# Patient Record
Sex: MALE | Race: WHITE | NOT HISPANIC OR LATINO | Employment: OTHER | URBAN - NONMETROPOLITAN AREA
[De-identification: names, ages, dates, MRNs, and addresses within clinical notes are randomized per-mention and may not be internally consistent; named-entity substitution may affect disease eponyms.]

---

## 2017-04-18 ENCOUNTER — OFFICE VISIT (OUTPATIENT)
Dept: RETAIL CLINIC | Facility: CLINIC | Age: 62
End: 2017-04-18

## 2017-04-18 VITALS
WEIGHT: 192.6 LBS | OXYGEN SATURATION: 97 % | RESPIRATION RATE: 20 BRPM | TEMPERATURE: 98 F | HEIGHT: 72 IN | HEART RATE: 97 BPM | BODY MASS INDEX: 26.09 KG/M2

## 2017-04-18 DIAGNOSIS — J06.9 UPPER RESPIRATORY TRACT INFECTION, UNSPECIFIED TYPE: Primary | ICD-10-CM

## 2017-04-18 PROCEDURE — 99203 OFFICE O/P NEW LOW 30 MIN: CPT | Performed by: NURSE PRACTITIONER

## 2017-04-18 RX ORDER — ALBUTEROL SULFATE 2.5 MG/3ML
2.5 SOLUTION RESPIRATORY (INHALATION) EVERY 4 HOURS PRN
Status: ON HOLD | COMMUNITY
End: 2018-05-06

## 2017-04-18 RX ORDER — AZITHROMYCIN 250 MG/1
TABLET, FILM COATED ORAL
Qty: 6 TABLET | Refills: 0 | Status: SHIPPED | OUTPATIENT
Start: 2017-04-18 | End: 2018-05-08 | Stop reason: HOSPADM

## 2017-04-18 RX ORDER — ALBUTEROL SULFATE 90 UG/1
2 AEROSOL, METERED RESPIRATORY (INHALATION) EVERY 4 HOURS PRN
COMMUNITY
End: 2018-12-25 | Stop reason: HOSPADM

## 2017-04-18 RX ORDER — PREDNISONE 10 MG/1
10 TABLET ORAL DAILY
Qty: 15 TABLET | Refills: 0 | Status: SHIPPED | OUTPATIENT
Start: 2017-04-18 | End: 2017-04-24

## 2017-04-18 RX ORDER — HYDROCHLOROTHIAZIDE 12.5 MG/1
25 CAPSULE, GELATIN COATED ORAL DAILY
Status: ON HOLD | COMMUNITY
End: 2018-05-06

## 2017-04-18 NOTE — PROGRESS NOTES
"Subjective   Maximo Kwon is a 62 y.o. male.   Chief Complaint   Patient presents with   • URI      URI    This is a new problem. The current episode started in the past 7 days. There has been no fever. Associated symptoms include congestion, coughing, rhinorrhea and wheezing. Pertinent negatives include no headaches.      Maximo presents to the clinic today c/o cough which started 3 days ago. Associated symptoms include wheezing and rhinorrhea.  Wheezing is primarily at night.  He has an albuterol nebulizer machine that he uses for his COPD, he has been using this about twice a day. The albuterol has offered some relief in symptoms. Has tried robitussin cough syrup without adequate relief.   Refer to ROS for additional information.    The following portions of the patient's history were reviewed and updated as appropriate: allergies, current medications, past family history, past medical history, past social history, past surgical history and problem list.    Review of Systems   HENT: Positive for congestion and rhinorrhea.    Respiratory: Positive for cough and wheezing.    Neurological: Negative for headaches.   Psychiatric/Behavioral: Positive for sleep disturbance.       No Known Allergies    Pulse 97  Temp 98 °F (36.7 °C) (Oral)   Resp 20  Ht 72\" (182.9 cm)  Wt 192 lb 9.6 oz (87.4 kg)  SpO2 97%  BMI 26.12 kg/m2      Objective     Physical Exam   Constitutional: He is oriented to person, place, and time. He appears well-developed and well-nourished.   HENT:   Head: Normocephalic.   Right Ear: External ear normal.   Left Ear: External ear normal.   Nose: Rhinorrhea present. Right sinus exhibits no maxillary sinus tenderness and no frontal sinus tenderness. Left sinus exhibits no maxillary sinus tenderness and no frontal sinus tenderness.   Mouth/Throat: Oropharynx is clear and moist.   Cardiovascular: Normal rate, regular rhythm and normal heart sounds.    Pulmonary/Chest: Effort normal. He has wheezes " (scant) in the right lower field and the left lower field.   Neurological: He is alert and oriented to person, place, and time.   Nursing note and vitals reviewed.      Assessment/Plan   Maximo was seen today for uri.    Diagnoses and all orders for this visit:    Upper respiratory tract infection, unspecified type  -     azithromycin (ZITHROMAX Z-AURY) 250 MG tablet; Take 2 tablets the first day, then 1 tablet daily for 4 days.  -     predniSONE (DELTASONE) 10 MG tablet; Take 1 tablet by mouth Daily for 6 days. Take 4 tablets for 2 days, 3 tablets for one day and 2 tablets for 2 days.               Follow up with PCP or at the Urgent Care if symptoms worsen or fail to improve within next 48-72 hours.  Patient teaching information discussed and provided to the patient. Patient verbalized understanding.

## 2017-04-18 NOTE — PATIENT INSTRUCTIONS
"Upper Respiratory Infection, Adult  Most upper respiratory infections (URIs) are a viral infection of the air passages leading to the lungs. A URI affects the nose, throat, and upper air passages. The most common type of URI is nasopharyngitis and is typically referred to as \"the common cold.\"  URIs run their course and usually go away on their own. Most of the time, a URI does not require medical attention, but sometimes a bacterial infection in the upper airways can follow a viral infection. This is called a secondary infection. Sinus and middle ear infections are common types of secondary upper respiratory infections.  Bacterial pneumonia can also complicate a URI. A URI can worsen asthma and chronic obstructive pulmonary disease (COPD). Sometimes, these complications can require emergency medical care and may be life threatening.   CAUSES  Almost all URIs are caused by viruses. A virus is a type of germ and can spread from one person to another.   RISKS FACTORS  You may be at risk for a URI if:   · You smoke.    · You have chronic heart or lung disease.  · You have a weakened defense (immune) system.    · You are very young or very old.    · You have nasal allergies or asthma.  · You work in crowded or poorly ventilated areas.  · You work in health care facilities or schools.  SIGNS AND SYMPTOMS   Symptoms typically develop 2-3 days after you come in contact with a cold virus. Most viral URIs last 7-10 days. However, viral URIs from the influenza virus (flu virus) can last 14-18 days and are typically more severe. Symptoms may include:   · Runny or stuffy (congested) nose.    · Sneezing.    · Cough.    · Sore throat.    · Headache.    · Fatigue.    · Fever.    · Loss of appetite.    · Pain in your forehead, behind your eyes, and over your cheekbones (sinus pain).  · Muscle aches.    DIAGNOSIS   Your health care provider may diagnose a URI by:  · Physical exam.  · Tests to check that your symptoms are not due to " another condition such as:  ¨ Strep throat.  ¨ Sinusitis.  ¨ Pneumonia.  ¨ Asthma.  TREATMENT   A URI goes away on its own with time. It cannot be cured with medicines, but medicines may be prescribed or recommended to relieve symptoms. Medicines may help:  · Reduce your fever.  · Reduce your cough.  · Relieve nasal congestion.  HOME CARE INSTRUCTIONS   · Take medicines only as directed by your health care provider.    · Gargle warm saltwater or take cough drops to comfort your throat as directed by your health care provider.  · Use a warm mist humidifier or inhale steam from a shower to increase air moisture. This may make it easier to breathe.  · Drink enough fluid to keep your urine clear or pale yellow.    · Eat soups and other clear broths and maintain good nutrition.    · Rest as needed.    · Return to work when your temperature has returned to normal or as your health care provider advises. You may need to stay home longer to avoid infecting others. You can also use a face mask and careful hand washing to prevent spread of the virus.  · Increase the usage of your inhaler if you have asthma.    · Do not use any tobacco products, including cigarettes, chewing tobacco, or electronic cigarettes. If you need help quitting, ask your health care provider.  PREVENTION   The best way to protect yourself from getting a cold is to practice good hygiene.   · Avoid oral or hand contact with people with cold symptoms.    · Wash your hands often if contact occurs.    There is no clear evidence that vitamin C, vitamin E, echinacea, or exercise reduces the chance of developing a cold. However, it is always recommended to get plenty of rest, exercise, and practice good nutrition.   SEEK MEDICAL CARE IF:   · You are getting worse rather than better.    · Your symptoms are not controlled by medicine.    · You have chills.  · You have worsening shortness of breath.  · You have brown or red mucus.  · You have yellow or brown nasal  discharge.  · You have pain in your face, especially when you bend forward.  · You have a fever.  · You have swollen neck glands.  · You have pain while swallowing.  · You have white areas in the back of your throat.  SEEK IMMEDIATE MEDICAL CARE IF:   · You have severe or persistent:    Headache.    Ear pain.    Sinus pain.    Chest pain.  · You have chronic lung disease and any of the following:    Wheezing.    Prolonged cough.    Coughing up blood.    A change in your usual mucus.  · You have a stiff neck.  · You have changes in your:    Vision.    Hearing.    Thinking.    Mood.  MAKE SURE YOU:   · Understand these instructions.  · Will watch your condition.  · Will get help right away if you are not doing well or get worse.     This information is not intended to replace advice given to you by your health care provider. Make sure you discuss any questions you have with your health care provider.     Document Released: 06/13/2002 Document Revised: 05/03/2016 Document Reviewed: 03/25/2015  [employer] Interactive Patient Education ©2016 [employer]    Cough, Adult  Coughing is a reflex that clears your throat and your airways. Coughing helps to heal and protect your lungs. It is normal to cough occasionally, but a cough that happens with other symptoms or lasts a long time may be a sign of a condition that needs treatment. A cough may last only 2-3 weeks (acute), or it may last longer than 8 weeks (chronic).  CAUSES  Coughing is commonly caused by:  · Breathing in substances that irritate your lungs.  · A viral or bacterial respiratory infection.  · Allergies.  · Asthma.  · Postnasal drip.  · Smoking.  · Acid backing up from the stomach into the esophagus (gastroesophageal reflux).  · Certain medicines.  · Chronic lung problems, including COPD (or rarely, lung cancer).  · Other medical conditions such as heart failure.  HOME CARE INSTRUCTIONS   Pay attention to any changes in your symptoms. Take these actions to  help with your discomfort:  · Take medicines only as told by your health care provider.    If you were prescribed an antibiotic medicine, take it as told by your health care provider. Do not stop taking the antibiotic even if you start to feel better.    Talk with your health care provider before you take a cough suppressant medicine.  · Drink enough fluid to keep your urine clear or pale yellow.  · If the air is dry, use a cold steam vaporizer or humidifier in your bedroom or your home to help loosen secretions.  · Avoid anything that causes you to cough at work or at home.  · If your cough is worse at night, try sleeping in a semi-upright position.  · Avoid cigarette smoke. If you smoke, quit smoking. If you need help quitting, ask your health care provider.  · Avoid caffeine.  · Avoid alcohol.  · Rest as needed.  SEEK MEDICAL CARE IF:   · You have new symptoms.  · You cough up pus.  · Your cough does not get better after 2-3 weeks, or your cough gets worse.  · You cannot control your cough with suppressant medicines and you are losing sleep.  · You develop pain that is getting worse or pain that is not controlled with pain medicines.  · You have a fever.  · You have unexplained weight loss.  · You have night sweats.  SEEK IMMEDIATE MEDICAL CARE IF:  · You cough up blood.  · You have difficulty breathing.  · Your heartbeat is very fast.     This information is not intended to replace advice given to you by your health care provider. Make sure you discuss any questions you have with your health care provider.     Document Released: 06/15/2012 Document Revised: 09/07/2016 Document Reviewed: 02/24/2016  [employer] Interactive Patient Education ©2016 [employer]

## 2018-03-11 ENCOUNTER — HOSPITAL ENCOUNTER (EMERGENCY)
Facility: HOSPITAL | Age: 63
Discharge: HOME | End: 2018-03-11
Attending: EMERGENCY MEDICINE | Admitting: EMERGENCY MEDICINE

## 2018-03-11 ENCOUNTER — APPOINTMENT (OUTPATIENT)
Dept: GENERAL RADIOLOGY | Facility: HOSPITAL | Age: 63
End: 2018-03-11

## 2018-03-11 VITALS
OXYGEN SATURATION: 96 % | HEIGHT: 72 IN | SYSTOLIC BLOOD PRESSURE: 131 MMHG | TEMPERATURE: 99.1 F | WEIGHT: 190 LBS | RESPIRATION RATE: 18 BRPM | DIASTOLIC BLOOD PRESSURE: 70 MMHG | HEART RATE: 96 BPM | BODY MASS INDEX: 25.73 KG/M2

## 2018-03-11 DIAGNOSIS — J44.1 CHRONIC OBSTRUCTIVE PULMONARY DISEASE WITH ACUTE EXACERBATION (HCC): Primary | ICD-10-CM

## 2018-03-11 LAB
A-A DO2: 41.1 MMHG (ref 0–300)
ALBUMIN SERPL-MCNC: 4.4 G/DL (ref 3.4–4.8)
ALBUMIN/GLOB SERPL: 1.5 G/DL (ref 1.5–2.5)
ALP SERPL-CCNC: 71 U/L (ref 40–129)
ALT SERPL W P-5'-P-CCNC: 41 U/L (ref 10–44)
ANION GAP SERPL CALCULATED.3IONS-SCNC: 7 MMOL/L (ref 3.6–11.2)
ARTERIAL PATENCY WRIST A: ABNORMAL
AST SERPL-CCNC: 24 U/L (ref 10–34)
ATMOSPHERIC PRESS: 727 MMHG
BASE EXCESS BLDA CALC-SCNC: 2.2 MMOL/L
BASOPHILS # BLD AUTO: 0.02 10*3/MM3 (ref 0–0.3)
BASOPHILS NFR BLD AUTO: 0.2 % (ref 0–2)
BDY SITE: ABNORMAL
BILIRUB SERPL-MCNC: 1.4 MG/DL (ref 0.2–1.8)
BILIRUB UR QL STRIP: NEGATIVE
BNP SERPL-MCNC: 18 PG/ML (ref 0–100)
BODY TEMPERATURE: 98.6 C
BUN BLD-MCNC: 14 MG/DL (ref 7–21)
BUN/CREAT SERPL: 15.4 (ref 7–25)
CALCIUM SPEC-SCNC: 9.2 MG/DL (ref 7.7–10)
CHLORIDE SERPL-SCNC: 97 MMOL/L (ref 99–112)
CLARITY UR: CLEAR
CO2 SERPL-SCNC: 29 MMOL/L (ref 24.3–31.9)
COHGB MFR BLD: 2.4 % (ref 0–5)
COLOR UR: YELLOW
CREAT BLD-MCNC: 0.91 MG/DL (ref 0.43–1.29)
D-LACTATE SERPL-SCNC: 2.3 MMOL/L (ref 0.5–2)
DEPRECATED RDW RBC AUTO: 49.9 FL (ref 37–54)
EOSINOPHIL # BLD AUTO: 0.02 10*3/MM3 (ref 0–0.7)
EOSINOPHIL NFR BLD AUTO: 0.2 % (ref 0–5)
ERYTHROCYTE [DISTWIDTH] IN BLOOD BY AUTOMATED COUNT: 15.4 % (ref 11.5–14.5)
FLUAV AG NPH QL: NEGATIVE
FLUBV AG NPH QL IA: NEGATIVE
GFR SERPL CREATININE-BSD FRML MDRD: 84 ML/MIN/1.73
GLOBULIN UR ELPH-MCNC: 3 GM/DL
GLUCOSE BLD-MCNC: 216 MG/DL (ref 70–110)
GLUCOSE UR STRIP-MCNC: ABNORMAL MG/DL
HCO3 BLDA-SCNC: 24.7 MMOL/L (ref 22–26)
HCT VFR BLD AUTO: 39 % (ref 42–52)
HCT VFR BLD CALC: 39 % (ref 42–52)
HGB BLD-MCNC: 12.9 G/DL (ref 14–18)
HGB BLDA-MCNC: 13.2 G/DL (ref 12–16)
HGB UR QL STRIP.AUTO: NEGATIVE
HOLD SPECIMEN: NORMAL
HOROWITZ INDEX BLD+IHG-RTO: 21 %
IMM GRANULOCYTES # BLD: 0.02 10*3/MM3 (ref 0–0.03)
IMM GRANULOCYTES NFR BLD: 0.2 % (ref 0–0.5)
KETONES UR QL STRIP: ABNORMAL
LEUKOCYTE ESTERASE UR QL STRIP.AUTO: NEGATIVE
LIPASE SERPL-CCNC: 31 U/L (ref 13–60)
LYMPHOCYTES # BLD AUTO: 0.93 10*3/MM3 (ref 1–3)
LYMPHOCYTES NFR BLD AUTO: 7.2 % (ref 21–51)
MCH RBC QN AUTO: 30.4 PG (ref 27–33)
MCHC RBC AUTO-ENTMCNC: 33.1 G/DL (ref 33–37)
MCV RBC AUTO: 91.8 FL (ref 80–94)
METHGB BLD QL: 0.2 % (ref 0–3)
MODALITY: ABNORMAL
MONOCYTES # BLD AUTO: 1.04 10*3/MM3 (ref 0.1–0.9)
MONOCYTES NFR BLD AUTO: 8.1 % (ref 0–10)
NEUTROPHILS # BLD AUTO: 10.83 10*3/MM3 (ref 1.4–6.5)
NEUTROPHILS NFR BLD AUTO: 84.1 % (ref 30–70)
NITRITE UR QL STRIP: NEGATIVE
OSMOLALITY SERPL CALC.SUM OF ELEC: 273.4 MOSM/KG (ref 273–305)
OXYHGB MFR BLDV: 90.6 % (ref 85–100)
PCO2 BLDA: 32 MM HG (ref 35–45)
PH BLDA: 7.51 PH UNITS (ref 7.35–7.45)
PH UR STRIP.AUTO: 6.5 [PH] (ref 5–8)
PLATELET # BLD AUTO: 197 10*3/MM3 (ref 130–400)
PMV BLD AUTO: 11.9 FL (ref 6–10)
PO2 BLDA: 63.4 MM HG (ref 80–100)
POTASSIUM BLD-SCNC: 4.2 MMOL/L (ref 3.5–5.3)
PROT SERPL-MCNC: 7.4 G/DL (ref 6–8)
PROT UR QL STRIP: NEGATIVE
RBC # BLD AUTO: 4.25 10*6/MM3 (ref 4.7–6.1)
SAO2 % BLDCOA: 93 % (ref 90–100)
SODIUM BLD-SCNC: 133 MMOL/L (ref 135–153)
SP GR UR STRIP: 1.02 (ref 1–1.03)
TROPONIN I SERPL-MCNC: <0.006 NG/ML
UROBILINOGEN UR QL STRIP: ABNORMAL
WBC NRBC COR # BLD: 12.86 10*3/MM3 (ref 4.5–12.5)

## 2018-03-11 PROCEDURE — 87804 INFLUENZA ASSAY W/OPTIC: CPT | Performed by: EMERGENCY MEDICINE

## 2018-03-11 PROCEDURE — 71045 X-RAY EXAM CHEST 1 VIEW: CPT

## 2018-03-11 PROCEDURE — 36600 WITHDRAWAL OF ARTERIAL BLOOD: CPT | Performed by: EMERGENCY MEDICINE

## 2018-03-11 PROCEDURE — 83880 ASSAY OF NATRIURETIC PEPTIDE: CPT | Performed by: EMERGENCY MEDICINE

## 2018-03-11 PROCEDURE — 85025 COMPLETE CBC W/AUTO DIFF WBC: CPT | Performed by: EMERGENCY MEDICINE

## 2018-03-11 PROCEDURE — 25010000002 CEFTRIAXONE: Performed by: EMERGENCY MEDICINE

## 2018-03-11 PROCEDURE — 94640 AIRWAY INHALATION TREATMENT: CPT

## 2018-03-11 PROCEDURE — 99284 EMERGENCY DEPT VISIT MOD MDM: CPT

## 2018-03-11 PROCEDURE — 93010 ELECTROCARDIOGRAM REPORT: CPT | Performed by: INTERNAL MEDICINE

## 2018-03-11 PROCEDURE — 83605 ASSAY OF LACTIC ACID: CPT | Performed by: EMERGENCY MEDICINE

## 2018-03-11 PROCEDURE — 96368 THER/DIAG CONCURRENT INF: CPT

## 2018-03-11 PROCEDURE — 82375 ASSAY CARBOXYHB QUANT: CPT | Performed by: EMERGENCY MEDICINE

## 2018-03-11 PROCEDURE — 83690 ASSAY OF LIPASE: CPT | Performed by: EMERGENCY MEDICINE

## 2018-03-11 PROCEDURE — 96365 THER/PROPH/DIAG IV INF INIT: CPT

## 2018-03-11 PROCEDURE — 80053 COMPREHEN METABOLIC PANEL: CPT | Performed by: EMERGENCY MEDICINE

## 2018-03-11 PROCEDURE — 94799 UNLISTED PULMONARY SVC/PX: CPT

## 2018-03-11 PROCEDURE — 83050 HGB METHEMOGLOBIN QUAN: CPT | Performed by: EMERGENCY MEDICINE

## 2018-03-11 PROCEDURE — 25010000002 METHYLPREDNISOLONE PER 125 MG: Performed by: EMERGENCY MEDICINE

## 2018-03-11 PROCEDURE — 87040 BLOOD CULTURE FOR BACTERIA: CPT | Performed by: EMERGENCY MEDICINE

## 2018-03-11 PROCEDURE — 82805 BLOOD GASES W/O2 SATURATION: CPT | Performed by: EMERGENCY MEDICINE

## 2018-03-11 PROCEDURE — 84484 ASSAY OF TROPONIN QUANT: CPT | Performed by: EMERGENCY MEDICINE

## 2018-03-11 PROCEDURE — 71045 X-RAY EXAM CHEST 1 VIEW: CPT | Performed by: RADIOLOGY

## 2018-03-11 PROCEDURE — 81003 URINALYSIS AUTO W/O SCOPE: CPT | Performed by: EMERGENCY MEDICINE

## 2018-03-11 PROCEDURE — 96375 TX/PRO/DX INJ NEW DRUG ADDON: CPT

## 2018-03-11 PROCEDURE — 93005 ELECTROCARDIOGRAM TRACING: CPT | Performed by: EMERGENCY MEDICINE

## 2018-03-11 PROCEDURE — 96361 HYDRATE IV INFUSION ADD-ON: CPT

## 2018-03-11 RX ORDER — METHYLPREDNISOLONE 4 MG/1
TABLET ORAL
Qty: 1 EACH | Refills: 0 | Status: SHIPPED | OUTPATIENT
Start: 2018-03-11 | End: 2018-05-08 | Stop reason: HOSPADM

## 2018-03-11 RX ORDER — DOXYCYCLINE 100 MG/1
100 CAPSULE ORAL EVERY 12 HOURS
Qty: 20 CAPSULE | Refills: 0 | Status: ON HOLD | OUTPATIENT
Start: 2018-03-11 | End: 2018-05-06

## 2018-03-11 RX ORDER — OMEPRAZOLE 20 MG/1
20 CAPSULE, DELAYED RELEASE ORAL DAILY
COMMUNITY
End: 2022-02-28 | Stop reason: SDUPTHER

## 2018-03-11 RX ORDER — PRAVASTATIN SODIUM 20 MG
20 TABLET ORAL NIGHTLY
COMMUNITY
End: 2018-12-25 | Stop reason: HOSPADM

## 2018-03-11 RX ORDER — METHYLPREDNISOLONE SODIUM SUCCINATE 125 MG/2ML
125 INJECTION, POWDER, LYOPHILIZED, FOR SOLUTION INTRAMUSCULAR; INTRAVENOUS ONCE
Status: COMPLETED | OUTPATIENT
Start: 2018-03-11 | End: 2018-03-11

## 2018-03-11 RX ORDER — GABAPENTIN 300 MG/1
300 CAPSULE ORAL 2 TIMES DAILY
Status: ON HOLD | COMMUNITY
End: 2018-05-06

## 2018-03-11 RX ORDER — SODIUM CHLORIDE 0.9 % (FLUSH) 0.9 %
10 SYRINGE (ML) INJECTION AS NEEDED
Status: DISCONTINUED | OUTPATIENT
Start: 2018-03-11 | End: 2018-03-11 | Stop reason: HOSPADM

## 2018-03-11 RX ORDER — GUAIFENESIN 600 MG/1
1200 TABLET, EXTENDED RELEASE ORAL EVERY 12 HOURS SCHEDULED
COMMUNITY
End: 2019-04-05

## 2018-03-11 RX ORDER — LISINOPRIL 2.5 MG/1
2.5 TABLET ORAL DAILY
COMMUNITY
End: 2018-12-25 | Stop reason: HOSPADM

## 2018-03-11 RX ORDER — SODIUM CHLORIDE 9 MG/ML
125 INJECTION, SOLUTION INTRAVENOUS CONTINUOUS
Status: DISCONTINUED | OUTPATIENT
Start: 2018-03-11 | End: 2018-03-11 | Stop reason: HOSPADM

## 2018-03-11 RX ORDER — CEFDINIR 300 MG/1
300 CAPSULE ORAL EVERY 12 HOURS
Qty: 20 CAPSULE | Refills: 0 | Status: ON HOLD | OUTPATIENT
Start: 2018-03-11 | End: 2018-05-06

## 2018-03-11 RX ORDER — IPRATROPIUM BROMIDE AND ALBUTEROL SULFATE 2.5; .5 MG/3ML; MG/3ML
3 SOLUTION RESPIRATORY (INHALATION)
Status: COMPLETED | OUTPATIENT
Start: 2018-03-11 | End: 2018-03-11

## 2018-03-11 RX ADMIN — DOXYCYCLINE 100 MG: 100 INJECTION, POWDER, LYOPHILIZED, FOR SOLUTION INTRAVENOUS at 13:40

## 2018-03-11 RX ADMIN — IPRATROPIUM BROMIDE AND ALBUTEROL SULFATE 3 ML: .5; 3 SOLUTION RESPIRATORY (INHALATION) at 12:33

## 2018-03-11 RX ADMIN — CEFTRIAXONE 1 G: 1 INJECTION, POWDER, FOR SOLUTION INTRAMUSCULAR; INTRAVENOUS at 13:38

## 2018-03-11 RX ADMIN — METHYLPREDNISOLONE SODIUM SUCCINATE 125 MG: 125 INJECTION, POWDER, FOR SOLUTION INTRAMUSCULAR; INTRAVENOUS at 12:55

## 2018-03-11 RX ADMIN — SODIUM CHLORIDE 125 ML/HR: 9 INJECTION, SOLUTION INTRAVENOUS at 14:57

## 2018-03-11 RX ADMIN — SODIUM CHLORIDE 1000 ML: 9 INJECTION, SOLUTION INTRAVENOUS at 12:55

## 2018-03-11 RX ADMIN — IPRATROPIUM BROMIDE AND ALBUTEROL SULFATE 3 ML: .5; 3 SOLUTION RESPIRATORY (INHALATION) at 13:27

## 2018-03-11 NOTE — ED PROVIDER NOTES
Subjective   Patient is a 62-year-old white male who reports a history of COPD.  He reports that about 6 weeks ago he developed a bad case of bronchitis which took him about 4 weeks to recover from.  He states that time he did, but over the last 2-3 days she has had increased cough productive of thick yellow sputum as well as progressively worsening shortness of breath and wheezing.  He has felt feverish, had some chills, had some generalized myalgias.  He denies chest pain, abdominal pain, nausea, vomiting, diarrhea, bloody stools, any other associated symptoms or other complaints.            Review of Systems   Constitutional: Positive for chills and fever. Negative for diaphoresis.   HENT: Negative for ear pain, sore throat and trouble swallowing.    Eyes: Negative for photophobia and pain.   Respiratory: Positive for cough, shortness of breath and wheezing. Negative for stridor.    Cardiovascular: Negative for chest pain and palpitations.   Gastrointestinal: Negative for abdominal distention, abdominal pain, blood in stool, diarrhea, nausea and vomiting.   Endocrine: Negative for polydipsia and polyphagia.   Genitourinary: Negative for difficulty urinating and flank pain.   Musculoskeletal: Negative for back pain, neck pain and neck stiffness.   Skin: Negative for color change and pallor.   Neurological: Negative for seizures, syncope and speech difficulty.   Psychiatric/Behavioral: Negative for confusion.   All other systems reviewed and are negative.      Past Medical History:   Diagnosis Date   • COPD (chronic obstructive pulmonary disease)    • Diabetes mellitus    • Diverticulitis    • Hypertension        No Known Allergies    History reviewed. No pertinent surgical history.    History reviewed. No pertinent family history.    Social History     Social History   • Marital status:      Social History Main Topics   • Smoking status: Former Smoker     Packs/day: 1.00     Years: 30.00     Types:  Cigarettes     Quit date: 2008   • Smokeless tobacco: Never Used   • Alcohol use No   • Drug use: No   • Sexual activity: Defer     Other Topics Concern   • Not on file           Objective   Physical Exam   Constitutional: He is oriented to person, place, and time. He appears well-developed and well-nourished. No distress.   HENT:   Head: Normocephalic and atraumatic.   Eyes: EOM are normal. Pupils are equal, round, and reactive to light. No scleral icterus.   Neck: Normal range of motion. Neck supple. No no neck rigidity. No tracheal deviation present.   Cardiovascular: Normal rate, regular rhythm and intact distal pulses.    Pulmonary/Chest: No respiratory distress. He exhibits no tenderness.   Patient does not appear to be in respiratory distress.  Lung exam reveals moderate expiratory wheezes throughout.  Overall air movement seems pretty good.  He is able to speak full sentences without difficulty.   Abdominal: Soft. Bowel sounds are normal. There is no tenderness. There is no rebound and no guarding.   Musculoskeletal: Normal range of motion. He exhibits no tenderness.   Neurological: He is alert and oriented to person, place, and time. He has normal strength. No sensory deficit. He exhibits normal muscle tone. Coordination normal. GCS eye subscore is 4. GCS verbal subscore is 5. GCS motor subscore is 6.   Skin: Skin is warm and dry. Capillary refill takes less than 2 seconds. He is not diaphoretic. No cyanosis. No pallor.   Psychiatric: He has a normal mood and affect. His behavior is normal.   Vitals reviewed.      Procedures  EKG shows sinus rhythm with a rate of 100.  No apparent acute ischemia.  XR Chest 1 View    (Results Pending)      Chest x-ray, pending radiologist review, shows no definite infiltrate.  There is some vague haziness in the right base.  No other apparent acute disease.  There are no previous films on file with which to compare.  Results for orders placed or performed during the hospital  encounter of 03/11/18   Influenza Antigen, Rapid - Swab, Nasopharynx   Result Value Ref Range    Influenza A Ag, EIA Negative Negative    Influenza B Ag, EIA Negative Negative   Comprehensive Metabolic Panel   Result Value Ref Range    Glucose 216 (H) 70 - 110 mg/dL    BUN 14 7 - 21 mg/dL    Creatinine 0.91 0.43 - 1.29 mg/dL    Sodium 133 (L) 135 - 153 mmol/L    Potassium 4.2 3.5 - 5.3 mmol/L    Chloride 97 (L) 99 - 112 mmol/L    CO2 29.0 24.3 - 31.9 mmol/L    Calcium 9.2 7.7 - 10.0 mg/dL    Total Protein 7.4 6.0 - 8.0 g/dL    Albumin 4.40 3.40 - 4.80 g/dL    ALT (SGPT) 41 10 - 44 U/L    AST (SGOT) 24 10 - 34 U/L    Alkaline Phosphatase 71 40 - 129 U/L    Total Bilirubin 1.4 0.2 - 1.8 mg/dL    eGFR Non African Amer 84 >60 mL/min/1.73    Globulin 3.0 gm/dL    A/G Ratio 1.5 1.5 - 2.5 g/dL    BUN/Creatinine Ratio 15.4 7.0 - 25.0    Anion Gap 7.0 3.6 - 11.2 mmol/L   Lipase   Result Value Ref Range    Lipase 31 13 - 60 U/L   Urinalysis With / Culture If Indicated - Urine, Clean Catch   Result Value Ref Range    Color, UA Yellow Yellow, Straw    Appearance, UA Clear Clear    pH, UA 6.5 5.0 - 8.0    Specific Gravity, UA 1.020 1.005 - 1.030    Glucose,  mg/dL (Trace) (A) Negative    Ketones, UA Trace (A) Negative    Bilirubin, UA Negative Negative    Blood, UA Negative Negative    Protein, UA Negative Negative    Leuk Esterase, UA Negative Negative    Nitrite, UA Negative Negative    Urobilinogen, UA 1.0 E.U./dL 0.2 - 1.0 E.U./dL   Blood Gas, Arterial   Result Value Ref Range    Site Arterial: right brachial     Max's Test N/A     pH, Arterial 7.506 (C) 7.350 - 7.450 pH units    pCO2, Arterial 32.0 (L) 35.0 - 45.0 mm Hg    pO2, Arterial 63.4 (L) 80.0 - 100.0 mm Hg    HCO3, Arterial 24.7 22.0 - 26.0 mmol/L    Base Excess, Arterial 2.2 mmol/L    O2 Saturation, Arterial 93.0 90.0 - 100.0 %    Hemoglobin, Blood Gas 13.2 12 - 16 g/dL    Hematocrit, Blood Gas 39.0 (L) 42.0 - 52.0 %    Oxyhemoglobin 90.6 85 - 100 %     Methemoglobin 0.20 0.00 - 3.00 %    Carboxyhemoglobin 2.4 0 - 5 %    A-a Gradiant 41.1 0.0 - 300.0 mmHg    Temperature 98.6 C    Barometric Pressure for Blood Gas 727 mmHg    Modality Room Air     FIO2 21 %   BNP   Result Value Ref Range    BNP 18.0 0.0 - 100.0 pg/mL   Troponin   Result Value Ref Range    Troponin I <0.006 <=0.040 ng/mL   Lactic Acid, Plasma   Result Value Ref Range    Lactate 2.3 (C) 0.5 - 2.0 mmol/L   CBC Auto Differential   Result Value Ref Range    WBC 12.86 (H) 4.50 - 12.50 10*3/mm3    RBC 4.25 (L) 4.70 - 6.10 10*6/mm3    Hemoglobin 12.9 (L) 14.0 - 18.0 g/dL    Hematocrit 39.0 (L) 42.0 - 52.0 %    MCV 91.8 80.0 - 94.0 fL    MCH 30.4 27.0 - 33.0 pg    MCHC 33.1 33.0 - 37.0 g/dL    RDW 15.4 (H) 11.5 - 14.5 %    RDW-SD 49.9 37.0 - 54.0 fl    MPV 11.9 (H) 6.0 - 10.0 fL    Platelets 197 130 - 400 10*3/mm3    Neutrophil % 84.1 (H) 30.0 - 70.0 %    Lymphocyte % 7.2 (L) 21.0 - 51.0 %    Monocyte % 8.1 0.0 - 10.0 %    Eosinophil % 0.2 0.0 - 5.0 %    Basophil % 0.2 0.0 - 2.0 %    Immature Grans % 0.2 0.0 - 0.5 %    Neutrophils, Absolute 10.83 (H) 1.40 - 6.50 10*3/mm3    Lymphocytes, Absolute 0.93 (L) 1.00 - 3.00 10*3/mm3    Monocytes, Absolute 1.04 (H) 0.10 - 0.90 10*3/mm3    Eosinophils, Absolute 0.02 0.00 - 0.70 10*3/mm3    Basophils, Absolute 0.02 0.00 - 0.30 10*3/mm3    Immature Grans, Absolute 0.02 0.00 - 0.03 10*3/mm3   Osmolality, Calculated   Result Value Ref Range    Osmolality Calc 273.4 273.0 - 305.0 mOsm/kg            ED Course  ED Course   Comment By Time   Patient's emergency department stay has been uneventful.  He voices that he is feeling much better.  He appears comfortable.  Lungs are clear to auscultation throughout with the exception of occasional scattered rhonchus; cough is still somewhat bronchospastic.  We discussed his test results and his plan of care.  He voices understanding and agreement. Bhavesh Stewart MD 03/11 1523                  [facility]    Final diagnoses:   Chronic  obstructive pulmonary disease with acute exacerbation             Please note that portions of this note were completed with a voice recognition program. Efforts were made to edit the dictations, but occasionally words are mistranscribed.         Bhavesh Stewart MD  03/11/18 [id number]

## 2018-03-11 NOTE — DISCHARGE INSTRUCTIONS
Home in care of family.  Rest.  Drink plenty of water.  Take your medications as prescribed.  Use your albuterol/ipratropium breathing treatments every 4 hours as needed.  No strenuous activity.  Do not smoke.  Follow-up with Zachariah Shay in the office in 2 days.  Return the emergency Department right away if any problems.

## 2018-03-16 LAB
BACTERIA SPEC AEROBE CULT: NORMAL
BACTERIA SPEC AEROBE CULT: NORMAL

## 2018-03-30 ENCOUNTER — APPOINTMENT (OUTPATIENT)
Dept: CT IMAGING | Facility: HOSPITAL | Age: 63
End: 2018-03-30

## 2018-03-30 ENCOUNTER — HOSPITAL ENCOUNTER (EMERGENCY)
Facility: HOSPITAL | Age: 63
Discharge: HOME | End: 2018-03-30
Attending: EMERGENCY MEDICINE | Admitting: EMERGENCY MEDICINE

## 2018-03-30 VITALS
DIASTOLIC BLOOD PRESSURE: 64 MMHG | HEART RATE: 99 BPM | HEIGHT: 72 IN | OXYGEN SATURATION: 98 % | SYSTOLIC BLOOD PRESSURE: 108 MMHG | WEIGHT: 190 LBS | BODY MASS INDEX: 25.73 KG/M2 | TEMPERATURE: 98 F | RESPIRATION RATE: 18 BRPM

## 2018-03-30 DIAGNOSIS — A08.4 VIRAL GASTROENTERITIS: Primary | ICD-10-CM

## 2018-03-30 LAB
ALBUMIN SERPL-MCNC: 4.7 G/DL (ref 3.4–4.8)
ALBUMIN/GLOB SERPL: 1.6 G/DL (ref 1.5–2.5)
ALP SERPL-CCNC: 66 U/L (ref 40–129)
ALT SERPL W P-5'-P-CCNC: 48 U/L (ref 10–44)
ANION GAP SERPL CALCULATED.3IONS-SCNC: 9.4 MMOL/L (ref 3.6–11.2)
AST SERPL-CCNC: 31 U/L (ref 10–34)
BASOPHILS # BLD AUTO: 0.01 10*3/MM3 (ref 0–0.3)
BASOPHILS NFR BLD AUTO: 0.1 % (ref 0–2)
BILIRUB SERPL-MCNC: 0.6 MG/DL (ref 0.2–1.8)
BILIRUB UR QL STRIP: NEGATIVE
BUN BLD-MCNC: 20 MG/DL (ref 7–21)
BUN/CREAT SERPL: 17.9 (ref 7–25)
CALCIUM SPEC-SCNC: 9.1 MG/DL (ref 7.7–10)
CHLORIDE SERPL-SCNC: 98 MMOL/L (ref 99–112)
CLARITY UR: CLEAR
CO2 SERPL-SCNC: 27.6 MMOL/L (ref 24.3–31.9)
COLOR UR: ABNORMAL
CREAT BLD-MCNC: 1.12 MG/DL (ref 0.43–1.29)
CRP SERPL-MCNC: 4.5 MG/DL (ref 0–0.99)
DEPRECATED RDW RBC AUTO: 48.4 FL (ref 37–54)
EOSINOPHIL # BLD AUTO: 0.17 10*3/MM3 (ref 0–0.7)
EOSINOPHIL NFR BLD AUTO: 2.4 % (ref 0–5)
ERYTHROCYTE [DISTWIDTH] IN BLOOD BY AUTOMATED COUNT: 14.9 % (ref 11.5–14.5)
GFR SERPL CREATININE-BSD FRML MDRD: 66 ML/MIN/1.73
GLOBULIN UR ELPH-MCNC: 3 GM/DL
GLUCOSE BLD-MCNC: 163 MG/DL (ref 70–110)
GLUCOSE UR STRIP-MCNC: ABNORMAL MG/DL
HCT VFR BLD AUTO: 42.5 % (ref 42–52)
HGB BLD-MCNC: 14.3 G/DL (ref 14–18)
HGB UR QL STRIP.AUTO: NEGATIVE
HOLD SPECIMEN: NORMAL
HOLD SPECIMEN: NORMAL
IMM GRANULOCYTES # BLD: 0.02 10*3/MM3 (ref 0–0.03)
IMM GRANULOCYTES NFR BLD: 0.3 % (ref 0–0.5)
KETONES UR QL STRIP: ABNORMAL
LEUKOCYTE ESTERASE UR QL STRIP.AUTO: NEGATIVE
LIPASE SERPL-CCNC: 34 U/L (ref 13–60)
LYMPHOCYTES # BLD AUTO: 0.93 10*3/MM3 (ref 1–3)
LYMPHOCYTES NFR BLD AUTO: 13 % (ref 21–51)
MCH RBC QN AUTO: 30.2 PG (ref 27–33)
MCHC RBC AUTO-ENTMCNC: 33.6 G/DL (ref 33–37)
MCV RBC AUTO: 89.9 FL (ref 80–94)
MONOCYTES # BLD AUTO: 1.28 10*3/MM3 (ref 0.1–0.9)
MONOCYTES NFR BLD AUTO: 17.9 % (ref 0–10)
NEUTROPHILS # BLD AUTO: 4.76 10*3/MM3 (ref 1.4–6.5)
NEUTROPHILS NFR BLD AUTO: 66.3 % (ref 30–70)
NITRITE UR QL STRIP: NEGATIVE
OSMOLALITY SERPL CALC.SUM OF ELEC: 276.3 MOSM/KG (ref 273–305)
PH UR STRIP.AUTO: <=5 [PH] (ref 5–8)
PLATELET # BLD AUTO: 288 10*3/MM3 (ref 130–400)
PMV BLD AUTO: 11.7 FL (ref 6–10)
POTASSIUM BLD-SCNC: 3.7 MMOL/L (ref 3.5–5.3)
PROT SERPL-MCNC: 7.7 G/DL (ref 6–8)
PROT UR QL STRIP: NEGATIVE
RBC # BLD AUTO: 4.73 10*6/MM3 (ref 4.7–6.1)
SODIUM BLD-SCNC: 135 MMOL/L (ref 135–153)
SP GR UR STRIP: >=1.03 (ref 1–1.03)
UROBILINOGEN UR QL STRIP: ABNORMAL
WBC NRBC COR # BLD: 7.17 10*3/MM3 (ref 4.5–12.5)
WHOLE BLOOD HOLD SPECIMEN: NORMAL
WHOLE BLOOD HOLD SPECIMEN: NORMAL

## 2018-03-30 PROCEDURE — 74177 CT ABD & PELVIS W/CONTRAST: CPT | Performed by: RADIOLOGY

## 2018-03-30 PROCEDURE — 25010000002 IOPAMIDOL 61 % SOLUTION: Performed by: EMERGENCY MEDICINE

## 2018-03-30 PROCEDURE — 81003 URINALYSIS AUTO W/O SCOPE: CPT | Performed by: PHYSICIAN ASSISTANT

## 2018-03-30 PROCEDURE — 85025 COMPLETE CBC W/AUTO DIFF WBC: CPT | Performed by: PHYSICIAN ASSISTANT

## 2018-03-30 PROCEDURE — 83690 ASSAY OF LIPASE: CPT | Performed by: PHYSICIAN ASSISTANT

## 2018-03-30 PROCEDURE — 25010000002 ONDANSETRON PER 1 MG: Performed by: PHYSICIAN ASSISTANT

## 2018-03-30 PROCEDURE — 86140 C-REACTIVE PROTEIN: CPT | Performed by: PHYSICIAN ASSISTANT

## 2018-03-30 PROCEDURE — 99283 EMERGENCY DEPT VISIT LOW MDM: CPT

## 2018-03-30 PROCEDURE — 80053 COMPREHEN METABOLIC PANEL: CPT | Performed by: PHYSICIAN ASSISTANT

## 2018-03-30 PROCEDURE — 96374 THER/PROPH/DIAG INJ IV PUSH: CPT

## 2018-03-30 PROCEDURE — 74177 CT ABD & PELVIS W/CONTRAST: CPT

## 2018-03-30 PROCEDURE — 96375 TX/PRO/DX INJ NEW DRUG ADDON: CPT

## 2018-03-30 PROCEDURE — 25010000002 ORPHENADRINE CITRATE PER 60 MG: Performed by: PHYSICIAN ASSISTANT

## 2018-03-30 PROCEDURE — 96361 HYDRATE IV INFUSION ADD-ON: CPT

## 2018-03-30 RX ORDER — CYCLOBENZAPRINE HCL 10 MG
10 TABLET ORAL ONCE
Status: COMPLETED | OUTPATIENT
Start: 2018-03-30 | End: 2018-03-30

## 2018-03-30 RX ORDER — ORPHENADRINE CITRATE 30 MG/ML
60 INJECTION INTRAMUSCULAR; INTRAVENOUS ONCE
Status: COMPLETED | OUTPATIENT
Start: 2018-03-30 | End: 2018-03-30

## 2018-03-30 RX ORDER — DICYCLOMINE HCL 20 MG
20 TABLET ORAL EVERY 6 HOURS PRN
Qty: 15 TABLET | Refills: 0 | Status: ON HOLD | OUTPATIENT
Start: 2018-03-30 | End: 2018-05-06

## 2018-03-30 RX ORDER — ONDANSETRON 2 MG/ML
4 INJECTION INTRAMUSCULAR; INTRAVENOUS ONCE
Status: COMPLETED | OUTPATIENT
Start: 2018-03-30 | End: 2018-03-30

## 2018-03-30 RX ORDER — METAXALONE 800 MG/1
800 TABLET ORAL 3 TIMES DAILY PRN
Qty: 20 TABLET | Refills: 0 | Status: ON HOLD | OUTPATIENT
Start: 2018-03-30 | End: 2018-05-06

## 2018-03-30 RX ORDER — SODIUM CHLORIDE 0.9 % (FLUSH) 0.9 %
10 SYRINGE (ML) INJECTION AS NEEDED
Status: DISCONTINUED | OUTPATIENT
Start: 2018-03-30 | End: 2018-03-30 | Stop reason: HOSPADM

## 2018-03-30 RX ORDER — ONDANSETRON 4 MG/1
4 TABLET, ORALLY DISINTEGRATING ORAL EVERY 8 HOURS PRN
Qty: 20 TABLET | Refills: 0 | Status: ON HOLD | OUTPATIENT
Start: 2018-03-30 | End: 2018-05-06

## 2018-03-30 RX ADMIN — CYCLOBENZAPRINE HYDROCHLORIDE 10 MG: 10 TABLET, FILM COATED ORAL at 21:29

## 2018-03-30 RX ADMIN — ORPHENADRINE CITRATE 60 MG: 30 INJECTION INTRAMUSCULAR; INTRAVENOUS at 18:45

## 2018-03-30 RX ADMIN — SODIUM CHLORIDE 1000 ML: 9 INJECTION, SOLUTION INTRAVENOUS at 18:45

## 2018-03-30 RX ADMIN — IOPAMIDOL 90 ML: 612 INJECTION, SOLUTION INTRAVENOUS at 20:09

## 2018-03-30 RX ADMIN — ONDANSETRON 4 MG: 2 INJECTION, SOLUTION INTRAMUSCULAR; INTRAVENOUS at 18:45

## 2018-04-27 ENCOUNTER — OFFICE VISIT (OUTPATIENT)
Dept: PULMONOLOGY | Facility: CLINIC | Age: 63
End: 2018-04-27

## 2018-04-27 VITALS
DIASTOLIC BLOOD PRESSURE: 99 MMHG | OXYGEN SATURATION: 96 % | HEART RATE: 101 BPM | SYSTOLIC BLOOD PRESSURE: 151 MMHG | TEMPERATURE: 97.5 F | WEIGHT: 200.6 LBS | BODY MASS INDEX: 27.17 KG/M2 | HEIGHT: 72 IN

## 2018-04-27 DIAGNOSIS — J41.1 MUCOPURULENT CHRONIC BRONCHITIS (HCC): Primary | ICD-10-CM

## 2018-04-27 DIAGNOSIS — Z87.891 HISTORY OF NICOTINE USE: ICD-10-CM

## 2018-04-27 PROCEDURE — 99204 OFFICE O/P NEW MOD 45 MIN: CPT | Performed by: INTERNAL MEDICINE

## 2018-04-27 RX ORDER — IPRATROPIUM BROMIDE AND ALBUTEROL SULFATE 2.5; .5 MG/3ML; MG/3ML
3 SOLUTION RESPIRATORY (INHALATION) EVERY 4 HOURS PRN
Qty: 120 VIAL | Refills: 11 | Status: SHIPPED | OUTPATIENT
Start: 2018-04-27 | End: 2019-04-05

## 2018-05-04 ENCOUNTER — OFFICE VISIT (OUTPATIENT)
Dept: PULMONOLOGY | Facility: CLINIC | Age: 63
End: 2018-05-04

## 2018-05-04 VITALS
HEIGHT: 72 IN | OXYGEN SATURATION: 98 % | SYSTOLIC BLOOD PRESSURE: 141 MMHG | HEART RATE: 116 BPM | BODY MASS INDEX: 27.09 KG/M2 | WEIGHT: 200 LBS | TEMPERATURE: 98 F | DIASTOLIC BLOOD PRESSURE: 84 MMHG

## 2018-05-04 DIAGNOSIS — J44.0 CHRONIC OBSTRUCTIVE PULMONARY DISEASE WITH ACUTE LOWER RESPIRATORY INFECTION (HCC): Primary | ICD-10-CM

## 2018-05-04 PROCEDURE — 99214 OFFICE O/P EST MOD 30 MIN: CPT | Performed by: INTERNAL MEDICINE

## 2018-05-04 RX ORDER — METHYLPREDNISOLONE 4 MG/1
TABLET ORAL
Qty: 21 TABLET | Refills: 0 | Status: ON HOLD | OUTPATIENT
Start: 2018-05-04 | End: 2018-05-06

## 2018-05-04 RX ORDER — AZITHROMYCIN 250 MG/1
TABLET, FILM COATED ORAL
Qty: 6 TABLET | Refills: 0 | Status: ON HOLD | OUTPATIENT
Start: 2018-05-04 | End: 2018-05-06

## 2018-05-04 NOTE — PROGRESS NOTES
Subjective   Maximo Kwon is a 63 y.o. male who is being seen for Bronchitis    History of Present Illness   Mr. Kwon stops by today on an unscheduled visit.  He tells me that 2 days ago he developed scratchy throat and later on started having cough with yellowish sputum production.  Now he has elen wheezing and frequent bouts of cough with yellowish phlegm.    Past Medical History:   Diagnosis Date   • COPD (chronic obstructive pulmonary disease)    • Diabetes mellitus    • Diverticulitis    • Hypertension      No past surgical history on file.  History reviewed. No pertinent family history.   reports that he quit smoking about 10 years ago. His smoking use included Cigarettes. He has a 30.00 pack-year smoking history. He has never used smokeless tobacco. He reports that he does not drink alcohol or use drugs.  No Known Allergies        Patient has received a flu shot but not a pneumonia vaccination.    The following portions of the patient's history were reviewed and updated as appropriate: allergies, current medications, past family history, past medical history, past social history, past surgical history and problem list.    Review of Systems   Constitutional: Positive for fatigue. Negative for appetite change, chills, diaphoresis and unexpected weight change.   HENT: Negative for sore throat, trouble swallowing and voice change.    Eyes: Negative for visual disturbance.   Respiratory: Positive for cough, shortness of breath and wheezing. Negative for apnea and choking.    Cardiovascular: Negative for chest pain, palpitations and leg swelling.   Gastrointestinal: Negative for abdominal pain, constipation, diarrhea, nausea and vomiting.   Endocrine: Negative for cold intolerance, heat intolerance, polydipsia, polyphagia and polyuria.   Genitourinary: Negative for difficulty urinating and dysuria.   Musculoskeletal: Negative for gait problem.   Skin: Negative for rash and wound.   Neurological: Negative for  "syncope and light-headedness.   Hematological: Negative for adenopathy.   Psychiatric/Behavioral: Negative for agitation, behavioral problems and confusion.   All other systems reviewed and are negative.      Objective   /84   Pulse 116   Temp 98 °F (36.7 °C) (Oral)   Ht 182.9 cm (72\")   Wt 90.7 kg (200 lb)   SpO2 98%   BMI 27.12 kg/m²   Physical Exam   Constitutional: He is oriented to person, place, and time. He appears well-developed and well-nourished.   HENT:   Head: Normocephalic and atraumatic.   Nose: Mucosal edema present.   Eyes: EOM are normal. Pupils are equal, round, and reactive to light.   Neck: Neck supple.   Cardiovascular: Normal rate, regular rhythm and normal heart sounds.    Pulmonary/Chest: He has wheezes.   Expiratory wheezing bilaterally, prominent posteriorly   Abdominal: Soft. Bowel sounds are normal.   Musculoskeletal: Normal range of motion.   Neurological: He is alert and oriented to person, place, and time.   Skin: Skin is warm and dry.   Psychiatric: He has a normal mood and affect. His behavior is normal.   Nursing note and vitals reviewed.        Radiology:  Ct Abdomen Pelvis With Contrast    Result Date: 3/31/2018  : No definitive source for the patient's symptoms.        This report was finalized on 3/31/2018 9:50 AM by Dr. Sarwat Peñaloza MD.      Xr Chest 1 View    Result Date: 3/11/2018  No evidence of active or acute cardiopulmonary disease on today's chest radiograph.     This report was finalized on 3/11/2018 5:34 PM by Dr. Sarwat Peñaloza MD.        Lab Results:  Admission on 03/30/2018, Discharged on 03/30/2018   Component Date Value Ref Range Status   • Glucose 03/30/2018 163* 70 - 110 mg/dL Final   • BUN 03/30/2018 20  7 - 21 mg/dL Final   • Creatinine 03/30/2018 1.12  0.43 - 1.29 mg/dL Final   • Sodium 03/30/2018 135  135 - 153 mmol/L Final   • Potassium 03/30/2018 3.7  3.5 - 5.3 mmol/L Final   • Chloride 03/30/2018 98* 99 - 112 mmol/L Final   • CO2 03/30/2018 " 27.6  24.3 - 31.9 mmol/L Final   • Calcium 03/30/2018 9.1  7.7 - 10.0 mg/dL Final   • Total Protein 03/30/2018 7.7  6.0 - 8.0 g/dL Final   • Albumin 03/30/2018 4.70  3.40 - 4.80 g/dL Final   • ALT (SGPT) 03/30/2018 48* 10 - 44 U/L Final   • AST (SGOT) 03/30/2018 31  10 - 34 U/L Final   • Alkaline Phosphatase 03/30/2018 66  40 - 129 U/L Final   • Total Bilirubin 03/30/2018 0.6  0.2 - 1.8 mg/dL Final   • eGFR Non African Amer 03/30/2018 66  >60 mL/min/1.73 Final   • Globulin 03/30/2018 3.0  gm/dL Final   • A/G Ratio 03/30/2018 1.6  1.5 - 2.5 g/dL Final   • BUN/Creatinine Ratio 03/30/2018 17.9  7.0 - 25.0 Final   • Anion Gap 03/30/2018 9.4  3.6 - 11.2 mmol/L Final   • Lipase 03/30/2018 34  13 - 60 U/L Final   • Color, UA 03/30/2018 Dark Yellow* Yellow, Straw Final   • Appearance, UA 03/30/2018 Clear  Clear Final   • pH, UA 03/30/2018 <=5.0  5.0 - 8.0 Final   • Specific Gravity, UA 03/30/2018 >=1.030  1.005 - 1.030 Final   • Glucose, UA 03/30/2018 100 mg/dL (Trace)* Negative Final   • Ketones, UA 03/30/2018 Trace* Negative Final   • Bilirubin, UA 03/30/2018 Negative  Negative Final   • Blood, UA 03/30/2018 Negative  Negative Final   • Protein, UA 03/30/2018 Negative  Negative Final   • Leuk Esterase, UA 03/30/2018 Negative  Negative Final   • Nitrite, UA 03/30/2018 Negative  Negative Final   • Urobilinogen, UA 03/30/2018 0.2 E.U./dL  0.2 - 1.0 E.U./dL Final   • C-Reactive Protein 03/30/2018 4.50* 0.00 - 0.99 mg/dL Final   • WBC 03/30/2018 7.17  4.50 - 12.50 10*3/mm3 Final   • RBC 03/30/2018 4.73  4.70 - 6.10 10*6/mm3 Final   • Hemoglobin 03/30/2018 14.3  14.0 - 18.0 g/dL Final   • Hematocrit 03/30/2018 42.5  42.0 - 52.0 % Final   • MCV 03/30/2018 89.9  80.0 - 94.0 fL Final   • MCH 03/30/2018 30.2  27.0 - 33.0 pg Final   • MCHC 03/30/2018 33.6  33.0 - 37.0 g/dL Final   • RDW 03/30/2018 14.9* 11.5 - 14.5 % Final   • RDW-SD 03/30/2018 48.4  37.0 - 54.0 fl Final   • MPV 03/30/2018 11.7* 6.0 - 10.0 fL Final   • Platelets  03/30/2018 288  130 - 400 10*3/mm3 Final   • Neutrophil % 03/30/2018 66.3  30.0 - 70.0 % Final   • Lymphocyte % 03/30/2018 13.0* 21.0 - 51.0 % Final   • Monocyte % 03/30/2018 17.9* 0.0 - 10.0 % Final   • Eosinophil % 03/30/2018 2.4  0.0 - 5.0 % Final   • Basophil % 03/30/2018 0.1  0.0 - 2.0 % Final   • Immature Grans % 03/30/2018 0.3  0.0 - 0.5 % Final   • Neutrophils, Absolute 03/30/2018 4.76  1.40 - 6.50 10*3/mm3 Final   • Lymphocytes, Absolute 03/30/2018 0.93* 1.00 - 3.00 10*3/mm3 Final   • Monocytes, Absolute 03/30/2018 1.28* 0.10 - 0.90 10*3/mm3 Final   • Eosinophils, Absolute 03/30/2018 0.17  0.00 - 0.70 10*3/mm3 Final   • Basophils, Absolute 03/30/2018 0.01  0.00 - 0.30 10*3/mm3 Final   • Immature Grans, Absolute 03/30/2018 0.02  0.00 - 0.03 10*3/mm3 Final   • Extra Tube 03/30/2018 hold for add-on   Final   • Extra Tube 03/30/2018 Hold for add-ons.   Final   • Extra Tube 03/30/2018 hold for add-on   Final   • Extra Tube 03/30/2018 Hold for add-ons.   Final   • Osmolality Calc 03/30/2018 276.3  273.0 - 305.0 mOsm/kg Final       Assessment      ICD-10-CM ICD-9-CM   1. Chronic obstructive pulmonary disease with acute lower respiratory infection J44.0 496     519.8                DISCUSSION:  As patient has acute exacerbation of his COPD possibly triggered by acute bronchitis.  I asked him to stop by the emergency room for a chest x-ray and also to receive a short of steroid as well as nebulized bronchodilator.  But patient does not want to go to the emergency room at this point.  I'm giving him a course of Z-Aury and Medrol Dosepak, hopefully this would put him out of this crisis.  If this does not work then he has to go to the emergency room.  Patient tells me that she would think about it at that point.    Plan    No orders of the defined types were placed in this encounter.    New Medications Ordered This Visit   Medications   • azithromycin (ZITHROMAX Z-AURY) 250 MG tablet     Sig: Take 2 tablets the first  day, then 1 tablet daily for 4 days.     Dispense:  6 tablet     Refill:  0   • MethylPREDNISolone (MEDROL, AURY,) 4 MG tablet     Sig: Take as directed on package instructions.     Dispense:  21 tablet     Refill:  0                  Rk Tai MD, FCCP, FAASM  Pulmonary, Critical Care, and Sleep Medicine

## 2018-05-06 ENCOUNTER — HOSPITAL ENCOUNTER (INPATIENT)
Facility: HOSPITAL | Age: 63
LOS: 2 days | Discharge: HOME | End: 2018-05-08
Attending: INTERNAL MEDICINE | Admitting: INTERNAL MEDICINE

## 2018-05-06 ENCOUNTER — APPOINTMENT (OUTPATIENT)
Dept: GENERAL RADIOLOGY | Facility: HOSPITAL | Age: 63
End: 2018-05-06

## 2018-05-06 DIAGNOSIS — J44.1 COPD EXACERBATION (HCC): Primary | ICD-10-CM

## 2018-05-06 PROBLEM — E78.2 MIXED HYPERLIPIDEMIA: Status: ACTIVE | Noted: 2018-05-06

## 2018-05-06 LAB
A-A DO2: 22.2 MMHG (ref 0–300)
ALBUMIN SERPL-MCNC: 4.5 G/DL (ref 3.4–4.8)
ALBUMIN/GLOB SERPL: 1.5 G/DL (ref 1.5–2.5)
ALP SERPL-CCNC: 79 U/L (ref 40–129)
ALT SERPL W P-5'-P-CCNC: 34 U/L (ref 10–44)
ANION GAP SERPL CALCULATED.3IONS-SCNC: 8.6 MMOL/L (ref 3.6–11.2)
ARTERIAL PATENCY WRIST A: POSITIVE
AST SERPL-CCNC: 22 U/L (ref 10–34)
ATMOSPHERIC PRESS: 725 MMHG
BASE EXCESS BLDA CALC-SCNC: -0.3 MMOL/L
BASOPHILS # BLD AUTO: 0.04 10*3/MM3 (ref 0–0.3)
BASOPHILS NFR BLD AUTO: 0.3 % (ref 0–2)
BDY SITE: ABNORMAL
BILIRUB SERPL-MCNC: 0.8 MG/DL (ref 0.2–1.8)
BILIRUB UR QL STRIP: NEGATIVE
BNP SERPL-MCNC: 10 PG/ML (ref 0–100)
BODY TEMPERATURE: 98.6 C
BUN BLD-MCNC: 18 MG/DL (ref 7–21)
BUN/CREAT SERPL: 18 (ref 7–25)
CALCIUM SPEC-SCNC: 9.5 MG/DL (ref 7.7–10)
CHLORIDE SERPL-SCNC: 97 MMOL/L (ref 99–112)
CLARITY UR: CLEAR
CO2 SERPL-SCNC: 29.4 MMOL/L (ref 24.3–31.9)
COHGB MFR BLD: 1.2 % (ref 0–5)
COLOR UR: YELLOW
CREAT BLD-MCNC: 1 MG/DL (ref 0.43–1.29)
CRP SERPL-MCNC: 5.95 MG/DL (ref 0–0.99)
D-LACTATE SERPL-SCNC: 2.2 MMOL/L (ref 0.5–2)
D-LACTATE SERPL-SCNC: 2.9 MMOL/L (ref 0.5–2)
DEPRECATED RDW RBC AUTO: 47.3 FL (ref 37–54)
EOSINOPHIL # BLD AUTO: 0.18 10*3/MM3 (ref 0–0.7)
EOSINOPHIL NFR BLD AUTO: 1.4 % (ref 0–5)
ERYTHROCYTE [DISTWIDTH] IN BLOOD BY AUTOMATED COUNT: 14.5 % (ref 11.5–14.5)
GFR SERPL CREATININE-BSD FRML MDRD: 75 ML/MIN/1.73
GLOBULIN UR ELPH-MCNC: 3 GM/DL
GLUCOSE BLD-MCNC: 173 MG/DL (ref 70–110)
GLUCOSE BLDC GLUCOMTR-MCNC: 334 MG/DL (ref 70–130)
GLUCOSE BLDC GLUCOMTR-MCNC: 347 MG/DL (ref 70–130)
GLUCOSE UR STRIP-MCNC: ABNORMAL MG/DL
HBA1C MFR BLD: 6.7 % (ref 4.5–5.7)
HCO3 BLDA-SCNC: 23.4 MMOL/L (ref 22–26)
HCT VFR BLD AUTO: 39 % (ref 42–52)
HCT VFR BLD CALC: 38 % (ref 42–52)
HGB BLD-MCNC: 13.1 G/DL (ref 14–18)
HGB BLDA-MCNC: 12.8 G/DL (ref 12–16)
HGB UR QL STRIP.AUTO: NEGATIVE
HOLD SPECIMEN: NORMAL
HOROWITZ INDEX BLD+IHG-RTO: 21 %
IMM GRANULOCYTES # BLD: 0.04 10*3/MM3 (ref 0–0.03)
IMM GRANULOCYTES NFR BLD: 0.3 % (ref 0–0.5)
INR PPP: 1.08 (ref 0.9–1.1)
KETONES UR QL STRIP: NEGATIVE
LEUKOCYTE ESTERASE UR QL STRIP.AUTO: NEGATIVE
LYMPHOCYTES # BLD AUTO: 1.36 10*3/MM3 (ref 1–3)
LYMPHOCYTES NFR BLD AUTO: 10.4 % (ref 21–51)
MCH RBC QN AUTO: 30.2 PG (ref 27–33)
MCHC RBC AUTO-ENTMCNC: 33.6 G/DL (ref 33–37)
MCV RBC AUTO: 89.9 FL (ref 80–94)
METHGB BLD QL: 0.2 % (ref 0–3)
MODALITY: ABNORMAL
MONOCYTES # BLD AUTO: 1.3 10*3/MM3 (ref 0.1–0.9)
MONOCYTES NFR BLD AUTO: 9.9 % (ref 0–10)
NEUTROPHILS # BLD AUTO: 10.22 10*3/MM3 (ref 1.4–6.5)
NEUTROPHILS NFR BLD AUTO: 77.7 % (ref 30–70)
NITRITE UR QL STRIP: NEGATIVE
OSMOLALITY SERPL CALC.SUM OF ELEC: 276.1 MOSM/KG (ref 273–305)
OXYHGB MFR BLDV: 93.9 % (ref 85–100)
PCO2 BLDA: 35.3 MM HG (ref 35–45)
PH BLDA: 7.44 PH UNITS (ref 7.35–7.45)
PH UR STRIP.AUTO: 5.5 [PH] (ref 5–8)
PLATELET # BLD AUTO: 211 10*3/MM3 (ref 130–400)
PMV BLD AUTO: 11.7 FL (ref 6–10)
PO2 BLDA: 77.9 MM HG (ref 80–100)
POTASSIUM BLD-SCNC: 3.6 MMOL/L (ref 3.5–5.3)
PROT SERPL-MCNC: 7.5 G/DL (ref 6–8)
PROT UR QL STRIP: NEGATIVE
PROTHROMBIN TIME: 14.2 SECONDS (ref 11–15.4)
RBC # BLD AUTO: 4.34 10*6/MM3 (ref 4.7–6.1)
SAO2 % BLDCOA: 95.2 % (ref 90–100)
SODIUM BLD-SCNC: 135 MMOL/L (ref 135–153)
SP GR UR STRIP: 1.02 (ref 1–1.03)
TROPONIN I SERPL-MCNC: <0.006 NG/ML
TROPONIN I SERPL-MCNC: <0.006 NG/ML
UROBILINOGEN UR QL STRIP: ABNORMAL
WBC NRBC COR # BLD: 13.14 10*3/MM3 (ref 4.5–12.5)

## 2018-05-06 PROCEDURE — 94799 UNLISTED PULMONARY SVC/PX: CPT

## 2018-05-06 PROCEDURE — 94640 AIRWAY INHALATION TREATMENT: CPT

## 2018-05-06 PROCEDURE — 99285 EMERGENCY DEPT VISIT HI MDM: CPT

## 2018-05-06 PROCEDURE — 82962 GLUCOSE BLOOD TEST: CPT

## 2018-05-06 PROCEDURE — 85610 PROTHROMBIN TIME: CPT | Performed by: INTERNAL MEDICINE

## 2018-05-06 PROCEDURE — 83050 HGB METHEMOGLOBIN QUAN: CPT | Performed by: INTERNAL MEDICINE

## 2018-05-06 PROCEDURE — 83036 HEMOGLOBIN GLYCOSYLATED A1C: CPT | Performed by: NURSE PRACTITIONER

## 2018-05-06 PROCEDURE — 87205 SMEAR GRAM STAIN: CPT | Performed by: INTERNAL MEDICINE

## 2018-05-06 PROCEDURE — 83880 ASSAY OF NATRIURETIC PEPTIDE: CPT | Performed by: INTERNAL MEDICINE

## 2018-05-06 PROCEDURE — G0378 HOSPITAL OBSERVATION PER HR: HCPCS

## 2018-05-06 PROCEDURE — 82375 ASSAY CARBOXYHB QUANT: CPT | Performed by: INTERNAL MEDICINE

## 2018-05-06 PROCEDURE — 36600 WITHDRAWAL OF ARTERIAL BLOOD: CPT | Performed by: INTERNAL MEDICINE

## 2018-05-06 PROCEDURE — 81003 URINALYSIS AUTO W/O SCOPE: CPT | Performed by: INTERNAL MEDICINE

## 2018-05-06 PROCEDURE — 87040 BLOOD CULTURE FOR BACTERIA: CPT | Performed by: INTERNAL MEDICINE

## 2018-05-06 PROCEDURE — 71045 X-RAY EXAM CHEST 1 VIEW: CPT

## 2018-05-06 PROCEDURE — 86140 C-REACTIVE PROTEIN: CPT | Performed by: NURSE PRACTITIONER

## 2018-05-06 PROCEDURE — 85025 COMPLETE CBC W/AUTO DIFF WBC: CPT | Performed by: INTERNAL MEDICINE

## 2018-05-06 PROCEDURE — 99223 1ST HOSP IP/OBS HIGH 75: CPT | Performed by: INTERNAL MEDICINE

## 2018-05-06 PROCEDURE — 80053 COMPREHEN METABOLIC PANEL: CPT | Performed by: INTERNAL MEDICINE

## 2018-05-06 PROCEDURE — 87070 CULTURE OTHR SPECIMN AEROBIC: CPT | Performed by: INTERNAL MEDICINE

## 2018-05-06 PROCEDURE — 25010000002 METHYLPREDNISOLONE PER 125 MG: Performed by: INTERNAL MEDICINE

## 2018-05-06 PROCEDURE — 71045 X-RAY EXAM CHEST 1 VIEW: CPT | Performed by: RADIOLOGY

## 2018-05-06 PROCEDURE — 25010000002 METHYLPREDNISOLONE PER 40 MG: Performed by: INTERNAL MEDICINE

## 2018-05-06 PROCEDURE — 25010000002 HEPARIN (PORCINE) PER 1000 UNITS: Performed by: INTERNAL MEDICINE

## 2018-05-06 PROCEDURE — 87077 CULTURE AEROBIC IDENTIFY: CPT | Performed by: INTERNAL MEDICINE

## 2018-05-06 PROCEDURE — 82805 BLOOD GASES W/O2 SATURATION: CPT | Performed by: INTERNAL MEDICINE

## 2018-05-06 PROCEDURE — 63710000001 INSULIN ASPART PER 5 UNITS: Performed by: NURSE PRACTITIONER

## 2018-05-06 PROCEDURE — 87185 SC STD ENZYME DETCJ PER NZM: CPT | Performed by: INTERNAL MEDICINE

## 2018-05-06 PROCEDURE — 25010000002 CEFTRIAXONE PER 250 MG: Performed by: INTERNAL MEDICINE

## 2018-05-06 PROCEDURE — 83605 ASSAY OF LACTIC ACID: CPT | Performed by: INTERNAL MEDICINE

## 2018-05-06 PROCEDURE — 93010 ELECTROCARDIOGRAM REPORT: CPT | Performed by: INTERNAL MEDICINE

## 2018-05-06 PROCEDURE — 84484 ASSAY OF TROPONIN QUANT: CPT | Performed by: INTERNAL MEDICINE

## 2018-05-06 PROCEDURE — 93005 ELECTROCARDIOGRAM TRACING: CPT | Performed by: INTERNAL MEDICINE

## 2018-05-06 RX ORDER — HYDROCHLOROTHIAZIDE 25 MG/1
25 TABLET ORAL DAILY
COMMUNITY
End: 2022-03-11 | Stop reason: SDUPTHER

## 2018-05-06 RX ORDER — IPRATROPIUM BROMIDE AND ALBUTEROL SULFATE 2.5; .5 MG/3ML; MG/3ML
3 SOLUTION RESPIRATORY (INHALATION) ONCE
Status: COMPLETED | OUTPATIENT
Start: 2018-05-06 | End: 2018-05-06

## 2018-05-06 RX ORDER — SODIUM CHLORIDE 9 MG/ML
INJECTION, SOLUTION INTRAVENOUS
Status: DISPENSED
Start: 2018-05-06 | End: 2018-05-06

## 2018-05-06 RX ORDER — L.ACID,PARA/B.BIFIDUM/S.THERM 8B CELL
1 CAPSULE ORAL DAILY
Status: DISCONTINUED | OUTPATIENT
Start: 2018-05-06 | End: 2018-05-08 | Stop reason: HOSPADM

## 2018-05-06 RX ORDER — NICOTINE POLACRILEX 4 MG
15 LOZENGE BUCCAL
Status: DISCONTINUED | OUTPATIENT
Start: 2018-05-06 | End: 2018-05-08 | Stop reason: HOSPADM

## 2018-05-06 RX ORDER — HYDROCHLOROTHIAZIDE 25 MG/1
25 TABLET ORAL DAILY
Status: DISCONTINUED | OUTPATIENT
Start: 2018-05-06 | End: 2018-05-08 | Stop reason: HOSPADM

## 2018-05-06 RX ORDER — LIDOCAINE HYDROCHLORIDE 10 MG/ML
2 INJECTION, SOLUTION EPIDURAL; INFILTRATION; INTRACAUDAL; PERINEURAL ONCE
Status: COMPLETED | OUTPATIENT
Start: 2018-05-06 | End: 2018-05-06

## 2018-05-06 RX ORDER — DEXTROSE MONOHYDRATE 25 G/50ML
25 INJECTION, SOLUTION INTRAVENOUS
Status: DISCONTINUED | OUTPATIENT
Start: 2018-05-06 | End: 2018-05-08 | Stop reason: HOSPADM

## 2018-05-06 RX ORDER — ALBUTEROL SULFATE 2.5 MG/3ML
2.5 SOLUTION RESPIRATORY (INHALATION) EVERY 6 HOURS PRN
Status: DISCONTINUED | OUTPATIENT
Start: 2018-05-06 | End: 2018-05-08 | Stop reason: HOSPADM

## 2018-05-06 RX ORDER — BUDESONIDE AND FORMOTEROL FUMARATE DIHYDRATE 80; 4.5 UG/1; UG/1
2 AEROSOL RESPIRATORY (INHALATION)
Status: DISCONTINUED | OUTPATIENT
Start: 2018-05-06 | End: 2018-05-08 | Stop reason: HOSPADM

## 2018-05-06 RX ORDER — CLOTRIMAZOLE AND BETAMETHASONE DIPROPIONATE 10; .64 MG/G; MG/G
1 CREAM TOPICAL 2 TIMES DAILY PRN
COMMUNITY
End: 2018-12-25 | Stop reason: HOSPADM

## 2018-05-06 RX ORDER — SODIUM CHLORIDE 0.9 % (FLUSH) 0.9 %
1-10 SYRINGE (ML) INJECTION AS NEEDED
Status: DISCONTINUED | OUTPATIENT
Start: 2018-05-06 | End: 2018-05-08 | Stop reason: HOSPADM

## 2018-05-06 RX ORDER — IPRATROPIUM BROMIDE AND ALBUTEROL SULFATE 2.5; .5 MG/3ML; MG/3ML
3 SOLUTION RESPIRATORY (INHALATION)
Status: DISCONTINUED | OUTPATIENT
Start: 2018-05-06 | End: 2018-05-08 | Stop reason: HOSPADM

## 2018-05-06 RX ORDER — GABAPENTIN 400 MG/1
400 CAPSULE ORAL 2 TIMES DAILY
COMMUNITY
End: 2019-04-05

## 2018-05-06 RX ORDER — GABAPENTIN 400 MG/1
400 CAPSULE ORAL 2 TIMES DAILY
Status: CANCELLED | OUTPATIENT
Start: 2018-05-06

## 2018-05-06 RX ORDER — CLOTRIMAZOLE AND BETAMETHASONE DIPROPIONATE 10; .64 MG/G; MG/G
1 CREAM TOPICAL 2 TIMES DAILY PRN
Status: DISCONTINUED | OUTPATIENT
Start: 2018-05-06 | End: 2018-05-08 | Stop reason: HOSPADM

## 2018-05-06 RX ORDER — PANTOPRAZOLE SODIUM 40 MG/1
40 TABLET, DELAYED RELEASE ORAL EVERY MORNING
Status: DISCONTINUED | OUTPATIENT
Start: 2018-05-07 | End: 2018-05-08 | Stop reason: HOSPADM

## 2018-05-06 RX ORDER — GUAIFENESIN 600 MG/1
1200 TABLET, EXTENDED RELEASE ORAL EVERY 12 HOURS SCHEDULED
Status: DISCONTINUED | OUTPATIENT
Start: 2018-05-06 | End: 2018-05-08 | Stop reason: HOSPADM

## 2018-05-06 RX ORDER — METHYLPREDNISOLONE SODIUM SUCCINATE 40 MG/ML
40 INJECTION, POWDER, LYOPHILIZED, FOR SOLUTION INTRAMUSCULAR; INTRAVENOUS 2 TIMES DAILY
Status: DISCONTINUED | OUTPATIENT
Start: 2018-05-06 | End: 2018-05-08

## 2018-05-06 RX ORDER — LIDOCAINE HYDROCHLORIDE 10 MG/ML
INJECTION, SOLUTION INFILTRATION; PERINEURAL
Status: DISPENSED
Start: 2018-05-06 | End: 2018-05-06

## 2018-05-06 RX ORDER — METHYLPREDNISOLONE SODIUM SUCCINATE 125 MG/2ML
125 INJECTION, POWDER, LYOPHILIZED, FOR SOLUTION INTRAMUSCULAR; INTRAVENOUS ONCE
Status: COMPLETED | OUTPATIENT
Start: 2018-05-06 | End: 2018-05-06

## 2018-05-06 RX ORDER — ATORVASTATIN CALCIUM 10 MG/1
10 TABLET, FILM COATED ORAL NIGHTLY
Status: DISCONTINUED | OUTPATIENT
Start: 2018-05-06 | End: 2018-05-08 | Stop reason: HOSPADM

## 2018-05-06 RX ORDER — HEPARIN SODIUM 5000 [USP'U]/ML
5000 INJECTION, SOLUTION INTRAVENOUS; SUBCUTANEOUS EVERY 12 HOURS SCHEDULED
Status: DISCONTINUED | OUTPATIENT
Start: 2018-05-06 | End: 2018-05-08 | Stop reason: HOSPADM

## 2018-05-06 RX ORDER — LISINOPRIL 2.5 MG/1
2.5 TABLET ORAL DAILY
Status: DISCONTINUED | OUTPATIENT
Start: 2018-05-06 | End: 2018-05-08 | Stop reason: HOSPADM

## 2018-05-06 RX ADMIN — DOXYCYCLINE 100 MG: 100 INJECTION, POWDER, LYOPHILIZED, FOR SOLUTION INTRAVENOUS at 16:00

## 2018-05-06 RX ADMIN — INSULIN ASPART 7 UNITS: 100 INJECTION, SOLUTION INTRAVENOUS; SUBCUTANEOUS at 20:17

## 2018-05-06 RX ADMIN — LISINOPRIL 2.5 MG: 2.5 TABLET ORAL at 20:16

## 2018-05-06 RX ADMIN — LIDOCAINE HYDROCHLORIDE 2 ML: 10 INJECTION, SOLUTION EPIDURAL; INFILTRATION; INTRACAUDAL; PERINEURAL at 10:23

## 2018-05-06 RX ADMIN — IPRATROPIUM BROMIDE AND ALBUTEROL SULFATE 3 ML: .5; 3 SOLUTION RESPIRATORY (INHALATION) at 22:46

## 2018-05-06 RX ADMIN — IPRATROPIUM BROMIDE AND ALBUTEROL SULFATE 3 ML: .5; 3 SOLUTION RESPIRATORY (INHALATION) at 10:20

## 2018-05-06 RX ADMIN — GUAIFENESIN 1200 MG: 600 TABLET, EXTENDED RELEASE ORAL at 20:15

## 2018-05-06 RX ADMIN — INSULIN ASPART 7 UNITS: 100 INJECTION, SOLUTION INTRAVENOUS; SUBCUTANEOUS at 17:13

## 2018-05-06 RX ADMIN — SODIUM CHLORIDE 1000 ML: 9 INJECTION, SOLUTION INTRAVENOUS at 11:42

## 2018-05-06 RX ADMIN — ATORVASTATIN CALCIUM 10 MG: 10 TABLET, FILM COATED ORAL at 20:15

## 2018-05-06 RX ADMIN — HEPARIN SODIUM 5000 UNITS: 5000 INJECTION, SOLUTION INTRAVENOUS; SUBCUTANEOUS at 20:16

## 2018-05-06 RX ADMIN — IPRATROPIUM BROMIDE AND ALBUTEROL SULFATE 3 ML: .5; 3 SOLUTION RESPIRATORY (INHALATION) at 19:46

## 2018-05-06 RX ADMIN — HYDROCHLOROTHIAZIDE 25 MG: 25 TABLET ORAL at 20:15

## 2018-05-06 RX ADMIN — CEFTRIAXONE 1 G: 1 INJECTION, SOLUTION INTRAVENOUS at 13:33

## 2018-05-06 RX ADMIN — IPRATROPIUM BROMIDE AND ALBUTEROL SULFATE 3 ML: .5; 3 SOLUTION RESPIRATORY (INHALATION) at 15:43

## 2018-05-06 RX ADMIN — METHYLPREDNISOLONE SODIUM SUCCINATE 40 MG: 40 INJECTION, POWDER, FOR SOLUTION INTRAMUSCULAR; INTRAVENOUS at 20:16

## 2018-05-06 RX ADMIN — METHYLPREDNISOLONE SODIUM SUCCINATE 125 MG: 125 INJECTION, POWDER, FOR SOLUTION INTRAMUSCULAR; INTRAVENOUS at 10:23

## 2018-05-06 NOTE — H&P
Patient Identification:  Name:  Maximo Kwon  Age:  63 y.o.  Sex:  male  :  1955  MRN:  [id number]   Visit Number:  [id number]  Primary Care Physician:  OSITO Cedillo    I have seen the patient in conjunction with OSITO Bynum. and I agree with the following statements:     Chief complaint: shortness of breath, cough and wheezing     History of presenting illness:  63 y.o. male, Mr. Kwon presented to the ER today from home with complaints of cough, wheezing and shortness of breath which started last Wednesday. Patient states he started getting worsening shortness of breath last Wednesday and tried to use home nebs and his mucinex with no improvement. He states he finally decided to go see Dr. Tai this past Friday and was started on Zithromax PO and medrol dose pack. Over the weekend he states he continued to have worsening shortness of breath and productive cough, he denies fevers, chest pain, fatigue, no n/v/d. He was found to have a COPD exacerbation and admitted to [address] [place] [address] for further monitoring and treatment with IV antibiotics since he failed outpatient treatment.     He is a former smoker he quit smoking around 6 years ago at which time he was smoking 1 ppd for 30-35 years. He is a retired . His other medical history consist of DM, COPD, HTN and diverticulitis. In 2016 he underwent an emergent colon resection due to diverticula, colostomy was placed and reversed since. He states he also has had a lung biopsy due to a nodule, which was negative for malignancy. He states Dr. Tai has discussed annual low dose CT's with him so this is being managed outpatient.   ---------------------------------------------------------------------------------------------------------------------   Review of Systems   Constitutional: Negative for chills, fatigue and fever.   HENT: Negative for congestion and rhinorrhea.    Eyes: Negative for photophobia and visual disturbance.    Respiratory: Positive for cough, shortness of breath and wheezing. Negative for choking.    Cardiovascular: Negative for chest pain and leg swelling.   Gastrointestinal: Negative for abdominal distention and abdominal pain.   Endocrine: Negative for cold intolerance and heat intolerance.   Genitourinary: Negative for decreased urine volume and difficulty urinating.   Musculoskeletal: Negative for arthralgias and myalgias.   Skin: Negative for color change and pallor.   Allergic/Immunologic: Negative for environmental allergies.   Neurological: Negative for dizziness, weakness and light-headedness.   Hematological: Negative for adenopathy. Does not bruise/bleed easily.   Psychiatric/Behavioral: Negative for agitation, behavioral problems and confusion.    ---------------------------------------------------------------------------------------------------------------------   Past Medical History:   Diagnosis Date   • COPD (chronic obstructive pulmonary disease)    • Diabetes mellitus    • Diverticulitis    • GERD (gastroesophageal reflux disease)    • Hypertension      Past Surgical History:   Procedure Laterality Date   • COLON RESECTION  2016    had colostomy and reveresed back    • COLONOSCOPY  2016   • LUNG BIOPSY  2014    (non cancerous)     Family History   Problem Relation Age of Onset   • Cancer Father    • Diabetes Sister    • Diabetes Brother      Social History   • Marital status:      Social History Main Topics   • Smoking status: Former Smoker     Packs/day: 1.00     Years: 30.00     Types: Cigarettes     Quit date: 2008   • Smokeless tobacco: Never Used   • Alcohol use No   • Drug use: No   • Sexual activity: Defer   ---------------------------------------------------------------------------------------------------------------------   Allergies:  Review of patient's allergies indicates no known  allergies.  ---------------------------------------------------------------------------------------------------------------------   Prior to Admission Medications     Prescriptions Last Dose Informant Patient Reported? Taking?    albuterol (PROVENTIL HFA;VENTOLIN HFA) 108 (90 BASE) MCG/ACT inhaler   Yes Yes    Inhale 2 puffs Every 4 (Four) Hours As Needed for Wheezing.    azithromycin (ZITHROMAX Z-AURY) 250 MG tablet   No Yes    Take 2 tablets the first day, then 1 tablet daily for 4 days.    gabapentin (NEURONTIN) 300 MG capsule   Yes Yes    Take 300 mg by mouth 2 (Two) Times a Day.    guaiFENesin (MUCINEX) 600 MG 12 hr tablet   Yes Yes    Take 1,200 mg by mouth Every 12 (Twelve) Hours.    hydrochlorothiazide (MICROZIDE) 12.5 MG capsule   Yes Yes    Take 25 mg by mouth Daily.    lisinopril (PRINIVIL,ZESTRIL) 2.5 MG tablet   Yes Yes    Take 2.5 mg by mouth Daily.    metFORMIN (GLUCOPHAGE) 500 MG tablet   Yes Yes    Take 1,000 mg by mouth 2 (Two) Times a Day With Meals.    MethylPREDNISolone (MEDROL, AURY,) 4 MG tablet   No Yes    Take as directed on package instructions.    omeprazole (priLOSEC) 20 MG capsule   Yes Yes    Take 20 mg by mouth Daily.    pravastatin (PRAVACHOL) 20 MG tablet   Yes Yes    Take 20 mg by mouth Every Night.    tiotropium (SPIRIVA) 18 MCG per inhalation capsule   Yes Yes    Place 1 capsule into inhaler and inhale Daily.    albuterol (PROVENTIL) (2.5 MG/3ML) 0.083% nebulizer solution   Yes No    Take 2.5 mg by nebulization Every 4 (Four) Hours As Needed for Wheezing.    azithromycin (ZITHROMAX Z-AURY) 250 MG tablet   No No    Take 2 tablets the first day, then 1 tablet daily for 4 days.    cefdinir (OMNICEF) 300 MG capsule   No No    Take 1 capsule by mouth Every 12 (Twelve) Hours.    dicyclomine (BENTYL) 20 MG tablet   No No    Take 1 tablet by mouth Every 6 (Six) Hours As Needed (Diarrhea).    doxycycline (MONODOX) 100 MG capsule   No No    Take 1 capsule by mouth Every 12 (Twelve) Hours.     Fluticasone Furoate-Vilanterol (BREO ELLIPTA) 200-25 MCG/INH aerosol powder    Yes No    Inhale 1 puff Daily.    fluticasone-salmeterol (ADVAIR) 100-50 MCG/DOSE DISKUS   Yes No    Inhale 2 (Two) Times a Day.    HYDROcod Polst-CPM Polst ER (TUSSIONEX PENNKINETIC ER) 10-8 MG/5ML ER suspension   No No    Take 5 mL by mouth Every 12 (Twelve) Hours As Needed for Cough.    ipratropium-albuterol (DUO-NEB) 0.5-2.5 mg/mL nebulizer   No No    Take 3 mL by nebulization Every 4 (Four) Hours As Needed for Wheezing.    metaxalone (SKELAXIN) 800 MG tablet   No No    Take 1 tablet by mouth 3 (Three) Times a Day As Needed for Muscle Spasms.    MethylPREDNISolone (MEDROL, AURY,) 4 MG tablet   No No    Take as directed on package instructions.    ondansetron ODT (ZOFRAN-ODT) 4 MG disintegrating tablet   No No    Take 1 tablet by mouth Every 8 (Eight) Hours As Needed for Vomiting.    Tiotropium Bromide Monohydrate (SPIRIVA RESPIMAT) 2.5 MCG/ACT aerosol solution   Yes No    Inhale 2 (Two) Times a Day.   ---------------------------------------------------------------------------------------------------------------------   Vital Signs:  Temp:  [98 °F (36.7 °C)-98.5 °F (36.9 °C)] 98.1 °F (36.7 °C)  Heart Rate:  [] 97  Resp:  [20-24] 20  BP: (136-158)/(74-92) 145/82  1    05/06/18  0958 05/06/18  1358   Weight: 90.7 kg (200 lb) 92.6 kg (204 lb 3.2 oz)     Body mass index is 27.69 kg/m².  ---------------------------------------------------------------------------------------------------------------------   Physical Exam  Constitutional:  Well-developed and well-nourished.  Moderate respiratory distress.   HENT:  Head: Normocephalic and atraumatic.  Mouth:  Moist mucous membranes.    Eyes:  Conjunctivae and EOM are normal.  Pupils are equal, round, and reactive to light.  No scleral icterus.  Neck:  Neck supple.  No JVD present.    Cardiovascular:  Normal rate, regular rhythm.  with no murmur.  Pulmonary/Chest:  moderate respiratory  distress, cough, scattered wheezing with no crackles with prolonged expiratory phase.  Abdominal:  Soft.  Bowel sounds are present.  No distension and no tenderness.   Musculoskeletal:  No edema, no tenderness, and no deformity.  No red or swollen joints anywhere.    Neurological:  Alert and oriented to person, place, and time.  No cranial nerve deficit.  No tongue deviation.  No facial droop.  No slurred speech.   Skin:  Skin is warm and dry.  No rash noted.  No pallor.   Psychiatric:  Normal mood and affect.  Behavior is normal.  Judgment and thought content normal.   Peripheral vascular:  No edema and strong pulses on all 4 extremities.  Genitourinary:  no Mendez catheter in place.  ---------------------------------------------------------------------------------------------------------------------  EKG:  I have personally looked at the EKG and read the final cardiology read.      Telemetry:  I have personally looked at both the EKG and the telemetry strips.  ---------------------------------------------------------------------------------------------------------------------  Results from last 7 days  Lab Units 05/06/18  1016   LACTATE mmol/L 2.2*   WBC 10*3/mm3 13.14*   HEMOGLOBIN g/dL 13.1*   HEMATOCRIT % 39.0*   MCV fL 89.9   MCHC g/dL 33.6   PLATELETS 10*3/mm3 211   INR  1.08     Results from last 7 days  Lab Units 05/06/18  1322   PH, ARTERIAL pH units 7.439   PO2 ART mm Hg 77.9*   PCO2, ARTERIAL mm Hg 35.3   HCO3 ART mmol/L 23.4     Results from last 7 days  Lab Units 05/06/18  1016   SODIUM mmol/L 135   POTASSIUM mmol/L 3.6   CHLORIDE mmol/L 97*   CO2 mmol/L 29.4   BUN mg/dL 18   CREATININE mg/dL 1.00   EGFR IF NONAFRICN AM mL/min/1.73 75   CALCIUM mg/dL 9.5   GLUCOSE mg/dL 173*   ALBUMIN g/dL 4.50   BILIRUBIN mg/dL 0.8   ALK PHOS U/L 79   AST (SGOT) U/L 22   ALT (SGPT) U/L 34   Estimated Creatinine Clearance: 99 mL/min (by C-G formula based on SCr of 1 mg/dL).    Results from last 7 days  Lab Units  05/06/18  1156 05/06/18  1016   TROPONIN I ng/mL <0.006 <0.006     Cultures: blood cultures, drawn, result pending.    I have personally looked at the labs and they are stated above.  ---------------------------------------------------------------------------------------------------------------------  Imaging Results (last 7 days)     Procedure Component Value Units Date/Time    XR Chest 1 View [[id number]] Collected:  05/06/18 1048     Updated:  05/06/18 1051    Narrative:       EXAMINATION:  XR CHEST 1 VW-      CLINICAL INDICATION:     soa     TECHNIQUE:  XR CHEST 1 VW-       COMPARISON: 3/11/2018      FINDINGS:   The lungs remain aerated.   Heart size is stable.   No pneumothorax.   No pleural effusion.   Bony and soft tissue structures are unremarkable.            Impression:       Stable radiographic appearance of the chest.     This report was finalized on 5/6/2018 10:48 AM by Dr. Wilfredo Lawrence MD.           I have personally reviewed the radiology images and read the final radiology report.  ---------------------------------------------------------------------------------------------------------------------  Assessment and Plan:  -Acute hypoxic respiratory failure related to COPD exacerbation That failed outpatient treatment  -Lactic acidosis that could be related to albuterol use  -Slight hypochloremia   -Type 2 diabetes mellitus  -Essential hypertension  -History of heavy tobacco smoking addiction, quit in 2008   -Leukocytosis likely due to outpatient use of steroids    The patient has been admitted to the medical surgical floor with telemetry.  He is being started on Rocephin and doxycycline as well as Solu-Medrol and breathing treatments with acute COPD exacerbation.  We will give him supplemental oxygen to maintain oxygen saturations between 92-94%.  We will repeat his blood work in the morning.  For his diabetes, we will obtain a CT daily at bedtime Accu-Cheks.    DVT prophylaxis: Heparin BID  lea.    Elyse Castellanos, OSITO  05/06/18  2:45 PM     Joao Cano MD  05/06/18  9:24 PM  ---------------------------------------------------------------------------------------------------------------------

## 2018-05-06 NOTE — PLAN OF CARE
Problem: Patient Care Overview  Goal: Plan of Care Review  Outcome: Ongoing (interventions implemented as appropriate)   05/06/18 5401   Coping/Psychosocial   Plan of Care Reviewed With patient   Plan of Care Review   Progress improving     Goal: Individualization and Mutuality  Outcome: Ongoing (interventions implemented as appropriate)    Goal: Discharge Needs Assessment  Outcome: Ongoing (interventions implemented as appropriate)    Goal: Interprofessional Rounds/Family Conf  Outcome: Ongoing (interventions implemented as appropriate)      Problem: Chronic Obstructive Pulmonary Disease (Adult)  Goal: Signs and Symptoms of Listed Potential Problems Will be Absent, Minimized or Managed (Chronic Obstructive Pulmonary Disease)  Outcome: Ongoing (interventions implemented as appropriate)   05/06/18 3838   Goal/Outcome Evaluation   Problems Assessed (Chronic Obstructive Pulmonary Disease (COPD)) all   Problems Present (COPD, Bronch/Emphy) respiratory compromise;situational response

## 2018-05-06 NOTE — ED PROVIDER NOTES
Subjective   Patient reports shortness of breath and cough and wheezing.  He has known COPD.  This started gradually couple of weeks ago.  He was seen by his pulmonologist as an outpatient and given oral steroids and he has been using his nebulizers and inhalers.  This provides minimal relief.  Symptoms are moderate and worse with coughing and with any exertion.  He denies any fever or chills.  Denies any associated chest pain or nausea or vomiting.            Review of Systems   Constitutional: Negative for activity change, appetite change, chills and fever.   HENT: Negative for congestion, ear pain and sore throat.    Eyes: Negative for pain and discharge.   Respiratory: Positive for cough, shortness of breath and wheezing.    Cardiovascular: Negative for chest pain.   Gastrointestinal: Negative for abdominal pain, diarrhea, nausea and vomiting.   Genitourinary: Negative for difficulty urinating, flank pain and frequency.   Musculoskeletal: Negative for back pain.   Skin: Negative for rash.   Neurological: Negative for dizziness, weakness, numbness and headaches.   Psychiatric/Behavioral: Negative for behavioral problems and confusion.       Past Medical History:   Diagnosis Date   • COPD (chronic obstructive pulmonary disease)    • Diabetes mellitus    • Diverticulitis    • Hypertension        No Known Allergies    No past surgical history on file.    No family history on file.    Social History     Social History   • Marital status:      Social History Main Topics   • Smoking status: Former Smoker     Packs/day: 1.00     Years: 30.00     Types: Cigarettes     Quit date: 2008   • Smokeless tobacco: Never Used   • Alcohol use No   • Drug use: No   • Sexual activity: Defer     Other Topics Concern   • Not on file           Objective   Physical Exam   Constitutional: He is oriented to person, place, and time. He appears well-developed and well-nourished. No distress.   HENT:   Head: Normocephalic and  atraumatic.   Nose: Nose normal.   Mouth/Throat: Oropharynx is clear and moist.   Eyes: Conjunctivae are normal. Pupils are equal, round, and reactive to light.   Neck: Normal range of motion. Neck supple. No JVD present.   Cardiovascular: Regular rhythm, normal heart sounds and intact distal pulses.    Sinus tach , 105 HR    Pulmonary/Chest: He is in respiratory distress. He has wheezes.   Mild resp distress   Wheezing diffuse   Mild ronchi on the left   Increased effort      Abdominal: Soft. He exhibits no distension. There is no tenderness.   Musculoskeletal: Normal range of motion. He exhibits no edema or deformity.   Neurological: He is alert and oriented to person, place, and time. No cranial nerve deficit. Coordination normal.   Skin: Skin is warm and dry.   Psychiatric: He has a normal mood and affect.       Procedures           ED Course  ED Course                  MDM  Number of Diagnoses or Management Options  COPD exacerbation:      Amount and/or Complexity of Data Reviewed  Clinical lab tests: ordered and reviewed  Tests in the radiology section of CPT®: ordered and reviewed  Discuss the patient with other providers: yes    Risk of Complications, Morbidity, and/or Mortality  General comments: Leukocytosis is steroid induced   Pt is already on omnicef by o/p provider in addition to the medrol pack   No clinical signs of sepsis   Will not add abx         Patient Progress  Patient progress: improved        Final diagnoses:   COPD exacerbation            Thea Olivo MD  05/06/18 3734

## 2018-05-07 LAB
ALBUMIN SERPL-MCNC: 4.1 G/DL (ref 3.4–4.8)
ALBUMIN/GLOB SERPL: 1.5 G/DL (ref 1.5–2.5)
ALP SERPL-CCNC: 73 U/L (ref 40–129)
ALT SERPL W P-5'-P-CCNC: 28 U/L (ref 10–44)
ANION GAP SERPL CALCULATED.3IONS-SCNC: 8.1 MMOL/L (ref 3.6–11.2)
AST SERPL-CCNC: 13 U/L (ref 10–34)
BASOPHILS # BLD AUTO: 0 10*3/MM3 (ref 0–0.3)
BASOPHILS NFR BLD AUTO: 0 % (ref 0–2)
BILIRUB SERPL-MCNC: 0.4 MG/DL (ref 0.2–1.8)
BUN BLD-MCNC: 17 MG/DL (ref 7–21)
BUN/CREAT SERPL: 18.7 (ref 7–25)
CALCIUM SPEC-SCNC: 9.2 MG/DL (ref 7.7–10)
CHLORIDE SERPL-SCNC: 98 MMOL/L (ref 99–112)
CO2 SERPL-SCNC: 26.9 MMOL/L (ref 24.3–31.9)
CREAT BLD-MCNC: 0.91 MG/DL (ref 0.43–1.29)
CRP SERPL-MCNC: 8.06 MG/DL (ref 0–0.99)
D-LACTATE SERPL-SCNC: 1.3 MMOL/L (ref 0.5–2)
DEPRECATED RDW RBC AUTO: 45.3 FL (ref 37–54)
EOSINOPHIL # BLD AUTO: 0 10*3/MM3 (ref 0–0.7)
EOSINOPHIL NFR BLD AUTO: 0 % (ref 0–5)
ERYTHROCYTE [DISTWIDTH] IN BLOOD BY AUTOMATED COUNT: 14.1 % (ref 11.5–14.5)
GFR SERPL CREATININE-BSD FRML MDRD: 84 ML/MIN/1.73
GLOBULIN UR ELPH-MCNC: 2.8 GM/DL
GLUCOSE BLD-MCNC: 324 MG/DL (ref 70–110)
GLUCOSE BLDC GLUCOMTR-MCNC: 323 MG/DL (ref 70–130)
GLUCOSE BLDC GLUCOMTR-MCNC: 337 MG/DL (ref 70–130)
GLUCOSE BLDC GLUCOMTR-MCNC: 341 MG/DL (ref 70–130)
GLUCOSE BLDC GLUCOMTR-MCNC: 409 MG/DL (ref 70–130)
HCT VFR BLD AUTO: 37.5 % (ref 42–52)
HGB BLD-MCNC: 12.6 G/DL (ref 14–18)
IMM GRANULOCYTES # BLD: 0.02 10*3/MM3 (ref 0–0.03)
IMM GRANULOCYTES NFR BLD: 0.2 % (ref 0–0.5)
LYMPHOCYTES # BLD AUTO: 0.44 10*3/MM3 (ref 1–3)
LYMPHOCYTES NFR BLD AUTO: 5.1 % (ref 21–51)
MAGNESIUM SERPL-MCNC: 2 MG/DL (ref 1.7–2.6)
MCH RBC QN AUTO: 30.2 PG (ref 27–33)
MCHC RBC AUTO-ENTMCNC: 33.6 G/DL (ref 33–37)
MCV RBC AUTO: 89.9 FL (ref 80–94)
MONOCYTES # BLD AUTO: 0.52 10*3/MM3 (ref 0.1–0.9)
MONOCYTES NFR BLD AUTO: 6 % (ref 0–10)
NEUTROPHILS # BLD AUTO: 7.62 10*3/MM3 (ref 1.4–6.5)
NEUTROPHILS NFR BLD AUTO: 88.7 % (ref 30–70)
OSMOLALITY SERPL CALC.SUM OF ELEC: 280.5 MOSM/KG (ref 273–305)
PHOSPHATE SERPL-MCNC: 3.1 MG/DL (ref 2.7–4.5)
PLATELET # BLD AUTO: 205 10*3/MM3 (ref 130–400)
PMV BLD AUTO: 11.7 FL (ref 6–10)
POTASSIUM BLD-SCNC: 3.8 MMOL/L (ref 3.5–5.3)
PROT SERPL-MCNC: 6.9 G/DL (ref 6–8)
RBC # BLD AUTO: 4.17 10*6/MM3 (ref 4.7–6.1)
SODIUM BLD-SCNC: 133 MMOL/L (ref 135–153)
WBC NRBC COR # BLD: 8.6 10*3/MM3 (ref 4.5–12.5)

## 2018-05-07 PROCEDURE — 63710000001 INSULIN ASPART PER 5 UNITS: Performed by: NURSE PRACTITIONER

## 2018-05-07 PROCEDURE — 84100 ASSAY OF PHOSPHORUS: CPT | Performed by: INTERNAL MEDICINE

## 2018-05-07 PROCEDURE — 25010000002 METHYLPREDNISOLONE PER 40 MG: Performed by: INTERNAL MEDICINE

## 2018-05-07 PROCEDURE — 94799 UNLISTED PULMONARY SVC/PX: CPT

## 2018-05-07 PROCEDURE — 86140 C-REACTIVE PROTEIN: CPT | Performed by: INTERNAL MEDICINE

## 2018-05-07 PROCEDURE — 85025 COMPLETE CBC W/AUTO DIFF WBC: CPT | Performed by: INTERNAL MEDICINE

## 2018-05-07 PROCEDURE — 83735 ASSAY OF MAGNESIUM: CPT | Performed by: INTERNAL MEDICINE

## 2018-05-07 PROCEDURE — 82962 GLUCOSE BLOOD TEST: CPT

## 2018-05-07 PROCEDURE — 83605 ASSAY OF LACTIC ACID: CPT | Performed by: INTERNAL MEDICINE

## 2018-05-07 PROCEDURE — 80053 COMPREHEN METABOLIC PANEL: CPT | Performed by: INTERNAL MEDICINE

## 2018-05-07 PROCEDURE — 99233 SBSQ HOSP IP/OBS HIGH 50: CPT | Performed by: INTERNAL MEDICINE

## 2018-05-07 PROCEDURE — 25010000002 CEFTRIAXONE PER 250 MG: Performed by: INTERNAL MEDICINE

## 2018-05-07 PROCEDURE — 25010000002 HEPARIN (PORCINE) PER 1000 UNITS: Performed by: INTERNAL MEDICINE

## 2018-05-07 RX ORDER — GABAPENTIN 400 MG/1
400 CAPSULE ORAL 2 TIMES DAILY
Status: DISCONTINUED | OUTPATIENT
Start: 2018-05-07 | End: 2018-05-08 | Stop reason: HOSPADM

## 2018-05-07 RX ORDER — MONTELUKAST SODIUM 10 MG/1
10 TABLET ORAL NIGHTLY
Status: DISCONTINUED | OUTPATIENT
Start: 2018-05-07 | End: 2018-05-08 | Stop reason: HOSPADM

## 2018-05-07 RX ADMIN — MONTELUKAST SODIUM 10 MG: 10 TABLET, COATED ORAL at 22:55

## 2018-05-07 RX ADMIN — PANTOPRAZOLE SODIUM 40 MG: 40 TABLET, DELAYED RELEASE ORAL at 06:05

## 2018-05-07 RX ADMIN — HYDROCHLOROTHIAZIDE 25 MG: 25 TABLET ORAL at 08:28

## 2018-05-07 RX ADMIN — CEFTRIAXONE 1 G: 1 INJECTION, SOLUTION INTRAVENOUS at 14:03

## 2018-05-07 RX ADMIN — Medication 1 CAPSULE: at 08:28

## 2018-05-07 RX ADMIN — INSULIN ASPART 7 UNITS: 100 INJECTION, SOLUTION INTRAVENOUS; SUBCUTANEOUS at 21:50

## 2018-05-07 RX ADMIN — CLOTRIMAZOLE AND BETAMETHASONE DIPROPIONATE 1 APPLICATION: 10; .5 CREAM TOPICAL at 20:59

## 2018-05-07 RX ADMIN — DOXYCYCLINE 100 MG: 100 INJECTION, POWDER, LYOPHILIZED, FOR SOLUTION INTRAVENOUS at 14:37

## 2018-05-07 RX ADMIN — IPRATROPIUM BROMIDE AND ALBUTEROL SULFATE 3 ML: .5; 3 SOLUTION RESPIRATORY (INHALATION) at 14:07

## 2018-05-07 RX ADMIN — HEPARIN SODIUM 5000 UNITS: 5000 INJECTION, SOLUTION INTRAVENOUS; SUBCUTANEOUS at 08:27

## 2018-05-07 RX ADMIN — INSULIN ASPART 7 UNITS: 100 INJECTION, SOLUTION INTRAVENOUS; SUBCUTANEOUS at 08:28

## 2018-05-07 RX ADMIN — IPRATROPIUM BROMIDE AND ALBUTEROL SULFATE 3 ML: .5; 3 SOLUTION RESPIRATORY (INHALATION) at 20:18

## 2018-05-07 RX ADMIN — ATORVASTATIN CALCIUM 10 MG: 10 TABLET, FILM COATED ORAL at 20:58

## 2018-05-07 RX ADMIN — LISINOPRIL 2.5 MG: 2.5 TABLET ORAL at 08:28

## 2018-05-07 RX ADMIN — METHYLPREDNISOLONE SODIUM SUCCINATE 40 MG: 40 INJECTION, POWDER, FOR SOLUTION INTRAMUSCULAR; INTRAVENOUS at 20:58

## 2018-05-07 RX ADMIN — IPRATROPIUM BROMIDE AND ALBUTEROL SULFATE 3 ML: .5; 3 SOLUTION RESPIRATORY (INHALATION) at 22:16

## 2018-05-07 RX ADMIN — GUAIFENESIN 1200 MG: 600 TABLET, EXTENDED RELEASE ORAL at 08:28

## 2018-05-07 RX ADMIN — GABAPENTIN 400 MG: 400 CAPSULE ORAL at 21:50

## 2018-05-07 RX ADMIN — DOXYCYCLINE 100 MG: 100 INJECTION, POWDER, LYOPHILIZED, FOR SOLUTION INTRAVENOUS at 03:17

## 2018-05-07 RX ADMIN — BUDESONIDE AND FORMOTEROL FUMARATE DIHYDRATE 2 PUFF: 80; 4.5 AEROSOL RESPIRATORY (INHALATION) at 20:18

## 2018-05-07 RX ADMIN — HEPARIN SODIUM 5000 UNITS: 5000 INJECTION, SOLUTION INTRAVENOUS; SUBCUTANEOUS at 20:58

## 2018-05-07 RX ADMIN — GUAIFENESIN 1200 MG: 600 TABLET, EXTENDED RELEASE ORAL at 20:58

## 2018-05-07 RX ADMIN — IPRATROPIUM BROMIDE AND ALBUTEROL SULFATE 3 ML: .5; 3 SOLUTION RESPIRATORY (INHALATION) at 07:26

## 2018-05-07 RX ADMIN — IPRATROPIUM BROMIDE AND ALBUTEROL SULFATE 3 ML: .5; 3 SOLUTION RESPIRATORY (INHALATION) at 11:00

## 2018-05-07 RX ADMIN — METHYLPREDNISOLONE SODIUM SUCCINATE 40 MG: 40 INJECTION, POWDER, FOR SOLUTION INTRAMUSCULAR; INTRAVENOUS at 08:28

## 2018-05-07 RX ADMIN — INSULIN ASPART 9 UNITS: 100 INJECTION, SOLUTION INTRAVENOUS; SUBCUTANEOUS at 17:20

## 2018-05-07 RX ADMIN — BUDESONIDE AND FORMOTEROL FUMARATE DIHYDRATE 2 PUFF: 80; 4.5 AEROSOL RESPIRATORY (INHALATION) at 07:28

## 2018-05-07 RX ADMIN — INSULIN ASPART 7 UNITS: 100 INJECTION, SOLUTION INTRAVENOUS; SUBCUTANEOUS at 12:06

## 2018-05-07 NOTE — PLAN OF CARE
Problem: Patient Care Overview  Goal: Plan of Care Review  Outcome: Ongoing (interventions implemented as appropriate)   05/07/18 0937   Coping/Psychosocial   Plan of Care Reviewed With patient   Plan of Care Review   Progress improving     Goal: Individualization and Mutuality  Outcome: Ongoing (interventions implemented as appropriate)    Goal: Discharge Needs Assessment  Outcome: Ongoing (interventions implemented as appropriate)    Goal: Interprofessional Rounds/Family Conf  Outcome: Ongoing (interventions implemented as appropriate)   05/07/18 0937   Interdisciplinary Rounds/Family Conf   Participants ;family;nursing;patient       Problem: Chronic Obstructive Pulmonary Disease (Adult)  Goal: Signs and Symptoms of Listed Potential Problems Will be Absent, Minimized or Managed (Chronic Obstructive Pulmonary Disease)  Outcome: Ongoing (interventions implemented as appropriate)   05/07/18 0937   Goal/Outcome Evaluation   Problems Assessed (Chronic Obstructive Pulmonary Disease (COPD)) all   Problems Present (COPD, Bronch/Emphy) respiratory compromise;situational response

## 2018-05-07 NOTE — PLAN OF CARE
Problem: Patient Care Overview  Goal: Plan of Care Review  Outcome: Ongoing (interventions implemented as appropriate)   05/06/18 4484   Coping/Psychosocial   Plan of Care Reviewed With patient   Plan of Care Review   Progress improving     Goal: Individualization and Mutuality  Outcome: Ongoing (interventions implemented as appropriate)      Problem: Chronic Obstructive Pulmonary Disease (Adult)  Goal: Signs and Symptoms of Listed Potential Problems Will be Absent, Minimized or Managed (Chronic Obstructive Pulmonary Disease)  Outcome: Ongoing (interventions implemented as appropriate)   05/06/18 2528   Goal/Outcome Evaluation   Problems Assessed (Chronic Obstructive Pulmonary Disease (COPD)) all   Problems Present (COPD, Bronch/Emphy) respiratory compromise;situational response

## 2018-05-07 NOTE — PROGRESS NOTES
[facility]     Patient Identification:  Name:  Maximo Kwon  Age:  63 y.o.  Sex:  male  :  1955  MRN:  [id number]  Visit Number:  [id number]  Primary Care Provider:  OSITO Cedillo    Length of stay:  1    Chief complaint:  cough    Subjective:  Much better overall with improvement in his dyspnea.  He still has a cough that is little bit productive.  There are no new associated symptoms.  There are no new issues noted by the nursing staff.  ----------------------------------------------------------------------------------------------------------------------  Current Hospital Meds:    atorvastatin 10 mg Oral Nightly   budesonide-formoterol 2 puff Inhalation BID - RT   ceftriaxone 1 g Intravenous Q24H   doxycycline 100 mg Intravenous Q12H   guaiFENesin 1,200 mg Oral Q12H   heparin (porcine) 5,000 Units Subcutaneous Q12H   hydrochlorothiazide 25 mg Oral Daily   insulin aspart 0-9 Units Subcutaneous 4x Daily AC & at Bedtime   ipratropium-albuterol 3 mL Nebulization Q4H - RT   lactobacillus acidophilus 1 capsule Oral Daily   lisinopril 2.5 mg Oral Daily   methylPREDNISolone sodium succinate 40 mg Intravenous BID   montelukast 10 mg Oral Nightly   pantoprazole 40 mg Oral QAM        ----------------------------------------------------------------------------------------------------------------------  Vital Signs:  Temp:  [97.4 °F (36.3 °C)-98.2 °F (36.8 °C)] 98.2 °F (36.8 °C)  Heart Rate:  [85-97] 97  Resp:  [16-22] 16  BP: (122-157)/(74-88) 124/74  1    18  0958 18  1358   Weight: 90.7 kg (200 lb) 92.6 kg (204 lb 3.2 oz)     Body mass index is 27.69 kg/m².    Intake/Output Summary (Last 24 hours) at 18 1917  Last data filed at 18 1403   Gross per 24 hour   Intake             1370 ml   Output                0 ml   Net             1370 ml     Diet Regular; Cardiac, Consistent  Carbohydrate  ----------------------------------------------------------------------------------------------------------------------  Physical exam:  Constitutional:  Well-developed and well-nourished White male.  No apparent distress. Appropriately interactive.  Mood appears normal.     HENT:  Head:  Normocephalic and atraumatic.  Mouth:  Moist mucous membranes.    Eyes:  Conjunctivae and EOM are normal.  No scleral icterus.    Neck:  Neck supple.  No JVD present.    Cardiovascular:  Normal rate, regular rhythm and normal heart sounds with no murmur.  Pulmonary/Chest:  No respiratory distress, no wheezes, no crackles, with normal breath sounds and good air movement.  Abdominal:  Soft.  Bowel sounds are normal.  No distension and no tenderness.   Musculoskeletal:  No edema, no tenderness, and no deformity.  No red or swollen joints anywhere.    Neurological:  Alert and oriented to person, place, and time.  Moves all extremities equally.  No tongue deviation.  No facial droop.  No slurred speech.   Skin:  Skin is warm and dry. No rash noted. No pallor.   Peripheral vascular:  Palpable pulses in all 4 extremities with no clubbing, no cyanosis, no edema.  Genitourinary:  ----------------------------------------------------------------------------------------------------------------------  Tele:    ----------------------------------------------------------------------------------------------------------------------    Results from last 7 days  Lab Units 05/06/18  1156 05/06/18  1016   TROPONIN I ng/mL <0.006 <0.006       Results from last 7 days  Lab Units 05/07/18  0520 05/06/18  1423 05/06/18  1016   CRP mg/dL 8.06* 5.95*  --    LACTATE mmol/L 1.3 2.9* 2.2*   WBC 10*3/mm3 8.60  --  13.14*   HEMOGLOBIN g/dL 12.6*  --  13.1*   HEMATOCRIT % 37.5*  --  39.0*   MCV fL 89.9  --  89.9   MCHC g/dL 33.6  --  33.6   PLATELETS 10*3/mm3 205  --  211   INR   --   --  1.08       Results from last 7 days  Lab Units 05/06/18  1322    PH, ARTERIAL pH units 7.439   PO2 ART mm Hg 77.9*   PCO2, ARTERIAL mm Hg 35.3   HCO3 ART mmol/L 23.4       Results from last 7 days  Lab Units 05/07/18  0520 05/06/18  1016   SODIUM mmol/L 133* 135   POTASSIUM mmol/L 3.8 3.6   MAGNESIUM mg/dL 2.0  --    CHLORIDE mmol/L 98* 97*   CO2 mmol/L 26.9 29.4   BUN mg/dL 17 18   CREATININE mg/dL 0.91 1.00   EGFR IF NONAFRICN AM mL/min/1.73 84 75   CALCIUM mg/dL 9.2 9.5   GLUCOSE mg/dL 324* 173*   ALBUMIN g/dL 4.10 4.50   BILIRUBIN mg/dL 0.4 0.8   ALK PHOS U/L 73 79   AST (SGOT) U/L 13 22   ALT (SGPT) U/L 28 34   Estimated Creatinine Clearance: 108.8 mL/min (by C-G formula based on SCr of 0.91 mg/dL).    No results found for: AMMONIA      Blood Culture   Date Value Ref Range Status   05/06/2018 No growth at 24 hours  Preliminary   05/06/2018 No growth at 24 hours  Preliminary                I have personally looked at the labs and they are summarized above.  ----------------------------------------------------------------------------------------------------------------------  Imaging Results (last 24 hours)     ** No results found for the last 24 hours. **        ----------------------------------------------------------------------------------------------------------------------  Assessment and Plan:  Acute hypoxic respiratory failure  COPD exacerbation  Persistent leukocytosis most likely due to the steroids  Failed outpatient oral therapy  Continue the IV steroids and IV antibiotics.  If he continues to improve so rapidly we'll de-escalate tomorrow.  Continue oxygen support, bronchodilators.  I have added Singulair and inhaled steroids due to the seasonality of his recurrent COPD exacerbations    Diabetes mellitus, type II  Hyperglycemia due to steroids  Continue the insulin sliding scale coverage and de-escalate steroids as soon as possible  Holding metformin for now but will likely restart tomorrow if no dye studies  planned    Hypertension  Hyperlipidemia  GERD  Clinically Stable  Continue home regimen    DVT prophylaxis  Subcutaneous heparin      Marcellus Moeller MD  05/07/18  5:37 PM

## 2018-05-07 NOTE — PROGRESS NOTES
Discharge Planning Assessment  VENITA Trejo     Patient Name: Maximo Kwon  MRN: [id number]  Today's Date: 5/7/2018    Admit Date: 5/6/2018      Discharge Plan     Row Name 05/07/18 1624       Plan    Plan SS received consult discharge planning (DME). SS attempted visit with pt and pt was out of his room. SS to follow and assist as needed with discharge planning.         Antoinette Pizarro

## 2018-05-08 VITALS
BODY MASS INDEX: 27.66 KG/M2 | OXYGEN SATURATION: 97 % | DIASTOLIC BLOOD PRESSURE: 96 MMHG | RESPIRATION RATE: 18 BRPM | WEIGHT: 204.2 LBS | TEMPERATURE: 97.7 F | SYSTOLIC BLOOD PRESSURE: 147 MMHG | HEART RATE: 89 BPM | HEIGHT: 72 IN

## 2018-05-08 LAB
BASOPHILS # BLD AUTO: 0.01 10*3/MM3 (ref 0–0.3)
BASOPHILS NFR BLD AUTO: 0.1 % (ref 0–2)
CRP SERPL-MCNC: 4.01 MG/DL (ref 0–0.99)
DEPRECATED RDW RBC AUTO: 46.3 FL (ref 37–54)
EOSINOPHIL # BLD AUTO: 0 10*3/MM3 (ref 0–0.7)
EOSINOPHIL NFR BLD AUTO: 0 % (ref 0–5)
ERYTHROCYTE [DISTWIDTH] IN BLOOD BY AUTOMATED COUNT: 14.2 % (ref 11.5–14.5)
GLUCOSE BLDC GLUCOMTR-MCNC: 285 MG/DL (ref 70–130)
GLUCOSE BLDC GLUCOMTR-MCNC: 318 MG/DL (ref 70–130)
HCT VFR BLD AUTO: 39.3 % (ref 42–52)
HGB BLD-MCNC: 13 G/DL (ref 14–18)
IMM GRANULOCYTES # BLD: 0.03 10*3/MM3 (ref 0–0.03)
IMM GRANULOCYTES NFR BLD: 0.4 % (ref 0–0.5)
LYMPHOCYTES # BLD AUTO: 0.43 10*3/MM3 (ref 1–3)
LYMPHOCYTES NFR BLD AUTO: 5.2 % (ref 21–51)
MCH RBC QN AUTO: 30.2 PG (ref 27–33)
MCHC RBC AUTO-ENTMCNC: 33.1 G/DL (ref 33–37)
MCV RBC AUTO: 91.2 FL (ref 80–94)
MONOCYTES # BLD AUTO: 0.36 10*3/MM3 (ref 0.1–0.9)
MONOCYTES NFR BLD AUTO: 4.3 % (ref 0–10)
NEUTROPHILS # BLD AUTO: 7.48 10*3/MM3 (ref 1.4–6.5)
NEUTROPHILS NFR BLD AUTO: 90 % (ref 30–70)
PLATELET # BLD AUTO: 200 10*3/MM3 (ref 130–400)
PMV BLD AUTO: 11.8 FL (ref 6–10)
RBC # BLD AUTO: 4.31 10*6/MM3 (ref 4.7–6.1)
WBC NRBC COR # BLD: 8.31 10*3/MM3 (ref 4.5–12.5)

## 2018-05-08 PROCEDURE — 25010000002 METHYLPREDNISOLONE PER 40 MG: Performed by: INTERNAL MEDICINE

## 2018-05-08 PROCEDURE — 94799 UNLISTED PULMONARY SVC/PX: CPT

## 2018-05-08 PROCEDURE — 85025 COMPLETE CBC W/AUTO DIFF WBC: CPT | Performed by: INTERNAL MEDICINE

## 2018-05-08 PROCEDURE — 82962 GLUCOSE BLOOD TEST: CPT

## 2018-05-08 PROCEDURE — 86140 C-REACTIVE PROTEIN: CPT | Performed by: INTERNAL MEDICINE

## 2018-05-08 PROCEDURE — 63710000001 INSULIN ASPART PER 5 UNITS: Performed by: NURSE PRACTITIONER

## 2018-05-08 PROCEDURE — 99239 HOSP IP/OBS DSCHRG MGMT >30: CPT | Performed by: INTERNAL MEDICINE

## 2018-05-08 PROCEDURE — 25010000002 HEPARIN (PORCINE) PER 1000 UNITS: Performed by: INTERNAL MEDICINE

## 2018-05-08 RX ORDER — MONTELUKAST SODIUM 10 MG/1
10 TABLET ORAL NIGHTLY
Qty: 30 TABLET | Refills: 0 | Status: SHIPPED | OUTPATIENT
Start: 2018-05-08 | End: 2021-08-18 | Stop reason: SDDI

## 2018-05-08 RX ORDER — DOXYCYCLINE 100 MG/1
100 CAPSULE ORAL EVERY 12 HOURS SCHEDULED
Qty: 10 CAPSULE | Refills: 0 | Status: SHIPPED | OUTPATIENT
Start: 2018-05-08 | End: 2018-05-13

## 2018-05-08 RX ORDER — PREDNISONE 20 MG/1
20 TABLET ORAL 2 TIMES DAILY WITH MEALS
Status: DISCONTINUED | OUTPATIENT
Start: 2018-05-08 | End: 2018-05-08 | Stop reason: HOSPADM

## 2018-05-08 RX ORDER — PREDNISONE 20 MG/1
20 TABLET ORAL 2 TIMES DAILY WITH MEALS
Qty: 10 TABLET | Refills: 0 | Status: SHIPPED | OUTPATIENT
Start: 2018-05-08 | End: 2018-05-13

## 2018-05-08 RX ORDER — DOXYCYCLINE 100 MG/1
100 CAPSULE ORAL EVERY 12 HOURS SCHEDULED
Status: DISCONTINUED | OUTPATIENT
Start: 2018-05-08 | End: 2018-05-08 | Stop reason: HOSPADM

## 2018-05-08 RX ADMIN — BUDESONIDE AND FORMOTEROL FUMARATE DIHYDRATE 2 PUFF: 80; 4.5 AEROSOL RESPIRATORY (INHALATION) at 06:49

## 2018-05-08 RX ADMIN — IPRATROPIUM BROMIDE AND ALBUTEROL SULFATE 3 ML: .5; 3 SOLUTION RESPIRATORY (INHALATION) at 06:49

## 2018-05-08 RX ADMIN — IPRATROPIUM BROMIDE AND ALBUTEROL SULFATE 3 ML: .5; 3 SOLUTION RESPIRATORY (INHALATION) at 10:31

## 2018-05-08 RX ADMIN — GUAIFENESIN 1200 MG: 600 TABLET, EXTENDED RELEASE ORAL at 08:36

## 2018-05-08 RX ADMIN — METHYLPREDNISOLONE SODIUM SUCCINATE 40 MG: 40 INJECTION, POWDER, FOR SOLUTION INTRAMUSCULAR; INTRAVENOUS at 08:36

## 2018-05-08 RX ADMIN — INSULIN ASPART 7 UNITS: 100 INJECTION, SOLUTION INTRAVENOUS; SUBCUTANEOUS at 08:36

## 2018-05-08 RX ADMIN — HEPARIN SODIUM 5000 UNITS: 5000 INJECTION, SOLUTION INTRAVENOUS; SUBCUTANEOUS at 08:36

## 2018-05-08 RX ADMIN — DOXYCYCLINE 100 MG: 100 INJECTION, POWDER, LYOPHILIZED, FOR SOLUTION INTRAVENOUS at 03:13

## 2018-05-08 RX ADMIN — LISINOPRIL 2.5 MG: 2.5 TABLET ORAL at 08:36

## 2018-05-08 RX ADMIN — INSULIN ASPART 6 UNITS: 100 INJECTION, SOLUTION INTRAVENOUS; SUBCUTANEOUS at 11:43

## 2018-05-08 RX ADMIN — Medication 1 CAPSULE: at 08:36

## 2018-05-08 RX ADMIN — PANTOPRAZOLE SODIUM 40 MG: 40 TABLET, DELAYED RELEASE ORAL at 06:55

## 2018-05-08 RX ADMIN — IPRATROPIUM BROMIDE AND ALBUTEROL SULFATE 3 ML: .5; 3 SOLUTION RESPIRATORY (INHALATION) at 02:24

## 2018-05-08 RX ADMIN — GABAPENTIN 400 MG: 400 CAPSULE ORAL at 08:36

## 2018-05-08 RX ADMIN — HYDROCHLOROTHIAZIDE 25 MG: 25 TABLET ORAL at 08:36

## 2018-05-08 NOTE — DISCHARGE SUMMARY
[facility] HOSPITALIST MEDICINE DISCHARGE SUMMARY    Patient Identification:  Name:  Maximo Kwon  Age:  63 y.o.  Sex:  male  :  1955  MRN:  [id number]  Visit Number:  [id number]    Date of Admission: 2018  Date of Discharge:  2018     PCP: Zachariah Shay, APRN    DISCHARGE DIAGNOSIS  Acute hypoxic respiratory failure  COPD exacerbation  Leukocytosis due to steroids  Diabetes mellitus type II  Hypertension  Hyperlipidemia  GERD    CONSULTS       PROCEDURES PERFORMED      HOSPITAL COURSE  Patient is a 63 y.o. male presented to [facility] complaining of shortness of breath, cough, and wheezing.  Please see the admitting history and physical for further details.  He appeared to be in COPD exacerbation with no clear evidence of pneumonia.  He was started on IV steroids, inhaled bronchodilators, and antibiotics versus CAP.  He improved quite rapidly and no longer needed oxygen by hospital day #2.  Antibiotics and steroids were D escalated the by mouth.  His symptoms seem to be consistently related this allergy season so he was started on Singulair as well.  He was felt to be stable for discharge with close outpatient follow-up.  He will follow-up with his primary pulmonologist within the week.  He was discharged home in improved condition.    VITAL SIGNS:  1    18  0958 18  1358   Weight: 90.7 kg (200 lb) 92.6 kg (204 lb 3.2 oz)     Body mass index is 27.69 kg/m².    PHYSICAL EXAM:  Constitutional:  Well-developed and well-nourished.  No respiratory distress.      HENT:  Head: Normocephalic and atraumatic.  Mouth:  Moist mucous membranes.    Eyes:  Conjunctivae and EOM are normal.  Pupils are equal, round, and reactive to light.  No scleral icterus.  Neck:  Neck supple.  No JVD present.    Cardiovascular:  Normal rate, regular rhythm and normal heart sounds with no murmur.  Pulmonary/Chest:  No respiratory distress, no wheezes, no crackles, with normal breath  sounds and good air movement.  Abdominal:  Soft.  Bowel sounds are normal.  No distension and no tenderness.   Musculoskeletal:  No edema, no tenderness, and no deformity.  No red or swollen joints anywhere.    Neurological:  Alert and oriented to person, place, and time.  No cranial nerve deficit.  No tongue deviation.  No facial droop.  No slurred speech.   Skin:  Skin is warm and dry.  No rash noted.  No pallor.   Psychiatric:  Normal mood and affect.  Behavior is normal.  Judgment and thought content normal.   Peripheral vascular:  No edema and strong pulses on all 4 extremities.  Genitourinary:    DISCHARGE DISPOSITION   Stable    DISCHARGE MEDICATIONS:   Maximo Kwon   Home Medication Instructions RICARDO:[id number]    Printed on:05/08/18 1810   Medication Information                      albuterol (PROVENTIL HFA;VENTOLIN HFA) 108 (90 BASE) MCG/ACT inhaler  Inhale 2 puffs Every 4 (Four) Hours As Needed for Wheezing.             clotrimazole-betamethasone (LOTRISONE) 1-0.05 % cream  Apply 1 application topically 2 (Two) Times a Day As Needed. feet             doxycycline (MONODOX) 100 MG capsule  Take 1 capsule by mouth Every 12 (Twelve) Hours for 10 doses.             Fluticasone Furoate-Vilanterol (BREO ELLIPTA) 200-25 MCG/INH aerosol powder   Inhale 1 puff Daily.             gabapentin (NEURONTIN) 400 MG capsule  Take 400 mg by mouth 2 (Two) Times a Day.             guaiFENesin (MUCINEX) 600 MG 12 hr tablet  Take 1,200 mg by mouth Every 12 (Twelve) Hours.             hydrochlorothiazide (HYDRODIURIL) 25 MG tablet  Take 25 mg by mouth Daily.             ipratropium-albuterol (DUO-NEB) 0.5-2.5 mg/mL nebulizer  Take 3 mL by nebulization Every 4 (Four) Hours As Needed for Wheezing.             lisinopril (PRINIVIL,ZESTRIL) 2.5 MG tablet  Take 2.5 mg by mouth Daily.             metFORMIN (GLUCOPHAGE) 500 MG tablet  Take 1,000 mg by mouth 2 (Two) Times a Day With Meals.             montelukast (SINGULAIR) 10 MG  tablet  Take 1 tablet by mouth Every Night.             omeprazole (priLOSEC) 20 MG capsule  Take 20 mg by mouth Daily.             pravastatin (PRAVACHOL) 20 MG tablet  Take 20 mg by mouth Every Night.             predniSONE (DELTASONE) 20 MG tablet  Take 1 tablet by mouth 2 (Two) Times a Day With Meals for 10 doses.             tiotropium bromide monohydrate (SPIRIVA RESPIMAT) 2.5 MCG/ACT aerosol solution inhaler  Inhale 2 puffs Daily.                 Diet Instructions     Consistent Carbohydrate Diet as tolerated.               Your Scheduled Appointments    May 25, 2018 11:00 AM EDT  Hospital Follow Up with OSITO Diane  [facility] PULMONOLOGY CRITICAL CARE (--) [address]  [place] [address]  [phone]   Aug 01, 2018 12:00 PM EDT  Follow Up with PRERNA Tai MD  [facility] PULMONOLOGY CRITICAL CARE (--) [address]  [place] [address]  [phone]   Arrive 15 minutes prior to appointment.        Activity Instructions     Activity as tolerated.               Additional Instructions for the Follow-ups that You Need to Schedule     Discharge Follow-up with Specified Provider: Dr. Tai; 1 Week    As directed      To:  Dr. Tai    Follow Up:  1 Week           Follow-up Information     OSITO Cedillo .    Specialty:  Family Medicine  Contact information:  [address] [place] [address]  [phone]                   TEST  RESULTS PENDING AT DISCHARGE   Order Current Status    Blood Culture - Blood, Blood, Venous Line Preliminary result    Blood Culture - Blood, Blood, Venous Line Preliminary result    Respiratory Culture - Sputum, Cough Preliminary result           Marcellus Moeller MD  05/08/18  6:10 PM    Please note that this discharge summary required more than 30 minutes to complete.

## 2018-05-08 NOTE — PLAN OF CARE
Problem: Chronic Obstructive Pulmonary Disease (Adult)  Goal: Signs and Symptoms of Listed Potential Problems Will be Absent, Minimized or Managed (Chronic Obstructive Pulmonary Disease)  Outcome: Ongoing (interventions implemented as appropriate)      Problem: Infection, Risk/Actual (Adult)  Goal: Identify Related Risk Factors and Signs and Symptoms  Outcome: Ongoing (interventions implemented as appropriate)

## 2018-05-08 NOTE — PLAN OF CARE
Problem: Patient Care Overview  Goal: Discharge Needs Assessment  Outcome: Ongoing (interventions implemented as appropriate)  Discharge Planning Assessment   [place]     Patient Name: Maximo Kwon  MRN: [id number]  Today's Date: 5/8/2018    Admit Date: 5/6/2018          Discharge Needs Assessment     Row Name 05/08/18 1510       Living Environment    Lives With friend(s)   Pt lives at home with friend, Subha Salvador.     Quality of Family Relationships supportive    Able to Return to Prior Arrangements yes       Discharge Needs Assessment    Equipment Currently Used at Home nebulizer   Pt has a nebulizer machine from [employer]. Pt states having nebulizer treatments.     Equipment Needed After Discharge none    Outpatient/Agency/Support Group Needs --   Pt does not utilize home health services and denies need for services.             Discharge Plan     Row Name 05/08/18 1511       Plan    Final Discharge Disposition Code 01 - home or self-care    Final Note Pt was discharged home today. Pt lives at home with friend, Subha Salvador. Pt does not utilize home health services and denies need for services. Pt has a nebulizer machine from [facility] and has nebulizer treatments. Pt does not have a POA or living will. PCP is NP, Zachariah Shay. No other needs identified.           Demographic Summary     Row Name 05/08/18 150       General Information    Referral Source nursing    Reason for Consult --   SS received consult discharge planning (DME). SS spoke to pt.      Antoinette Pizarro

## 2018-05-08 NOTE — PROGRESS NOTES
Discharge Planning Assessment   [place]     Patient Name: Maximo Kwon  MRN: [id number]  Today's Date: 5/8/2018    Admit Date: 5/6/2018          Discharge Needs Assessment     Row Name 05/08/18 1510       Living Environment    Lives With friend(s)   Pt lives at home with friend, Subha Salvador.     Quality of Family Relationships supportive    Able to Return to Prior Arrangements yes       Discharge Needs Assessment    Equipment Currently Used at Home nebulizer   Pt has a nebulizer machine from [employer]. Pt states having nebulizer treatments.     Equipment Needed After Discharge none    Outpatient/Agency/Support Group Needs --   Pt does not utilize home health services and denies need for services.             Discharge Plan     Row Name 05/08/18 1511       Plan    Final Discharge Disposition Code 01 - home or self-care    Final Note Pt was discharged home today. Pt lives at home with friend, Subha Salvador. Pt does not utilize home health services and denies need for services. Pt has a nebulizer machine from [facility] and has nebulizer treatments. Pt does not have a POA or living will. PCP is NP, Zachariah Shay. No other needs identified.           Demographic Summary     Row Name 05/08/18 1508       General Information    Referral Source nursing    Reason for Consult --   SS received consult discharge planning (DME). SS spoke to pt.      Antoinette Pizarro

## 2018-05-08 NOTE — PLAN OF CARE
Problem: Patient Care Overview  Goal: Plan of Care Review  Outcome: Ongoing (interventions implemented as appropriate)   05/07/18 0937 05/08/18 0836   Coping/Psychosocial   Plan of Care Reviewed With --  patient   Plan of Care Review   Progress improving --        Problem: Chronic Obstructive Pulmonary Disease (Adult)  Goal: Signs and Symptoms of Listed Potential Problems Will be Absent, Minimized or Managed (Chronic Obstructive Pulmonary Disease)  Outcome: Ongoing (interventions implemented as appropriate)      Problem: Infection, Risk/Actual (Adult)  Goal: Identify Related Risk Factors and Signs and Symptoms  Outcome: Ongoing (interventions implemented as appropriate)    Goal: Infection Prevention/Resolution  Outcome: Ongoing (interventions implemented as appropriate)

## 2018-05-09 LAB
B-LACTAMASE USUAL SUSC ISLT: NEGATIVE
BACTERIA SPEC RESP CULT: ABNORMAL
BACTERIA SPEC RESP CULT: ABNORMAL
GRAM STN SPEC: ABNORMAL

## 2018-05-09 NOTE — PAYOR COMM NOTE
"[facility]  NPI:[id number]    Utilization Review  Contact: Fabienne Lebron RN  Phone: [phone]  Fax:[phone]    DISCHARGE NOTIFICATION  DISCHARGE TO HOME ON 2018  AUTH # [id number]    Michelle Kwon (63 y.o. Male)     Date of Birth Social Security Number Address Home Phone MRN    1955  [address]  [place] [address] [phone] [id number]    Islam Marital Status          None        Admission Date Admission Type Admitting Provider Attending Provider Department, Room/Bed    18 Emergency Joao Cano MD  [address], [facility]4/2S    Discharge Date Discharge Disposition Discharge Destination        2018 Home or Self Care              Attending Provider:  (none)   Allergies:  No Known Allergies    Isolation:  None   Infection:  None   Code Status:  Prior    Ht:  182.9 cm (72\")   Wt:  92.6 kg (204 lb 3.2 oz)    Admission Cmt:  None   Principal Problem:  None                Active Insurance as of 2018     Primary Coverage     Payor Plan Insurance Group Employer/Plan Group    [employer] MEDICAID [employer] [id number]     Payor Plan Address Payor Plan Phone Number Effective From Effective To    PO  [phone] 3/11/2018     [place] [address]       Subscriber Name Subscriber Birth Date Member ID       MICHELLE KWON 1955 [id number]                 Emergency Contacts      (Rel.) Home Phone Work Phone Mobile Phone    Mignon Durant (Partner) [phone] -- --               Discharge Summary      Marcellus Moeller MD at 2018 12:15 PM              [facility] HOSPITALIST MEDICINE DISCHARGE SUMMARY    Patient Identification:  Name:  Michelle Kwon  Age:  63 y.o.  Sex:  male  :  1955  MRN:  [id number]  Visit Number:  [id number]    Date of Admission: 2018  Date of Discharge:  2018     PCP: Zachariah Shay APRFAUSTINO    DISCHARGE DIAGNOSIS  Acute hypoxic respiratory failure  COPD " exacerbation  Leukocytosis due to steroids  Diabetes mellitus type II  Hypertension  Hyperlipidemia  GERD    CONSULTS       PROCEDURES PERFORMED      HOSPITAL COURSE  Patient is a 63 y.o. male presented to [facility] complaining of shortness of breath, cough, and wheezing.  Please see the admitting history and physical for further details.  He appeared to be in COPD exacerbation with no clear evidence of pneumonia.  He was started on IV steroids, inhaled bronchodilators, and antibiotics versus CAP.  He improved quite rapidly and no longer needed oxygen by hospital day #2.  Antibiotics and steroids were D escalated the by mouth.  His symptoms seem to be consistently related this allergy season so he was started on Singulair as well.  He was felt to be stable for discharge with close outpatient follow-up.  He will follow-up with his primary pulmonologist within the week.  He was discharged home in improved condition.    VITAL SIGNS:  1    05/06/18  0958 05/06/18  1358   Weight: 90.7 kg (200 lb) 92.6 kg (204 lb 3.2 oz)     Body mass index is 27.69 kg/m².    PHYSICAL EXAM:  Constitutional:  Well-developed and well-nourished.  No respiratory distress.      HENT:  Head: Normocephalic and atraumatic.  Mouth:  Moist mucous membranes.    Eyes:  Conjunctivae and EOM are normal.  Pupils are equal, round, and reactive to light.  No scleral icterus.  Neck:  Neck supple.  No JVD present.    Cardiovascular:  Normal rate, regular rhythm and normal heart sounds with no murmur.  Pulmonary/Chest:  No respiratory distress, no wheezes, no crackles, with normal breath sounds and good air movement.  Abdominal:  Soft.  Bowel sounds are normal.  No distension and no tenderness.   Musculoskeletal:  No edema, no tenderness, and no deformity.  No red or swollen joints anywhere.    Neurological:  Alert and oriented to person, place, and time.  No cranial nerve deficit.  No tongue deviation.  No facial droop.  No slurred  speech.   Skin:  Skin is warm and dry.  No rash noted.  No pallor.   Psychiatric:  Normal mood and affect.  Behavior is normal.  Judgment and thought content normal.   Peripheral vascular:  No edema and strong pulses on all 4 extremities.  Genitourinary:    DISCHARGE DISPOSITION   Stable    DISCHARGE MEDICATIONS:   Maximo Kwon   Home Medication Instructions RICARDO:[id number]    Printed on:05/08/18 8294   Medication Information                      albuterol (PROVENTIL HFA;VENTOLIN HFA) 108 (90 BASE) MCG/ACT inhaler  Inhale 2 puffs Every 4 (Four) Hours As Needed for Wheezing.             clotrimazole-betamethasone (LOTRISONE) 1-0.05 % cream  Apply 1 application topically 2 (Two) Times a Day As Needed. feet             doxycycline (MONODOX) 100 MG capsule  Take 1 capsule by mouth Every 12 (Twelve) Hours for 10 doses.             Fluticasone Furoate-Vilanterol (BREO ELLIPTA) 200-25 MCG/INH aerosol powder   Inhale 1 puff Daily.             gabapentin (NEURONTIN) 400 MG capsule  Take 400 mg by mouth 2 (Two) Times a Day.             guaiFENesin (MUCINEX) 600 MG 12 hr tablet  Take 1,200 mg by mouth Every 12 (Twelve) Hours.             hydrochlorothiazide (HYDRODIURIL) 25 MG tablet  Take 25 mg by mouth Daily.             ipratropium-albuterol (DUO-NEB) 0.5-2.5 mg/mL nebulizer  Take 3 mL by nebulization Every 4 (Four) Hours As Needed for Wheezing.             lisinopril (PRINIVIL,ZESTRIL) 2.5 MG tablet  Take 2.5 mg by mouth Daily.             metFORMIN (GLUCOPHAGE) 500 MG tablet  Take 1,000 mg by mouth 2 (Two) Times a Day With Meals.             montelukast (SINGULAIR) 10 MG tablet  Take 1 tablet by mouth Every Night.             omeprazole (priLOSEC) 20 MG capsule  Take 20 mg by mouth Daily.             pravastatin (PRAVACHOL) 20 MG tablet  Take 20 mg by mouth Every Night.             predniSONE (DELTASONE) 20 MG tablet  Take 1 tablet by mouth 2 (Two) Times a Day With Meals for 10 doses.             tiotropium bromide  monohydrate (SPIRIVA RESPIMAT) 2.5 MCG/ACT aerosol solution inhaler  Inhale 2 puffs Daily.                 Diet Instructions     Consistent Carbohydrate Diet as tolerated.               Your Scheduled Appointments    May 25, 2018 11:00 AM EDT  Hospital Follow Up with OSITO Diane  [facility] PULMONOLOGY CRITICAL CARE (--) [address]  [place] [address]  [phone]   Aug 01, 2018 12:00 PM EDT  Follow Up with PRERNA Tai MD  [facility] PULMONOLOGY CRITICAL CARE (--) [address]  [place] [address]  [phone]   Arrive 15 minutes prior to appointment.        Activity Instructions     Activity as tolerated.               Additional Instructions for the Follow-ups that You Need to Schedule     Discharge Follow-up with Specified Provider: Dr. Tai; 1 Week    As directed      To:  Dr. Tai    Follow Up:  1 Week           Follow-up Information     OSITO Cedillo .    Specialty:  Family Medicine  Contact information:  [address] [place], [place] [address] [address]  [phone]                   TEST  RESULTS PENDING AT DISCHARGE   Order Current Status    Blood Culture - Blood, Blood, Venous Line Preliminary result    Blood Culture - Blood, Blood, Venous Line Preliminary result    Respiratory Culture - Sputum, Cough Preliminary result           Marcellus Moeller MD  05/08/18  6:10 PM    Please note that this discharge summary required more than 30 minutes to complete.      Electronically signed by Marcellus Moeller MD at 5/8/2018  6:15 PM

## 2018-05-11 LAB
BACTERIA SPEC AEROBE CULT: NORMAL
BACTERIA SPEC AEROBE CULT: NORMAL

## 2018-05-14 ENCOUNTER — HOSPITAL ENCOUNTER (OUTPATIENT)
Dept: CT IMAGING | Facility: HOSPITAL | Age: 63
Discharge: HOME | End: 2018-05-14
Attending: INTERNAL MEDICINE | Admitting: INTERNAL MEDICINE

## 2018-05-14 DIAGNOSIS — J41.1 MUCOPURULENT CHRONIC BRONCHITIS (HCC): ICD-10-CM

## 2018-05-14 DIAGNOSIS — Z87.891 HISTORY OF NICOTINE USE: ICD-10-CM

## 2018-05-14 PROCEDURE — 71250 CT THORAX DX C-: CPT | Performed by: RADIOLOGY

## 2018-05-14 PROCEDURE — G0297 LDCT FOR LUNG CA SCREEN: HCPCS

## 2018-07-24 ENCOUNTER — OFFICE VISIT (OUTPATIENT)
Dept: PULMONOLOGY | Facility: CLINIC | Age: 63
End: 2018-07-24

## 2018-07-24 VITALS
HEIGHT: 72 IN | DIASTOLIC BLOOD PRESSURE: 77 MMHG | OXYGEN SATURATION: 97 % | HEART RATE: 110 BPM | SYSTOLIC BLOOD PRESSURE: 142 MMHG | BODY MASS INDEX: 27.39 KG/M2 | WEIGHT: 202.2 LBS

## 2018-07-24 DIAGNOSIS — R91.1 LUNG NODULE: ICD-10-CM

## 2018-07-24 DIAGNOSIS — J44.1 COPD EXACERBATION (HCC): Primary | ICD-10-CM

## 2018-07-24 PROCEDURE — 99214 OFFICE O/P EST MOD 30 MIN: CPT | Performed by: INTERNAL MEDICINE

## 2018-07-24 RX ORDER — METHYLPREDNISOLONE 4 MG/1
TABLET ORAL
Qty: 21 TABLET | Refills: 0 | Status: SHIPPED | OUTPATIENT
Start: 2018-07-24 | End: 2019-04-05

## 2018-07-24 NOTE — PROGRESS NOTES
Subjective   Maximo Kwon is a 63 y.o. male who is being seen for COPD; Shortness of Breath; and Results    History of Present Illness   Patient returns for follow-up for COPD.  He also had a screening low-dose CT chest.  Subjectively his symptoms are worse than usual.  He tells me that for the past month or so he's been having increased shortness of breath and chest tightness.  He has some dry cough sometimes brings acute respiratory distress.  Overall he thinks that his breathing is much worse than before.  Past Medical History:   Diagnosis Date   • COPD (chronic obstructive pulmonary disease) (CMS/HCC)    • Diabetes mellitus (CMS/HCC)    • Diverticulitis    • GERD (gastroesophageal reflux disease)    • Hypertension      Past Surgical History:   Procedure Laterality Date   • COLON RESECTION  2016    had colostomy and reveresed back    • COLONOSCOPY  2016   • LUNG BIOPSY  2014    (non cancerous)     Family History   Problem Relation Age of Onset   • Cancer Father    • Diabetes Sister    • Diabetes Brother       reports that he quit smoking about 10 years ago. His smoking use included Cigarettes. He has a 30.00 pack-year smoking history. He has never used smokeless tobacco. He reports that he does not drink alcohol or use drugs.  No Known Allergies        Patient has received a flu shot but has not received a pneumonia vaccination.     The following portions of the patient's history were reviewed and updated as appropriate: allergies, current medications, past family history, past medical history, past social history, past surgical history and problem list.    Review of Systems   Constitutional: Negative for appetite change, chills, diaphoresis and unexpected weight change.   HENT: Negative for sore throat, trouble swallowing and voice change.    Eyes: Negative for visual disturbance.   Respiratory: Positive for cough and shortness of breath. Negative for apnea, choking and wheezing.    Cardiovascular: Negative for  "chest pain, palpitations and leg swelling.   Gastrointestinal: Negative for abdominal pain, constipation, diarrhea, nausea and vomiting.   Endocrine: Negative for cold intolerance, heat intolerance, polydipsia, polyphagia and polyuria.   Genitourinary: Negative for difficulty urinating and dysuria.   Musculoskeletal: Negative for gait problem.   Skin: Negative for rash and wound.   Neurological: Negative for syncope and light-headedness.   Hematological: Negative for adenopathy.   Psychiatric/Behavioral: Negative for agitation, behavioral problems and confusion.   All other systems reviewed and are negative.      Objective   /77   Pulse 110   Ht 182.9 cm (72\")   Wt 91.7 kg (202 lb 3.2 oz)   SpO2 97%   BMI 27.42 kg/m²   Physical Exam   Constitutional: He is oriented to person, place, and time.   HENT:   Head: Normocephalic and atraumatic.   Nose: Mucosal edema present.   Eyes: Pupils are equal, round, and reactive to light. EOM are normal.   Neck: Neck supple.   Cardiovascular: Normal rate, regular rhythm and normal heart sounds.    Pulmonary/Chest: He has rhonchi.   Vesicular breath sound bilaterally with prolonged expiratory phase   Abdominal: Soft. Bowel sounds are normal.   Musculoskeletal: Normal range of motion. He exhibits no deformity.   Neurological: He is alert and oriented to person, place, and time.   Skin: Skin is warm and dry.   Psychiatric: He has a normal mood and affect. His behavior is normal.   Nursing note and vitals reviewed.        Radiology:    Ct Chest Low Dose Wo    CT LUNG CANCER SCREENING     TECHNICAL PARAMETERS:  Multiple CT axial images were obtained from thoracic inlet through upper  abdomen without administration of IV contrast.      Radiation dose reduction techniques were utilized per ALARA protocol.  Automated exposure control was initiated through either or [employer] or  DoseRight software packages by  protocol.       CT Dose Index (CTDIvol):  1.96 mGy   "   Facility: [facility]     CT Scanner: Amedrixilliance CT 64-Slice Scanner Keen     CLINICAL INDICATION:  Screening visit: Baseline  Smoking history:   Current status: Former  If former smoker, years since quittin or greater  Pack-years as reported by the ordering practitioner: 30   For current smokers, smoking cessation interventions availableDocumented  by provider screening order sheet.     Lung cancer screening.   Absence of signs or symptoms of lung   cancer.     COMPARISON:  NONE     FINDINGS:  Exam parameters  Diagnostic quality: Satisfactory      Comments: NONE     Lung nodules  Present, detailed below:     Right lung solid 7.4 mm -- image# 93     -- solid 6 mm -- image# [id number]     Right lung semi-solid 6.0 mm -- image# 142     COPD: Moderate.     FIBROSIS: None.     LYMPH NODES: None.     OTHER FINDINGS: Patchy airspace markings are seen in the right lung.  They are nonnodular in appearance.     RIGHT PLEURAL SPACE:  Effusion: None.  Calcification: None.  Thickening: None.  Pneumothorax: None.     LEFT PLEURAL SPACE:  Effusion: None.  Calcification: None.  Thickening: None.  Pneumothorax: None.     HEART:  Heart size: Normal  Coronary calcification: Moderate.  Pericardial effusion: None.     OTHER FINDINGS  Upper abdomen: None.  Thorax: None.  Base of neck: None.        IMPRESSION:  Lung RADS Category 3, probably benign nodules in the right  lung.  In addition patchy airspace markings in the right lung which could be  inflammatory.     Need comparison:  No.     Repeat CT: I would suggest short interval 3-month follow-up low-dose  screening CT.     See physician: As previously instructed.        This report was finalized on 2018 2:06 PM by Dr. Sarwat Peñaloza MD.  Lab Results:  Admission on 2018, Discharged on 2018   Component Date Value Ref Range Status   • Glucose 2018 173* 70 - 110 mg/dL Final   • BUN 2018 18  7 - 21 mg/dL Final   • Creatinine  05/06/2018 1.00  0.43 - 1.29 mg/dL Final   • Sodium 05/06/2018 135  135 - 153 mmol/L Final   • Potassium 05/06/2018 3.6  3.5 - 5.3 mmol/L Final   • Chloride 05/06/2018 97* 99 - 112 mmol/L Final   • CO2 05/06/2018 29.4  24.3 - 31.9 mmol/L Final   • Calcium 05/06/2018 9.5  7.7 - 10.0 mg/dL Final   • Total Protein 05/06/2018 7.5  6.0 - 8.0 g/dL Final   • Albumin 05/06/2018 4.50  3.40 - 4.80 g/dL Final   • ALT (SGPT) 05/06/2018 34  10 - 44 U/L Final   • AST (SGOT) 05/06/2018 22  10 - 34 U/L Final   • Alkaline Phosphatase 05/06/2018 79  40 - 129 U/L Final   • Total Bilirubin 05/06/2018 0.8  0.2 - 1.8 mg/dL Final   • eGFR Non African Amer 05/06/2018 75  >60 mL/min/1.73 Final   • Globulin 05/06/2018 3.0  gm/dL Final   • A/G Ratio 05/06/2018 1.5  1.5 - 2.5 g/dL Final   • BUN/Creatinine Ratio 05/06/2018 18.0  7.0 - 25.0 Final   • Anion Gap 05/06/2018 8.6  3.6 - 11.2 mmol/L Final   • BNP 05/06/2018 10.0  0.0 - 100.0 pg/mL Final   • Blood Culture 05/06/2018 No growth at 5 days   Final   • Blood Culture 05/06/2018 No growth at 5 days   Final   • Troponin I 05/06/2018 <0.006  <=0.040 ng/mL Final   • Troponin I 05/06/2018 <0.006  <=0.040 ng/mL Final   • Protime 05/06/2018 14.2  11.0 - 15.4 Seconds Final   • INR 05/06/2018 1.08  0.90 - 1.10 Final   • Lactate 05/06/2018 2.2* 0.5 - 2.0 mmol/L Final   • Color, UA 05/06/2018 Yellow  Yellow, Straw Final   • Appearance, UA 05/06/2018 Clear  Clear Final   • pH, UA 05/06/2018 5.5  5.0 - 8.0 Final   • Specific Gravity, UA 05/06/2018 1.016  1.005 - 1.030 Final   • Glucose, UA 05/06/2018 250 mg/dL (1+)* Negative Final   • Ketones, UA 05/06/2018 Negative  Negative Final   • Bilirubin, UA 05/06/2018 Negative  Negative Final   • Blood, UA 05/06/2018 Negative  Negative Final   • Protein, UA 05/06/2018 Negative  Negative Final   • Leuk Esterase, UA 05/06/2018 Negative  Negative Final   • Nitrite, UA 05/06/2018 Negative  Negative Final   • Urobilinogen, UA 05/06/2018 0.2 E.U./dL  0.2 - 1.0 E.U./dL  Final   • WBC 05/06/2018 13.14* 4.50 - 12.50 10*3/mm3 Final   • RBC 05/06/2018 4.34* 4.70 - 6.10 10*6/mm3 Final   • Hemoglobin 05/06/2018 13.1* 14.0 - 18.0 g/dL Final   • Hematocrit 05/06/2018 39.0* 42.0 - 52.0 % Final   • MCV 05/06/2018 89.9  80.0 - 94.0 fL Final   • MCH 05/06/2018 30.2  27.0 - 33.0 pg Final   • MCHC 05/06/2018 33.6  33.0 - 37.0 g/dL Final   • RDW 05/06/2018 14.5  11.5 - 14.5 % Final   • RDW-SD 05/06/2018 47.3  37.0 - 54.0 fl Final   • MPV 05/06/2018 11.7* 6.0 - 10.0 fL Final   • Platelets 05/06/2018 211  130 - 400 10*3/mm3 Final   • Neutrophil % 05/06/2018 77.7* 30.0 - 70.0 % Final   • Lymphocyte % 05/06/2018 10.4* 21.0 - 51.0 % Final   • Monocyte % 05/06/2018 9.9  0.0 - 10.0 % Final   • Eosinophil % 05/06/2018 1.4  0.0 - 5.0 % Final   • Basophil % 05/06/2018 0.3  0.0 - 2.0 % Final   • Immature Grans % 05/06/2018 0.3  0.0 - 0.5 % Final   • Neutrophils, Absolute 05/06/2018 10.22* 1.40 - 6.50 10*3/mm3 Final   • Lymphocytes, Absolute 05/06/2018 1.36  1.00 - 3.00 10*3/mm3 Final   • Monocytes, Absolute 05/06/2018 1.30* 0.10 - 0.90 10*3/mm3 Final   • Eosinophils, Absolute 05/06/2018 0.18  0.00 - 0.70 10*3/mm3 Final   • Basophils, Absolute 05/06/2018 0.04  0.00 - 0.30 10*3/mm3 Final   • Immature Grans, Absolute 05/06/2018 0.04* 0.00 - 0.03 10*3/mm3 Final   • Extra Tube 05/06/2018 Hold for add-ons.   Final   • Osmolality Calc 05/06/2018 276.1  273.0 - 305.0 mOsm/kg Final   • Site 05/06/2018 Arterial: right brachial   Final   • Max's Test 05/06/2018 Positive   Final   • pH, Arterial 05/06/2018 7.439  7.350 - 7.450 pH units Final   • pCO2, Arterial 05/06/2018 35.3  35.0 - 45.0 mm Hg Final   • pO2, Arterial 05/06/2018 77.9* 80.0 - 100.0 mm Hg Final   • HCO3, Arterial 05/06/2018 23.4  22.0 - 26.0 mmol/L Final   • Base Excess, Arterial 05/06/2018 -0.3  mmol/L Final   • O2 Saturation, Arterial 05/06/2018 95.2  90.0 - 100.0 % Final   • Hemoglobin, Blood Gas 05/06/2018 12.8  12 - 16 g/dL Final   • Hematocrit,  Blood Gas 05/06/2018 38.0* 42.0 - 52.0 % Final   • Oxyhemoglobin 05/06/2018 93.9  85 - 100 % Final   • Methemoglobin 05/06/2018 0.20  0.00 - 3.00 % Final   • Carboxyhemoglobin 05/06/2018 1.2  0 - 5 % Final   • A-a Gradiant 05/06/2018 22.2  0.0 - 300.0 mmHg Final   • Temperature 05/06/2018 98.6  C Final   • Barometric Pressure for Blood Gas 05/06/2018 725  mmHg Final   • Modality 05/06/2018 Room Air   Final   • FIO2 05/06/2018 21  % Final   • Respiratory Culture 05/06/2018 Heavy growth (4+) Haemophilus influenzae Biotype II*  Final   • BETA LACTAMASE 05/06/2018 Negative   Corrected   • Gram Stain Result 05/06/2018 Many (4+) WBCs seen   Final   • Gram Stain Result 05/06/2018 Rare (1+) Gram positive cocci in pairs   Final   • Gram Stain Result 05/06/2018 Few (2+) Gram positive bacilli   Final   • Gram Stain Result 05/06/2018 Many (4+) Gram negative bacilli   Final   • Lactate 05/06/2018 2.9* 0.5 - 2.0 mmol/L Final   • C-Reactive Protein 05/06/2018 5.95* 0.00 - 0.99 mg/dL Final   • Hemoglobin A1C 05/06/2018 6.70* 4.50 - 5.70 % Final   • Glucose 05/06/2018 334* 70 - 130 mg/dL Final   • Glucose 05/06/2018 347* 70 - 130 mg/dL Final   • C-Reactive Protein 05/07/2018 8.06* 0.00 - 0.99 mg/dL Final   • Magnesium 05/07/2018 2.0  1.7 - 2.6 mg/dL Final   • Phosphorus 05/07/2018 3.1  2.7 - 4.5 mg/dL Final   • Glucose 05/07/2018 324* 70 - 110 mg/dL Final   • BUN 05/07/2018 17  7 - 21 mg/dL Final   • Creatinine 05/07/2018 0.91  0.43 - 1.29 mg/dL Final   • Sodium 05/07/2018 133* 135 - 153 mmol/L Final   • Potassium 05/07/2018 3.8  3.5 - 5.3 mmol/L Final   • Chloride 05/07/2018 98* 99 - 112 mmol/L Final   • CO2 05/07/2018 26.9  24.3 - 31.9 mmol/L Final   • Calcium 05/07/2018 9.2  7.7 - 10.0 mg/dL Final   • Total Protein 05/07/2018 6.9  6.0 - 8.0 g/dL Final   • Albumin 05/07/2018 4.10  3.40 - 4.80 g/dL Final   • ALT (SGPT) 05/07/2018 28  10 - 44 U/L Final   • AST (SGOT) 05/07/2018 13  10 - 34 U/L Final   • Alkaline Phosphatase  05/07/2018 73  40 - 129 U/L Final   • Total Bilirubin 05/07/2018 0.4  0.2 - 1.8 mg/dL Final   • eGFR Non African Amer 05/07/2018 84  >60 mL/min/1.73 Final   • Globulin 05/07/2018 2.8  gm/dL Final   • A/G Ratio 05/07/2018 1.5  1.5 - 2.5 g/dL Final   • BUN/Creatinine Ratio 05/07/2018 18.7  7.0 - 25.0 Final   • Anion Gap 05/07/2018 8.1  3.6 - 11.2 mmol/L Final   • Lactate 05/07/2018 1.3  0.5 - 2.0 mmol/L Final   • WBC 05/07/2018 8.60  4.50 - 12.50 10*3/mm3 Final   • RBC 05/07/2018 4.17* 4.70 - 6.10 10*6/mm3 Final   • Hemoglobin 05/07/2018 12.6* 14.0 - 18.0 g/dL Final   • Hematocrit 05/07/2018 37.5* 42.0 - 52.0 % Final   • MCV 05/07/2018 89.9  80.0 - 94.0 fL Final   • MCH 05/07/2018 30.2  27.0 - 33.0 pg Final   • MCHC 05/07/2018 33.6  33.0 - 37.0 g/dL Final   • RDW 05/07/2018 14.1  11.5 - 14.5 % Final   • RDW-SD 05/07/2018 45.3  37.0 - 54.0 fl Final   • MPV 05/07/2018 11.7* 6.0 - 10.0 fL Final   • Platelets 05/07/2018 205  130 - 400 10*3/mm3 Final   • Neutrophil % 05/07/2018 88.7* 30.0 - 70.0 % Final   • Lymphocyte % 05/07/2018 5.1* 21.0 - 51.0 % Final   • Monocyte % 05/07/2018 6.0  0.0 - 10.0 % Final   • Eosinophil % 05/07/2018 0.0  0.0 - 5.0 % Final   • Basophil % 05/07/2018 0.0  0.0 - 2.0 % Final   • Immature Grans % 05/07/2018 0.2  0.0 - 0.5 % Final   • Neutrophils, Absolute 05/07/2018 7.62* 1.40 - 6.50 10*3/mm3 Final   • Lymphocytes, Absolute 05/07/2018 0.44* 1.00 - 3.00 10*3/mm3 Final   • Monocytes, Absolute 05/07/2018 0.52  0.10 - 0.90 10*3/mm3 Final   • Eosinophils, Absolute 05/07/2018 0.00  0.00 - 0.70 10*3/mm3 Final   • Basophils, Absolute 05/07/2018 0.00  0.00 - 0.30 10*3/mm3 Final   • Immature Grans, Absolute 05/07/2018 0.02  0.00 - 0.03 10*3/mm3 Final   • Osmolality Calc 05/07/2018 280.5  273.0 - 305.0 mOsm/kg Final   • Glucose 05/07/2018 341* 70 - 130 mg/dL Final   • Glucose 05/07/2018 323* 70 - 130 mg/dL Final   • Glucose 05/07/2018 409* 70 - 130 mg/dL Final   • Glucose 05/07/2018 337* 70 - 130 mg/dL  Final   • C-Reactive Protein 05/08/2018 4.01* 0.00 - 0.99 mg/dL Final   • WBC 05/08/2018 8.31  4.50 - 12.50 10*3/mm3 Final   • RBC 05/08/2018 4.31* 4.70 - 6.10 10*6/mm3 Final   • Hemoglobin 05/08/2018 13.0* 14.0 - 18.0 g/dL Final   • Hematocrit 05/08/2018 39.3* 42.0 - 52.0 % Final   • MCV 05/08/2018 91.2  80.0 - 94.0 fL Final   • MCH 05/08/2018 30.2  27.0 - 33.0 pg Final   • MCHC 05/08/2018 33.1  33.0 - 37.0 g/dL Final   • RDW 05/08/2018 14.2  11.5 - 14.5 % Final   • RDW-SD 05/08/2018 46.3  37.0 - 54.0 fl Final   • MPV 05/08/2018 11.8* 6.0 - 10.0 fL Final   • Platelets 05/08/2018 200  130 - 400 10*3/mm3 Final   • Neutrophil % 05/08/2018 90.0* 30.0 - 70.0 % Final   • Lymphocyte % 05/08/2018 5.2* 21.0 - 51.0 % Final   • Monocyte % 05/08/2018 4.3  0.0 - 10.0 % Final   • Eosinophil % 05/08/2018 0.0  0.0 - 5.0 % Final   • Basophil % 05/08/2018 0.1  0.0 - 2.0 % Final   • Immature Grans % 05/08/2018 0.4  0.0 - 0.5 % Final   • Neutrophils, Absolute 05/08/2018 7.48* 1.40 - 6.50 10*3/mm3 Final   • Lymphocytes, Absolute 05/08/2018 0.43* 1.00 - 3.00 10*3/mm3 Final   • Monocytes, Absolute 05/08/2018 0.36  0.10 - 0.90 10*3/mm3 Final   • Eosinophils, Absolute 05/08/2018 0.00  0.00 - 0.70 10*3/mm3 Final   • Basophils, Absolute 05/08/2018 0.01  0.00 - 0.30 10*3/mm3 Final   • Immature Grans, Absolute 05/08/2018 0.03  0.00 - 0.03 10*3/mm3 Final   • Glucose 05/08/2018 318* 70 - 130 mg/dL Final   • Glucose 05/08/2018 285* 70 - 130 mg/dL Final       Assessment      ICD-10-CM ICD-9-CM   1. COPD exacerbation (CMS/HCC) J44.1 491.21   2. Lung nodule R91.1 793.11                DISCUSSION:  Patient had a low dose CT chest for cancer screening.  There are 3 noncalcified nodules noted on the right side, size ranging from 6-7 mm.  It is possible that we are dealing with benign nodules but a neoplastic process still comes in now differential diagnosis.  We like to repeat his CT scan in October, which would be approximately 6 months from the last  60.    Today he is complaining about increase in shortness of breath and chest tightness.  Recent chest x-ray did not reveal any infiltrate.  I'm giving him a course of steroid and also ordering a pulmonary function test for objective assessment of his airways function.  He should continue all of his other medications as well.    Plan    Orders Placed This Encounter   Procedures   • CT Chest Without Contrast   • Pulmonary Function Test     New Medications Ordered This Visit   Medications   • MethylPREDNISolone (MEDROL, AURY,) 4 MG tablet     Sig: Take as directed on package instructions.     Dispense:  21 tablet     Refill:  0                  Rk Tai MD, FCCP, FAASM  Pulmonary, Critical Care, and Sleep Medicine

## 2018-08-09 ENCOUNTER — HOSPITAL ENCOUNTER (OUTPATIENT)
Dept: CT IMAGING | Facility: HOSPITAL | Age: 63
Discharge: HOME | End: 2018-08-09
Attending: INTERNAL MEDICINE

## 2018-08-09 ENCOUNTER — HOSPITAL ENCOUNTER (OUTPATIENT)
Dept: RESPIRATORY THERAPY | Facility: HOSPITAL | Age: 63
Discharge: HOME | End: 2018-08-09
Attending: INTERNAL MEDICINE | Admitting: INTERNAL MEDICINE

## 2018-08-09 ENCOUNTER — APPOINTMENT (OUTPATIENT)
Dept: CT IMAGING | Facility: HOSPITAL | Age: 63
End: 2018-08-09
Attending: INTERNAL MEDICINE

## 2018-08-09 VITALS — HEART RATE: 95 BPM | RESPIRATION RATE: 16 BRPM | OXYGEN SATURATION: 98 %

## 2018-08-09 DIAGNOSIS — J44.1 COPD EXACERBATION (HCC): ICD-10-CM

## 2018-08-09 DIAGNOSIS — R91.1 LUNG NODULE: ICD-10-CM

## 2018-08-09 PROCEDURE — 94727 GAS DIL/WSHOT DETER LNG VOL: CPT | Performed by: INTERNAL MEDICINE

## 2018-08-09 PROCEDURE — 94729 DIFFUSING CAPACITY: CPT

## 2018-08-09 PROCEDURE — 94799 UNLISTED PULMONARY SVC/PX: CPT

## 2018-08-09 PROCEDURE — 94729 DIFFUSING CAPACITY: CPT | Performed by: INTERNAL MEDICINE

## 2018-08-09 PROCEDURE — 71250 CT THORAX DX C-: CPT | Performed by: RADIOLOGY

## 2018-08-09 PROCEDURE — 94727 GAS DIL/WSHOT DETER LNG VOL: CPT

## 2018-08-09 PROCEDURE — 94060 EVALUATION OF WHEEZING: CPT

## 2018-08-09 PROCEDURE — 94640 AIRWAY INHALATION TREATMENT: CPT

## 2018-08-09 PROCEDURE — 94060 EVALUATION OF WHEEZING: CPT | Performed by: INTERNAL MEDICINE

## 2018-08-09 PROCEDURE — 71250 CT THORAX DX C-: CPT

## 2018-08-09 RX ORDER — ALBUTEROL SULFATE 2.5 MG/3ML
2.5 SOLUTION RESPIRATORY (INHALATION) ONCE
Status: COMPLETED | OUTPATIENT
Start: 2018-08-09 | End: 2018-08-09

## 2018-08-09 RX ADMIN — ALBUTEROL SULFATE 2.5 MG: 2.5 SOLUTION RESPIRATORY (INHALATION) at 14:15

## 2018-08-24 ENCOUNTER — OFFICE VISIT (OUTPATIENT)
Dept: PULMONOLOGY | Facility: CLINIC | Age: 63
End: 2018-08-24

## 2018-08-24 VITALS
SYSTOLIC BLOOD PRESSURE: 132 MMHG | HEART RATE: 96 BPM | TEMPERATURE: 97.7 F | OXYGEN SATURATION: 94 % | BODY MASS INDEX: 28.04 KG/M2 | DIASTOLIC BLOOD PRESSURE: 62 MMHG | HEIGHT: 72 IN | WEIGHT: 207 LBS

## 2018-08-24 DIAGNOSIS — R91.1 LUNG NODULE: ICD-10-CM

## 2018-08-24 DIAGNOSIS — J41.0 SIMPLE CHRONIC BRONCHITIS (HCC): Primary | ICD-10-CM

## 2018-08-24 PROCEDURE — 99214 OFFICE O/P EST MOD 30 MIN: CPT | Performed by: INTERNAL MEDICINE

## 2018-08-24 RX ORDER — ALBUTEROL SULFATE 90 UG/1
2 AEROSOL, METERED RESPIRATORY (INHALATION) EVERY 4 HOURS PRN
Qty: 1 INHALER | Refills: 11 | Status: SHIPPED | OUTPATIENT
Start: 2018-08-24 | End: 2019-04-05

## 2018-08-24 NOTE — PROGRESS NOTES
Subjective   Maximo Kwon is a 63 y.o. male who is being seen for COPD    History of Present Illness   Patient returns for follow-up for COPD.  He is using his inhalers and nebulizer regularly.  Shortness of breath still persisting but appears slightly better than before to him.  Denies any coughing or wheezing.  During his last visit we ordered a chest x-ray and a pulmonary function test, he had those done and today was to discuss about the result.  No new complaints today.  Past Medical History:   Diagnosis Date   • COPD (chronic obstructive pulmonary disease) (CMS/HCC)    • Diabetes mellitus (CMS/HCC)    • Diverticulitis    • GERD (gastroesophageal reflux disease)    • Hypertension      Past Surgical History:   Procedure Laterality Date   • COLON RESECTION  2016    had colostomy and reveresed back    • COLONOSCOPY  2016   • LUNG BIOPSY  2014    (non cancerous)     Family History   Problem Relation Age of Onset   • Cancer Father    • Diabetes Sister    • Diabetes Brother       reports that he quit smoking about 10 years ago. His smoking use included Cigarettes. He has a 30.00 pack-year smoking history. He has never used smokeless tobacco. He reports that he does not drink alcohol or use drugs.  No Known Allergies        Patient has received a flu shot but has not received the pneumonia vaccination.     The following portions of the patient's history were reviewed and updated as appropriate: allergies, current medications, past family history, past medical history, past social history, past surgical history and problem list.    Review of Systems   Constitutional: Negative for appetite change, chills, diaphoresis and unexpected weight change.   HENT: Negative for sore throat, trouble swallowing and voice change.    Eyes: Negative for visual disturbance.   Respiratory: Positive for cough, shortness of breath and wheezing. Negative for apnea and choking.    Cardiovascular: Negative for chest pain, palpitations and leg  "swelling.   Gastrointestinal: Negative for abdominal pain, constipation, diarrhea, nausea and vomiting.   Endocrine: Negative for cold intolerance, heat intolerance, polydipsia, polyphagia and polyuria.   Genitourinary: Negative for difficulty urinating and dysuria.   Musculoskeletal: Negative for gait problem.   Skin: Negative for rash and wound.   Neurological: Negative for syncope and light-headedness.   Hematological: Negative for adenopathy.   Psychiatric/Behavioral: Negative for agitation, behavioral problems and confusion.   All other systems reviewed and are negative.      Objective   /62   Pulse 96   Temp 97.7 °F (36.5 °C) (Oral)   Ht 182.9 cm (72\")   Wt 93.9 kg (207 lb)   SpO2 94%   BMI 28.07 kg/m²   Physical Exam   Constitutional: He is oriented to person, place, and time.   HENT:   Head: Normocephalic and atraumatic.   Nose: Mucosal edema present.   Eyes: Pupils are equal, round, and reactive to light. EOM are normal.   Neck: Neck supple.   Cardiovascular: Normal rate, regular rhythm and normal heart sounds.    Pulmonary/Chest: He has rhonchi.   Vesicular breath sound bilaterally with prolonged expiratory phase   Abdominal: Soft. Bowel sounds are normal.   Musculoskeletal: Normal range of motion. He exhibits no deformity.   Neurological: He is alert and oriented to person, place, and time.   Skin: Skin is warm and dry.   Psychiatric: He has a normal mood and affect. His behavior is normal.   Nursing note and vitals reviewed.        Radiology:  Ct Chest Without Contrast    Result Date: 8/9/2018   1. STABLE 6 MM PULMONARY NODULES RIGHT LOWER LOBE AND RIGHT UPPER LOBE. 2. ADVANCED COPD, STABLE. 3. NO ACUTE CHANGES. 4. 12 MONTH FOLLOW-UP CT.  This report was finalized on 8/9/2018 3:08 PM by Dr. Isaac Salas MD.      Xr Chest 1 View    Result Date: 5/6/2018  Stable radiographic appearance of the chest.  This report was finalized on 5/6/2018 10:48 AM by Dr. Wilfredo Lawrence MD.      Ct Chest Low " Dose Wo    Result Date: 5/14/2018  Lung RADS Category 3, probably benign nodules in the right lung. In addition patchy airspace markings in the right lung which could be inflammatory.  Need comparison:  No.  Repeat CT: I would suggest short interval 3-month follow-up low-dose screening CT.  See physician: As previously instructed.   This report was finalized on 5/14/2018 2:06 PM by Dr. Sarwat Peñaloza MD.        Lab Results:  Admission on 05/06/2018, Discharged on 05/08/2018   Component Date Value Ref Range Status   • Glucose 05/06/2018 173* 70 - 110 mg/dL Final   • BUN 05/06/2018 18  7 - 21 mg/dL Final   • Creatinine 05/06/2018 1.00  0.43 - 1.29 mg/dL Final   • Sodium 05/06/2018 135  135 - 153 mmol/L Final   • Potassium 05/06/2018 3.6  3.5 - 5.3 mmol/L Final   • Chloride 05/06/2018 97* 99 - 112 mmol/L Final   • CO2 05/06/2018 29.4  24.3 - 31.9 mmol/L Final   • Calcium 05/06/2018 9.5  7.7 - 10.0 mg/dL Final   • Total Protein 05/06/2018 7.5  6.0 - 8.0 g/dL Final   • Albumin 05/06/2018 4.50  3.40 - 4.80 g/dL Final   • ALT (SGPT) 05/06/2018 34  10 - 44 U/L Final   • AST (SGOT) 05/06/2018 22  10 - 34 U/L Final   • Alkaline Phosphatase 05/06/2018 79  40 - 129 U/L Final   • Total Bilirubin 05/06/2018 0.8  0.2 - 1.8 mg/dL Final   • eGFR Non African Amer 05/06/2018 75  >60 mL/min/1.73 Final   • Globulin 05/06/2018 3.0  gm/dL Final   • A/G Ratio 05/06/2018 1.5  1.5 - 2.5 g/dL Final   • BUN/Creatinine Ratio 05/06/2018 18.0  7.0 - 25.0 Final   • Anion Gap 05/06/2018 8.6  3.6 - 11.2 mmol/L Final   • BNP 05/06/2018 10.0  0.0 - 100.0 pg/mL Final   • Blood Culture 05/06/2018 No growth at 5 days   Final   • Blood Culture 05/06/2018 No growth at 5 days   Final   • Troponin I 05/06/2018 <0.006  <=0.040 ng/mL Final   • Troponin I 05/06/2018 <0.006  <=0.040 ng/mL Final   • Protime 05/06/2018 14.2  11.0 - 15.4 Seconds Final   • INR 05/06/2018 1.08  0.90 - 1.10 Final   • Lactate 05/06/2018 2.2* 0.5 - 2.0 mmol/L Final   • Color, UA  05/06/2018 Yellow  Yellow, Straw Final   • Appearance, UA 05/06/2018 Clear  Clear Final   • pH, UA 05/06/2018 5.5  5.0 - 8.0 Final   • Specific Gravity, UA 05/06/2018 1.016  1.005 - 1.030 Final   • Glucose, UA 05/06/2018 250 mg/dL (1+)* Negative Final   • Ketones, UA 05/06/2018 Negative  Negative Final   • Bilirubin, UA 05/06/2018 Negative  Negative Final   • Blood, UA 05/06/2018 Negative  Negative Final   • Protein, UA 05/06/2018 Negative  Negative Final   • Leuk Esterase, UA 05/06/2018 Negative  Negative Final   • Nitrite, UA 05/06/2018 Negative  Negative Final   • Urobilinogen, UA 05/06/2018 0.2 E.U./dL  0.2 - 1.0 E.U./dL Final   • WBC 05/06/2018 13.14* 4.50 - 12.50 10*3/mm3 Final   • RBC 05/06/2018 4.34* 4.70 - 6.10 10*6/mm3 Final   • Hemoglobin 05/06/2018 13.1* 14.0 - 18.0 g/dL Final   • Hematocrit 05/06/2018 39.0* 42.0 - 52.0 % Final   • MCV 05/06/2018 89.9  80.0 - 94.0 fL Final   • MCH 05/06/2018 30.2  27.0 - 33.0 pg Final   • MCHC 05/06/2018 33.6  33.0 - 37.0 g/dL Final   • RDW 05/06/2018 14.5  11.5 - 14.5 % Final   • RDW-SD 05/06/2018 47.3  37.0 - 54.0 fl Final   • MPV 05/06/2018 11.7* 6.0 - 10.0 fL Final   • Platelets 05/06/2018 211  130 - 400 10*3/mm3 Final   • Neutrophil % 05/06/2018 77.7* 30.0 - 70.0 % Final   • Lymphocyte % 05/06/2018 10.4* 21.0 - 51.0 % Final   • Monocyte % 05/06/2018 9.9  0.0 - 10.0 % Final   • Eosinophil % 05/06/2018 1.4  0.0 - 5.0 % Final   • Basophil % 05/06/2018 0.3  0.0 - 2.0 % Final   • Immature Grans % 05/06/2018 0.3  0.0 - 0.5 % Final   • Neutrophils, Absolute 05/06/2018 10.22* 1.40 - 6.50 10*3/mm3 Final   • Lymphocytes, Absolute 05/06/2018 1.36  1.00 - 3.00 10*3/mm3 Final   • Monocytes, Absolute 05/06/2018 1.30* 0.10 - 0.90 10*3/mm3 Final   • Eosinophils, Absolute 05/06/2018 0.18  0.00 - 0.70 10*3/mm3 Final   • Basophils, Absolute 05/06/2018 0.04  0.00 - 0.30 10*3/mm3 Final   • Immature Grans, Absolute 05/06/2018 0.04* 0.00 - 0.03 10*3/mm3 Final   • Extra Tube 05/06/2018  Hold for add-ons.   Final   • Osmolality Calc 05/06/2018 276.1  273.0 - 305.0 mOsm/kg Final   • Site 05/06/2018 Arterial: right brachial   Final   • Max's Test 05/06/2018 Positive   Final   • pH, Arterial 05/06/2018 7.439  7.350 - 7.450 pH units Final   • pCO2, Arterial 05/06/2018 35.3  35.0 - 45.0 mm Hg Final   • pO2, Arterial 05/06/2018 77.9* 80.0 - 100.0 mm Hg Final   • HCO3, Arterial 05/06/2018 23.4  22.0 - 26.0 mmol/L Final   • Base Excess, Arterial 05/06/2018 -0.3  mmol/L Final   • O2 Saturation, Arterial 05/06/2018 95.2  90.0 - 100.0 % Final   • Hemoglobin, Blood Gas 05/06/2018 12.8  12 - 16 g/dL Final   • Hematocrit, Blood Gas 05/06/2018 38.0* 42.0 - 52.0 % Final   • Oxyhemoglobin 05/06/2018 93.9  85 - 100 % Final   • Methemoglobin 05/06/2018 0.20  0.00 - 3.00 % Final   • Carboxyhemoglobin 05/06/2018 1.2  0 - 5 % Final   • A-a Gradiant 05/06/2018 22.2  0.0 - 300.0 mmHg Final   • Temperature 05/06/2018 98.6  C Final   • Barometric Pressure for Blood Gas 05/06/2018 725  mmHg Final   • Modality 05/06/2018 Room Air   Final   • FIO2 05/06/2018 21  % Final   • Respiratory Culture 05/06/2018 Heavy growth (4+) Haemophilus influenzae Biotype II*  Final   • BETA LACTAMASE 05/06/2018 Negative   Corrected   • Gram Stain Result 05/06/2018 Many (4+) WBCs seen   Final   • Gram Stain Result 05/06/2018 Rare (1+) Gram positive cocci in pairs   Final   • Gram Stain Result 05/06/2018 Few (2+) Gram positive bacilli   Final   • Gram Stain Result 05/06/2018 Many (4+) Gram negative bacilli   Final   • Lactate 05/06/2018 2.9* 0.5 - 2.0 mmol/L Final   • C-Reactive Protein 05/06/2018 5.95* 0.00 - 0.99 mg/dL Final   • Hemoglobin A1C 05/06/2018 6.70* 4.50 - 5.70 % Final   • Glucose 05/06/2018 334* 70 - 130 mg/dL Final   • Glucose 05/06/2018 347* 70 - 130 mg/dL Final   • C-Reactive Protein 05/07/2018 8.06* 0.00 - 0.99 mg/dL Final   • Magnesium 05/07/2018 2.0  1.7 - 2.6 mg/dL Final   • Phosphorus 05/07/2018 3.1  2.7 - 4.5 mg/dL Final    • Glucose 05/07/2018 324* 70 - 110 mg/dL Final   • BUN 05/07/2018 17  7 - 21 mg/dL Final   • Creatinine 05/07/2018 0.91  0.43 - 1.29 mg/dL Final   • Sodium 05/07/2018 133* 135 - 153 mmol/L Final   • Potassium 05/07/2018 3.8  3.5 - 5.3 mmol/L Final   • Chloride 05/07/2018 98* 99 - 112 mmol/L Final   • CO2 05/07/2018 26.9  24.3 - 31.9 mmol/L Final   • Calcium 05/07/2018 9.2  7.7 - 10.0 mg/dL Final   • Total Protein 05/07/2018 6.9  6.0 - 8.0 g/dL Final   • Albumin 05/07/2018 4.10  3.40 - 4.80 g/dL Final   • ALT (SGPT) 05/07/2018 28  10 - 44 U/L Final   • AST (SGOT) 05/07/2018 13  10 - 34 U/L Final   • Alkaline Phosphatase 05/07/2018 73  40 - 129 U/L Final   • Total Bilirubin 05/07/2018 0.4  0.2 - 1.8 mg/dL Final   • eGFR Non African Amer 05/07/2018 84  >60 mL/min/1.73 Final   • Globulin 05/07/2018 2.8  gm/dL Final   • A/G Ratio 05/07/2018 1.5  1.5 - 2.5 g/dL Final   • BUN/Creatinine Ratio 05/07/2018 18.7  7.0 - 25.0 Final   • Anion Gap 05/07/2018 8.1  3.6 - 11.2 mmol/L Final   • Lactate 05/07/2018 1.3  0.5 - 2.0 mmol/L Final   • WBC 05/07/2018 8.60  4.50 - 12.50 10*3/mm3 Final   • RBC 05/07/2018 4.17* 4.70 - 6.10 10*6/mm3 Final   • Hemoglobin 05/07/2018 12.6* 14.0 - 18.0 g/dL Final   • Hematocrit 05/07/2018 37.5* 42.0 - 52.0 % Final   • MCV 05/07/2018 89.9  80.0 - 94.0 fL Final   • MCH 05/07/2018 30.2  27.0 - 33.0 pg Final   • MCHC 05/07/2018 33.6  33.0 - 37.0 g/dL Final   • RDW 05/07/2018 14.1  11.5 - 14.5 % Final   • RDW-SD 05/07/2018 45.3  37.0 - 54.0 fl Final   • MPV 05/07/2018 11.7* 6.0 - 10.0 fL Final   • Platelets 05/07/2018 205  130 - 400 10*3/mm3 Final   • Neutrophil % 05/07/2018 88.7* 30.0 - 70.0 % Final   • Lymphocyte % 05/07/2018 5.1* 21.0 - 51.0 % Final   • Monocyte % 05/07/2018 6.0  0.0 - 10.0 % Final   • Eosinophil % 05/07/2018 0.0  0.0 - 5.0 % Final   • Basophil % 05/07/2018 0.0  0.0 - 2.0 % Final   • Immature Grans % 05/07/2018 0.2  0.0 - 0.5 % Final   • Neutrophils, Absolute 05/07/2018 7.62* 1.40 -  6.50 10*3/mm3 Final   • Lymphocytes, Absolute 05/07/2018 0.44* 1.00 - 3.00 10*3/mm3 Final   • Monocytes, Absolute 05/07/2018 0.52  0.10 - 0.90 10*3/mm3 Final   • Eosinophils, Absolute 05/07/2018 0.00  0.00 - 0.70 10*3/mm3 Final   • Basophils, Absolute 05/07/2018 0.00  0.00 - 0.30 10*3/mm3 Final   • Immature Grans, Absolute 05/07/2018 0.02  0.00 - 0.03 10*3/mm3 Final   • Osmolality Calc 05/07/2018 280.5  273.0 - 305.0 mOsm/kg Final   • Glucose 05/07/2018 341* 70 - 130 mg/dL Final   • Glucose 05/07/2018 323* 70 - 130 mg/dL Final   • Glucose 05/07/2018 409* 70 - 130 mg/dL Final   • Glucose 05/07/2018 337* 70 - 130 mg/dL Final   • C-Reactive Protein 05/08/2018 4.01* 0.00 - 0.99 mg/dL Final   • WBC 05/08/2018 8.31  4.50 - 12.50 10*3/mm3 Final   • RBC 05/08/2018 4.31* 4.70 - 6.10 10*6/mm3 Final   • Hemoglobin 05/08/2018 13.0* 14.0 - 18.0 g/dL Final   • Hematocrit 05/08/2018 39.3* 42.0 - 52.0 % Final   • MCV 05/08/2018 91.2  80.0 - 94.0 fL Final   • MCH 05/08/2018 30.2  27.0 - 33.0 pg Final   • MCHC 05/08/2018 33.1  33.0 - 37.0 g/dL Final   • RDW 05/08/2018 14.2  11.5 - 14.5 % Final   • RDW-SD 05/08/2018 46.3  37.0 - 54.0 fl Final   • MPV 05/08/2018 11.8* 6.0 - 10.0 fL Final   • Platelets 05/08/2018 200  130 - 400 10*3/mm3 Final   • Neutrophil % 05/08/2018 90.0* 30.0 - 70.0 % Final   • Lymphocyte % 05/08/2018 5.2* 21.0 - 51.0 % Final   • Monocyte % 05/08/2018 4.3  0.0 - 10.0 % Final   • Eosinophil % 05/08/2018 0.0  0.0 - 5.0 % Final   • Basophil % 05/08/2018 0.1  0.0 - 2.0 % Final   • Immature Grans % 05/08/2018 0.4  0.0 - 0.5 % Final   • Neutrophils, Absolute 05/08/2018 7.48* 1.40 - 6.50 10*3/mm3 Final   • Lymphocytes, Absolute 05/08/2018 0.43* 1.00 - 3.00 10*3/mm3 Final   • Monocytes, Absolute 05/08/2018 0.36  0.10 - 0.90 10*3/mm3 Final   • Eosinophils, Absolute 05/08/2018 0.00  0.00 - 0.70 10*3/mm3 Final   • Basophils, Absolute 05/08/2018 0.01  0.00 - 0.30 10*3/mm3 Final   • Immature Grans, Absolute 05/08/2018 0.03   0.00 - 0.03 10*3/mm3 Final   • Glucose 05/08/2018 318* 70 - 130 mg/dL Final   • Glucose 05/08/2018 285* 70 - 130 mg/dL Final       Assessment      ICD-10-CM ICD-9-CM   1. Simple chronic bronchitis (CMS/HCC) J41.0 491.0   2. Lung nodule R91.1 793.11                DISCUSSION:  Reviewed the pulmonary function test, findings are consistent with moderate COPD/asthmatic bronchitis.  Currently he is using albuterol inhaler, Breo ellipta and Spiriva respiratory tract.  I have asked him to continue the same.    Reviewed the CT scan of the chest,  multiple lung nodules with maximum size of 6 mm remains essentially unchanged as compared to his prior CT scan done in April of this year.  Our recommendation is to repeat his CT scan in 6 months.    I'm giving him refills for his medications and would see him again in 6 months.    Plan    No orders of the defined types were placed in this encounter.    New Medications Ordered This Visit   Medications   • albuterol (PROAIR HFA) 108 (90 Base) MCG/ACT inhaler     Sig: Inhale 2 puffs Every 4 (Four) Hours As Needed for Shortness of Air.     Dispense:  1 inhaler     Refill:  11   • Fluticasone Furoate-Vilanterol (BREO ELLIPTA) 100-25 MCG/INH aerosol powder      Sig: Inhale 1 puff Daily.     Dispense:  1 each     Refill:  6   • tiotropium bromide monohydrate (SPIRIVA RESPIMAT) 2.5 MCG/ACT aerosol solution inhaler     Sig: Inhale 1 puff Daily.     Dispense:  1 inhaler     Refill:  5                  Rk Tia MD, FCCP, [facility]  Pulmonary, Critical Care, and Sleep Medicine

## 2018-10-18 ENCOUNTER — OFFICE VISIT (OUTPATIENT)
Dept: PULMONOLOGY | Facility: CLINIC | Age: 63
End: 2018-10-18

## 2018-10-18 VITALS
BODY MASS INDEX: 28.17 KG/M2 | HEIGHT: 72 IN | TEMPERATURE: 97.6 F | OXYGEN SATURATION: 97 % | SYSTOLIC BLOOD PRESSURE: 117 MMHG | WEIGHT: 208 LBS | HEART RATE: 101 BPM | DIASTOLIC BLOOD PRESSURE: 74 MMHG

## 2018-10-18 DIAGNOSIS — R91.1 LUNG NODULE: ICD-10-CM

## 2018-10-18 DIAGNOSIS — J41.0 SIMPLE CHRONIC BRONCHITIS (HCC): Primary | ICD-10-CM

## 2018-10-18 PROCEDURE — 99213 OFFICE O/P EST LOW 20 MIN: CPT | Performed by: INTERNAL MEDICINE

## 2018-10-18 RX ORDER — CHLORAL HYDRATE 500 MG
1000 CAPSULE ORAL
COMMUNITY
End: 2021-07-09

## 2018-10-18 NOTE — PROGRESS NOTES
Subjective   Maximo Kwon is a 63 y.o. male who is being seen for COPD    History of Present Illness   Patient returns for follow-up for COPD.  History much better than before.  Tells me that this year he did not have any acute exacerbation yet and that is quite different than his usual status.  He is not wheezing anymore and cough is also much better.    Past Medical History:   Diagnosis Date   • COPD (chronic obstructive pulmonary disease) (CMS/HCC)    • Diabetes mellitus (CMS/HCC)    • Diverticulitis    • GERD (gastroesophageal reflux disease)    • Hypertension      Past Surgical History:   Procedure Laterality Date   • COLON RESECTION  2016    had colostomy and reveresed back    • COLONOSCOPY  2016   • LUNG BIOPSY  2014    (non cancerous)     Family History   Problem Relation Age of Onset   • Cancer Father    • Diabetes Sister    • Diabetes Brother       reports that he quit smoking about 10 years ago. His smoking use included Cigarettes. He has a 30.00 pack-year smoking history. He has never used smokeless tobacco. He reports that he does not drink alcohol or use drugs.  No Known Allergies        Patient has had flu vaccination this year. Patient has not received pneumonia vaccination this year. He has declined pneumonia vaccination at this time.     The following portions of the patient's history were reviewed and updated as appropriate: allergies, current medications, past family history, past medical history, past social history, past surgical history and problem list.    Review of Systems   Constitutional: Positive for fatigue. Negative for appetite change, chills, diaphoresis and unexpected weight change.   HENT: Negative for sore throat, trouble swallowing and voice change.    Eyes: Negative for visual disturbance.   Respiratory: Positive for shortness of breath. Negative for apnea, cough, choking and wheezing.    Cardiovascular: Negative for chest pain, palpitations and leg swelling.   Gastrointestinal:  "Negative for abdominal pain, constipation, diarrhea, nausea and vomiting.   Endocrine: Negative for cold intolerance, heat intolerance, polydipsia, polyphagia and polyuria.   Genitourinary: Negative for difficulty urinating and dysuria.   Musculoskeletal: Negative for gait problem.   Skin: Negative for rash and wound.   Neurological: Negative for syncope and light-headedness.   Hematological: Negative for adenopathy.   Psychiatric/Behavioral: Negative for agitation, behavioral problems and confusion.   All other systems reviewed and are negative.      Objective   /74   Pulse 101   Temp 97.6 °F (36.4 °C)   Ht 182.9 cm (72\")   Wt 94.3 kg (208 lb)   SpO2 97%   BMI 28.21 kg/m²   Physical Exam   Constitutional: He is oriented to person, place, and time. He appears well-developed and well-nourished.   HENT:   Head: Normocephalic and atraumatic.   Nose: Nose normal.   Mouth/Throat: Oropharynx is clear and moist.   Eyes: Pupils are equal, round, and reactive to light. EOM are normal.   Neck: Normal range of motion. Neck supple.   Cardiovascular: Normal rate, regular rhythm and normal heart sounds.    Pulmonary/Chest: Breath sounds normal. He has no wheezes. He has no rales.   Abdominal: Soft. Bowel sounds are normal.   Musculoskeletal: Normal range of motion.   Neurological: He is alert and oriented to person, place, and time.   Skin: Skin is warm and dry.   Psychiatric: He has a normal mood and affect. His behavior is normal.   Nursing note and vitals reviewed.        Radiology:  Ct Chest Without Contrast    Result Date: 8/9/2018   1. STABLE 6 MM PULMONARY NODULES RIGHT LOWER LOBE AND RIGHT UPPER LOBE. 2. ADVANCED COPD, STABLE. 3. NO ACUTE CHANGES. 4. 12 MONTH FOLLOW-UP CT.  This report was finalized on 8/9/2018 3:08 PM by Dr. Isaac Salas MD.        Lab Results:  Admission on 05/06/2018, Discharged on 05/08/2018   Component Date Value Ref Range Status   • Glucose 05/06/2018 173* 70 - 110 mg/dL Final   • BUN " 05/06/2018 18  7 - 21 mg/dL Final   • Creatinine 05/06/2018 1.00  0.43 - 1.29 mg/dL Final   • Sodium 05/06/2018 135  135 - 153 mmol/L Final   • Potassium 05/06/2018 3.6  3.5 - 5.3 mmol/L Final   • Chloride 05/06/2018 97* 99 - 112 mmol/L Final   • CO2 05/06/2018 29.4  24.3 - 31.9 mmol/L Final   • Calcium 05/06/2018 9.5  7.7 - 10.0 mg/dL Final   • Total Protein 05/06/2018 7.5  6.0 - 8.0 g/dL Final   • Albumin 05/06/2018 4.50  3.40 - 4.80 g/dL Final   • ALT (SGPT) 05/06/2018 34  10 - 44 U/L Final   • AST (SGOT) 05/06/2018 22  10 - 34 U/L Final   • Alkaline Phosphatase 05/06/2018 79  40 - 129 U/L Final   • Total Bilirubin 05/06/2018 0.8  0.2 - 1.8 mg/dL Final   • eGFR Non African Amer 05/06/2018 75  >60 mL/min/1.73 Final   • Globulin 05/06/2018 3.0  gm/dL Final   • A/G Ratio 05/06/2018 1.5  1.5 - 2.5 g/dL Final   • BUN/Creatinine Ratio 05/06/2018 18.0  7.0 - 25.0 Final   • Anion Gap 05/06/2018 8.6  3.6 - 11.2 mmol/L Final   • BNP 05/06/2018 10.0  0.0 - 100.0 pg/mL Final   • Blood Culture 05/06/2018 No growth at 5 days   Final   • Blood Culture 05/06/2018 No growth at 5 days   Final   • Troponin I 05/06/2018 <0.006  <=0.040 ng/mL Final   • Troponin I 05/06/2018 <0.006  <=0.040 ng/mL Final   • Protime 05/06/2018 14.2  11.0 - 15.4 Seconds Final   • INR 05/06/2018 1.08  0.90 - 1.10 Final   • Lactate 05/06/2018 2.2* 0.5 - 2.0 mmol/L Final   • Color, UA 05/06/2018 Yellow  Yellow, Straw Final   • Appearance, UA 05/06/2018 Clear  Clear Final   • pH, UA 05/06/2018 5.5  5.0 - 8.0 Final   • Specific Gravity, UA 05/06/2018 1.016  1.005 - 1.030 Final   • Glucose, UA 05/06/2018 250 mg/dL (1+)* Negative Final   • Ketones, UA 05/06/2018 Negative  Negative Final   • Bilirubin, UA 05/06/2018 Negative  Negative Final   • Blood, UA 05/06/2018 Negative  Negative Final   • Protein, UA 05/06/2018 Negative  Negative Final   • Leuk Esterase, UA 05/06/2018 Negative  Negative Final   • Nitrite, UA 05/06/2018 Negative  Negative Final   •  Urobilinogen, UA 05/06/2018 0.2 E.U./dL  0.2 - 1.0 E.U./dL Final   • WBC 05/06/2018 13.14* 4.50 - 12.50 10*3/mm3 Final   • RBC 05/06/2018 4.34* 4.70 - 6.10 10*6/mm3 Final   • Hemoglobin 05/06/2018 13.1* 14.0 - 18.0 g/dL Final   • Hematocrit 05/06/2018 39.0* 42.0 - 52.0 % Final   • MCV 05/06/2018 89.9  80.0 - 94.0 fL Final   • MCH 05/06/2018 30.2  27.0 - 33.0 pg Final   • MCHC 05/06/2018 33.6  33.0 - 37.0 g/dL Final   • RDW 05/06/2018 14.5  11.5 - 14.5 % Final   • RDW-SD 05/06/2018 47.3  37.0 - 54.0 fl Final   • MPV 05/06/2018 11.7* 6.0 - 10.0 fL Final   • Platelets 05/06/2018 211  130 - 400 10*3/mm3 Final   • Neutrophil % 05/06/2018 77.7* 30.0 - 70.0 % Final   • Lymphocyte % 05/06/2018 10.4* 21.0 - 51.0 % Final   • Monocyte % 05/06/2018 9.9  0.0 - 10.0 % Final   • Eosinophil % 05/06/2018 1.4  0.0 - 5.0 % Final   • Basophil % 05/06/2018 0.3  0.0 - 2.0 % Final   • Immature Grans % 05/06/2018 0.3  0.0 - 0.5 % Final   • Neutrophils, Absolute 05/06/2018 10.22* 1.40 - 6.50 10*3/mm3 Final   • Lymphocytes, Absolute 05/06/2018 1.36  1.00 - 3.00 10*3/mm3 Final   • Monocytes, Absolute 05/06/2018 1.30* 0.10 - 0.90 10*3/mm3 Final   • Eosinophils, Absolute 05/06/2018 0.18  0.00 - 0.70 10*3/mm3 Final   • Basophils, Absolute 05/06/2018 0.04  0.00 - 0.30 10*3/mm3 Final   • Immature Grans, Absolute 05/06/2018 0.04* 0.00 - 0.03 10*3/mm3 Final   • Extra Tube 05/06/2018 Hold for add-ons.   Final   • Osmolality Calc 05/06/2018 276.1  273.0 - 305.0 mOsm/kg Final   • Site 05/06/2018 Arterial: right brachial   Final   • Max's Test 05/06/2018 Positive   Final   • pH, Arterial 05/06/2018 7.439  7.350 - 7.450 pH units Final   • pCO2, Arterial 05/06/2018 35.3  35.0 - 45.0 mm Hg Final   • pO2, Arterial 05/06/2018 77.9* 80.0 - 100.0 mm Hg Final   • HCO3, Arterial 05/06/2018 23.4  22.0 - 26.0 mmol/L Final   • Base Excess, Arterial 05/06/2018 -0.3  mmol/L Final   • O2 Saturation, Arterial 05/06/2018 95.2  90.0 - 100.0 % Final   • Hemoglobin,  Blood Gas 05/06/2018 12.8  12 - 16 g/dL Final   • Hematocrit, Blood Gas 05/06/2018 38.0* 42.0 - 52.0 % Final   • Oxyhemoglobin 05/06/2018 93.9  85 - 100 % Final   • Methemoglobin 05/06/2018 0.20  0.00 - 3.00 % Final   • Carboxyhemoglobin 05/06/2018 1.2  0 - 5 % Final   • A-a Gradiant 05/06/2018 22.2  0.0 - 300.0 mmHg Final   • Temperature 05/06/2018 98.6  C Final   • Barometric Pressure for Blood Gas 05/06/2018 725  mmHg Final   • Modality 05/06/2018 Room Air   Final   • FIO2 05/06/2018 21  % Final   • Respiratory Culture 05/06/2018 Heavy growth (4+) Haemophilus influenzae Biotype II*  Final   • BETA LACTAMASE 05/06/2018 Negative   Corrected   • Gram Stain Result 05/06/2018 Many (4+) WBCs seen   Final   • Gram Stain Result 05/06/2018 Rare (1+) Gram positive cocci in pairs   Final   • Gram Stain Result 05/06/2018 Few (2+) Gram positive bacilli   Final   • Gram Stain Result 05/06/2018 Many (4+) Gram negative bacilli   Final   • Lactate 05/06/2018 2.9* 0.5 - 2.0 mmol/L Final   • C-Reactive Protein 05/06/2018 5.95* 0.00 - 0.99 mg/dL Final   • Hemoglobin A1C 05/06/2018 6.70* 4.50 - 5.70 % Final   • Glucose 05/06/2018 334* 70 - 130 mg/dL Final   • Glucose 05/06/2018 347* 70 - 130 mg/dL Final   • C-Reactive Protein 05/07/2018 8.06* 0.00 - 0.99 mg/dL Final   • Magnesium 05/07/2018 2.0  1.7 - 2.6 mg/dL Final   • Phosphorus 05/07/2018 3.1  2.7 - 4.5 mg/dL Final   • Glucose 05/07/2018 324* 70 - 110 mg/dL Final   • BUN 05/07/2018 17  7 - 21 mg/dL Final   • Creatinine 05/07/2018 0.91  0.43 - 1.29 mg/dL Final   • Sodium 05/07/2018 133* 135 - 153 mmol/L Final   • Potassium 05/07/2018 3.8  3.5 - 5.3 mmol/L Final   • Chloride 05/07/2018 98* 99 - 112 mmol/L Final   • CO2 05/07/2018 26.9  24.3 - 31.9 mmol/L Final   • Calcium 05/07/2018 9.2  7.7 - 10.0 mg/dL Final   • Total Protein 05/07/2018 6.9  6.0 - 8.0 g/dL Final   • Albumin 05/07/2018 4.10  3.40 - 4.80 g/dL Final   • ALT (SGPT) 05/07/2018 28  10 - 44 U/L Final   • AST (SGOT)  05/07/2018 13  10 - 34 U/L Final   • Alkaline Phosphatase 05/07/2018 73  40 - 129 U/L Final   • Total Bilirubin 05/07/2018 0.4  0.2 - 1.8 mg/dL Final   • eGFR Non African Amer 05/07/2018 84  >60 mL/min/1.73 Final   • Globulin 05/07/2018 2.8  gm/dL Final   • A/G Ratio 05/07/2018 1.5  1.5 - 2.5 g/dL Final   • BUN/Creatinine Ratio 05/07/2018 18.7  7.0 - 25.0 Final   • Anion Gap 05/07/2018 8.1  3.6 - 11.2 mmol/L Final   • Lactate 05/07/2018 1.3  0.5 - 2.0 mmol/L Final   • WBC 05/07/2018 8.60  4.50 - 12.50 10*3/mm3 Final   • RBC 05/07/2018 4.17* 4.70 - 6.10 10*6/mm3 Final   • Hemoglobin 05/07/2018 12.6* 14.0 - 18.0 g/dL Final   • Hematocrit 05/07/2018 37.5* 42.0 - 52.0 % Final   • MCV 05/07/2018 89.9  80.0 - 94.0 fL Final   • MCH 05/07/2018 30.2  27.0 - 33.0 pg Final   • MCHC 05/07/2018 33.6  33.0 - 37.0 g/dL Final   • RDW 05/07/2018 14.1  11.5 - 14.5 % Final   • RDW-SD 05/07/2018 45.3  37.0 - 54.0 fl Final   • MPV 05/07/2018 11.7* 6.0 - 10.0 fL Final   • Platelets 05/07/2018 205  130 - 400 10*3/mm3 Final   • Neutrophil % 05/07/2018 88.7* 30.0 - 70.0 % Final   • Lymphocyte % 05/07/2018 5.1* 21.0 - 51.0 % Final   • Monocyte % 05/07/2018 6.0  0.0 - 10.0 % Final   • Eosinophil % 05/07/2018 0.0  0.0 - 5.0 % Final   • Basophil % 05/07/2018 0.0  0.0 - 2.0 % Final   • Immature Grans % 05/07/2018 0.2  0.0 - 0.5 % Final   • Neutrophils, Absolute 05/07/2018 7.62* 1.40 - 6.50 10*3/mm3 Final   • Lymphocytes, Absolute 05/07/2018 0.44* 1.00 - 3.00 10*3/mm3 Final   • Monocytes, Absolute 05/07/2018 0.52  0.10 - 0.90 10*3/mm3 Final   • Eosinophils, Absolute 05/07/2018 0.00  0.00 - 0.70 10*3/mm3 Final   • Basophils, Absolute 05/07/2018 0.00  0.00 - 0.30 10*3/mm3 Final   • Immature Grans, Absolute 05/07/2018 0.02  0.00 - 0.03 10*3/mm3 Final   • Osmolality Calc 05/07/2018 280.5  273.0 - 305.0 mOsm/kg Final   • Glucose 05/07/2018 341* 70 - 130 mg/dL Final   • Glucose 05/07/2018 323* 70 - 130 mg/dL Final   • Glucose 05/07/2018 409* 70 - 130  mg/dL Final   • Glucose 05/07/2018 337* 70 - 130 mg/dL Final   • C-Reactive Protein 05/08/2018 4.01* 0.00 - 0.99 mg/dL Final   • WBC 05/08/2018 8.31  4.50 - 12.50 10*3/mm3 Final   • RBC 05/08/2018 4.31* 4.70 - 6.10 10*6/mm3 Final   • Hemoglobin 05/08/2018 13.0* 14.0 - 18.0 g/dL Final   • Hematocrit 05/08/2018 39.3* 42.0 - 52.0 % Final   • MCV 05/08/2018 91.2  80.0 - 94.0 fL Final   • MCH 05/08/2018 30.2  27.0 - 33.0 pg Final   • MCHC 05/08/2018 33.1  33.0 - 37.0 g/dL Final   • RDW 05/08/2018 14.2  11.5 - 14.5 % Final   • RDW-SD 05/08/2018 46.3  37.0 - 54.0 fl Final   • MPV 05/08/2018 11.8* 6.0 - 10.0 fL Final   • Platelets 05/08/2018 200  130 - 400 10*3/mm3 Final   • Neutrophil % 05/08/2018 90.0* 30.0 - 70.0 % Final   • Lymphocyte % 05/08/2018 5.2* 21.0 - 51.0 % Final   • Monocyte % 05/08/2018 4.3  0.0 - 10.0 % Final   • Eosinophil % 05/08/2018 0.0  0.0 - 5.0 % Final   • Basophil % 05/08/2018 0.1  0.0 - 2.0 % Final   • Immature Grans % 05/08/2018 0.4  0.0 - 0.5 % Final   • Neutrophils, Absolute 05/08/2018 7.48* 1.40 - 6.50 10*3/mm3 Final   • Lymphocytes, Absolute 05/08/2018 0.43* 1.00 - 3.00 10*3/mm3 Final   • Monocytes, Absolute 05/08/2018 0.36  0.10 - 0.90 10*3/mm3 Final   • Eosinophils, Absolute 05/08/2018 0.00  0.00 - 0.70 10*3/mm3 Final   • Basophils, Absolute 05/08/2018 0.01  0.00 - 0.30 10*3/mm3 Final   • Immature Grans, Absolute 05/08/2018 0.03  0.00 - 0.03 10*3/mm3 Final   • Glucose 05/08/2018 318* 70 - 130 mg/dL Final   • Glucose 05/08/2018 285* 70 - 130 mg/dL Final       Assessment      ICD-10-CM ICD-9-CM   1. Simple chronic bronchitis (CMS/[facility]) J41.0 491.0   2. Lung nodule R91.1 793.11                DISCUSSION:  This patient has responded very well to combination of albuterol inhaler for when necessary use, Breo ellipta and Spiriva for regular use.  Continue the same.  We would see him again in 6 months, at that point he would need a repeat CT scan of the chest for follow-up for a 6 mm size right lower  lobe lung nodule that was documented in his prior CT scan.      Plan    No orders of the defined types were placed in this encounter.    No orders of the defined types were placed in this encounter.                 Rk Tai MD, FCCP, [facility]  Pulmonary, Critical Care, and Sleep Medicine

## 2018-12-24 ENCOUNTER — HOSPITAL ENCOUNTER (EMERGENCY)
Facility: HOSPITAL | Age: 63
Discharge: HOME | End: 2018-12-25
Attending: FAMILY MEDICINE | Admitting: FAMILY MEDICINE

## 2018-12-24 ENCOUNTER — APPOINTMENT (OUTPATIENT)
Dept: CT IMAGING | Facility: HOSPITAL | Age: 63
End: 2018-12-24

## 2018-12-24 DIAGNOSIS — R25.3 TWITCHING: Primary | ICD-10-CM

## 2018-12-24 DIAGNOSIS — J32.0 MAXILLARY SINUSITIS, UNSPECIFIED CHRONICITY: ICD-10-CM

## 2018-12-24 DIAGNOSIS — G62.9 PERIPHERAL POLYNEUROPATHY: ICD-10-CM

## 2018-12-24 LAB — GLUCOSE BLDC GLUCOMTR-MCNC: 261 MG/DL (ref 70–130)

## 2018-12-24 PROCEDURE — 82962 GLUCOSE BLOOD TEST: CPT

## 2018-12-24 PROCEDURE — 93005 ELECTROCARDIOGRAM TRACING: CPT | Performed by: FAMILY MEDICINE

## 2018-12-24 PROCEDURE — 70450 CT HEAD/BRAIN W/O DYE: CPT | Performed by: RADIOLOGY

## 2018-12-24 PROCEDURE — 70450 CT HEAD/BRAIN W/O DYE: CPT

## 2018-12-24 PROCEDURE — 99284 EMERGENCY DEPT VISIT MOD MDM: CPT

## 2018-12-24 RX ORDER — GABAPENTIN 300 MG/1
300 CAPSULE ORAL 2 TIMES DAILY
COMMUNITY
End: 2019-04-05

## 2018-12-24 RX ORDER — GLYBURIDE 5 MG/1
5 TABLET ORAL
COMMUNITY
End: 2022-02-28 | Stop reason: SDUPTHER

## 2018-12-24 RX ORDER — LISINOPRIL 30 MG/1
30 TABLET ORAL DAILY
COMMUNITY
End: 2021-08-17

## 2018-12-25 ENCOUNTER — APPOINTMENT (OUTPATIENT)
Dept: GENERAL RADIOLOGY | Facility: HOSPITAL | Age: 63
End: 2018-12-25

## 2018-12-25 VITALS
WEIGHT: 200 LBS | HEIGHT: 72 IN | TEMPERATURE: 98.4 F | SYSTOLIC BLOOD PRESSURE: 148 MMHG | RESPIRATION RATE: 16 BRPM | HEART RATE: 73 BPM | BODY MASS INDEX: 27.09 KG/M2 | OXYGEN SATURATION: 96 % | DIASTOLIC BLOOD PRESSURE: 84 MMHG

## 2018-12-25 LAB
6-ACETYL MORPHINE: NEGATIVE
A-A DO2: 13.2 MMHG (ref 0–300)
ALBUMIN SERPL-MCNC: 4.4 G/DL (ref 3.4–4.8)
ALBUMIN/GLOB SERPL: 2 G/DL (ref 1.5–2.5)
ALP SERPL-CCNC: 100 U/L (ref 40–129)
ALT SERPL W P-5'-P-CCNC: 35 U/L (ref 10–44)
AMPHET+METHAMPHET UR QL: NEGATIVE
ANION GAP SERPL CALCULATED.3IONS-SCNC: 7.3 MMOL/L (ref 3.6–11.2)
APTT PPP: 30.2 SECONDS (ref 23.8–36.1)
ARTERIAL PATENCY WRIST A: POSITIVE
AST SERPL-CCNC: 21 U/L (ref 10–34)
ATMOSPHERIC PRESS: 735 MMHG
BARBITURATES UR QL SCN: NEGATIVE
BASE EXCESS BLDA CALC-SCNC: 3 MMOL/L
BASOPHILS # BLD AUTO: 0.03 10*3/MM3 (ref 0–0.3)
BASOPHILS NFR BLD AUTO: 0.5 % (ref 0–2)
BDY SITE: ABNORMAL
BENZODIAZ UR QL SCN: NEGATIVE
BILIRUB SERPL-MCNC: 0.4 MG/DL (ref 0.2–1.8)
BILIRUB UR QL STRIP: NEGATIVE
BNP SERPL-MCNC: 13 PG/ML (ref 0–100)
BODY TEMPERATURE: 98.6 C
BUN BLD-MCNC: 21 MG/DL (ref 7–21)
BUN/CREAT SERPL: 18.9 (ref 7–25)
BUPRENORPHINE SERPL-MCNC: NEGATIVE NG/ML
CALCIUM SPEC-SCNC: 9.1 MG/DL (ref 7.7–10)
CANNABINOIDS SERPL QL: NEGATIVE
CHLORIDE SERPL-SCNC: 98 MMOL/L (ref 99–112)
CLARITY UR: CLEAR
CO2 SERPL-SCNC: 29.7 MMOL/L (ref 24.3–31.9)
COCAINE UR QL: NEGATIVE
COHGB MFR BLD: 1.1 % (ref 0–5)
COLOR UR: YELLOW
CREAT BLD-MCNC: 1.11 MG/DL (ref 0.43–1.29)
CRP SERPL-MCNC: 0.53 MG/DL (ref 0–0.99)
D-LACTATE SERPL-SCNC: 1.4 MMOL/L (ref 0.5–2)
DEPRECATED RDW RBC AUTO: 45.6 FL (ref 37–54)
EOSINOPHIL # BLD AUTO: 0.13 10*3/MM3 (ref 0–0.7)
EOSINOPHIL NFR BLD AUTO: 2.1 % (ref 0–5)
ERYTHROCYTE [DISTWIDTH] IN BLOOD BY AUTOMATED COUNT: 14.2 % (ref 11.5–14.5)
ETHANOL BLD-MCNC: <10 MG/DL
ETHANOL UR QL: <0.01 %
GFR SERPL CREATININE-BSD FRML MDRD: 67 ML/MIN/1.73
GLOBULIN UR ELPH-MCNC: 2.2 GM/DL
GLUCOSE BLD-MCNC: 221 MG/DL (ref 70–110)
GLUCOSE UR STRIP-MCNC: ABNORMAL MG/DL
HCO3 BLDA-SCNC: 27.6 MMOL/L (ref 22–26)
HCT VFR BLD AUTO: 38.4 % (ref 42–52)
HCT VFR BLD CALC: 38 % (ref 42–52)
HGB BLD-MCNC: 12.6 G/DL (ref 14–18)
HGB BLDA-MCNC: 13 G/DL (ref 12–16)
HGB UR QL STRIP.AUTO: NEGATIVE
HOROWITZ INDEX BLD+IHG-RTO: 21 %
IMM GRANULOCYTES # BLD AUTO: 0.03 10*3/MM3 (ref 0–0.03)
IMM GRANULOCYTES NFR BLD AUTO: 0.5 % (ref 0–0.5)
INR PPP: 1.06 (ref 0.9–1.1)
KETONES UR QL STRIP: NEGATIVE
LDH SERPL-CCNC: 192 U/L (ref 135–225)
LEUKOCYTE ESTERASE UR QL STRIP.AUTO: NEGATIVE
LYMPHOCYTES # BLD AUTO: 1.03 10*3/MM3 (ref 1–3)
LYMPHOCYTES NFR BLD AUTO: 17 % (ref 21–51)
MAGNESIUM SERPL-MCNC: 2.1 MG/DL (ref 1.7–2.6)
MCH RBC QN AUTO: 29.5 PG (ref 27–33)
MCHC RBC AUTO-ENTMCNC: 32.8 G/DL (ref 33–37)
MCV RBC AUTO: 89.9 FL (ref 80–94)
METHADONE UR QL SCN: NEGATIVE
METHGB BLD QL: 0.2 % (ref 0–3)
MODALITY: ABNORMAL
MONOCYTES # BLD AUTO: 0.57 10*3/MM3 (ref 0.1–0.9)
MONOCYTES NFR BLD AUTO: 9.4 % (ref 0–10)
NEUTROPHILS # BLD AUTO: 4.28 10*3/MM3 (ref 1.4–6.5)
NEUTROPHILS NFR BLD AUTO: 70.5 % (ref 30–70)
NITRITE UR QL STRIP: NEGATIVE
OPIATES UR QL: NEGATIVE
OSMOLALITY SERPL CALC.SUM OF ELEC: 279.9 MOSM/KG (ref 273–305)
OXYCODONE UR QL SCN: NEGATIVE
OXYHGB MFR BLDV: 94.1 % (ref 85–100)
PCO2 BLDA: 42.5 MM HG (ref 35–45)
PCP UR QL SCN: NEGATIVE
PH BLDA: 7.43 PH UNITS (ref 7.35–7.45)
PH UR STRIP.AUTO: 6 [PH] (ref 5–8)
PLATELET # BLD AUTO: 206 10*3/MM3 (ref 130–400)
PMV BLD AUTO: 11.4 FL (ref 6–10)
PO2 BLDA: 80.4 MM HG (ref 80–100)
POTASSIUM BLD-SCNC: 3.6 MMOL/L (ref 3.5–5.3)
PROT SERPL-MCNC: 6.6 G/DL (ref 6–8)
PROT UR QL STRIP: NEGATIVE
PROTHROMBIN TIME: 14 SECONDS (ref 11–15.4)
RBC # BLD AUTO: 4.27 10*6/MM3 (ref 4.7–6.1)
SAO2 % BLDCOA: 95.3 % (ref 90–100)
SODIUM BLD-SCNC: 135 MMOL/L (ref 135–153)
SP GR UR STRIP: 1.02 (ref 1–1.03)
TROPONIN I SERPL-MCNC: <0.006 NG/ML
UROBILINOGEN UR QL STRIP: ABNORMAL
WBC NRBC COR # BLD: 6.07 10*3/MM3 (ref 4.5–12.5)

## 2018-12-25 PROCEDURE — 80053 COMPREHEN METABOLIC PANEL: CPT | Performed by: FAMILY MEDICINE

## 2018-12-25 PROCEDURE — 71045 X-RAY EXAM CHEST 1 VIEW: CPT

## 2018-12-25 PROCEDURE — 85730 THROMBOPLASTIN TIME PARTIAL: CPT | Performed by: FAMILY MEDICINE

## 2018-12-25 PROCEDURE — 80307 DRUG TEST PRSMV CHEM ANLYZR: CPT | Performed by: FAMILY MEDICINE

## 2018-12-25 PROCEDURE — 36600 WITHDRAWAL OF ARTERIAL BLOOD: CPT | Performed by: FAMILY MEDICINE

## 2018-12-25 PROCEDURE — 71045 X-RAY EXAM CHEST 1 VIEW: CPT | Performed by: RADIOLOGY

## 2018-12-25 PROCEDURE — 83735 ASSAY OF MAGNESIUM: CPT | Performed by: FAMILY MEDICINE

## 2018-12-25 PROCEDURE — 87040 BLOOD CULTURE FOR BACTERIA: CPT | Performed by: FAMILY MEDICINE

## 2018-12-25 PROCEDURE — 83880 ASSAY OF NATRIURETIC PEPTIDE: CPT | Performed by: FAMILY MEDICINE

## 2018-12-25 PROCEDURE — 86140 C-REACTIVE PROTEIN: CPT | Performed by: FAMILY MEDICINE

## 2018-12-25 PROCEDURE — 85610 PROTHROMBIN TIME: CPT | Performed by: FAMILY MEDICINE

## 2018-12-25 PROCEDURE — 85025 COMPLETE CBC W/AUTO DIFF WBC: CPT | Performed by: FAMILY MEDICINE

## 2018-12-25 PROCEDURE — 83050 HGB METHEMOGLOBIN QUAN: CPT | Performed by: FAMILY MEDICINE

## 2018-12-25 PROCEDURE — 83605 ASSAY OF LACTIC ACID: CPT | Performed by: FAMILY MEDICINE

## 2018-12-25 PROCEDURE — 82375 ASSAY CARBOXYHB QUANT: CPT | Performed by: FAMILY MEDICINE

## 2018-12-25 PROCEDURE — 84484 ASSAY OF TROPONIN QUANT: CPT | Performed by: FAMILY MEDICINE

## 2018-12-25 PROCEDURE — 83615 LACTATE (LD) (LDH) ENZYME: CPT | Performed by: FAMILY MEDICINE

## 2018-12-25 PROCEDURE — 81003 URINALYSIS AUTO W/O SCOPE: CPT | Performed by: FAMILY MEDICINE

## 2018-12-25 PROCEDURE — 36415 COLL VENOUS BLD VENIPUNCTURE: CPT

## 2018-12-25 PROCEDURE — 96360 HYDRATION IV INFUSION INIT: CPT

## 2018-12-25 PROCEDURE — 93010 ELECTROCARDIOGRAM REPORT: CPT | Performed by: INTERNAL MEDICINE

## 2018-12-25 PROCEDURE — 82805 BLOOD GASES W/O2 SATURATION: CPT | Performed by: FAMILY MEDICINE

## 2018-12-25 RX ORDER — CEFDINIR 300 MG/1
300 CAPSULE ORAL 2 TIMES DAILY
Qty: 20 CAPSULE | Refills: 0 | Status: SHIPPED | OUTPATIENT
Start: 2018-12-25 | End: 2019-04-05

## 2018-12-25 RX ORDER — CEFDINIR 300 MG/1
300 CAPSULE ORAL EVERY 12 HOURS SCHEDULED
Status: DISCONTINUED | OUTPATIENT
Start: 2018-12-25 | End: 2018-12-25 | Stop reason: HOSPADM

## 2018-12-25 RX ORDER — SODIUM CHLORIDE 9 MG/ML
125 INJECTION, SOLUTION INTRAVENOUS CONTINUOUS
Status: DISCONTINUED | OUTPATIENT
Start: 2018-12-25 | End: 2018-12-25 | Stop reason: HOSPADM

## 2018-12-25 RX ORDER — SODIUM CHLORIDE 0.9 % (FLUSH) 0.9 %
10 SYRINGE (ML) INJECTION AS NEEDED
Status: DISCONTINUED | OUTPATIENT
Start: 2018-12-24 | End: 2018-12-25 | Stop reason: HOSPADM

## 2018-12-25 RX ADMIN — CEFDINIR 300 MG: 300 CAPSULE ORAL at 03:26

## 2018-12-25 RX ADMIN — SODIUM CHLORIDE 125 ML/HR: 9 INJECTION, SOLUTION INTRAVENOUS at 02:04

## 2018-12-30 LAB
BACTERIA SPEC AEROBE CULT: NORMAL
BACTERIA SPEC AEROBE CULT: NORMAL

## 2019-02-22 ENCOUNTER — TRANSCRIBE ORDERS (OUTPATIENT)
Dept: ADMINISTRATIVE | Facility: HOSPITAL | Age: 64
End: 2019-02-22

## 2019-02-22 ENCOUNTER — HOSPITAL ENCOUNTER (OUTPATIENT)
Dept: GENERAL RADIOLOGY | Facility: HOSPITAL | Age: 64
Discharge: HOME | End: 2019-02-22
Admitting: PSYCHIATRY & NEUROLOGY

## 2019-02-22 DIAGNOSIS — M54.9 BACK PAIN, UNSPECIFIED BACK LOCATION, UNSPECIFIED BACK PAIN LATERALITY, UNSPECIFIED CHRONICITY: ICD-10-CM

## 2019-02-22 DIAGNOSIS — M54.2 NECK PAIN: ICD-10-CM

## 2019-02-22 DIAGNOSIS — M54.2 NECK PAIN: Primary | ICD-10-CM

## 2019-02-22 PROCEDURE — 72052 X-RAY EXAM NECK SPINE 6/>VWS: CPT | Performed by: RADIOLOGY

## 2019-02-22 PROCEDURE — 72110 X-RAY EXAM L-2 SPINE 4/>VWS: CPT | Performed by: RADIOLOGY

## 2019-02-22 PROCEDURE — 72050 X-RAY EXAM NECK SPINE 4/5VWS: CPT

## 2019-02-22 PROCEDURE — 72110 X-RAY EXAM L-2 SPINE 4/>VWS: CPT

## 2019-03-04 ENCOUNTER — HOSPITAL ENCOUNTER (EMERGENCY)
Facility: HOSPITAL | Age: 64
Discharge: HOME | End: 2019-03-04
Attending: EMERGENCY MEDICINE | Admitting: EMERGENCY MEDICINE

## 2019-03-04 ENCOUNTER — APPOINTMENT (OUTPATIENT)
Dept: GENERAL RADIOLOGY | Facility: HOSPITAL | Age: 64
End: 2019-03-04

## 2019-03-04 DIAGNOSIS — J18.9 PNEUMONIA OF LEFT LOWER LOBE DUE TO INFECTIOUS ORGANISM: ICD-10-CM

## 2019-03-04 DIAGNOSIS — J10.1 INFLUENZA A: Primary | ICD-10-CM

## 2019-03-04 LAB
A-A DO2: 39.8 MMHG (ref 0–300)
ALBUMIN SERPL-MCNC: 4.7 G/DL (ref 3.4–4.8)
ALBUMIN/GLOB SERPL: 1.5 G/DL (ref 1.5–2.5)
ALP SERPL-CCNC: 89 U/L (ref 40–129)
ALT SERPL W P-5'-P-CCNC: 35 U/L (ref 10–44)
ANION GAP SERPL CALCULATED.3IONS-SCNC: 13.9 MMOL/L (ref 3.6–11.2)
ARTERIAL PATENCY WRIST A: ABNORMAL
AST SERPL-CCNC: 32 U/L (ref 10–34)
ATMOSPHERIC PRESS: 729 MMHG
BASE EXCESS BLDA CALC-SCNC: 1.8 MMOL/L
BASOPHILS # BLD AUTO: 0.03 10*3/MM3 (ref 0–0.3)
BASOPHILS NFR BLD AUTO: 0.3 % (ref 0–2)
BDY SITE: ABNORMAL
BILIRUB SERPL-MCNC: 0.5 MG/DL (ref 0.2–1.8)
BODY TEMPERATURE: 98.6 C
BUN BLD-MCNC: 13 MG/DL (ref 7–21)
BUN/CREAT SERPL: 13.3 (ref 7–25)
CALCIUM SPEC-SCNC: 10.2 MG/DL (ref 7.7–10)
CHLORIDE SERPL-SCNC: 95 MMOL/L (ref 99–112)
CO2 SERPL-SCNC: 28.1 MMOL/L (ref 24.3–31.9)
COHGB MFR BLD: 1.4 % (ref 0–5)
CREAT BLD-MCNC: 0.98 MG/DL (ref 0.43–1.29)
D-LACTATE SERPL-SCNC: 1.7 MMOL/L (ref 0.5–2)
DEPRECATED RDW RBC AUTO: 47.1 FL (ref 37–54)
EOSINOPHIL # BLD AUTO: 0.3 10*3/MM3 (ref 0–0.7)
EOSINOPHIL NFR BLD AUTO: 2.9 % (ref 0–5)
ERYTHROCYTE [DISTWIDTH] IN BLOOD BY AUTOMATED COUNT: 14.9 % (ref 11.5–14.5)
GFR SERPL CREATININE-BSD FRML MDRD: 77 ML/MIN/1.73
GLOBULIN UR ELPH-MCNC: 3.1 GM/DL
GLUCOSE BLD-MCNC: 107 MG/DL (ref 70–110)
HCO3 BLDA-SCNC: 24.5 MMOL/L (ref 22–26)
HCT VFR BLD AUTO: 41 % (ref 42–52)
HCT VFR BLD CALC: 39 % (ref 42–52)
HGB BLD-MCNC: 13.6 G/DL (ref 14–18)
HGB BLDA-MCNC: 13.4 G/DL (ref 12–16)
HOLD SPECIMEN: NORMAL
HOLD SPECIMEN: NORMAL
HOROWITZ INDEX BLD+IHG-RTO: 21 %
IMM GRANULOCYTES # BLD AUTO: 0.03 10*3/MM3 (ref 0–0.03)
IMM GRANULOCYTES NFR BLD AUTO: 0.3 % (ref 0–0.5)
LYMPHOCYTES # BLD AUTO: 1.2 10*3/MM3 (ref 1–3)
LYMPHOCYTES NFR BLD AUTO: 11.8 % (ref 21–51)
MCH RBC QN AUTO: 29.1 PG (ref 27–33)
MCHC RBC AUTO-ENTMCNC: 33.2 G/DL (ref 33–37)
MCV RBC AUTO: 87.6 FL (ref 80–94)
METHGB BLD QL: 0.2 % (ref 0–3)
MODALITY: ABNORMAL
MONOCYTES # BLD AUTO: 1.13 10*3/MM3 (ref 0.1–0.9)
MONOCYTES NFR BLD AUTO: 11.1 % (ref 0–10)
NEUTROPHILS # BLD AUTO: 7.52 10*3/MM3 (ref 1.4–6.5)
NEUTROPHILS NFR BLD AUTO: 73.6 % (ref 30–70)
OSMOLALITY SERPL CALC.SUM OF ELEC: 274.4 MOSM/KG (ref 273–305)
OXYHGB MFR BLDV: 91.1 % (ref 85–100)
PCO2 BLDA: 32.6 MM HG (ref 35–45)
PH BLDA: 7.49 PH UNITS (ref 7.35–7.45)
PLATELET # BLD AUTO: 278 10*3/MM3 (ref 130–400)
PMV BLD AUTO: 11.4 FL (ref 6–10)
PO2 BLDA: 64.4 MM HG (ref 80–100)
POTASSIUM BLD-SCNC: 4 MMOL/L (ref 3.5–5.3)
PROT SERPL-MCNC: 7.8 G/DL (ref 6–8)
RBC # BLD AUTO: 4.68 10*6/MM3 (ref 4.7–6.1)
SAO2 % BLDCOA: 92.6 % (ref 90–100)
SODIUM BLD-SCNC: 137 MMOL/L (ref 135–153)
WBC NRBC COR # BLD: 10.21 10*3/MM3 (ref 4.5–12.5)
WHOLE BLOOD HOLD SPECIMEN: NORMAL
WHOLE BLOOD HOLD SPECIMEN: NORMAL

## 2019-03-04 PROCEDURE — 71046 X-RAY EXAM CHEST 2 VIEWS: CPT

## 2019-03-04 PROCEDURE — 94640 AIRWAY INHALATION TREATMENT: CPT

## 2019-03-04 PROCEDURE — 93005 ELECTROCARDIOGRAM TRACING: CPT | Performed by: EMERGENCY MEDICINE

## 2019-03-04 PROCEDURE — 82375 ASSAY CARBOXYHB QUANT: CPT | Performed by: EMERGENCY MEDICINE

## 2019-03-04 PROCEDURE — 83050 HGB METHEMOGLOBIN QUAN: CPT | Performed by: EMERGENCY MEDICINE

## 2019-03-04 PROCEDURE — 80053 COMPREHEN METABOLIC PANEL: CPT | Performed by: EMERGENCY MEDICINE

## 2019-03-04 PROCEDURE — 82805 BLOOD GASES W/O2 SATURATION: CPT | Performed by: EMERGENCY MEDICINE

## 2019-03-04 PROCEDURE — 83605 ASSAY OF LACTIC ACID: CPT | Performed by: EMERGENCY MEDICINE

## 2019-03-04 PROCEDURE — 99285 EMERGENCY DEPT VISIT HI MDM: CPT

## 2019-03-04 PROCEDURE — 36600 WITHDRAWAL OF ARTERIAL BLOOD: CPT | Performed by: EMERGENCY MEDICINE

## 2019-03-04 PROCEDURE — 93010 ELECTROCARDIOGRAM REPORT: CPT | Performed by: INTERNAL MEDICINE

## 2019-03-04 PROCEDURE — 85025 COMPLETE CBC W/AUTO DIFF WBC: CPT | Performed by: EMERGENCY MEDICINE

## 2019-03-04 PROCEDURE — 36415 COLL VENOUS BLD VENIPUNCTURE: CPT

## 2019-03-04 PROCEDURE — 94799 UNLISTED PULMONARY SVC/PX: CPT

## 2019-03-04 PROCEDURE — 71046 X-RAY EXAM CHEST 2 VIEWS: CPT | Performed by: RADIOLOGY

## 2019-03-04 PROCEDURE — 87040 BLOOD CULTURE FOR BACTERIA: CPT | Performed by: EMERGENCY MEDICINE

## 2019-03-04 RX ORDER — IPRATROPIUM BROMIDE 21 UG/1
2 SPRAY, METERED NASAL EVERY 12 HOURS
Qty: 60 ML | Refills: 0 | Status: SHIPPED | OUTPATIENT
Start: 2019-03-04 | End: 2019-04-05

## 2019-03-04 RX ORDER — LEVOFLOXACIN 750 MG/1
750 TABLET ORAL ONCE
Status: COMPLETED | OUTPATIENT
Start: 2019-03-04 | End: 2019-03-04

## 2019-03-04 RX ORDER — IPRATROPIUM BROMIDE AND ALBUTEROL SULFATE 2.5; .5 MG/3ML; MG/3ML
3 SOLUTION RESPIRATORY (INHALATION) ONCE
Status: COMPLETED | OUTPATIENT
Start: 2019-03-04 | End: 2019-03-04

## 2019-03-04 RX ORDER — ACETAMINOPHEN 325 MG/1
975 TABLET ORAL ONCE
Status: COMPLETED | OUTPATIENT
Start: 2019-03-04 | End: 2019-03-04

## 2019-03-04 RX ORDER — AZITHROMYCIN 250 MG/1
TABLET, FILM COATED ORAL
Qty: 6 TABLET | Refills: 0 | Status: SHIPPED | OUTPATIENT
Start: 2019-03-04 | End: 2019-04-05

## 2019-03-04 RX ADMIN — ACETAMINOPHEN 975 MG: 325 TABLET, FILM COATED ORAL at 21:12

## 2019-03-04 RX ADMIN — LEVOFLOXACIN 750 MG: 750 TABLET, FILM COATED ORAL at 22:26

## 2019-03-04 RX ADMIN — IPRATROPIUM BROMIDE AND ALBUTEROL SULFATE 3 ML: .5; 3 SOLUTION RESPIRATORY (INHALATION) at 21:11

## 2019-03-04 RX ADMIN — HYDROCODONE POLISTIREX AND CHLORPHENIRAMINE POLISTIREX 5 ML: 10; 8 SUSPENSION, EXTENDED RELEASE ORAL at 20:45

## 2019-03-05 VITALS
RESPIRATION RATE: 18 BRPM | TEMPERATURE: 98.9 F | HEIGHT: 72 IN | WEIGHT: 195 LBS | OXYGEN SATURATION: 95 % | HEART RATE: 90 BPM | BODY MASS INDEX: 26.41 KG/M2 | DIASTOLIC BLOOD PRESSURE: 68 MMHG | SYSTOLIC BLOOD PRESSURE: 120 MMHG

## 2019-03-05 NOTE — ED NOTES
Pt stable, nad noted at this time, pt awaiting room placement due to high pt volume at this time, report to Meryl Browning, RN triage nurse     Hattie Shay RN  03/04/19 [id number]

## 2019-03-05 NOTE — ED PROVIDER NOTES
Subjective   Patient presents to ER with shortness of air.        Shortness of Breath   Severity:  Mild  Onset quality:  Gradual  Timing:  Constant  Progression:  Worsening  Chronicity:  New  Relieved by:  Nothing  Worsened by:  Exertion and movement  Ineffective treatments:  None tried  Associated symptoms: cough        Review of Systems   Constitutional: Positive for activity change.   HENT: Negative.    Eyes: Negative.    Respiratory: Positive for cough and shortness of breath.    Cardiovascular: Negative.    Gastrointestinal: Negative.    Endocrine: Negative.    Genitourinary: Negative.    Musculoskeletal: Negative.    Skin: Negative.    Allergic/Immunologic: Negative.    Neurological: Negative.    Hematological: Negative.    Psychiatric/Behavioral: Negative.        Past Medical History:   Diagnosis Date   • COPD (chronic obstructive pulmonary disease) (CMS/HCC)    • Diabetes mellitus (CMS/HCC)    • Diverticulitis    • GERD (gastroesophageal reflux disease)    • Hypertension        No Known Allergies    Past Surgical History:   Procedure Laterality Date   • COLON RESECTION  2016    had colostomy and reveresed back    • COLONOSCOPY     • LUNG BIOPSY      (non cancerous)       Family History   Problem Relation Age of Onset   • Cancer Father    • Diabetes Sister    • Diabetes Brother        Social History     Socioeconomic History   • Marital status:      Spouse name: Not on file   • Number of children: Not on file   • Years of education: Not on file   • Highest education level: Not on file   Tobacco Use   • Smoking status: Former Smoker     Packs/day: 1.00     Years: 30.00     Pack years: 30.00     Types: Cigarettes     Last attempt to quit:      Years since quittin.1   • Smokeless tobacco: Never Used   Substance and Sexual Activity   • Alcohol use: No   • Drug use: No   • Sexual activity: Defer     Birth control/protection: Other           Objective   Physical Exam   Constitutional: He  appears well-developed and well-nourished.   HENT:   Head: Normocephalic and atraumatic.   Eyes: EOM are normal. Pupils are equal, round, and reactive to light.   Neck: Normal range of motion.   Cardiovascular: Normal rate.   Pulmonary/Chest: He has decreased breath sounds in the right lower field and the left lower field. He has wheezes in the right upper field and the left upper field.   Abdominal: Soft.   Musculoskeletal: Normal range of motion.        Right lower leg: Normal.        Left lower leg: Normal.   Neurological: He is alert.   Skin: Skin is warm. Capillary refill takes less than 2 seconds.   Psychiatric: He has a normal mood and affect.   Nursing note and vitals reviewed.      Procedures           ED Course  ED Course as of Mar 05 1319   Mon Mar 04, 2019   2222 CXR left basilar infiltrate  [PETE]      ED Course User Index  [PETE] Haris Jimenez MD                  [facility]      Final diagnoses:   Influenza A   Pneumonia of left lower lobe due to infectious organism (CMS/[facility])            Haris Jimenez MD  03/05/19 1359

## 2019-03-09 LAB
BACTERIA SPEC AEROBE CULT: NORMAL
BACTERIA SPEC AEROBE CULT: NORMAL

## 2019-03-13 ENCOUNTER — HOSPITAL ENCOUNTER (OUTPATIENT)
Dept: PHYSICAL THERAPY | Facility: HOSPITAL | Age: 64
Setting detail: THERAPIES SERIES
Discharge: HOME | End: 2019-03-13

## 2019-03-13 ENCOUNTER — TRANSCRIBE ORDERS (OUTPATIENT)
Dept: ADMINISTRATIVE | Facility: HOSPITAL | Age: 64
End: 2019-03-13

## 2019-03-13 ENCOUNTER — TRANSCRIBE ORDERS (OUTPATIENT)
Dept: PHYSICAL THERAPY | Facility: HOSPITAL | Age: 64
End: 2019-03-13

## 2019-03-13 DIAGNOSIS — R93.7 MUSCULOSKELETAL SYSTEM IMAGING ABNORMALITY: ICD-10-CM

## 2019-03-13 DIAGNOSIS — M54.2 NECK PAIN: Primary | ICD-10-CM

## 2019-03-13 DIAGNOSIS — M54.16 LUMBAR RADICULOPATHY: Primary | ICD-10-CM

## 2019-03-13 DIAGNOSIS — M54.9 BACK PAIN, UNSPECIFIED BACK LOCATION, UNSPECIFIED BACK PAIN LATERALITY, UNSPECIFIED CHRONICITY: ICD-10-CM

## 2019-03-13 PROCEDURE — 97161 PT EVAL LOW COMPLEX 20 MIN: CPT | Performed by: PHYSICAL THERAPIST

## 2019-03-13 NOTE — THERAPY TREATMENT NOTE
Outpatient Physical Therapy Ortho Treatment Note  [facility]     Patient Name: Maximo Kwon  : 1955  MRN: [id number]  Today's Date: 3/13/2019      Visit Date: 2019    Visit Dx:    ICD-10-CM ICD-9-CM   1. Neck pain M54.2 723.1   2. Back pain, unspecified back location, unspecified back pain laterality, unspecified chronicity M54.9 724.5       Patient Active Problem List   Diagnosis   • COPD exacerbation (CMS/HCC)   • Mixed hyperlipidemia        Past Medical History:   Diagnosis Date   • COPD (chronic obstructive pulmonary disease) (CMS/HCC)    • Depression    • Diabetes mellitus (CMS/HCC)    • Diverticulitis    • GERD (gastroesophageal reflux disease)    • Hypertension         Past Surgical History:   Procedure Laterality Date   • COLON RESECTION      had colostomy and reveresed back    • COLONOSCOPY     • LUNG BIOPSY      (non cancerous)       PT Ortho     Row Name 19 1000       Posture/Observations    Posture- WNL  -- Flexed forward posture.  -AD       Neural Tension Signs- Upper Quarter Clearing    ULNTT 1  Bilateral:;Negative  -AD    ULNTT 3  Bilateral:;Negative  -AD       Sensory Screen for Light Touch- Upper Quarter Clearing    C4 (posterior shoulder)  Bilateral:;Intact  -AD    C5 (lateral upper arm)  Bilateral:;Intact  -AD    C6 (tip of thumb)  Bilateral:;Intact  -AD    C7 (tip of 3rd finger)  Bilateral:;Intact  -AD    C8 (tip of 5th finger)  Bilateral:;Intact  -AD    T1 (medial lower arm)  Bilateral:;Intact  -AD       Myotomal Screen- Upper Quarter Clearing    Shoulder flexion (C5)  WNL  -AD    Elbow flexion/wrist extension (C6)  WNL  -AD    Elbow extension/wrist flexion (C7)  WNL  -AD       Cervical/Shoulder ROM Screen    Cervical flexion  Normal  -AD    Cervical extension  Normal  -AD    Cervical lateral flexion  Normal  -AD    Cervical rotation  Normal  -AD       Neural Tension Signs- Lower Quarter Clearing    SLR  Bilateral:;Negative  -AD       Pathological Reflexes-  Lower Quarter Clearing    Clonus  Bilateral:;Negative  -AD       Sensory Screen for Light Touch- Lower Quarter Clearing    L1 (inguinal area)  Bilateral:;Intact  -AD    L2 (anterior mid thigh)  Bilateral:;Intact  -AD    L3 (distal anterior thigh)  Bilateral:;Intact  -AD    L4 (medial lower leg/foot)  Bilateral:;Intact  -AD       Myotomal Screen- Lower Quarter Clearing    Hip flexion (L2)  WNL  -AD    Knee extension (L3)  WNL  -AD    Ankle DF (L4)  WNL  -AD    Great toe extension (L5)  WNL  -AD    Ankle PF (S1)  WNL  -AD    Knee flexion (S2)  WNL  -AD       Lumbar ROM Screen- Lower Quarter Clearing    Lumbar Flexion  Impaired 50%  -AD    Lumbar Extension  Impaired 50%  -AD    Lumbar Lateral Flexion  Normal  -AD    Lumbar Rotation  Normal  -AD       SI/Hip Screen- Lower Quarter Clearing    Zulay's/Michael's test  Bilateral:;Negative  -AD    Posterior thigh sheer  Bilateral:;Negative  -AD       Special Tests/Palpation    Special Tests/Palpation  Lumbar/SI;Cervical/Thoracic No tenderness to palpation.  -AD       Cervical/Thoracic Special Tests    Spurlings (Foraminal Compression)  Negative  -AD    Cervical Compression (Forarminal Compression vs. Facet Pain)  Negative  -AD    Cervical Distraction (Foraminal Compression vs. Facet Pain)  Negative  -AD       Lumbosacral Palpation    Lumbosacral Palpation?  No Tenderness/Abnormality  -AD       Sensation    Sensation WNL?  WNL  -AD       Transfers    Bed-Chair [place] (Transfers)  independent  -AD    Chair-Bed [place] (Transfers)  independent  -AD    Sit-Stand [place] (Transfers)  independent  -AD    Stand-Sit [place] (Transfers)  independent  -AD       Gait/Stairs Assessment/Training    Comment (Gait/Stairs)  No impairments observed.  -AD      User Key  (r) = Recorded By, (t) = Taken By, (c) = Cosigned By    Initials Name Provider Type    AD Dalton, Ashley Claudene, PT Physical Therapist          PT Neuro     Row Name 03/13/19 1000             Balance  Skills Training    SLS  -- 3 seconds bilaterally  -AD      Sharpened Rhomberg  -- Loses balance immediately  -AD        User Key  (r) = Recorded By, (t) = Taken By, (c) = Cosigned By    Initials Name Provider Type    AD Dalton, Ashley Claudene, PT Physical Therapist                  PT Assessment/Plan     Row Name 03/13/19 1047          PT Assessment    Functional Limitations  Decreased safety during functional activities;Limitation in home management;Limitations in community activities;Performance in self-care ADL;Performance in leisure activities;Limitations in functional capacity and performance  -AD     Impairments  Balance;Endurance;Pain;Joint integrity;Joint mobility;Poor body mechanics;Posture;Range of motion  -AD     Assessment Comments  The patient is a 63 year old male presenting to the clinic with low back and neck pain. In addition, he demonstrated impaired balance limiting his ability to perform SLS and sharpened rhomberg. The patient reported no tenderness to palpation and special tests of the cervical and lumbar region were negative for neural involvement. The patient reported 30% impairment on the Modified Oswestry and 24% impairment on the NDI. He would benefit from skilled physical therapy to address impaired lumbar ROM, pain, and balance. He will be progressed as tolerated.  -AD     Please refer to paper survey for additional self-reported information  Yes  -AD     Rehab Potential  Fair  -AD     Patient/caregiver participated in establishment of treatment plan and goals  Yes  -AD     Patient would benefit from skilled therapy intervention  Yes  -AD        PT Plan    PT Frequency  2x/week  -AD     Predicted Duration of Therapy Intervention (Therapy Eval)  4 weeks  -AD     Planned CPT's?  PT EVAL LOW COMPLEXITY: 00743;PT THER ACT EA 15 MIN: 91757;PT THER PROC EA 15 MIN: 47972;PT RE-EVAL: 64190;PT MANUAL THERAPY EA 15 MIN: 37686;PT NEUROMUSC RE-EDUCATION EA 15 MIN: 06048;PT GAIT TRAINING EA 15 MIN:  74463;PT SELF CARE/HOME MGMT/TRAIN EA 15: 29218;PT ELECTRICAL STIM UNATTEND: ;PT ELECTRICAL STIM ATTD EA 15 MIN: 92477;PT ULTRASOUND EA 15 MIN: 35314;PT HOT/COLD PACK WC NONMCARE: 88533;PT THER SUPP EA 15 MIN  -AD     Physical Therapy Interventions (Optional Details)  balance training;gross motor skills;neuromuscular re-education;motor coordination training;modalities;manual therapy techniques;lumbar stabilization;joint mobilization;home exercise program;patient/family education;postural re-education;ROM (Range of Motion);stair training;strengthening;stretching  -AD     PT Plan Comments  The above noted interventions will be used to address functional limitations and impairments. He will be progressed as tolerated per POC and MD order.  -AD       User Key  (r) = Recorded By, (t) = Taken By, (c) = Cosigned By    Initials Name Provider Type    AD Dalton, Ashley Claudene, PT Physical Therapist              Exercises     Row Name 03/13/19 1000             Exercise 1    Exercise Name 1  HEP: SKTC, piriformis stretch, LTR.  -AD        User Key  (r) = Recorded By, (t) = Taken By, (c) = Cosigned By    Initials Name Provider Type    AD Dalton, Ashley Claudene, PT Physical Therapist                         PT OP Goals     Row Name 03/13/19 1000          PT Short Term Goals    STG Date to Achieve  03/27/19  -AD     STG 1  Pt will be instructed in HEP for improved independence.  -AD     STG 1 Progress  New  -AD     STG 2  Pt will demonstrate 75% lumbar ROM for improved mobility and function.  -AD     STG 2 Progress  New  -AD     STG 3  Pt will reoprt the ability to walk 1/2 a mile with no more than 3/10 low back and neck pain for improved community involvement.  -AD     STG 3 Progress  New  -AD        Long Term Goals    LTG Date to Achieve  04/12/19  -AD     LTG 1  Pt will report less than 20% impairment on the Modified Oswestry for improved functional independence.  -AD     LTG 1 Progress  New  -AD     LTG 2  Pt will  report less than 20% impairment on the NDI for improved functional independence.  -AD     LTG 2 Progress  New  -AD     LTG 3  Pt will report lumbar ROM within normal limits for improved mobility.  -AD     LTG 3 Progress  New  -AD     LTG 4  Pt will report the ability to perform all household activities with no more than 2/10 low back and neck pain.  -AD     LTG 4 Progress  New  -AD     LTG 5  Pt will perform SLS for at least 10 seconds for improved balance and decreased fall risk.  -AD     LTG 5 Progress  New  -AD     LTG 6  Pt will perform Sharpened Rhomberg for 30 seconds for improved balance and safety.  -AD     LTG 6 Progress  New  -AD        Time Calculation    PT Goal Re-Cert Due Date  04/12/19  -AD       User Key  (r) = Recorded By, (t) = Taken By, (c) = Cosigned By    Initials Name Provider Type    AD Dalton, Ashley Claudene, PT Physical Therapist          Therapy Education  Given: HEP, Symptoms/condition management, Pain management, Posture/body mechanics, Fall prevention and home safety, Mobility training  Program: New  How Provided: Verbal, Demonstration  Provided to: Patient  Level of Understanding: Teach back education performed, Verbalized, Demonstrated    Outcome Measure Options: Bola Reyna Neck Disability Index (NDI)  Modified Oswestry  Modified Oswestry Score/Comments: 15/30 = 60% impairment  Neck Disability Index  Section 1 - Pain Intensity: The pain is very mild at the moment.  Section 2 - Personal Care: I can look after myself normally without causing extra pain.  Section 3 - Lifting: Pain prevents me from lifting heavy weights, but I can manage light weights if they are conveniently positioned.  Section 4 - Work: I can do most of my usual work, but no more  Section 5 - Headaches: I have slight headaches that come infrequently.  Section 6 - Concentration: I can concentrate fully with slight difficulty.  Section 7 - Sleeping: My sleep is mildly disturbed for up to 1-2 hours.  Section 8 -  Driving: I can drive my car without neck pain  Section 9 - Reading: I can read as much as I want with slight neck pain.  Section 10 - Recreation: I have some neck pain with all recreational activities.  Neck Disability Index Score: 12      Time Calculation:   Start Time: 0950  Stop Time: 1052  Time Calculation (min): 62 min  Therapy Suggested Charges     Code   Minutes Charges    None           Therapy Charges for Today     Code Description Service Date Service Provider Modifiers Qty    28353736924 HC PT EVAL LOW COMPLEXITY 4 3/13/2019 Dalton, Ashley Claudene, PT GP 1          PT G-Codes  Outcome Measure Options: Bola Reyna Neck Disability Index (NDI)  Modified Oswestry Score/Comments: 15/30 = 60% impairment  Neck Disability Index Score: 12         Ashley Claudene Dalton, PT  3/13/2019

## 2019-03-15 ENCOUNTER — HOSPITAL ENCOUNTER (OUTPATIENT)
Dept: MRI IMAGING | Facility: HOSPITAL | Age: 64
Discharge: HOME | End: 2019-03-15
Admitting: PSYCHIATRY & NEUROLOGY

## 2019-03-15 ENCOUNTER — HOSPITAL ENCOUNTER (OUTPATIENT)
Dept: MRI IMAGING | Facility: HOSPITAL | Age: 64
Discharge: HOME | End: 2019-03-15

## 2019-03-15 DIAGNOSIS — M54.16 LUMBAR RADICULOPATHY: ICD-10-CM

## 2019-03-15 DIAGNOSIS — R93.7 MUSCULOSKELETAL SYSTEM IMAGING ABNORMALITY: ICD-10-CM

## 2019-03-15 PROCEDURE — 72148 MRI LUMBAR SPINE W/O DYE: CPT

## 2019-03-15 PROCEDURE — 72148 MRI LUMBAR SPINE W/O DYE: CPT | Performed by: RADIOLOGY

## 2019-03-15 PROCEDURE — 72141 MRI NECK SPINE W/O DYE: CPT | Performed by: RADIOLOGY

## 2019-03-15 PROCEDURE — 72141 MRI NECK SPINE W/O DYE: CPT

## 2019-03-20 ENCOUNTER — HOSPITAL ENCOUNTER (OUTPATIENT)
Dept: PHYSICAL THERAPY | Facility: HOSPITAL | Age: 64
Setting detail: THERAPIES SERIES
Discharge: HOME | End: 2019-03-20

## 2019-03-20 DIAGNOSIS — M54.9 BACK PAIN, UNSPECIFIED BACK LOCATION, UNSPECIFIED BACK PAIN LATERALITY, UNSPECIFIED CHRONICITY: ICD-10-CM

## 2019-03-20 DIAGNOSIS — M54.2 NECK PAIN: Primary | ICD-10-CM

## 2019-03-20 PROCEDURE — 97110 THERAPEUTIC EXERCISES: CPT

## 2019-03-20 PROCEDURE — 97035 APP MDLTY 1+ULTRASOUND EA 15: CPT

## 2019-03-20 PROCEDURE — G0283 ELEC STIM OTHER THAN WOUND: HCPCS

## 2019-03-20 NOTE — THERAPY TREATMENT NOTE
Outpatient Physical Therapy Ortho Treatment Note  [facility]     Patient Name: Maximo Kwon  : 1955  MRN: [id number]  Today's Date: 3/20/2019      Visit Date: 2019    Visit Dx:    ICD-10-CM ICD-9-CM   1. Neck pain M54.2 723.1   2. Back pain, unspecified back location, unspecified back pain laterality, unspecified chronicity M54.9 724.5       Patient Active Problem List   Diagnosis   • COPD exacerbation (CMS/HCC)   • Mixed hyperlipidemia        Past Medical History:   Diagnosis Date   • COPD (chronic obstructive pulmonary disease) (CMS/HCC)    • Depression    • Diabetes mellitus (CMS/HCC)    • Diverticulitis    • GERD (gastroesophageal reflux disease)    • Hypertension         Past Surgical History:   Procedure Laterality Date   • COLON RESECTION      had colostomy and reveresed back    • COLONOSCOPY     • LUNG BIOPSY      (non cancerous)                       PT Assessment/Plan     Row Name 19 6309          PT Assessment    Assessment Comments  Tx today consisted of mh and estim to right low back followed by ther ex and ended with US.  Pt responded well to tx with good response to mh and estim.  Pt reported the exercises increased his pain from 4 to 6/10, yet pt reported no distress during session.    -RT        PT Plan    PT Plan Comments  Will follow progressing stability and decreased pain.  -RT       User Key  (r) = Recorded By, (t) = Taken By, (c) = Cosigned By    Initials Name Provider Type    RT Maldonado Fernandes, PT Physical Therapist          Modalities     Row Name 19 1100             Subjective Comments    Subjective Comments  Pt reports having increased right low back pain today.  -RT         Subjective Pain    Able to rate subjective pain?  yes  -RT      Pre-Treatment Pain Level  4  -RT      Post-Treatment Pain Level  6  -RT         Moist Heat    MH Applied  Yes  -RT      Location  low back  -RT      Rx Minutes  10 mins  -RT      MH Prior to Rx  Yes  -RT          Ultrasound 50667    Location  right low back  -RT      Duty Cycle  100  -RT      Frequency  1.0 MHz  -RT      Intensity - Wts/cm  1.2  -RT      29462 - PT Ultrasound Minutes  8  -RT         ELECTRICAL STIMULATION    Attended/Unattended  Unattended  -RT      Stimulation Type  IFC  -RT      Location/Electrode Placement/Other  right low back  -RT       PT E-Stim Unattended (Manual) Minutes  10  -RT        User Key  (r) = Recorded By, (t) = Taken By, (c) = Cosigned By    Initials Name Provider Type    RT Maldonado Fernandes, PT Physical Therapist        Exercises     Row Name 03/20/19 1100             Subjective Comments    Subjective Comments  Pt reports having increased right low back pain today.  -RT         Subjective Pain    Able to rate subjective pain?  yes  -RT      Pre-Treatment Pain Level  4  -RT      Post-Treatment Pain Level  6  -RT         Total Minutes    81153 - PT Therapeutic Exercise Minutes  30  -RT         Exercise 1    Exercise Name 1  ut stretch, lev scap stretch, scap sq, cerv retraction, ltr, sktc, piriformis stretch, bridges  -RT      Cueing 1  Verbal;Tactile  -RT      Reps 1  10-20 reps  -RT      Time 1  30  -RT        User Key  (r) = Recorded By, (t) = Taken By, (c) = Cosigned By    Initials Name Provider Type    RT Maldonado Fernandes, PT Physical Therapist                           Therapy Education  Given: HEP, Symptoms/condition management, Pain management, Posture/body mechanics, Fall prevention and home safety, Mobility training  Program: Reinforced  How Provided: Verbal, Demonstration  Provided to: Patient  Level of Understanding: Teach back education performed, Verbalized, Demonstrated              Time Calculation:   Start Time: 0900  Stop Time: 1000  Time Calculation (min): 60 min  Therapy Charges for Today     Code Description Service Date Service Provider Modifiers Qty    96122858445  PT THER PROC EA 15 MIN 3/20/2019 Maldonado Fernandes, PT GP 2    [id number]  PT ELECTRICAL STIM  UNATTENDED 3/20/2019 Maldonado Fernandes, PT  1    68894566206 HC PT ULTRASOUND EA 15 MIN 3/20/2019 Maldonado Fernandes, PT GP 1                    Maldonado Fernandes, PT  3/20/2019

## 2019-03-25 ENCOUNTER — HOSPITAL ENCOUNTER (OUTPATIENT)
Dept: PHYSICAL THERAPY | Facility: HOSPITAL | Age: 64
Setting detail: THERAPIES SERIES
Discharge: HOME | End: 2019-03-25

## 2019-03-25 DIAGNOSIS — M54.9 BACK PAIN, UNSPECIFIED BACK LOCATION, UNSPECIFIED BACK PAIN LATERALITY, UNSPECIFIED CHRONICITY: ICD-10-CM

## 2019-03-25 DIAGNOSIS — M54.2 NECK PAIN: Primary | ICD-10-CM

## 2019-03-25 PROCEDURE — 97110 THERAPEUTIC EXERCISES: CPT

## 2019-03-25 PROCEDURE — G0283 ELEC STIM OTHER THAN WOUND: HCPCS

## 2019-03-25 PROCEDURE — 97035 APP MDLTY 1+ULTRASOUND EA 15: CPT

## 2019-03-25 NOTE — THERAPY TREATMENT NOTE
Outpatient Physical Therapy Ortho Treatment Note  [facility]     Patient Name: Maximo Kwon  : 1955  MRN: [id number]  Today's Date: 3/25/2019      Visit Date: 2019    Visit Dx:    ICD-10-CM ICD-9-CM   1. Neck pain M54.2 723.1   2. Back pain, unspecified back location, unspecified back pain laterality, unspecified chronicity M54.9 724.5       Patient Active Problem List   Diagnosis   • COPD exacerbation (CMS/HCC)   • Mixed hyperlipidemia        Past Medical History:   Diagnosis Date   • COPD (chronic obstructive pulmonary disease) (CMS/HCC)    • Depression    • Diabetes mellitus (CMS/[facility])    • Diverticulitis    • GERD (gastroesophageal reflux disease)    • Hypertension         Past Surgical History:   Procedure Laterality Date   • COLON RESECTION      had colostomy and reveresed back    • COLONOSCOPY     • LUNG BIOPSY      (non cancerous)                       PT Assessment/Plan     Row Name 19 1014          PT Assessment    Assessment Comments  Tx today consisted of mh to back and neck; estim to right low back; ther ex and ended with US to right low back.  Pt responded well to added ppt and mid rows with rtb today.  Pt reported decreased pain following session today.  -RT        PT Plan    PT Plan Comments  Will follow progressing core stability and decreased pain.  -RT       User Key  (r) = Recorded By, (t) = Taken By, (c) = Cosigned By    Initials Name Provider Type    RT Maldonado Fernandes, PT Physical Therapist          Modalities     Row Name 19 0900             Subjective Comments    Subjective Comments  Pt has no new complaints today.  Pt christina tx well last session.  -RT         Subjective Pain    Able to rate subjective pain?  yes  -RT      Pre-Treatment Pain Level  4  -RT      Post-Treatment Pain Level  2  -RT         Moist Heat    MH Applied  Yes  -RT      Location  low back  -RT      Rx Minutes  10 mins  -RT      MH Prior to Rx  Yes  -RT         Ultrasound 70091     Location  right low back  -RT      Duty Cycle  100  -RT      Frequency  1.0 MHz  -RT      Intensity - Wts/cm  1.2  -RT      92812 - PT Ultrasound Minutes  8  -RT         ELECTRICAL STIMULATION    Attended/Unattended  Unattended  -RT      Stimulation Type  IFC  -RT      Location/Electrode Placement/Other  right low back  -RT       PT E-Stim Unattended (Manual) Minutes  10  -RT        User Key  (r) = Recorded By, (t) = Taken By, (c) = Cosigned By    Initials Name Provider Type    RT Maldonado Fernandes, PT Physical Therapist        Exercises     Row Name 03/25/19 0900             Subjective Comments    Subjective Comments  Pt has no new complaints today.  Pt christina tx well last session.  -RT         Subjective Pain    Able to rate subjective pain?  yes  -RT      Pre-Treatment Pain Level  4  -RT      Post-Treatment Pain Level  2  -RT         Total Minutes    14486 - PT Therapeutic Exercise Minutes  30  -RT         Exercise 1    Exercise Name 1  ut stretch,ppt,  lev scap stretch, scap sq 20, cerv retraction 20, ltr 20, sktc and piriformis stretch, bridges 20, mid rows rtb 20  -RT      Cueing 1  Verbal;Tactile  -RT      Time 1  30  -RT        User Key  (r) = Recorded By, (t) = Taken By, (c) = Cosigned By    Initials Name Provider Type    RT Maldonado Fernandes, PT Physical Therapist                           Therapy Education  Given: HEP, Symptoms/condition management, Pain management, Posture/body mechanics, Fall prevention and home safety, Mobility training  Program: Reinforced  How Provided: Verbal, Demonstration  Provided to: Patient  Level of Understanding: Teach back education performed, Verbalized, Demonstrated              Time Calculation:   Start Time: 0900  Stop Time: 1000  Time Calculation (min): 60 min  Therapy Charges for Today     Code Description Service Date Service Provider Modifiers Qty    80764101724  PT THER PROC EA 15 MIN 3/25/2019 Maldonado Fernandes, PT GP 2    [id number]  PT ULTRASOUND EA 15 MIN  3/25/2019 Maldonado Fernandes, PT GP 1    [id number] HC PT ELECTRICAL STIM UNATTENDED 3/25/2019 Maldonado Fernandes, PT  1                    Maldonado Fernandes, PT  3/25/2019

## 2019-04-04 ENCOUNTER — OFFICE VISIT (OUTPATIENT)
Dept: PULMONOLOGY | Facility: CLINIC | Age: 64
End: 2019-04-04

## 2019-04-04 ENCOUNTER — HOSPITAL ENCOUNTER (OUTPATIENT)
Dept: GENERAL RADIOLOGY | Facility: HOSPITAL | Age: 64
Discharge: HOME | End: 2019-04-04
Admitting: NURSE PRACTITIONER

## 2019-04-04 VITALS
SYSTOLIC BLOOD PRESSURE: 143 MMHG | HEART RATE: 101 BPM | DIASTOLIC BLOOD PRESSURE: 76 MMHG | WEIGHT: 209 LBS | BODY MASS INDEX: 28.31 KG/M2 | OXYGEN SATURATION: 95 % | TEMPERATURE: 98 F | HEIGHT: 72 IN

## 2019-04-04 DIAGNOSIS — R91.1 PULMONARY NODULE: ICD-10-CM

## 2019-04-04 DIAGNOSIS — J41.0 SIMPLE CHRONIC BRONCHITIS (HCC): ICD-10-CM

## 2019-04-04 DIAGNOSIS — R05.9 COUGH: ICD-10-CM

## 2019-04-04 DIAGNOSIS — R06.02 SHORTNESS OF BREATH: Primary | ICD-10-CM

## 2019-04-04 DIAGNOSIS — R06.02 SHORTNESS OF BREATH: ICD-10-CM

## 2019-04-04 PROCEDURE — 94618 PULMONARY STRESS TESTING: CPT | Performed by: NURSE PRACTITIONER

## 2019-04-04 PROCEDURE — 71046 X-RAY EXAM CHEST 2 VIEWS: CPT | Performed by: RADIOLOGY

## 2019-04-04 PROCEDURE — 71046 X-RAY EXAM CHEST 2 VIEWS: CPT

## 2019-04-04 PROCEDURE — 94664 DEMO&/EVAL PT USE INHALER: CPT | Performed by: NURSE PRACTITIONER

## 2019-04-04 PROCEDURE — 99214 OFFICE O/P EST MOD 30 MIN: CPT | Performed by: NURSE PRACTITIONER

## 2019-04-04 NOTE — PROGRESS NOTES
Interval history since last visit:    Recent hospitalizations:    Investigations (imaging, PFT's, labs, sleep study, record requests, etc.)    Have you had the Influenza Vaccine? Yes  Would you like to receive this Vaccine today? yes     Have you had the Pneumonia Vaccine? No  Would you like to receive this Vaccine today? yes     Subjective    Maximo Kwon presents for the following Bronchitis  .    History of Present Illness     Mr. Kwon is here today to follow up on shortness of breath and COPD.  He states that he has been sick for about 6 weeks.  States that he is having increased shortness of breath, and an increased cough that is productive of a dark brown sputum.  He states that he has been to the emergency department as well as his primary care provider several times and taken several rounds of antibiotics as well as prednisone.  He states that he was seen by his physician about a week ago and gave an antibiotic shot and steroid shot in the office and then sent home on azithromycin, doxycycline and  Prednisone.  Currently taking Breo once daily, and Spiriva once daily as well as an albuterol inhaler and nebulizer machine on an as-needed basis        Review of Systems   Constitutional: Negative for chills and fever.   HENT: Positive for congestion and sinus pressure. Negative for sore throat.    Respiratory: Positive for cough, chest tightness, shortness of breath and wheezing.    Cardiovascular: Negative for chest pain, palpitations and leg swelling.   Neurological: Positive for light-headedness and headaches. Negative for dizziness.   Psychiatric/Behavioral: Negative for confusion and sleep disturbance.       Active Problems:  Problem List Items Addressed This Visit        Respiratory    Simple chronic bronchitis (CMS/HCC)    Pulmonary nodule      Other Visit Diagnoses     Shortness of breath    -  Primary    Relevant Orders    CBC & Differential    C-reactive Protein    Basic Metabolic Panel    proBNP     Adult Transthoracic Echo Complete W/ Cont if Necessary Per Protocol    XR Chest 2 View (Completed)    Walking Oximetry (Completed)    Cough        Relevant Orders    Respiratory Culture - Sputum, Cough    CBC & Differential    C-reactive Protein    Basic Metabolic Panel    proBNP    XR Chest 2 View (Completed)          Past Medical History:  Past Medical History:   Diagnosis Date   • Arthritis    • COPD (chronic obstructive pulmonary disease) (CMS/HCC)    • Depression    • Diabetes mellitus (CMS/[facility])    • Diverticulitis    • GERD (gastroesophageal reflux disease)    • Hypertension        Family History:  Family History   Problem Relation Age of Onset   • Cancer Father    • Diabetes Sister    • Diabetes Brother        Social History:  Social History     Tobacco Use   • Smoking status: Former Smoker     Packs/day: 1.00     Years: 30.00     Pack years: 30.00     Types: Cigarettes     Last attempt to quit:      Years since quittin.2   • Smokeless tobacco: Never Used   Substance Use Topics   • Alcohol use: No       Current Medications:  Current Outpatient Medications   Medication Sig Dispense Refill   • Fluticasone Furoate-Vilanterol (BREO ELLIPTA) 100-25 MCG/INH aerosol powder  Inhale 1 puff Daily. 1 each 6   • glyBURIDE (DIAbeta) 5 MG tablet Take 5 mg by mouth Daily With Breakfast.     • hydrochlorothiazide (HYDRODIURIL) 25 MG tablet Take 25 mg by mouth Daily.     • lisinopril (PRINIVIL,ZESTRIL) 20 MG tablet Take 20 mg by mouth Daily.     • metFORMIN (GLUCOPHAGE) 500 MG tablet Take 1,000 mg by mouth 2 (Two) Times a Day With Meals.     • montelukast (SINGULAIR) 10 MG tablet Take 1 tablet by mouth Every Night. 30 tablet 0   • Omega-3 Fatty Acids (FISH OIL) 1000 MG capsule capsule Take 1,000 mg by mouth Daily With Breakfast.     • omeprazole (priLOSEC) 20 MG capsule Take 20 mg by mouth Daily.     • albuterol (PROVENTIL) (2.5 MG/3ML) 0.083% nebulizer solution Take 2.5 mg by nebulization 4 (Four) Times a Day As  "Needed for Wheezing.     • brompheniramine-pseudoephedrine (DIMETAPP) 1-15 MG/5ML elixir Take 5 mL by mouth Every 4 (Four) Hours As Needed for Allergies or Congestion.     • busPIRone (BUSPAR) 15 MG tablet Take 15 mg by mouth Every 6 (Six) Hours.     • doxycycline (MONODOX) 100 MG capsule Take 100 mg by mouth 2 (Two) Times a Day. Prior to [facility] Admission, Patient was on: has 3 doses left     • levETIRAcetam (KEPPRA) 750 MG tablet Take 375 mg by mouth 2 (Two) Times a Day. Prior to [facility] Admission, Patient was on: 1/2 to 1 tablet twice a day     • levoFLOXacin (LEVAQUIN) 750 MG tablet Take 1 tablet by mouth Daily for 7 days. 7 tablet 0   • predniSONE (DELTASONE) 10 MG tablet Take 10 mg by mouth Daily. Prior to [facility] Admission, Patient was on: has 4 doses left     • tiotropium bromide monohydrate (SPIRIVA RESPIMAT) 2.5 MCG/ACT aerosol solution inhaler Inhale 2 puffs Daily.       No current facility-administered medications for this visit.        Allergies:  No Known Allergies    Vitals:  /76   Pulse 101   Temp 98 °F (36.7 °C)   Ht 182.9 cm (72\")   Wt 94.8 kg (209 lb)   SpO2 95%   BMI 28.35 kg/m²     Imaging:    Imaging Results (most recent)     None          Pulmonary Functions Testing Results:    No results found for: FEV1, FVC, PUT1ZEP, TLC, DLCO    Results for orders placed or performed during the hospital encounter of 03/04/19   Blood Culture - Blood, Arm, Left   Result Value Ref Range    Blood Culture No growth at 5 days    Blood Culture - Blood, Arm, Right   Result Value Ref Range    Blood Culture No growth at 5 days    Comprehensive Metabolic Panel   Result Value Ref Range    Glucose 107 70 - 110 mg/dL    BUN 13 7 - 21 mg/dL    Creatinine 0.98 0.43 - 1.29 mg/dL    Sodium 137 135 - 153 mmol/L    Potassium 4.0 3.5 - 5.3 mmol/L    Chloride 95 (L) 99 - 112 mmol/L    CO2 28.1 24.3 - 31.9 mmol/L    Calcium 10.2 (H) 7.7 - 10.0 mg/dL    Total Protein 7.8 6.0 - 8.0 g/dL    Albumin 4.70 3.40 - 4.80 g/dL "    ALT (SGPT) 35 10 - 44 U/L    AST (SGOT) 32 10 - 34 U/L    Alkaline Phosphatase 89 40 - 129 U/L    Total Bilirubin 0.5 0.2 - 1.8 mg/dL    eGFR Non African Amer 77 >60 mL/min/1.73    Globulin 3.1 gm/dL    A/G Ratio 1.5 1.5 - 2.5 g/dL    BUN/Creatinine Ratio 13.3 7.0 - 25.0    Anion Gap 13.9 (H) 3.6 - 11.2 mmol/L   Lactic Acid, Plasma   Result Value Ref Range    Lactate 1.7 0.5 - 2.0 mmol/L   CBC Auto Differential   Result Value Ref Range    WBC 10.21 4.50 - 12.50 10*3/mm3    RBC 4.68 (L) 4.70 - 6.10 10*6/mm3    Hemoglobin 13.6 (L) 14.0 - 18.0 g/dL    Hematocrit 41.0 (L) 42.0 - 52.0 %    MCV 87.6 80.0 - 94.0 fL    MCH 29.1 27.0 - 33.0 pg    MCHC 33.2 33.0 - 37.0 g/dL    RDW 14.9 (H) 11.5 - 14.5 %    RDW-SD 47.1 37.0 - 54.0 fl    MPV 11.4 (H) 6.0 - 10.0 fL    Platelets 278 130 - 400 10*3/mm3    Neutrophil % 73.6 (H) 30.0 - 70.0 %    Lymphocyte % 11.8 (L) 21.0 - 51.0 %    Monocyte % 11.1 (H) 0.0 - 10.0 %    Eosinophil % 2.9 0.0 - 5.0 %    Basophil % 0.3 0.0 - 2.0 %    Immature Grans % 0.3 0.0 - 0.5 %    Neutrophils, Absolute 7.52 (H) 1.40 - 6.50 10*3/mm3    Lymphocytes, Absolute 1.20 1.00 - 3.00 10*3/mm3    Monocytes, Absolute 1.13 (H) 0.10 - 0.90 10*3/mm3    Eosinophils, Absolute 0.30 0.00 - 0.70 10*3/mm3    Basophils, Absolute 0.03 0.00 - 0.30 10*3/mm3    Immature Grans, Absolute 0.03 0.00 - 0.03 10*3/mm3   Osmolality, Calculated   Result Value Ref Range    Osmolality Calc 274.4 273.0 - 305.0 mOsm/kg   Blood Gas, Arterial   Result Value Ref Range    Site Arterial: right brachial     Max's Test N/A     pH, Arterial 7.494 (H) 7.350 - 7.450 pH units    pCO2, Arterial 32.6 (L) 35.0 - 45.0 mm Hg    pO2, Arterial 64.4 (L) 80.0 - 100.0 mm Hg    HCO3, Arterial 24.5 22.0 - 26.0 mmol/L    Base Excess, Arterial 1.8 mmol/L    O2 Saturation, Arterial 92.6 90.0 - 100.0 %    Hemoglobin, Blood Gas 13.4 12 - 16 g/dL    Hematocrit, Blood Gas 39.0 (L) 42.0 - 52.0 %    Oxyhemoglobin 91.1 85 - 100 %    Methemoglobin 0.20 0.00 - 3.00 %     Carboxyhemoglobin 1.4 0 - 5 %    A-a Gradiant 39.8 0.0 - 300.0 mmHg    Temperature 98.6 C    Barometric Pressure for Blood Gas 729 mmHg    Modality Room Air     FIO2 21 %   Light Blue Top   Result Value Ref Range    Extra Tube hold for add-on    Green Top (Gel)   Result Value Ref Range    Extra Tube Hold for add-ons.    Lavender Top   Result Value Ref Range    Extra Tube hold for add-on    Gold Top - SST   Result Value Ref Range    Extra Tube Hold for add-ons.        Objective   Physical Exam     GENERAL APPEARANCE: Well developed, well nourished, alert and cooperative, and appears to be in no acute distress.    HEAD: normocephalic. Atraumatic.    EYES: PERRL, EOMI. Fundi normal, vision is grossly intact.    THROAT: Oral cavity and pharynx normal. No inflammation, swelling, exudate, or lesions.     NECK: Neck supple.  No thyromegaly.    CARDIAC: Normal S1 and S2. No S3, S4 or murmurs. Rhythm is regular. There is no peripheral edema, cyanosis or pallor. Extremities are warm and well perfused. Capillary refill is less than 2 seconds. No carotid bruits.    RESPIRATORY:Bilateral air entry positive. Bilateral diminished breath sounds. No wheezing, crackles or rhonchi noted.    GI: Positive bowel sounds. Soft, nondistended, nontender.     MUSCULOSKELETAL: No significant deformity or joint abnormality. No edema. Peripheral pulses intact. No varicosities.    NEUROLOGICAL: Strength and sensation symmetric and intact throughout.     PSYCHIATRIC: The mental examination revealed the patient was oriented to person, place, and time.     Assessment/Plan      COPD:  Social History     Tobacco Use   Smoking Status Former Smoker   • Packs/day: 1.00   • Years: 30.00   • Pack years: 30.00   • Types: Cigarettes   • Last attempt to quit:    • Years since quittin.2   Smokeless Tobacco Never Used     Body mass index is 28.35 kg/m².  Lab Results   Component Value Date    PHART 7.494 (H) 2019    AXR3FGD 32.6 (L) 2019     PO2ART 64.4 (L) 03/04/2019    OCZ8MFA 24.5 03/04/2019    BASEEXCESS 1.8 03/04/2019    A4SIKAEC 92.6 03/04/2019     I have personally reviewed the chest x-ray image.  As per my read, chest x-ray showed flat diaphragms, hyperinflation with interstitial markings.         Plan:  - Continue Ipratropium/albuterol nebs as needed   - Continue Breo once daily  -Continue Spiriva inhaler 1 puff daily  - Ordered pulse oximetry while ambulating to assess supplemental oxygen need during exertion. (Supplemental oxygen will be required if PaO2 is less than are equal to 55 or SaO2 less than or equal to 88% during rest or exercise.  Supplemental oxygen will be required if PaO2 is between 55-60 and SaO2 is between 88-90% in case of pulmonary hypertension, heart failure or hematocrit greater than 55% at rest or exertion).   - Flu and pneumonia vaccination status: Up to date  -Patient's Body mass index is 28.35 kg/m². BMI is above normal parameters. Recommendations include: exercise counseling and nutrition counseling.  -Continue supplemental oxygen during the night and on an as-needed basis.  -Ordered an echo.  -6-minute walk test completed in office.  Patient only completed 5 minutes of test due to extreme shortness of breath.  His oxygen saturation was noted to be 92% at the lowest.    - Inhaler technique demonstration/discussion:  I have extensively discussed the steps.  In summary, the steps were discussed in the following order.Patient was advised to rinse the mouth after steroid inhalation to prevent fungal mucositis.  · Remove the cap from the inhaler and shake well.    · Breathe out all the way.    · Place the mouthpiece of the inhaler between your teeth and seal your lips tightly around it.    · As you start to blow in slowly, press down on the canister one time.   · Keep breathing in as slowly and deeply as you can.    · It should take about 5 seconds for you to completely breathe in.    · Hold your breath for 10  seconds(count to 10 slowly) to allow the medication to reach the airways of the lung.    · Repeat the above steps for each puff.    · Wait about 1 minute between the puffs.    · Replaced the cap on the inhaler when finished.        Increased shortness of breath and cough: Ordered a respiratory culture to evaluate increased sputum and color change.  Will prescribe further antibiotics based on culture results.  Advised patient to continue current antibiotics and steroid regimen prescribed by PCP.  Also ordered a BMP, CBC, CRP, pro BNP and chest x-ray for further evaluation of acute illness.    Advised patient that if His symptoms continue to worsen he needed to be seen in the emergency room for further management.    Pulmonary nodule: Most recent CT scan shows a 6 mm right lower lobe lung nodule.  We will repeat CT scan for further evaluation.              ICD-10-CM ICD-9-CM   1. Shortness of breath R06.02 786.05   2. Cough R05 786.2   3. Simple chronic bronchitis (CMS/[facility]) J41.0 491.0   4. Pulmonary nodule R91.1 793.11       Return in about 6 months (around 10/4/2019).

## 2019-04-05 ENCOUNTER — APPOINTMENT (OUTPATIENT)
Dept: GENERAL RADIOLOGY | Facility: HOSPITAL | Age: 64
End: 2019-04-05

## 2019-04-05 ENCOUNTER — HOSPITAL ENCOUNTER (OUTPATIENT)
Facility: HOSPITAL | Age: 64
Setting detail: OBSERVATION
Discharge: HOME | End: 2019-04-07
Attending: EMERGENCY MEDICINE | Admitting: EMERGENCY MEDICINE

## 2019-04-05 DIAGNOSIS — J44.1 COPD EXACERBATION (HCC): Primary | ICD-10-CM

## 2019-04-05 LAB
ALBUMIN SERPL-MCNC: 4.49 G/DL (ref 3.5–5.2)
ALBUMIN/GLOB SERPL: 1.3 G/DL
ALP SERPL-CCNC: 91 U/L (ref 39–117)
ALT SERPL W P-5'-P-CCNC: 19 U/L (ref 1–41)
ANION GAP SERPL CALCULATED.3IONS-SCNC: 11.9 MMOL/L
AST SERPL-CCNC: 12 U/L (ref 1–40)
BASOPHILS # BLD AUTO: 0.03 10*3/MM3 (ref 0–0.2)
BASOPHILS NFR BLD AUTO: 0.2 % (ref 0–1.5)
BILIRUB SERPL-MCNC: 0.8 MG/DL (ref 0.2–1.2)
BUN BLD-MCNC: 18 MG/DL (ref 8–23)
BUN/CREAT SERPL: 17.5 (ref 7–25)
CALCIUM SPEC-SCNC: 10.5 MG/DL (ref 8.6–10.5)
CHLORIDE SERPL-SCNC: 90 MMOL/L (ref 98–107)
CO2 SERPL-SCNC: 26.1 MMOL/L (ref 22–29)
CREAT BLD-MCNC: 1.03 MG/DL (ref 0.76–1.27)
CRP SERPL-MCNC: 9.49 MG/DL (ref 0–0.5)
DEPRECATED RDW RBC AUTO: 47.3 FL (ref 37–54)
EOSINOPHIL # BLD AUTO: 0.33 10*3/MM3 (ref 0–0.4)
EOSINOPHIL NFR BLD AUTO: 2.1 % (ref 0.3–6.2)
ERYTHROCYTE [DISTWIDTH] IN BLOOD BY AUTOMATED COUNT: 15 % (ref 12.3–15.4)
GFR SERPL CREATININE-BSD FRML MDRD: 73 ML/MIN/1.73
GLOBULIN UR ELPH-MCNC: 3.4 GM/DL
GLUCOSE BLD-MCNC: 195 MG/DL (ref 65–99)
GLUCOSE BLDC GLUCOMTR-MCNC: 281 MG/DL (ref 70–130)
GLUCOSE BLDC GLUCOMTR-MCNC: 325 MG/DL (ref 70–130)
HCT VFR BLD AUTO: 42.7 % (ref 37.5–51)
HGB BLD-MCNC: 14.4 G/DL (ref 13–17.7)
HOLD SPECIMEN: NORMAL
HOLD SPECIMEN: NORMAL
IMM GRANULOCYTES # BLD AUTO: 0.05 10*3/MM3 (ref 0–0.05)
IMM GRANULOCYTES NFR BLD AUTO: 0.3 % (ref 0–0.5)
L PNEUMO1 AG UR QL IA: NEGATIVE
LYMPHOCYTES # BLD AUTO: 0.93 10*3/MM3 (ref 0.7–3.1)
LYMPHOCYTES NFR BLD AUTO: 6 % (ref 19.6–45.3)
M PNEUMO IGM SER QL: NEGATIVE
MCH RBC QN AUTO: 29.3 PG (ref 26.6–33)
MCHC RBC AUTO-ENTMCNC: 33.7 G/DL (ref 31.5–35.7)
MCV RBC AUTO: 86.8 FL (ref 79–97)
MONOCYTES # BLD AUTO: 1.68 10*3/MM3 (ref 0.1–0.9)
MONOCYTES NFR BLD AUTO: 10.9 % (ref 5–12)
NEUTROPHILS # BLD AUTO: 12.44 10*3/MM3 (ref 1.4–7)
NEUTROPHILS NFR BLD AUTO: 80.5 % (ref 42.7–76)
PLATELET # BLD AUTO: 235 10*3/MM3 (ref 140–450)
PMV BLD AUTO: 11.3 FL (ref 6–12)
POTASSIUM BLD-SCNC: 4.3 MMOL/L (ref 3.5–5.2)
PROT SERPL-MCNC: 7.9 G/DL (ref 6–8.5)
RBC # BLD AUTO: 4.92 10*6/MM3 (ref 4.14–5.8)
SODIUM BLD-SCNC: 128 MMOL/L (ref 136–145)
TROPONIN T SERPL-MCNC: <0.01 NG/ML (ref 0–0.03)
TROPONIN T SERPL-MCNC: <0.01 NG/ML (ref 0–0.03)
WBC NRBC COR # BLD: 15.46 10*3/MM3 (ref 3.4–10.8)
WHOLE BLOOD HOLD SPECIMEN: NORMAL
WHOLE BLOOD HOLD SPECIMEN: NORMAL

## 2019-04-05 PROCEDURE — 96376 TX/PRO/DX INJ SAME DRUG ADON: CPT

## 2019-04-05 PROCEDURE — 96367 TX/PROPH/DG ADDL SEQ IV INF: CPT

## 2019-04-05 PROCEDURE — G0378 HOSPITAL OBSERVATION PER HR: HCPCS

## 2019-04-05 PROCEDURE — 87040 BLOOD CULTURE FOR BACTERIA: CPT | Performed by: NURSE PRACTITIONER

## 2019-04-05 PROCEDURE — 25010000002 ENOXAPARIN PER 10 MG: Performed by: PHYSICIAN ASSISTANT

## 2019-04-05 PROCEDURE — 94799 UNLISTED PULMONARY SVC/PX: CPT

## 2019-04-05 PROCEDURE — 87070 CULTURE OTHR SPECIMN AEROBIC: CPT | Performed by: NURSE PRACTITIONER

## 2019-04-05 PROCEDURE — 80053 COMPREHEN METABOLIC PANEL: CPT | Performed by: EMERGENCY MEDICINE

## 2019-04-05 PROCEDURE — 86738 MYCOPLASMA ANTIBODY: CPT | Performed by: PHYSICIAN ASSISTANT

## 2019-04-05 PROCEDURE — 96361 HYDRATE IV INFUSION ADD-ON: CPT

## 2019-04-05 PROCEDURE — 71046 X-RAY EXAM CHEST 2 VIEWS: CPT | Performed by: RADIOLOGY

## 2019-04-05 PROCEDURE — 96365 THER/PROPH/DIAG IV INF INIT: CPT

## 2019-04-05 PROCEDURE — 96372 THER/PROPH/DIAG INJ SC/IM: CPT

## 2019-04-05 PROCEDURE — 85025 COMPLETE CBC W/AUTO DIFF WBC: CPT | Performed by: EMERGENCY MEDICINE

## 2019-04-05 PROCEDURE — 87077 CULTURE AEROBIC IDENTIFY: CPT | Performed by: NURSE PRACTITIONER

## 2019-04-05 PROCEDURE — 87205 SMEAR GRAM STAIN: CPT | Performed by: NURSE PRACTITIONER

## 2019-04-05 PROCEDURE — 99285 EMERGENCY DEPT VISIT HI MDM: CPT

## 2019-04-05 PROCEDURE — 84484 ASSAY OF TROPONIN QUANT: CPT | Performed by: EMERGENCY MEDICINE

## 2019-04-05 PROCEDURE — 25010000002 CEFTRIAXONE: Performed by: PHYSICIAN ASSISTANT

## 2019-04-05 PROCEDURE — 96375 TX/PRO/DX INJ NEW DRUG ADDON: CPT

## 2019-04-05 PROCEDURE — 82962 GLUCOSE BLOOD TEST: CPT

## 2019-04-05 PROCEDURE — 87186 SC STD MICRODIL/AGAR DIL: CPT | Performed by: NURSE PRACTITIONER

## 2019-04-05 PROCEDURE — 94640 AIRWAY INHALATION TREATMENT: CPT

## 2019-04-05 PROCEDURE — 63710000001 INSULIN ASPART PER 5 UNITS: Performed by: INTERNAL MEDICINE

## 2019-04-05 PROCEDURE — 86140 C-REACTIVE PROTEIN: CPT | Performed by: NURSE PRACTITIONER

## 2019-04-05 PROCEDURE — 93005 ELECTROCARDIOGRAM TRACING: CPT | Performed by: EMERGENCY MEDICINE

## 2019-04-05 PROCEDURE — 87899 AGENT NOS ASSAY W/OPTIC: CPT | Performed by: PHYSICIAN ASSISTANT

## 2019-04-05 PROCEDURE — 71046 X-RAY EXAM CHEST 2 VIEWS: CPT

## 2019-04-05 PROCEDURE — 25010000002 METHYLPREDNISOLONE PER 125 MG: Performed by: NURSE PRACTITIONER

## 2019-04-05 PROCEDURE — 25010000002 METHYLPREDNISOLONE PER 40 MG: Performed by: PHYSICIAN ASSISTANT

## 2019-04-05 PROCEDURE — 93010 ELECTROCARDIOGRAM REPORT: CPT | Performed by: INTERNAL MEDICINE

## 2019-04-05 RX ORDER — LEVETIRACETAM 750 MG/1
375 TABLET ORAL 2 TIMES DAILY
COMMUNITY
End: 2022-02-28 | Stop reason: SDUPTHER

## 2019-04-05 RX ORDER — DOXYCYCLINE 100 MG/1
100 CAPSULE ORAL EVERY 12 HOURS SCHEDULED
Status: CANCELLED | OUTPATIENT
Start: 2019-04-05 | End: 2019-04-07

## 2019-04-05 RX ORDER — NITROGLYCERIN 0.4 MG/1
0.4 TABLET SUBLINGUAL
Status: DISCONTINUED | OUTPATIENT
Start: 2019-04-05 | End: 2019-04-07 | Stop reason: HOSPADM

## 2019-04-05 RX ORDER — ONDANSETRON 4 MG/1
4 TABLET, FILM COATED ORAL EVERY 6 HOURS PRN
Status: DISCONTINUED | OUTPATIENT
Start: 2019-04-05 | End: 2019-04-07 | Stop reason: HOSPADM

## 2019-04-05 RX ORDER — DEXTROSE MONOHYDRATE 25 G/50ML
25 INJECTION, SOLUTION INTRAVENOUS
Status: DISCONTINUED | OUTPATIENT
Start: 2019-04-05 | End: 2019-04-07 | Stop reason: HOSPADM

## 2019-04-05 RX ORDER — SODIUM CHLORIDE 9 MG/ML
100 INJECTION, SOLUTION INTRAVENOUS CONTINUOUS
Status: DISCONTINUED | OUTPATIENT
Start: 2019-04-05 | End: 2019-04-07

## 2019-04-05 RX ORDER — HYDROCHLOROTHIAZIDE 25 MG/1
25 TABLET ORAL DAILY
Status: DISCONTINUED | OUTPATIENT
Start: 2019-04-06 | End: 2019-04-07 | Stop reason: HOSPADM

## 2019-04-05 RX ORDER — IPRATROPIUM BROMIDE AND ALBUTEROL SULFATE 2.5; .5 MG/3ML; MG/3ML
3 SOLUTION RESPIRATORY (INHALATION)
Status: DISCONTINUED | OUTPATIENT
Start: 2019-04-05 | End: 2019-04-07 | Stop reason: HOSPADM

## 2019-04-05 RX ORDER — SODIUM CHLORIDE 0.9 % (FLUSH) 0.9 %
3 SYRINGE (ML) INJECTION EVERY 12 HOURS SCHEDULED
Status: DISCONTINUED | OUTPATIENT
Start: 2019-04-05 | End: 2019-04-07 | Stop reason: HOSPADM

## 2019-04-05 RX ORDER — ASPIRIN 325 MG
325 TABLET ORAL ONCE
Status: COMPLETED | OUTPATIENT
Start: 2019-04-05 | End: 2019-04-05

## 2019-04-05 RX ORDER — NICOTINE POLACRILEX 4 MG
15 LOZENGE BUCCAL
Status: DISCONTINUED | OUTPATIENT
Start: 2019-04-05 | End: 2019-04-07 | Stop reason: HOSPADM

## 2019-04-05 RX ORDER — BUSPIRONE HYDROCHLORIDE 15 MG/1
15 TABLET ORAL EVERY 6 HOURS
Status: ON HOLD | COMMUNITY
End: 2019-05-14

## 2019-04-05 RX ORDER — SODIUM CHLORIDE 0.9 % (FLUSH) 0.9 %
3-10 SYRINGE (ML) INJECTION AS NEEDED
Status: DISCONTINUED | OUTPATIENT
Start: 2019-04-05 | End: 2019-04-07 | Stop reason: HOSPADM

## 2019-04-05 RX ORDER — BUDESONIDE AND FORMOTEROL FUMARATE DIHYDRATE 80; 4.5 UG/1; UG/1
2 AEROSOL RESPIRATORY (INHALATION)
Status: DISCONTINUED | OUTPATIENT
Start: 2019-04-05 | End: 2019-04-07 | Stop reason: HOSPADM

## 2019-04-05 RX ORDER — ONDANSETRON 4 MG/1
4 TABLET, ORALLY DISINTEGRATING ORAL EVERY 6 HOURS PRN
Status: DISCONTINUED | OUTPATIENT
Start: 2019-04-05 | End: 2019-04-07 | Stop reason: HOSPADM

## 2019-04-05 RX ORDER — METHYLPREDNISOLONE SODIUM SUCCINATE 125 MG/2ML
125 INJECTION, POWDER, LYOPHILIZED, FOR SOLUTION INTRAMUSCULAR; INTRAVENOUS ONCE
Status: COMPLETED | OUTPATIENT
Start: 2019-04-05 | End: 2019-04-05

## 2019-04-05 RX ORDER — IPRATROPIUM BROMIDE AND ALBUTEROL SULFATE 2.5; .5 MG/3ML; MG/3ML
3 SOLUTION RESPIRATORY (INHALATION) EVERY 4 HOURS PRN
Status: DISCONTINUED | OUTPATIENT
Start: 2019-04-05 | End: 2019-04-07 | Stop reason: HOSPADM

## 2019-04-05 RX ORDER — IPRATROPIUM BROMIDE AND ALBUTEROL SULFATE 2.5; .5 MG/3ML; MG/3ML
3 SOLUTION RESPIRATORY (INHALATION) ONCE
Status: COMPLETED | OUTPATIENT
Start: 2019-04-05 | End: 2019-04-05

## 2019-04-05 RX ORDER — GLIPIZIDE 5 MG/1
5 TABLET ORAL
Status: DISCONTINUED | OUTPATIENT
Start: 2019-04-06 | End: 2019-04-07 | Stop reason: HOSPADM

## 2019-04-05 RX ORDER — ONDANSETRON 2 MG/ML
4 INJECTION INTRAMUSCULAR; INTRAVENOUS EVERY 6 HOURS PRN
Status: DISCONTINUED | OUTPATIENT
Start: 2019-04-05 | End: 2019-04-07 | Stop reason: HOSPADM

## 2019-04-05 RX ORDER — PANTOPRAZOLE SODIUM 40 MG/1
40 TABLET, DELAYED RELEASE ORAL EVERY MORNING
Status: DISCONTINUED | OUTPATIENT
Start: 2019-04-06 | End: 2019-04-07 | Stop reason: HOSPADM

## 2019-04-05 RX ORDER — IPRATROPIUM BROMIDE AND ALBUTEROL SULFATE 2.5; .5 MG/3ML; MG/3ML
3 SOLUTION RESPIRATORY (INHALATION)
Status: DISCONTINUED | OUTPATIENT
Start: 2019-04-05 | End: 2019-04-05

## 2019-04-05 RX ORDER — MONTELUKAST SODIUM 10 MG/1
10 TABLET ORAL NIGHTLY
Status: DISCONTINUED | OUTPATIENT
Start: 2019-04-05 | End: 2019-04-07 | Stop reason: HOSPADM

## 2019-04-05 RX ORDER — DOXYCYCLINE 100 MG/1
100 CAPSULE ORAL 2 TIMES DAILY
Status: ON HOLD | COMMUNITY
End: 2019-05-14

## 2019-04-05 RX ORDER — ALBUTEROL SULFATE 2.5 MG/3ML
2.5 SOLUTION RESPIRATORY (INHALATION) 4 TIMES DAILY PRN
COMMUNITY
End: 2022-03-16 | Stop reason: SDUPTHER

## 2019-04-05 RX ORDER — ONDANSETRON 2 MG/ML
4 INJECTION INTRAMUSCULAR; INTRAVENOUS ONCE
Status: DISCONTINUED | OUTPATIENT
Start: 2019-04-05 | End: 2019-04-05

## 2019-04-05 RX ORDER — ACETAMINOPHEN 325 MG/1
650 TABLET ORAL EVERY 4 HOURS PRN
Status: DISCONTINUED | OUTPATIENT
Start: 2019-04-05 | End: 2019-04-07 | Stop reason: HOSPADM

## 2019-04-05 RX ORDER — LEVETIRACETAM 250 MG/1
375 TABLET ORAL EVERY 12 HOURS SCHEDULED
Status: DISCONTINUED | OUTPATIENT
Start: 2019-04-05 | End: 2019-04-07 | Stop reason: HOSPADM

## 2019-04-05 RX ORDER — PREDNISONE 10 MG/1
10 TABLET ORAL DAILY
Status: CANCELLED | OUTPATIENT
Start: 2019-04-06

## 2019-04-05 RX ORDER — LISINOPRIL 10 MG/1
20 TABLET ORAL DAILY
Status: DISCONTINUED | OUTPATIENT
Start: 2019-04-06 | End: 2019-04-07 | Stop reason: HOSPADM

## 2019-04-05 RX ORDER — ALBUTEROL SULFATE 2.5 MG/3ML
2.5 SOLUTION RESPIRATORY (INHALATION) 4 TIMES DAILY PRN
Status: CANCELLED | OUTPATIENT
Start: 2019-04-05

## 2019-04-05 RX ORDER — METHYLPREDNISOLONE SODIUM SUCCINATE 40 MG/ML
30 INJECTION, POWDER, LYOPHILIZED, FOR SOLUTION INTRAMUSCULAR; INTRAVENOUS EVERY 12 HOURS
Status: DISCONTINUED | OUTPATIENT
Start: 2019-04-05 | End: 2019-04-07

## 2019-04-05 RX ORDER — PREDNISONE 10 MG/1
10 TABLET ORAL DAILY
Status: ON HOLD | COMMUNITY
End: 2019-05-14

## 2019-04-05 RX ADMIN — IPRATROPIUM BROMIDE AND ALBUTEROL SULFATE 3 ML: .5; 3 SOLUTION RESPIRATORY (INHALATION) at 19:12

## 2019-04-05 RX ADMIN — IPRATROPIUM BROMIDE AND ALBUTEROL SULFATE 3 ML: .5; 3 SOLUTION RESPIRATORY (INHALATION) at 12:09

## 2019-04-05 RX ADMIN — ASPIRIN 325 MG: 325 TABLET ORAL at 12:07

## 2019-04-05 RX ADMIN — BUDESONIDE AND FORMOTEROL FUMARATE DIHYDRATE 2 PUFF: 80; 4.5 AEROSOL RESPIRATORY (INHALATION) at 20:05

## 2019-04-05 RX ADMIN — SODIUM CHLORIDE 100 ML/HR: 9 INJECTION, SOLUTION INTRAVENOUS at 17:32

## 2019-04-05 RX ADMIN — CEFTRIAXONE 2 G: 2 INJECTION, POWDER, FOR SOLUTION INTRAMUSCULAR; INTRAVENOUS at 17:32

## 2019-04-05 RX ADMIN — MONTELUKAST SODIUM 10 MG: 10 TABLET, COATED ORAL at 20:21

## 2019-04-05 RX ADMIN — METFORMIN HYDROCHLORIDE 1000 MG: 500 TABLET ORAL at 20:21

## 2019-04-05 RX ADMIN — INSULIN ASPART 5 UNITS: 100 INJECTION, SOLUTION INTRAVENOUS; SUBCUTANEOUS at 20:22

## 2019-04-05 RX ADMIN — IPRATROPIUM BROMIDE AND ALBUTEROL SULFATE 3 ML: .5; 3 SOLUTION RESPIRATORY (INHALATION) at 23:58

## 2019-04-05 RX ADMIN — METHYLPREDNISOLONE SODIUM SUCCINATE 125 MG: 125 INJECTION, POWDER, FOR SOLUTION INTRAMUSCULAR; INTRAVENOUS at 12:06

## 2019-04-05 RX ADMIN — ENOXAPARIN SODIUM 40 MG: 40 INJECTION SUBCUTANEOUS at 17:44

## 2019-04-05 RX ADMIN — METHYLPREDNISOLONE SODIUM SUCCINATE 30 MG: 40 INJECTION, POWDER, FOR SOLUTION INTRAMUSCULAR; INTRAVENOUS at 20:20

## 2019-04-05 RX ADMIN — LEVETIRACETAM 375 MG: 250 TABLET, FILM COATED ORAL at 20:21

## 2019-04-05 RX ADMIN — BUSPIRONE HYDROCHLORIDE 15 MG: 10 TABLET ORAL at 20:20

## 2019-04-05 RX ADMIN — DOXYCYCLINE 100 MG: 100 INJECTION, POWDER, LYOPHILIZED, FOR SOLUTION INTRAVENOUS at 18:31

## 2019-04-05 NOTE — ED PROVIDER NOTES
Subjective     Shortness of Breath   Severity:  Moderate  Onset quality:  Sudden  Duration:  2 days  Timing:  Constant  Progression:  Waxing and waning  Chronicity:  New  Context: not activity, not animal exposure, not emotional upset, not fumes, not known allergens, not pollens, not smoke exposure, not strong odors and not weather changes    Relieved by:  Nothing  Worsened by:  Nothing  Ineffective treatments:  None tried  Associated symptoms: chest pain and wheezing    Associated symptoms: no abdominal pain, no claudication, no diaphoresis, no ear pain, no headaches, no hemoptysis and no PND    Risk factors: no recent alcohol use, no family hx of DVT, no hx of cancer, no hx of PE/DVT, no obesity and no tobacco use        Review of Systems   Constitutional: Negative.  Negative for diaphoresis.   HENT: Negative.  Negative for ear pain.    Eyes: Negative.    Respiratory: Positive for shortness of breath and wheezing. Negative for hemoptysis.    Cardiovascular: Positive for chest pain. Negative for claudication and PND.   Gastrointestinal: Negative.  Negative for abdominal pain.   Endocrine: Negative.    Genitourinary: Negative.    Musculoskeletal: Negative.    Skin: Negative.    Allergic/Immunologic: Negative.    Neurological: Negative.  Negative for headaches.   Hematological: Negative.    Psychiatric/Behavioral: Negative.        Past Medical History:   Diagnosis Date   • Arthritis    • COPD (chronic obstructive pulmonary disease) (CMS/HCC)    • Depression    • Diabetes mellitus (CMS/HCC)    • Diverticulitis    • GERD (gastroesophageal reflux disease)    • Hypertension        No Known Allergies    Past Surgical History:   Procedure Laterality Date   • COLON RESECTION  2016    had colostomy and reveresed back    • COLONOSCOPY  2016   • LUNG BIOPSY  2014    (non cancerous)       Family History   Problem Relation Age of Onset   • Cancer Father    • Diabetes Sister    • Diabetes Brother        Social History      Socioeconomic History   • Marital status:      Spouse name: Not on file   • Number of children: Not on file   • Years of education: Not on file   • Highest education level: Not on file   Tobacco Use   • Smoking status: Former Smoker     Packs/day: 1.00     Years: 30.00     Pack years: 30.00     Types: Cigarettes     Last attempt to quit:      Years since quittin.2   • Smokeless tobacco: Never Used   Substance and Sexual Activity   • Alcohol use: No   • Drug use: No   • Sexual activity: Defer     Birth control/protection: Other           Objective   Physical Exam   Constitutional: He is oriented to person, place, and time. He appears well-developed and well-nourished.   HENT:   Head: Normocephalic.   Right Ear: External ear normal.   Left Ear: External ear normal.   Mouth/Throat: Oropharynx is clear and moist.   Eyes: Conjunctivae and EOM are normal. Pupils are equal, round, and reactive to light.   Neck: Normal range of motion. Neck supple.   Cardiovascular: Normal rate, regular rhythm, normal heart sounds and intact distal pulses.   Pulmonary/Chest: Effort normal. He has decreased breath sounds. He has wheezes.   Abdominal: Soft. Bowel sounds are normal.   Musculoskeletal: Normal range of motion.   Neurological: He is alert and oriented to person, place, and time.   Skin: Skin is warm and dry. Capillary refill takes less than 2 seconds.   Psychiatric: He has a normal mood and affect. His behavior is normal. Thought content normal.   Nursing note and vitals reviewed.      Procedures           ED Course                  MDM      Final diagnoses:   COPD exacerbation (CMS/[facility])            Nikko Neal, APRN  [phone]

## 2019-04-05 NOTE — PLAN OF CARE
Problem: Fall Risk (Adult)  Goal: Identify Related Risk Factors and Signs and Symptoms  Outcome: Ongoing (interventions implemented as appropriate)   04/05/19 1625   Fall Risk (Adult)   Related Risk Factors (Fall Risk) age-related changes;environment unfamiliar   Signs and Symptoms (Fall Risk) presence of risk factors       Problem: Breathing Pattern Ineffective (Adult)  Goal: Identify Related Risk Factors and Signs and Symptoms  Outcome: Ongoing (interventions implemented as appropriate)      Problem: Chronic Obstructive Pulmonary Disease (Adult)  Goal: Signs and Symptoms of Listed Potential Problems Will be Absent, Minimized or Managed (Chronic Obstructive Pulmonary Disease)  Outcome: Ongoing (interventions implemented as appropriate)   04/05/19 1625   Goal/Outcome Evaluation   Problems Assessed (Chronic Obstructive Pulmonary Disease (COPD)) all   Problems Present (COPD, Bronch/Emphy) none

## 2019-04-05 NOTE — H&P
HISTORY AND PHYSICAL        Patient Identification:  Name:  Maximo Kwon  Age:  64 y.o.  Sex:  male  :  1955  MRN:  [id number]   Visit Number:  [id number]  Primary Care Physician:  Zachariah Shay APRN       Subjective     Subjective     Chief complaint:     Chief Complaint   Patient presents with   • Shortness of Breath   • Chest Pain       History of presenting illness:     The patient is a 64 year old male who has a history of COPD and uses 2L of oxygen at home at night. He reported to the ED today after having worsening shortness of breath over the last few days. The patient states over the last month his PCP has prescribed him a Z-nirmal, Doxycycline and has been given two shots of rocephin on different occasions to treat a COPD exacerbation. The patient states that he began to feel poorly around a month ago when he had Influenza A which was treated with Tamiflu but he reports he has not improved much since. He reports a persistent productive cough as well with nausea but no vomiting or diarrhea. Chest xray shows clearing of pneumonia in the lingular segment of the left upper lobe.  Leukocytosis and elevated CRP level. Sodium is low at 128. Admitted to the observation unit for further evaluation and monitoring.       ---------------------------------------------------------------------------------------------------------------------     Review Of Systems:    Constitutional: no fever, chills and night sweats. No appetite change or unexpected weight change. No fatigue.  Eyes: no eye drainage, itching or redness.  HEENT: no mouth sores, dysphagia or nose bleed.  Respiratory: Positive for shortness of breath and productive cough.   Cardiovascular: no chest pain, no palpitations, no orthopnea.  Gastrointestinal: no nausea, vomiting or diarrhea. No abdominal pain, hematemesis or rectal bleeding.  Genitourinary: no dysuria or polyuria.  Hematologic/lymphatic: no lymph node abnormalities, no easy  bruising or easy bleeding.  Musculoskeletal: no muscle or joint pain.  Skin: No rash and no itching.  Neurological: no loss of consciousness, no seizure, no headache.  Behavioral/Psych: no depression or suicidal ideation.  Endocrine: no hot flashes.  Immunologic: negative.    ---------------------------------------------------------------------------------------------------------------------     Past Medical History    Past Medical History:   Diagnosis Date   • COPD (chronic obstructive pulmonary disease) (CMS/HCC)    • Depression    • Diabetes mellitus (CMS/HCC)    • Diverticulitis    • GERD (gastroesophageal reflux disease)    • Hypertension        Past Surgical History    Past Surgical History:   Procedure Laterality Date   • COLON RESECTION      had colostomy and reveresed back    • COLONOSCOPY     • LUNG BIOPSY      (non cancerous)       Family History    Family History   Problem Relation Age of Onset   • Cancer Father    • Diabetes Sister    • Diabetes Brother        Social History    Social History     Tobacco Use   • Smoking status: Former Smoker     Packs/day: 1.00     Years: 30.00     Pack years: 30.00     Types: Cigarettes     Last attempt to quit: 2008     Years since quittin.2   • Smokeless tobacco: Never Used   Substance Use Topics   • Alcohol use: No   • Drug use: No       Allergies    Patient has no known allergies.  ---------------------------------------------------------------------------------------------------------------------     Home Medications:    Prior to Admission Medications     Prescriptions Last Dose Informant Patient Reported? Taking?    Fluticasone Furoate-Vilanterol (BREO ELLIPTA) 100-25 MCG/INH aerosol powder    No No    Inhale 1 puff Daily.    gabapentin (NEURONTIN) 300 MG capsule   Yes No    Take 300 mg by mouth 2 (Two) Times a Day.    gabapentin (NEURONTIN) 400 MG capsule  Pharmacy Yes No    Take 400 mg by mouth 2 (Two) Times a Day.    glyBURIDE (DIAbeta) 5 MG  tablet   Yes No    Take 5 mg by mouth Daily With Breakfast.    guaiFENesin (MUCINEX) 600 MG 12 hr tablet  Pharmacy Yes No    Take 1,200 mg by mouth Every 12 (Twelve) Hours.    hydrochlorothiazide (HYDRODIURIL) 25 MG tablet  Pharmacy Yes No    Take 25 mg by mouth Daily.    ipratropium (ATROVENT) 0.03 % nasal spray   No No    2 sprays into the nostril(s) as directed by provider Every 12 (Twelve) Hours.    ipratropium-albuterol (DUO-NEB) 0.5-2.5 mg/mL nebulizer  Pharmacy No No    Take 3 mL by nebulization Every 4 (Four) Hours As Needed for Wheezing.    lisinopril (PRINIVIL,ZESTRIL) 20 MG tablet   Yes No    Take 20 mg by mouth Daily.    metFORMIN (GLUCOPHAGE) 500 MG tablet  Pharmacy Yes No    Take 1,000 mg by mouth 2 (Two) Times a Day With Meals.    montelukast (SINGULAIR) 10 MG tablet   No No    Take 1 tablet by mouth Every Night.    Omega-3 Fatty Acids (FISH OIL) 1000 MG capsule capsule   Yes No    Take  by mouth Daily With Breakfast.    omeprazole (priLOSEC) 20 MG capsule  Pharmacy Yes No    Take 20 mg by mouth Daily.        ---------------------------------------------------------------------------------------------------------------------    Objective     Objective     Hospital Scheduled Meds:      No current facility-administered medications for this encounter.   ---------------------------------------------------------------------------------------------------------------------   Vital Signs:  Temp:  [97.6 °F (36.4 °C)] 97.6 °F (36.4 °C)  Heart Rate:  [] 95  Resp:  [20] 20  BP: (101-146)/(64-86) 130/76  Mean Arterial Pressure (Non-Invasive) for the past 24 hrs (Last 3 readings):   Noninvasive MAP (mmHg)   04/05/19 1532 93   04/05/19 1517 81   04/05/19 1502 72     SpO2 Percentage    04/05/19 1502 04/05/19 1517 04/05/19 1532   SpO2: 96% 95% 94%     SpO2:  [93 %-100 %] 94 %  on   ;   Device (Oxygen Therapy): room air    Body mass index is 28.07 kg/m².  Wt Readings from Last 3 Encounters:   04/05/19 93.9 kg  (207 lb)   04/04/19 94.8 kg (209 lb)   03/04/19 88.5 kg (195 lb)     ---------------------------------------------------------------------------------------------------------------------     Physical Exam:    Constitutional:  Well-developed and well-nourished.  No respiratory distress.      HENT:  Head: Normocephalic and atraumatic.  Mouth:  Moist mucous membranes.    Eyes:  Conjunctivae and EOM are normal.  No scleral icterus.  Neck:  Neck supple.  No JVD present.    Cardiovascular:  Normal rate, regular rhythm and normal heart sounds with no murmur. No edema.  Pulmonary/Chest:  Rhonchi. No respiratory distress, no wheezes, no crackles, with normal breath sounds and good air movement.  Abdominal:  Soft.  Bowel sounds are normal.  No distension and no tenderness.   Musculoskeletal:  No edema, no tenderness, and no deformity.  No swelling or redness of joints.  Neurological:  Alert and oriented to person, place, and time.  No facial droop.  No slurred speech.   Skin:  Skin is warm and dry.  No rash noted.  No pallor.   Psychiatric:  Normal mood and affect.  Behavior is normal.    ---------------------------------------------------------------------------------------------------------------------  I have personally reviewed the EKG/Telemetry strip  ---------------------------------------------------------------------------------------------------------------------   Results from last 7 days   Lab Units 04/05/19  1200 04/05/19  1058   TROPONIN T ng/mL <0.010 <0.010           Results from last 7 days   Lab Units 04/05/19  1058   CRP mg/dL 9.49*   WBC 10*3/mm3 15.46*   HEMOGLOBIN g/dL 14.4   HEMATOCRIT % 42.7   MCV fL 86.8   MCHC g/dL 33.7   PLATELETS 10*3/mm3 235     Results from last 7 days   Lab Units 04/05/19  1058   SODIUM mmol/L 128*   POTASSIUM mmol/L 4.3   CHLORIDE mmol/L 90*   CO2 mmol/L 26.1   BUN mg/dL 18   CREATININE mg/dL 1.03   EGFR IF NONAFRICN AM mL/min/1.73 73   CALCIUM mg/dL 10.5   GLUCOSE mg/dL 195*    ALBUMIN g/dL 4.49   BILIRUBIN mg/dL 0.8   ALK PHOS U/L 91   AST (SGOT) U/L 12   ALT (SGPT) U/L 19   Estimated Creatinine Clearance: 86.2 mL/min (by C-G formula based on SCr of 1.03 mg/dL).  No results found for: AMMONIA    No results found for: HGBA1C, POCGLU  Lab Results   Component Value Date    HGBA1C 6.70 (H) 05/06/2018     No results found for: TSH, FREET4                   Pain Management Panel     Pain Management Panel Latest Ref Rng & Units 12/25/2018    AMPHETAMINES SCREEN, URINE Negative Negative    BARBITURATES SCREEN Negative Negative    BENZODIAZEPINE SCREEN, URINE Negative Negative    BUPRENORPHINE Negative Negative    COCAINE SCREEN, URINE Negative Negative    METHADONE SCREEN, URINE Negative Negative        I have personally reviewed the above laboratory results.   ---------------------------------------------------------------------------------------------------------------------  Imaging Results (last 7 days)     Procedure Component Value Units Date/Time    XR Chest 2 View [[id number]] Collected:  04/05/19 1243     Updated:  04/05/19 1246    Narrative:       EXAMINATION:  XR CHEST 2 VW-      CLINICAL INDICATION:     Chest Pain Triage Protocol     TECHNIQUE:  XR CHEST 2 VW-       COMPARISON: NONE      FINDINGS:   Coarse interstitial markings suggestive of chronic interstitial lung  disease.   Heart size is stable.   No pneumothorax.   No pleural effusion.   Bony and soft tissue structures are unremarkable.          Impression:       Stable radiographic appearance of the chest.     This report was finalized on 4/5/2019 12:43 PM by Dr. Wilfredo Lawrence MD.           I have personally reviewed the above radiology results.   ---------------------------------------------------------------------------------------------------------------------      Assessment & Plan        Assessment/Plan       ASSESSMENT:    1. Pneumonia  2. COPD exacerbation  3. Hyponatremia    PLAN:    The patient was initiated on  Rocephin 2g IV q 24 hours and Doxycycline 100 mg IV q 12 hours. Legionella urinary antigen and Mycoplasma IgM ordered as well as respiratory culture. Repeat labs in am. Initiated on solu-medrol and duo-nebs prn and scheduled.      Place don telemetry and continuous pulse oximetry. Denies any chest pain non exam.     Electrolyte replacement protocol initiated.     DVT prophylaxis ordered.     100 mL/ hr NS with 800 mL fluid restriction for hyponatremia.     Will continue to monitor closely for acute changes.         Patient's findings and recommendations were discussed with patient, family and nursing staff      Code Status:   Code Status and Medical Interventions:   Ordered at: 04/05/19 1514     Code Status:    CPR     Medical Interventions (Level of Support Prior to Arrest):    Full           Barbie Herman PA-C  04/05/19  4:00 PM

## 2019-04-05 NOTE — PLAN OF CARE
Problem: Patient Care Overview  Goal: Plan of Care Review  Outcome: Ongoing (interventions implemented as appropriate)   04/05/19 1632   Coping/Psychosocial   Plan of Care Reviewed With patient       Problem: Fall Risk (Adult)  Goal: Identify Related Risk Factors and Signs and Symptoms  Outcome: Ongoing (interventions implemented as appropriate)   04/05/19 1625   Fall Risk (Adult)   Related Risk Factors (Fall Risk) age-related changes;environment unfamiliar   Signs and Symptoms (Fall Risk) presence of risk factors       Problem: Breathing Pattern Ineffective (Adult)  Goal: Identify Related Risk Factors and Signs and Symptoms  Outcome: Ongoing (interventions implemented as appropriate)      Problem: Chronic Obstructive Pulmonary Disease (Adult)  Goal: Signs and Symptoms of Listed Potential Problems Will be Absent, Minimized or Managed (Chronic Obstructive Pulmonary Disease)  Outcome: Ongoing (interventions implemented as appropriate)   04/05/19 1625   Goal/Outcome Evaluation   Problems Assessed (Chronic Obstructive Pulmonary Disease (COPD)) all   Problems Present (COPD, Bronch/Emphy) none

## 2019-04-05 NOTE — PROGRESS NOTES
Discharge Planning Assessment   Neil     Patient Name: Maximo Kwon  MRN: [id number]  Today's Date: 4/5/2019    Admit Date: 4/5/2019    Discharge Needs Assessment     Row Name 04/05/19 1519       Living Environment    Lives With  significant other    Name(s) of Who Lives With Patient  LIVES WITH CHRISTIANA SANTAMARIA    Current Living Arrangements  home/apartment/condo    Primary Care Provided by  self    Quality of Family Relationships  unable to assess    Able to Return to Prior Arrangements  yes       Transition Planning    Patient/Family Anticipates Transition to  home with family    Patient/Family Anticipated Services at Transition  none    Transportation Anticipated  family or friend will provide       Discharge Needs Assessment    Readmission Within the Last 30 Days  no previous admission in last 30 days    Concerns to be Addressed  no discharge needs identified;denies needs/concerns at this time    Equipment Currently Used at Home  oxygen;respiratory supplies    Anticipated Changes Related to Illness  none    Equipment Needed After Discharge  none        Discharge Plan     Row Name 04/05/19 3080       Plan    Plan  PT LIVES AT HOME WITH CHRISTIANA SANTAMARIA AND PLANS TO RETURN HOME AT DISCHARGE, SHE WILL PROVIDE TRANSPORATION HOME. PCP IS ANGELICA NORMAN, HE HAS [employer] INSURANCE AND USES [facility]. HE USES A NEBULIZER AND HOME O2 AT HS THAT HE GETS FROM [facility], HE DOES NOT USE HH OR OTHER DME'S. PT DENIES ANY ISSUES WITH MEDS OR COPAYS. NO OTHER NEEDS IDENTIFIED AT THIS TIME.        Destination      No service coordination in this encounter.      Durable Medical Equipment      No service coordination in this encounter.      Dialysis/Infusion      No service coordination in this encounter.      Home Medical Care      No service coordination in this encounter.      Therapy      No service coordination in this encounter.      Community Resources      No service coordination in this  encounter.          Demographic Summary     Row Name 04/05/19 1518       General Information    Admission Type  observation    Arrived From  home    Referral Source  emergency department    Reason for Consult  discharge planning        Functional Status     Row Name 04/05/19 1518       Functional Status    Usual Activity Tolerance  fair        Psychosocial    No documentation.       Abuse/Neglect    No documentation.       Legal    No documentation.       Substance Abuse    No documentation.       Patient Forms    No documentation.           Jaclyn Amezcua RN

## 2019-04-06 LAB
ALBUMIN SERPL-MCNC: 3.8 G/DL (ref 3.5–5.2)
ALBUMIN/GLOB SERPL: 1.2 G/DL
ALP SERPL-CCNC: 76 U/L (ref 39–117)
ALT SERPL W P-5'-P-CCNC: 13 U/L (ref 1–41)
ANION GAP SERPL CALCULATED.3IONS-SCNC: 12.6 MMOL/L
AST SERPL-CCNC: 8 U/L (ref 1–40)
BASOPHILS # BLD AUTO: 0.01 10*3/MM3 (ref 0–0.2)
BASOPHILS NFR BLD AUTO: 0.1 % (ref 0–1.5)
BILIRUB SERPL-MCNC: 0.2 MG/DL (ref 0.2–1.2)
BUN BLD-MCNC: 17 MG/DL (ref 8–23)
BUN/CREAT SERPL: 17.3 (ref 7–25)
CALCIUM SPEC-SCNC: 9.7 MG/DL (ref 8.6–10.5)
CHLORIDE SERPL-SCNC: 93 MMOL/L (ref 98–107)
CO2 SERPL-SCNC: 24.4 MMOL/L (ref 22–29)
CREAT BLD-MCNC: 0.98 MG/DL (ref 0.76–1.27)
CRP SERPL-MCNC: 17.11 MG/DL (ref 0–0.5)
DEPRECATED RDW RBC AUTO: 47.2 FL (ref 37–54)
EOSINOPHIL # BLD AUTO: 0 10*3/MM3 (ref 0–0.4)
EOSINOPHIL NFR BLD AUTO: 0 % (ref 0.3–6.2)
ERYTHROCYTE [DISTWIDTH] IN BLOOD BY AUTOMATED COUNT: 14.9 % (ref 12.3–15.4)
GFR SERPL CREATININE-BSD FRML MDRD: 77 ML/MIN/1.73
GLOBULIN UR ELPH-MCNC: 3.3 GM/DL
GLUCOSE BLD-MCNC: 240 MG/DL (ref 65–99)
GLUCOSE BLDC GLUCOMTR-MCNC: 241 MG/DL (ref 70–130)
GLUCOSE BLDC GLUCOMTR-MCNC: 274 MG/DL (ref 70–130)
GLUCOSE BLDC GLUCOMTR-MCNC: 279 MG/DL (ref 70–130)
HCT VFR BLD AUTO: 37.9 % (ref 37.5–51)
HGB BLD-MCNC: 12.7 G/DL (ref 13–17.7)
IMM GRANULOCYTES # BLD AUTO: 0.04 10*3/MM3 (ref 0–0.05)
IMM GRANULOCYTES NFR BLD AUTO: 0.4 % (ref 0–0.5)
LYMPHOCYTES # BLD AUTO: 0.46 10*3/MM3 (ref 0.7–3.1)
LYMPHOCYTES NFR BLD AUTO: 5 % (ref 19.6–45.3)
MCH RBC QN AUTO: 29.4 PG (ref 26.6–33)
MCHC RBC AUTO-ENTMCNC: 33.5 G/DL (ref 31.5–35.7)
MCV RBC AUTO: 87.7 FL (ref 79–97)
MONOCYTES # BLD AUTO: 0.47 10*3/MM3 (ref 0.1–0.9)
MONOCYTES NFR BLD AUTO: 5.1 % (ref 5–12)
NEUTROPHILS # BLD AUTO: 8.18 10*3/MM3 (ref 1.4–7)
NEUTROPHILS NFR BLD AUTO: 89.4 % (ref 42.7–76)
NT-PROBNP SERPL-MCNC: 58.7 PG/ML (ref 5–900)
PLATELET # BLD AUTO: 185 10*3/MM3 (ref 140–450)
PMV BLD AUTO: 11.7 FL (ref 6–12)
POTASSIUM BLD-SCNC: 4.4 MMOL/L (ref 3.5–5.2)
PROT SERPL-MCNC: 7.1 G/DL (ref 6–8.5)
RBC # BLD AUTO: 4.32 10*6/MM3 (ref 4.14–5.8)
SODIUM BLD-SCNC: 130 MMOL/L (ref 136–145)
WBC NRBC COR # BLD: 9.16 10*3/MM3 (ref 3.4–10.8)

## 2019-04-06 PROCEDURE — 25010000002 CEFTRIAXONE: Performed by: PHYSICIAN ASSISTANT

## 2019-04-06 PROCEDURE — 94799 UNLISTED PULMONARY SVC/PX: CPT

## 2019-04-06 PROCEDURE — 96366 THER/PROPH/DIAG IV INF ADDON: CPT

## 2019-04-06 PROCEDURE — 85025 COMPLETE CBC W/AUTO DIFF WBC: CPT | Performed by: PHYSICIAN ASSISTANT

## 2019-04-06 PROCEDURE — 83880 ASSAY OF NATRIURETIC PEPTIDE: CPT | Performed by: PHYSICIAN ASSISTANT

## 2019-04-06 PROCEDURE — 94640 AIRWAY INHALATION TREATMENT: CPT

## 2019-04-06 PROCEDURE — 96361 HYDRATE IV INFUSION ADD-ON: CPT

## 2019-04-06 PROCEDURE — 96372 THER/PROPH/DIAG INJ SC/IM: CPT

## 2019-04-06 PROCEDURE — 63710000001 INSULIN ASPART PER 5 UNITS: Performed by: INTERNAL MEDICINE

## 2019-04-06 PROCEDURE — 25010000002 METHYLPREDNISOLONE PER 40 MG: Performed by: PHYSICIAN ASSISTANT

## 2019-04-06 PROCEDURE — 82962 GLUCOSE BLOOD TEST: CPT

## 2019-04-06 PROCEDURE — 96376 TX/PRO/DX INJ SAME DRUG ADON: CPT

## 2019-04-06 PROCEDURE — 80053 COMPREHEN METABOLIC PANEL: CPT | Performed by: PHYSICIAN ASSISTANT

## 2019-04-06 PROCEDURE — G0378 HOSPITAL OBSERVATION PER HR: HCPCS

## 2019-04-06 PROCEDURE — 25010000002 ENOXAPARIN PER 10 MG: Performed by: PHYSICIAN ASSISTANT

## 2019-04-06 PROCEDURE — 86140 C-REACTIVE PROTEIN: CPT | Performed by: PHYSICIAN ASSISTANT

## 2019-04-06 RX ORDER — SODIUM CHLORIDE 0.9 % (FLUSH) 0.9 %
3-10 SYRINGE (ML) INJECTION AS NEEDED
Status: DISCONTINUED | OUTPATIENT
Start: 2019-04-06 | End: 2019-04-07 | Stop reason: HOSPADM

## 2019-04-06 RX ORDER — SODIUM CHLORIDE 0.9 % (FLUSH) 0.9 %
3 SYRINGE (ML) INJECTION EVERY 12 HOURS SCHEDULED
Status: DISCONTINUED | OUTPATIENT
Start: 2019-04-06 | End: 2019-04-07 | Stop reason: HOSPADM

## 2019-04-06 RX ADMIN — LEVETIRACETAM 375 MG: 250 TABLET, FILM COATED ORAL at 08:48

## 2019-04-06 RX ADMIN — LEVETIRACETAM 375 MG: 250 TABLET, FILM COATED ORAL at 21:01

## 2019-04-06 RX ADMIN — CEFTRIAXONE 2 G: 2 INJECTION, POWDER, FOR SOLUTION INTRAMUSCULAR; INTRAVENOUS at 17:03

## 2019-04-06 RX ADMIN — METHYLPREDNISOLONE SODIUM SUCCINATE 30 MG: 40 INJECTION, POWDER, FOR SOLUTION INTRAMUSCULAR; INTRAVENOUS at 21:02

## 2019-04-06 RX ADMIN — BUSPIRONE HYDROCHLORIDE 15 MG: 10 TABLET ORAL at 14:46

## 2019-04-06 RX ADMIN — SODIUM CHLORIDE, PRESERVATIVE FREE 3 ML: 5 INJECTION INTRAVENOUS at 21:03

## 2019-04-06 RX ADMIN — GLIPIZIDE 5 MG: 5 TABLET ORAL at 08:49

## 2019-04-06 RX ADMIN — SODIUM CHLORIDE 100 ML/HR: 9 INJECTION, SOLUTION INTRAVENOUS at 16:13

## 2019-04-06 RX ADMIN — IPRATROPIUM BROMIDE AND ALBUTEROL SULFATE 3 ML: .5; 3 SOLUTION RESPIRATORY (INHALATION) at 19:59

## 2019-04-06 RX ADMIN — MONTELUKAST SODIUM 10 MG: 10 TABLET, COATED ORAL at 21:02

## 2019-04-06 RX ADMIN — IPRATROPIUM BROMIDE AND ALBUTEROL SULFATE 3 ML: .5; 3 SOLUTION RESPIRATORY (INHALATION) at 14:56

## 2019-04-06 RX ADMIN — BUSPIRONE HYDROCHLORIDE 15 MG: 10 TABLET ORAL at 21:02

## 2019-04-06 RX ADMIN — DOXYCYCLINE 100 MG: 100 INJECTION, POWDER, LYOPHILIZED, FOR SOLUTION INTRAVENOUS at 18:04

## 2019-04-06 RX ADMIN — HYDROCHLOROTHIAZIDE 25 MG: 25 TABLET ORAL at 08:48

## 2019-04-06 RX ADMIN — INSULIN ASPART 3 UNITS: 100 INJECTION, SOLUTION INTRAVENOUS; SUBCUTANEOUS at 08:49

## 2019-04-06 RX ADMIN — IPRATROPIUM BROMIDE AND ALBUTEROL SULFATE 3 ML: .5; 3 SOLUTION RESPIRATORY (INHALATION) at 23:48

## 2019-04-06 RX ADMIN — METHYLPREDNISOLONE SODIUM SUCCINATE 30 MG: 40 INJECTION, POWDER, FOR SOLUTION INTRAMUSCULAR; INTRAVENOUS at 12:26

## 2019-04-06 RX ADMIN — DOXYCYCLINE 100 MG: 100 INJECTION, POWDER, LYOPHILIZED, FOR SOLUTION INTRAVENOUS at 05:26

## 2019-04-06 RX ADMIN — IPRATROPIUM BROMIDE AND ALBUTEROL SULFATE 3 ML: .5; 3 SOLUTION RESPIRATORY (INHALATION) at 06:36

## 2019-04-06 RX ADMIN — BUDESONIDE AND FORMOTEROL FUMARATE DIHYDRATE 2 PUFF: 80; 4.5 AEROSOL RESPIRATORY (INHALATION) at 06:36

## 2019-04-06 RX ADMIN — BUDESONIDE AND FORMOTEROL FUMARATE DIHYDRATE 2 PUFF: 80; 4.5 AEROSOL RESPIRATORY (INHALATION) at 19:59

## 2019-04-06 RX ADMIN — BUSPIRONE HYDROCHLORIDE 15 MG: 10 TABLET ORAL at 08:49

## 2019-04-06 RX ADMIN — SODIUM CHLORIDE 100 ML/HR: 9 INJECTION, SOLUTION INTRAVENOUS at 05:26

## 2019-04-06 RX ADMIN — IPRATROPIUM BROMIDE AND ALBUTEROL SULFATE 3 ML: .5; 3 SOLUTION RESPIRATORY (INHALATION) at 02:27

## 2019-04-06 RX ADMIN — METFORMIN HYDROCHLORIDE 1000 MG: 500 TABLET ORAL at 08:49

## 2019-04-06 RX ADMIN — INSULIN ASPART 4 UNITS: 100 INJECTION, SOLUTION INTRAVENOUS; SUBCUTANEOUS at 21:07

## 2019-04-06 RX ADMIN — METFORMIN HYDROCHLORIDE 1000 MG: 500 TABLET ORAL at 17:03

## 2019-04-06 RX ADMIN — IPRATROPIUM BROMIDE AND ALBUTEROL SULFATE 3 ML: .5; 3 SOLUTION RESPIRATORY (INHALATION) at 11:09

## 2019-04-06 RX ADMIN — INSULIN ASPART 4 UNITS: 100 INJECTION, SOLUTION INTRAVENOUS; SUBCUTANEOUS at 12:26

## 2019-04-06 RX ADMIN — PANTOPRAZOLE SODIUM 40 MG: 40 TABLET, DELAYED RELEASE ORAL at 05:26

## 2019-04-06 RX ADMIN — LISINOPRIL 20 MG: 10 TABLET ORAL at 08:48

## 2019-04-06 RX ADMIN — INSULIN ASPART 4 UNITS: 100 INJECTION, SOLUTION INTRAVENOUS; SUBCUTANEOUS at 17:02

## 2019-04-06 RX ADMIN — ENOXAPARIN SODIUM 40 MG: 40 INJECTION SUBCUTANEOUS at 17:03

## 2019-04-06 NOTE — PLAN OF CARE
Problem: Patient Care Overview  Goal: Plan of Care Review  Outcome: Ongoing (interventions implemented as appropriate)   04/05/19 2106 04/06/19 0716   Plan of Care Review   Progress no change --    Coping/Psychosocial   Plan of Care Reviewed With --  patient       Problem: Fall Risk (Adult)  Goal: Identify Related Risk Factors and Signs and Symptoms  Outcome: Ongoing (interventions implemented as appropriate)   04/05/19 1625   Fall Risk (Adult)   Related Risk Factors (Fall Risk) age-related changes;environment unfamiliar   Signs and Symptoms (Fall Risk) presence of risk factors       Problem: Breathing Pattern Ineffective (Adult)  Goal: Identify Related Risk Factors and Signs and Symptoms  Outcome: Ongoing (interventions implemented as appropriate)   04/05/19 2106   Breathing Pattern Ineffective (Adult)   Related Risk Factors (Breathing Pattern Ineffective) fatigue   Signs and Symptoms (Breathing Pattern Ineffective) restlessness       Problem: Chronic Obstructive Pulmonary Disease (Adult)  Goal: Signs and Symptoms of Listed Potential Problems Will be Absent, Minimized or Managed (Chronic Obstructive Pulmonary Disease)  Outcome: Ongoing (interventions implemented as appropriate)   04/05/19 2106   Goal/Outcome Evaluation   Problems Assessed (Chronic Obstructive Pulmonary Disease (COPD)) all   Problems Present (COPD, Bronch/Emphy) situational response

## 2019-04-06 NOTE — PLAN OF CARE
Problem: Patient Care Overview  Goal: Plan of Care Review  Outcome: Ongoing (interventions implemented as appropriate)   04/05/19 1940 04/05/19 2106   Plan of Care Review   Progress --  no change   Coping/Psychosocial   Plan of Care Reviewed With patient --      Goal: Individualization and Mutuality  Outcome: Ongoing (interventions implemented as appropriate)      Problem: Fall Risk (Adult)  Goal: Identify Related Risk Factors and Signs and Symptoms  Outcome: Ongoing (interventions implemented as appropriate)    Goal: Absence of Fall  Outcome: Ongoing (interventions implemented as appropriate)      Problem: Breathing Pattern Ineffective (Adult)  Goal: Identify Related Risk Factors and Signs and Symptoms  Outcome: Ongoing (interventions implemented as appropriate)    Goal: Effective Oxygenation/Ventilation  Outcome: Ongoing (interventions implemented as appropriate)      Problem: Chronic Obstructive Pulmonary Disease (Adult)  Goal: Signs and Symptoms of Listed Potential Problems Will be Absent, Minimized or Managed (Chronic Obstructive Pulmonary Disease)  Outcome: Ongoing (interventions implemented as appropriate)

## 2019-04-06 NOTE — PROGRESS NOTES
PROGRESS NOTE         Patient Identification:  Name:  Maximo Kwon  Age:  64 y.o.  Sex:  male  :  1955  MRN:  [id number]  Visit Number:  [id number]  Primary Care Provider:  Zachariah Shay APRN      ----------------------------------------------------------------------------------------------------------------------  Subjective       Chief Complaints:    Shortness of Breath and Chest Pain        Interval History:      The patient was awake and alert this morning with no complaints. He states he is still coughing but overall feeling significantly better. Vital signs stable overnight and he remains afebrile. Leukocytosis has resolved but CRP has significantly elevated. Negative Legionella urinary antigen and Mycoplasma IgM. Sodium improved to 130. Will continue on NS for now. BNP is normal. Respiratory culture in progress showing growth of GNB so far. Blood cultures with no growth so far.     Review of Systems:    Constitutional: no fever, chills and night sweats. No appetite change or unexpected weight change. No fatigue.  Eyes: no eye drainage, itching or redness.  HEENT: no mouth sores, dysphagia or nose bleed.  Respiratory: no for shortness of breath, cough or production of sputum.  Cardiovascular: no chest pain, no palpitations, no orthopnea.  Gastrointestinal: no nausea, vomiting or diarrhea. No abdominal pain, hematemesis or rectal bleeding.  Genitourinary: no dysuria or polyuria.  Hematologic/lymphatic: no lymph node abnormalities, no easy bruising or easy bleeding.  Musculoskeletal: no muscle or joint pain.  Skin: No rash and no itching.  Neurological: no loss of consciousness, no seizure, no headache.  Behavioral/Psych: no depression or suicidal ideation.  Endocrine: no hot flashes.  Immunologic: negative.  ----------------------------------------------------------------------------------------------------------------------      Objective       Current Hospital  Meds:    budesonide-formoterol 2 puff Inhalation BID - RT   busPIRone 15 mg Oral Q6H   ceftriaxone 2 g Intravenous Q24H   doxycycline 100 mg Intravenous Q12H   enoxaparin 40 mg Subcutaneous Q24H   glipiZIDE 5 mg Oral QAM AC   hydrochlorothiazide 25 mg Oral Daily   insulin aspart 0-7 Units Subcutaneous 4x Daily AC & at Bedtime   ipratropium-albuterol 3 mL Nebulization Q4H - RT   levETIRAcetam 375 mg Oral Q12H   lisinopril 20 mg Oral Daily   metFORMIN 1,000 mg Oral BID With Meals   methylPREDNISolone sodium succinate 30 mg Intravenous Q12H   montelukast 10 mg Oral Nightly   pantoprazole 40 mg Oral QAM   sodium chloride 3 mL Intravenous Q12H   sodium chloride 3 mL Intravenous Q12H       sodium chloride 100 mL/hr Last Rate: 100 mL/hr (04/06/19 1217)     ----------------------------------------------------------------------------------------------------------------------    Vital Signs:  Temp:  [97.5 °F (36.4 °C)-98 °F (36.7 °C)] 97.8 °F (36.6 °C)  Heart Rate:  [] 101  Resp:  [18-20] 18  BP: (101-145)/(61-82) 145/74  Mean Arterial Pressure (Non-Invasive) for the past 24 hrs (Last 3 readings):   Noninvasive MAP (mmHg)   04/06/19 1234 107   04/06/19 0741 89   04/06/19 0359 85     SpO2 Percentage    04/06/19 0741 04/06/19 1106 04/06/19 1234   SpO2: 90% 95% 94%     SpO2:  [90 %-98 %] 94 %  on  Flow (L/min):  [2] 2;   Device (Oxygen Therapy): room air    Body mass index is 28.07 kg/m².  Wt Readings from Last 3 Encounters:   04/05/19 93.9 kg (207 lb)   04/04/19 94.8 kg (209 lb)   03/04/19 88.5 kg (195 lb)        Intake/Output Summary (Last 24 hours) at 4/6/2019 1320  Last data filed at 4/6/2019 1234  Gross per 24 hour   Intake 1110 ml   Output 1700 ml   Net -590 ml     Diet Regular; Daily Fluid Restriction; Other; 800  ----------------------------------------------------------------------------------------------------------------------      Physical Exam:     Constitutional:  Well-developed and well-nourished.  No  respiratory distress.      HENT:  Head: Normocephalic and atraumatic.  Mouth:  Moist mucous membranes.    Eyes:  Conjunctivae and EOM are normal.  No scleral icterus.  Neck:  Neck supple.  No JVD present.    Cardiovascular:  Normal rate, regular rhythm and normal heart sounds with no murmur. No edema.  Pulmonary/Chest:  Rhonchi. No respiratory distress, no wheezes, no crackles, with normal breath sounds and good air movement.  Abdominal:  Soft.  Bowel sounds are normal.  No distension and no tenderness.   Musculoskeletal:  No edema, no tenderness, and no deformity.  No swelling or redness of joints.  Neurological:  Alert and oriented to person, place, and time.  No facial droop.  No slurred speech.   Skin:  Skin is warm and dry.  No rash noted.  No pallor.   Psychiatric:  Normal mood and affect.  Behavior is normal.    ----------------------------------------------------------------------------------------------------------------------  I have personally reviewed the EKG/Telemetry strips   ----------------------------------------------------------------------------------------------------------------------  Results from last 7 days   Lab Units 04/05/19  1200 04/05/19  1058   TROPONIN T ng/mL <0.010 <0.010     Results from last 7 days   Lab Units 04/06/19  0422   PROBNP pg/mL 58.7         Results from last 7 days   Lab Units 04/06/19  0422 04/05/19  1058   CRP mg/dL 17.11* 9.49*   WBC 10*3/mm3 9.16 15.46*   HEMOGLOBIN g/dL 12.7* 14.4   HEMATOCRIT % 37.9 42.7   MCV fL 87.7 86.8   MCHC g/dL 33.5 33.7   PLATELETS 10*3/mm3 185 235     Results from last 7 days   Lab Units 04/06/19  0422 04/05/19  1058   SODIUM mmol/L 130* 128*   POTASSIUM mmol/L 4.4 4.3   CHLORIDE mmol/L 93* 90*   CO2 mmol/L 24.4 26.1   BUN mg/dL 17 18   CREATININE mg/dL 0.98 1.03   EGFR IF NONAFRICN AM mL/min/1.73 77 73   CALCIUM mg/dL 9.7 10.5   GLUCOSE mg/dL 240* 195*   ALBUMIN g/dL 3.80 4.49   BILIRUBIN mg/dL 0.2 0.8   ALK PHOS U/L 76 91   AST (SGOT)  U/L 8 12   ALT (SGPT) U/L 13 19   Estimated Creatinine Clearance: 90.6 mL/min (by C-G formula based on SCr of 0.98 mg/dL).  No results found for: AMMONIA    Glucose   Date/Time Value Ref Range Status   04/06/2019 1101 274 (H) 70 - 130 mg/dL Final   04/06/2019 0700 241 (H) 70 - 130 mg/dL Final   04/05/2019 1944 325 (H) 70 - 130 mg/dL Final   04/05/2019 1614 281 (H) 70 - 130 mg/dL Final     Lab Results   Component Value Date    HGBA1C 6.70 (H) 05/06/2018     No results found for: TSH, FREET4    Blood Culture   Date Value Ref Range Status   04/05/2019 No growth at 24 hours  Preliminary   04/05/2019 No growth at 24 hours  Preliminary              Respiratory Culture   Date Value Ref Range Status   04/05/2019 Scant growth (1+) Gram Negative Bacilli (A)  Preliminary     Pain Management Panel     Pain Management Panel Latest Ref Rng & Units 12/25/2018    AMPHETAMINES SCREEN, URINE Negative Negative    BARBITURATES SCREEN Negative Negative    BENZODIAZEPINE SCREEN, URINE Negative Negative    BUPRENORPHINE Negative Negative    COCAINE SCREEN, URINE Negative Negative    METHADONE SCREEN, URINE Negative Negative          I have personally reviewed the above laboratory results.   ----------------------------------------------------------------------------------------------------------------------  Imaging Results (last 24 hours)     ** No results found for the last 24 hours. **        I have personally reviewed the above radiology results.   ----------------------------------------------------------------------------------------------------------------------    Assessment/Plan       Assessment/Plan     ASSESSMENT:         1. Pneumonia  2. COPD exacerbation  3. Hyponatremia     PLAN:     The patient was awake and alert this morning with no complaints. He states he is still coughing but overall feeling significantly better. Vital signs stable overnight and he remains afebrile. Leukocytosis has resolved but CRP has significantly  elevated. Negative Legionella urinary antigen and Mycoplasma IgM. Sodium improved to 130. Will continue on NS for now. BNP is normal. Respiratory culture in progress showing growth of GNB so far. Blood cultures with no growth so far.     As the patient is clinically showing improvement would recommend to continue Rocephin 2g IV q 24 hours and Doxycycline 100 mg IV q 12 hours. Repeat labs in am. Would continue solu-medrol and duo-nebs prn and scheduled.      Would continue telemetry and continuous pulse oximetry. Denies any chest pain non exam.      Electrolyte replacement protocol initiated.      DVT prophylaxis ordered.      100 mL/ hr NS with 1000 mL fluid restriction for hyponatremia.      Will continue to monitor closely for acute changes.           Code Status:   Code Status and Medical Interventions:   Ordered at: 04/05/19 1514     Code Status:    CPR     Medical Interventions (Level of Support Prior to Arrest):    Full       Barbie Herman PA-C  04/06/19  1:20 PM

## 2019-04-06 NOTE — PLAN OF CARE
Problem: Patient Care Overview  Goal: Plan of Care Review  Outcome: Ongoing (interventions implemented as appropriate)   04/05/19 2106 04/06/19 1943   Plan of Care Review   Progress no change --    Coping/Psychosocial   Plan of Care Reviewed With --  patient     Goal: Individualization and Mutuality  Outcome: Ongoing (interventions implemented as appropriate)      Problem: Fall Risk (Adult)  Goal: Identify Related Risk Factors and Signs and Symptoms  Outcome: Ongoing (interventions implemented as appropriate)    Goal: Absence of Fall  Outcome: Ongoing (interventions implemented as appropriate)      Problem: Breathing Pattern Ineffective (Adult)  Goal: Identify Related Risk Factors and Signs and Symptoms  Outcome: Ongoing (interventions implemented as appropriate)    Goal: Effective Oxygenation/Ventilation  Outcome: Ongoing (interventions implemented as appropriate)    Goal: Anxiety/Fear Reduction  Outcome: Ongoing (interventions implemented as appropriate)      Problem: Chronic Obstructive Pulmonary Disease (Adult)  Goal: Signs and Symptoms of Listed Potential Problems Will be Absent, Minimized or Managed (Chronic Obstructive Pulmonary Disease)  Outcome: Ongoing (interventions implemented as appropriate)

## 2019-04-07 VITALS
TEMPERATURE: 97.8 F | HEIGHT: 72 IN | OXYGEN SATURATION: 98 % | HEART RATE: 96 BPM | WEIGHT: 207 LBS | BODY MASS INDEX: 28.04 KG/M2 | RESPIRATION RATE: 18 BRPM | DIASTOLIC BLOOD PRESSURE: 88 MMHG | SYSTOLIC BLOOD PRESSURE: 159 MMHG

## 2019-04-07 PROBLEM — J44.1 COPD EXACERBATION: Status: RESOLVED | Noted: 2018-05-06 | Resolved: 2019-04-07

## 2019-04-07 LAB
ALBUMIN SERPL-MCNC: 3.42 G/DL (ref 3.5–5.2)
ALBUMIN/GLOB SERPL: 1.1 G/DL
ALP SERPL-CCNC: 68 U/L (ref 39–117)
ALT SERPL W P-5'-P-CCNC: 12 U/L (ref 1–41)
ANION GAP SERPL CALCULATED.3IONS-SCNC: 13.2 MMOL/L
AST SERPL-CCNC: 7 U/L (ref 1–40)
BACTERIA SPEC RESP CULT: ABNORMAL
BASOPHILS # BLD AUTO: 0 10*3/MM3 (ref 0–0.2)
BASOPHILS NFR BLD AUTO: 0 % (ref 0–1.5)
BILIRUB SERPL-MCNC: <0.2 MG/DL (ref 0.2–1.2)
BUN BLD-MCNC: 18 MG/DL (ref 8–23)
BUN/CREAT SERPL: 21.2 (ref 7–25)
CALCIUM SPEC-SCNC: 9.1 MG/DL (ref 8.6–10.5)
CHLORIDE SERPL-SCNC: 99 MMOL/L (ref 98–107)
CO2 SERPL-SCNC: 21.8 MMOL/L (ref 22–29)
CREAT BLD-MCNC: 0.85 MG/DL (ref 0.76–1.27)
CRP SERPL-MCNC: 6.25 MG/DL (ref 0–0.5)
DEPRECATED RDW RBC AUTO: 47.2 FL (ref 37–54)
EOSINOPHIL # BLD AUTO: 0 10*3/MM3 (ref 0–0.4)
EOSINOPHIL NFR BLD AUTO: 0 % (ref 0.3–6.2)
ERYTHROCYTE [DISTWIDTH] IN BLOOD BY AUTOMATED COUNT: 14.8 % (ref 12.3–15.4)
GFR SERPL CREATININE-BSD FRML MDRD: 91 ML/MIN/1.73
GLOBULIN UR ELPH-MCNC: 3 GM/DL
GLUCOSE BLD-MCNC: 239 MG/DL (ref 65–99)
GLUCOSE BLDC GLUCOMTR-MCNC: 196 MG/DL (ref 70–130)
GLUCOSE BLDC GLUCOMTR-MCNC: 218 MG/DL (ref 70–130)
GLUCOSE BLDC GLUCOMTR-MCNC: 270 MG/DL (ref 70–130)
GRAM STN SPEC: ABNORMAL
HCT VFR BLD AUTO: 35.6 % (ref 37.5–51)
HGB BLD-MCNC: 11.7 G/DL (ref 13–17.7)
IMM GRANULOCYTES # BLD AUTO: 0.04 10*3/MM3 (ref 0–0.05)
IMM GRANULOCYTES NFR BLD AUTO: 0.5 % (ref 0–0.5)
LYMPHOCYTES # BLD AUTO: 0.41 10*3/MM3 (ref 0.7–3.1)
LYMPHOCYTES NFR BLD AUTO: 4.6 % (ref 19.6–45.3)
MCH RBC QN AUTO: 29 PG (ref 26.6–33)
MCHC RBC AUTO-ENTMCNC: 32.9 G/DL (ref 31.5–35.7)
MCV RBC AUTO: 88.3 FL (ref 79–97)
MONOCYTES # BLD AUTO: 0.39 10*3/MM3 (ref 0.1–0.9)
MONOCYTES NFR BLD AUTO: 4.4 % (ref 5–12)
NEUTROPHILS # BLD AUTO: 8 10*3/MM3 (ref 1.4–7)
NEUTROPHILS NFR BLD AUTO: 90.5 % (ref 42.7–76)
PLATELET # BLD AUTO: 189 10*3/MM3 (ref 140–450)
PMV BLD AUTO: 11.5 FL (ref 6–12)
POTASSIUM BLD-SCNC: 4.3 MMOL/L (ref 3.5–5.2)
PROT SERPL-MCNC: 6.4 G/DL (ref 6–8.5)
RBC # BLD AUTO: 4.03 10*6/MM3 (ref 4.14–5.8)
SODIUM BLD-SCNC: 134 MMOL/L (ref 136–145)
WBC NRBC COR # BLD: 8.84 10*3/MM3 (ref 3.4–10.8)

## 2019-04-07 PROCEDURE — 80053 COMPREHEN METABOLIC PANEL: CPT | Performed by: PHYSICIAN ASSISTANT

## 2019-04-07 PROCEDURE — 96361 HYDRATE IV INFUSION ADD-ON: CPT

## 2019-04-07 PROCEDURE — 94799 UNLISTED PULMONARY SVC/PX: CPT

## 2019-04-07 PROCEDURE — 82962 GLUCOSE BLOOD TEST: CPT

## 2019-04-07 PROCEDURE — 25010000002 METHYLPREDNISOLONE PER 40 MG: Performed by: PHYSICIAN ASSISTANT

## 2019-04-07 PROCEDURE — 96366 THER/PROPH/DIAG IV INF ADDON: CPT

## 2019-04-07 PROCEDURE — G0378 HOSPITAL OBSERVATION PER HR: HCPCS

## 2019-04-07 PROCEDURE — 86140 C-REACTIVE PROTEIN: CPT | Performed by: PHYSICIAN ASSISTANT

## 2019-04-07 PROCEDURE — 96376 TX/PRO/DX INJ SAME DRUG ADON: CPT

## 2019-04-07 PROCEDURE — 63710000001 INSULIN ASPART PER 5 UNITS: Performed by: INTERNAL MEDICINE

## 2019-04-07 PROCEDURE — 85025 COMPLETE CBC W/AUTO DIFF WBC: CPT | Performed by: PHYSICIAN ASSISTANT

## 2019-04-07 RX ORDER — LEVOFLOXACIN 750 MG/1
750 TABLET ORAL EVERY 24 HOURS
Qty: 7 TABLET | Refills: 0 | Status: SHIPPED | OUTPATIENT
Start: 2019-04-08 | End: 2019-04-15

## 2019-04-07 RX ORDER — LEVOFLOXACIN 750 MG/1
750 TABLET ORAL EVERY 24 HOURS
Status: DISCONTINUED | OUTPATIENT
Start: 2019-04-07 | End: 2019-04-07 | Stop reason: HOSPADM

## 2019-04-07 RX ADMIN — GLIPIZIDE 5 MG: 5 TABLET ORAL at 08:04

## 2019-04-07 RX ADMIN — LEVETIRACETAM 375 MG: 250 TABLET, FILM COATED ORAL at 08:02

## 2019-04-07 RX ADMIN — HYDROCHLOROTHIAZIDE 25 MG: 25 TABLET ORAL at 08:02

## 2019-04-07 RX ADMIN — LISINOPRIL 20 MG: 10 TABLET ORAL at 08:01

## 2019-04-07 RX ADMIN — METFORMIN HYDROCHLORIDE 1000 MG: 500 TABLET ORAL at 08:02

## 2019-04-07 RX ADMIN — IPRATROPIUM BROMIDE AND ALBUTEROL SULFATE 3 ML: .5; 3 SOLUTION RESPIRATORY (INHALATION) at 02:11

## 2019-04-07 RX ADMIN — IPRATROPIUM BROMIDE AND ALBUTEROL SULFATE 3 ML: .5; 3 SOLUTION RESPIRATORY (INHALATION) at 11:10

## 2019-04-07 RX ADMIN — BUSPIRONE HYDROCHLORIDE 15 MG: 10 TABLET ORAL at 08:02

## 2019-04-07 RX ADMIN — BUSPIRONE HYDROCHLORIDE 15 MG: 10 TABLET ORAL at 14:17

## 2019-04-07 RX ADMIN — PANTOPRAZOLE SODIUM 40 MG: 40 TABLET, DELAYED RELEASE ORAL at 05:43

## 2019-04-07 RX ADMIN — LEVOFLOXACIN 750 MG: 750 TABLET, FILM COATED ORAL at 14:21

## 2019-04-07 RX ADMIN — SODIUM CHLORIDE, PRESERVATIVE FREE 3 ML: 5 INJECTION INTRAVENOUS at 08:03

## 2019-04-07 RX ADMIN — DOXYCYCLINE 100 MG: 100 INJECTION, POWDER, LYOPHILIZED, FOR SOLUTION INTRAVENOUS at 06:24

## 2019-04-07 RX ADMIN — INSULIN ASPART 3 UNITS: 100 INJECTION, SOLUTION INTRAVENOUS; SUBCUTANEOUS at 08:03

## 2019-04-07 RX ADMIN — SODIUM CHLORIDE, PRESERVATIVE FREE 3 ML: 5 INJECTION INTRAVENOUS at 08:02

## 2019-04-07 RX ADMIN — METHYLPREDNISOLONE SODIUM SUCCINATE 30 MG: 40 INJECTION, POWDER, FOR SOLUTION INTRAMUSCULAR; INTRAVENOUS at 11:49

## 2019-04-07 RX ADMIN — INSULIN ASPART 2 UNITS: 100 INJECTION, SOLUTION INTRAVENOUS; SUBCUTANEOUS at 11:49

## 2019-04-07 NOTE — NURSING NOTE
DISCHARGE INSTRUCTIONS GIVEN TO PATIENT, PATIENT V/U, WAITING FOR FAMILY TO ARRIVE STATES WILL BE 1-2 HOURS

## 2019-04-07 NOTE — PLAN OF CARE
Problem: Patient Care Overview  Goal: Plan of Care Review  Outcome: Ongoing (interventions implemented as appropriate)    Goal: Individualization and Mutuality  Outcome: Ongoing (interventions implemented as appropriate)    Goal: Discharge Needs Assessment  Outcome: Ongoing (interventions implemented as appropriate)    Goal: Interprofessional Rounds/Family Conf  Outcome: Ongoing (interventions implemented as appropriate)      Problem: Fall Risk (Adult)  Goal: Identify Related Risk Factors and Signs and Symptoms  Outcome: Ongoing (interventions implemented as appropriate)    Goal: Absence of Fall  Outcome: Ongoing (interventions implemented as appropriate)      Problem: Breathing Pattern Ineffective (Adult)  Goal: Identify Related Risk Factors and Signs and Symptoms  Outcome: Ongoing (interventions implemented as appropriate)    Goal: Effective Oxygenation/Ventilation  Outcome: Ongoing (interventions implemented as appropriate)    Goal: Anxiety/Fear Reduction  Outcome: Ongoing (interventions implemented as appropriate)      Problem: Chronic Obstructive Pulmonary Disease (Adult)  Goal: Signs and Symptoms of Listed Potential Problems Will be Absent, Minimized or Managed (Chronic Obstructive Pulmonary Disease)  Outcome: Ongoing (interventions implemented as appropriate)

## 2019-04-07 NOTE — DISCHARGE SUMMARY
DISCHARGE SUMMARY        Patient Identification:  Name:  Maximo Kwon  Age:  64 y.o.  Sex:  male  :  1955  MRN:  [id number]  Visit Number:  [id number]    Date of Admission: 2019  Date of Discharge:  2019    PCP: Zachariah Shay APRN    Discharging Provider: Barbie Herman PA-C      Discharge Diagnoses     Pneumonia, COPD exacerbation      Consults/Procedures     Consults:   Consults     Date and Time Order Name Status Description    2019 1508 IP General Consult (Use specialty-specific consult if known)            Procedures Performed:         History of Presenting Illness       The patient is a 64 year old male who has a history of COPD and uses 2L of oxygen at home at night. He reported to the ED today after having worsening shortness of breath over the last few days. The patient states over the last month his PCP has prescribed him a Z-nirmal, Doxycycline and has been given two shots of rocephin on different occasions to treat a COPD exacerbation. The patient states that he began to feel poorly around a month ago when he had Influenza A which was treated with Tamiflu but he reports he has not improved much since. He reports a persistent productive cough as well with nausea but no vomiting or diarrhea. Chest xray shows clearing of pneumonia in the lingular segment of the left upper lobe.  Leukocytosis and elevated CRP level. Sodium is low at 128.     Hospital Course     Patient was admitted to the Observation unit for further evaluation and monitoring.     The patient was initiated on Rocephin 2g IV q 24 hours and Doxycycline 100 mg IV q 12 hours. Legionella urinary antigen and Mycoplasma IgM were both negative. Initiated on solu-medrol and duo-nebs prn and scheduled.      Placed on telemetry and continuous pulse oximetry. Denies any chest pain non exam.      Electrolyte replacement protocol initiated.      DVT prophylaxis ordered.      100 mL/ hr NS with 800 mL fluid  restriction for hyponatremia. Hyponatremia has shown significant improvement.     The patient showed improvement on Rocephin and Doxycycline with significant improvement of CRP level from 17.11 down to 6.25 with a normal white count. Respiratory culture finalized as pansusceptible Pseudomonas. Antibiotic therapy was changed to Levaquin 750 mg PO q 24 hours x 7 days as recommended by pharmacy for Qtc.     The patient has shown significant improvement and is stable for discharge. Would recommend to follow up with PCP this week with repeat EKG to monitor Qtc while on Levaquin, CBC, CRP, and CMP on 4/10/19 with results to be sent to the patients PCP.      Discharge Vitals/Physical Examination     Vital Signs:  Temp:  [96 °F (35.6 °C)-97.8 °F (36.6 °C)] 97.8 °F (36.6 °C)  Heart Rate:  [] 104  Resp:  [18-20] 18  BP: (117-159)/(62-88) 159/88  Mean Arterial Pressure (Non-Invasive) for the past 24 hrs (Last 3 readings):   Noninvasive MAP (mmHg)   04/07/19 1204 105   04/07/19 0715 90   04/07/19 0436 93     SpO2 Percentage    04/07/19 0650 04/07/19 0715 04/07/19 1204   SpO2: 96% 97% 95%     SpO2:  [95 %-98 %] 95 %  on  Flow (L/min):  [2] 2;   Device (Oxygen Therapy): room air    Body mass index is 28.07 kg/m².  Wt Readings from Last 3 Encounters:   04/05/19 93.9 kg (207 lb)   04/04/19 94.8 kg (209 lb)   03/04/19 88.5 kg (195 lb)         Physical Exam:    Constitutional:  Well-developed and well-nourished.  No respiratory distress.      HENT:  Head: Normocephalic and atraumatic.  Mouth:  Moist mucous membranes.    Eyes:  Conjunctivae and EOM are normal.  No scleral icterus.  Neck:  Neck supple.  No JVD present.    Cardiovascular:  Normal rate, regular rhythm and normal heart sounds with no murmur. No edema.  Pulmonary/Chest:  No respiratory distress, no wheezes, no crackles, with normal breath sounds and good air movement.  Abdominal:  Soft.  Bowel sounds are normal.  No distension and no tenderness.   Musculoskeletal:   No edema, no tenderness, and no deformity.  No swelling or redness of joints.  Neurological:  Alert and oriented to person, place, and time.  No facial droop.  No slurred speech.   Skin:  Skin is warm and dry.  No rash noted.  No pallor.   Psychiatric:  Normal mood and affect.  Behavior is normal.      Pertinent Laboratory/Radiology Results     Pertinent Laboratory Results:    Results from last 7 days   Lab Units 04/05/19  1200 04/05/19  1058   TROPONIN T ng/mL <0.010 <0.010     Results from last 7 days   Lab Units 04/06/19  0422   PROBNP pg/mL 58.7         Results from last 7 days   Lab Units 04/07/19  0316 04/06/19  0422 04/05/19  1058   CRP mg/dL 6.25* 17.11* 9.49*   WBC 10*3/mm3 8.84 9.16 15.46*   HEMOGLOBIN g/dL 11.7* 12.7* 14.4   HEMATOCRIT % 35.6* 37.9 42.7   MCV fL 88.3 87.7 86.8   MCHC g/dL 32.9 33.5 33.7   PLATELETS 10*3/mm3 189 185 235     Results from last 7 days   Lab Units 04/07/19  0316 04/06/19  0422 04/05/19  1058   SODIUM mmol/L 134* 130* 128*   POTASSIUM mmol/L 4.3 4.4 4.3   CHLORIDE mmol/L 99 93* 90*   CO2 mmol/L 21.8* 24.4 26.1   BUN mg/dL 18 17 18   CREATININE mg/dL 0.85 0.98 1.03   EGFR IF NONAFRICN AM mL/min/1.73 91 77 73   CALCIUM mg/dL 9.1 9.7 10.5   GLUCOSE mg/dL 239* 240* 195*   ALBUMIN g/dL 3.42* 3.80 4.49   BILIRUBIN mg/dL <0.2* 0.2 0.8   ALK PHOS U/L 68 76 91   AST (SGOT) U/L 7 8 12   ALT (SGPT) U/L 12 13 19   Estimated Creatinine Clearance: 104.4 mL/min (by C-G formula based on SCr of 0.85 mg/dL).  No results found for: AMMONIA    Glucose   Date/Time Value Ref Range Status   04/07/2019 1127 196 (H) 70 - 130 mg/dL Final   04/07/2019 0717 218 (H) 70 - 130 mg/dL Final   04/06/2019 2038 270 (H) 70 - 130 mg/dL Final   04/06/2019 1652 279 (H) 70 - 130 mg/dL Final   04/06/2019 1101 274 (H) 70 - 130 mg/dL Final   04/06/2019 0700 241 (H) 70 - 130 mg/dL Final   04/05/2019 1944 325 (H) 70 - 130 mg/dL Final   04/05/2019 1614 281 (H) 70 - 130 mg/dL Final     Lab Results   Component Value Date     HGBA1C 6.70 (H) 05/06/2018     No results found for: TSH, FREET4    Blood Culture   Date Value Ref Range Status   04/05/2019 No growth at 2 days  Preliminary   04/05/2019 No growth at 2 days  Preliminary              Respiratory Culture   Date Value Ref Range Status   04/05/2019 Scant growth (1+) Pseudomonas aeruginosa (A)  Final     Pain Management Panel     Pain Management Panel Latest Ref Rng & Units 12/25/2018    AMPHETAMINES SCREEN, URINE Negative Negative    BARBITURATES SCREEN Negative Negative    BENZODIAZEPINE SCREEN, URINE Negative Negative    BUPRENORPHINE Negative Negative    COCAINE SCREEN, URINE Negative Negative    METHADONE SCREEN, URINE Negative Negative          Pertinent Radiology Results:  Imaging Results (all)     Procedure Component Value Units Date/Time    XR Chest 2 View [[id number]] Collected:  04/05/19 1243     Updated:  04/05/19 1246    Narrative:       EXAMINATION:  XR CHEST 2 VW-      CLINICAL INDICATION:     Chest Pain Triage Protocol     TECHNIQUE:  XR CHEST 2 VW-       COMPARISON: NONE      FINDINGS:   Coarse interstitial markings suggestive of chronic interstitial lung  disease.   Heart size is stable.   No pneumothorax.   No pleural effusion.   Bony and soft tissue structures are unremarkable.          Impression:       Stable radiographic appearance of the chest.     This report was finalized on 4/5/2019 12:43 PM by Dr. Wilfredo Lawrence MD.             Test Results Pending at Discharge:   Order Current Status    Blood Culture - Blood, Arm, Right Preliminary result    Blood Culture - Blood, Hand, Left Preliminary result          Discharge Disposition/Discharge Medications/Discharge Appointments     Discharge Disposition:   Home or Self Care    Condition at Discharge:  Stable, much improved with no issues today     Code Status While Inpatient:  Code Status and Medical Interventions:   Ordered at: 04/05/19 1514     Code Status:    CPR     Medical Interventions (Level of Support Prior to  Arrest):    Full       Discharge Medications:     Discharge Medications      Continue These Medications      Instructions Start Date   albuterol (2.5 MG/3ML) 0.083% nebulizer solution  Commonly known as:  PROVENTIL   2.5 mg, Nebulization, 4 Times Daily PRN      brompheniramine-pseudoephedrine 1-15 MG/5ML elixir  Commonly known as:  DIMETAPP   5 mL, Oral, Every 4 Hours PRN      busPIRone 15 MG tablet  Commonly known as:  BUSPAR   15 mg, Oral, Every 6 Hours      doxycycline 100 MG capsule  Commonly known as:  MONODOX   100 mg, Oral, 2 Times Daily, Prior to [facility] Admission, Patient was on: has 3 doses left      fish oil 1000 MG capsule capsule   1,000 mg, Oral, Daily With Breakfast      Fluticasone Furoate-Vilanterol 100-25 MCG/INH inhaler  Commonly known as:  BREO ELLIPTA   1 puff, Inhalation, Daily      glyBURIDE 5 MG tablet  Commonly known as:  DIAbeta   5 mg, Oral, Daily With Breakfast      hydrochlorothiazide 25 MG tablet  Commonly known as:  HYDRODIURIL   25 mg, Oral, Daily      levETIRAcetam 750 MG tablet  Commonly known as:  KEPPRA   375 mg, Oral, 2 Times Daily, Prior to [facility] Admission, Patient was on: 1/2 to 1 tablet twice a day      lisinopril 20 MG tablet  Commonly known as:  PRINIVIL,ZESTRIL   20 mg, Oral, Daily      metFORMIN 500 MG tablet  Commonly known as:  GLUCOPHAGE   1,000 mg, Oral, 2 Times Daily With Meals      montelukast 10 MG tablet  Commonly known as:  SINGULAIR   10 mg, Oral, Nightly      omeprazole 20 MG capsule  Commonly known as:  priLOSEC   20 mg, Oral, Daily      predniSONE 10 MG tablet  Commonly known as:  DELTASONE   10 mg, Oral, Daily, Prior to [facility] Admission, Patient was on: has 4 doses left      tiotropium bromide monohydrate 2.5 MCG/ACT aerosol solution inhaler  Commonly known as:  SPIRIVA RESPIMAT   2 puffs, Inhalation, Daily - RT             Discharge Diet:  Diet Instructions     Diet: Regular      Discharge Diet:  Regular          Discharge Activity:  Activity Instructions      Activity as Tolerated            Discharge Appointments:  Your Scheduled Appointments    Apr 08, 2019  9:00 AM EDT  Therapy Treatment with Maldonado Fernandes, PT  [facility] OUTPATIENT PHYSICAL THERAPY ([place]) [address]  [place] [address]  [phone]   Apr 10, 2019  9:00 AM EDT  REEVALUATION with Ashley Claudene Dalton, PT  [facility] OUTPATIENT PHYSICAL THERAPY ([place]) [address]  [place] [address]  [phone]   Oct 08, 2019  1:15 PM EDT  Follow Up with Eladio Bethea MD  [facility] PULMONOLOGY CRITICAL CARE (--) [address]  [place] [address]  [phone]   Arrive 15 minutes prior to appointment.          Additional Instructions for the Follow-ups that You Need to Schedule     C-reactive Protein    Apr 10, 2019 (Approximate)      CBC & Differential    Apr 10, 2019 (Approximate)      Manual Differential:  No         Comprehensive Metabolic Panel    Apr 10, 2019 (Approximate)        Follow-up Information     Zachariah Shay, APRN. Schedule an appointment as soon as possible for a visit in 5 day(s).    Specialty:  Nurse Practitioner  Contact information:  [address] [place] [place] [address]  [phone]                   Additional Instructions for the Follow-ups that You Need to Schedule     C-reactive Protein    Apr 10, 2019 (Approximate)      CBC & Differential    Apr 10, 2019 (Approximate)      Manual Differential:  No         Comprehensive Metabolic Panel    Apr 10, 2019 (Approximate)                Barbie Herman PA-C  04/07/19  1:48 PM          Please note that this discharge summary required more than 30 minutes to complete.

## 2019-04-08 PROBLEM — J41.0 SIMPLE CHRONIC BRONCHITIS: Status: ACTIVE | Noted: 2019-04-08

## 2019-04-08 PROBLEM — R91.1 PULMONARY NODULE: Status: ACTIVE | Noted: 2019-04-08

## 2019-04-09 ENCOUNTER — RESULTS ENCOUNTER (OUTPATIENT)
Dept: PULMONOLOGY | Facility: CLINIC | Age: 64
End: 2019-04-09

## 2019-04-09 DIAGNOSIS — R05.9 COUGH: ICD-10-CM

## 2019-04-09 DIAGNOSIS — R06.02 SHORTNESS OF BREATH: ICD-10-CM

## 2019-04-10 ENCOUNTER — APPOINTMENT (OUTPATIENT)
Dept: PHYSICAL THERAPY | Facility: HOSPITAL | Age: 64
End: 2019-04-10

## 2019-04-10 LAB
BACTERIA SPEC AEROBE CULT: NORMAL
BACTERIA SPEC AEROBE CULT: NORMAL

## 2019-04-11 ENCOUNTER — HOSPITAL ENCOUNTER (OUTPATIENT)
Dept: CARDIOLOGY | Facility: HOSPITAL | Age: 64
Discharge: HOME | End: 2019-04-11
Admitting: PHYSICIAN ASSISTANT

## 2019-04-11 DIAGNOSIS — J44.1 COPD EXACERBATION (HCC): ICD-10-CM

## 2019-04-11 PROCEDURE — 93005 ELECTROCARDIOGRAM TRACING: CPT | Performed by: PHYSICIAN ASSISTANT

## 2019-04-11 PROCEDURE — 93010 ELECTROCARDIOGRAM REPORT: CPT | Performed by: INTERNAL MEDICINE

## 2019-04-18 ENCOUNTER — DOCUMENTATION (OUTPATIENT)
Dept: PHYSICAL THERAPY | Facility: HOSPITAL | Age: 64
End: 2019-04-18

## 2019-04-18 DIAGNOSIS — M54.9 BACK PAIN, UNSPECIFIED BACK LOCATION, UNSPECIFIED BACK PAIN LATERALITY, UNSPECIFIED CHRONICITY: ICD-10-CM

## 2019-04-18 DIAGNOSIS — M54.2 NECK PAIN: Primary | ICD-10-CM

## 2019-04-18 NOTE — THERAPY DISCHARGE NOTE
Outpatient Physical Therapy Discharge Summary         Patient Name: Maximo Kwon  : 1955  MRN: [id number]    Today's Date: 2019    Visit Dx:    ICD-10-CM ICD-9-CM   1. Neck pain M54.2 723.1   2. Back pain, unspecified back location, unspecified back pain laterality, unspecified chronicity M54.9 724.5           OP PT Discharge Summary  Date of Discharge: 19  Reason for Discharge: Patient/Caregiver request  Outcomes Achieved: Unable to make functional progress toward goals at this time  Discharge Destination: Home with home program  Discharge Instructions/Additional Comments: Pt requested to be discharged from therapy.      Time Calculation:                    Maldonado Fernandes, PT  2019

## 2019-04-26 ENCOUNTER — HOSPITAL ENCOUNTER (OUTPATIENT)
Dept: CT IMAGING | Facility: HOSPITAL | Age: 64
Discharge: HOME | End: 2019-04-26

## 2019-04-26 ENCOUNTER — HOSPITAL ENCOUNTER (OUTPATIENT)
Dept: CARDIOLOGY | Facility: HOSPITAL | Age: 64
Discharge: HOME | End: 2019-04-26
Admitting: NURSE PRACTITIONER

## 2019-04-26 DIAGNOSIS — R91.1 PULMONARY NODULE: ICD-10-CM

## 2019-04-26 DIAGNOSIS — R06.02 SHORTNESS OF BREATH: ICD-10-CM

## 2019-04-26 PROCEDURE — 71250 CT THORAX DX C-: CPT

## 2019-04-26 PROCEDURE — 71250 CT THORAX DX C-: CPT | Performed by: RADIOLOGY

## 2019-05-08 ENCOUNTER — HOSPITAL ENCOUNTER (OUTPATIENT)
Dept: CARDIOLOGY | Facility: HOSPITAL | Age: 64
Discharge: HOME | End: 2019-05-08
Admitting: NURSE PRACTITIONER

## 2019-05-08 ENCOUNTER — DOCUMENTATION (OUTPATIENT)
Dept: PULMONOLOGY | Facility: CLINIC | Age: 64
End: 2019-05-08

## 2019-05-08 PROCEDURE — 93306 TTE W/DOPPLER COMPLETE: CPT | Performed by: INTERNAL MEDICINE

## 2019-05-08 PROCEDURE — 93306 TTE W/DOPPLER COMPLETE: CPT

## 2019-05-08 NOTE — PROGRESS NOTES
Discussed the results of CT chest with the patient.  Dr. Bethea review the chest, was decided that the best plan forward for this patient would be to schedule him for an EUS to obtain a biopsy of the pulmonary nodule in the right lung.  It is noted on CT chest that pulmonary nodule in his right lung has gotten larger.  This was discussed with the patient in detail, explaining to the patient that this could be cancer.  Procedure was also discussed with the patient in detail.  He states that he would like to think about the procedure and let me know sometime tomorrow what he would like to do.  Strongly advised that the patient have the procedure done as this pulmonary nodule could be cancer and we would need to start treatment quickly if this was the case.

## 2019-05-09 DIAGNOSIS — R93.1 ABNORMAL ECHOCARDIOGRAM: Primary | ICD-10-CM

## 2019-05-09 DIAGNOSIS — R91.1 PULMONARY NODULE: ICD-10-CM

## 2019-05-09 LAB
BH CV ECHO MEAS - ACS: 2.2 CM
BH CV ECHO MEAS - AO MAX PG: 7.6 MMHG
BH CV ECHO MEAS - AO MEAN PG: 4 MMHG
BH CV ECHO MEAS - AO ROOT AREA (BSA CORRECTED): 1.7
BH CV ECHO MEAS - AO ROOT AREA: 10.2 CM^2
BH CV ECHO MEAS - AO ROOT DIAM: 3.6 CM
BH CV ECHO MEAS - AO V2 MAX: 138 CM/SEC
BH CV ECHO MEAS - AO V2 MEAN: 98.4 CM/SEC
BH CV ECHO MEAS - AO V2 VTI: 34.5 CM
BH CV ECHO MEAS - BSA(HAYCOCK): 2.2 M^2
BH CV ECHO MEAS - BSA: 2.2 M^2
BH CV ECHO MEAS - BZI_BMI: 28.1 KILOGRAMS/M^2
BH CV ECHO MEAS - BZI_METRIC_HEIGHT: 182.9 CM
BH CV ECHO MEAS - BZI_METRIC_WEIGHT: 93.9 KG
BH CV ECHO MEAS - EDV(CUBED): 47.4 ML
BH CV ECHO MEAS - EDV(MOD-SP4): 57 ML
BH CV ECHO MEAS - EDV(TEICH): 55.2 ML
BH CV ECHO MEAS - EF(CUBED): 33.5 %
BH CV ECHO MEAS - EF(MOD-SP4): 64.9 %
BH CV ECHO MEAS - EF(TEICH): 28 %
BH CV ECHO MEAS - ESV(CUBED): 31.6 ML
BH CV ECHO MEAS - ESV(MOD-SP4): 20 ML
BH CV ECHO MEAS - ESV(TEICH): 39.7 ML
BH CV ECHO MEAS - FS: 12.7 %
BH CV ECHO MEAS - IVS/LVPW: 1.1
BH CV ECHO MEAS - IVSD: 1.5 CM
BH CV ECHO MEAS - LA DIMENSION: 2 CM
BH CV ECHO MEAS - LA/AO: 0.56
BH CV ECHO MEAS - LV DIASTOLIC VOL/BSA (35-75): 26.4 ML/M^2
BH CV ECHO MEAS - LV MASS(C)D: 184.4 GRAMS
BH CV ECHO MEAS - LV MASS(C)DI: 85.3 GRAMS/M^2
BH CV ECHO MEAS - LV SYSTOLIC VOL/BSA (12-30): 9.3 ML/M^2
BH CV ECHO MEAS - LVIDD: 3.6 CM
BH CV ECHO MEAS - LVIDS: 3.2 CM
BH CV ECHO MEAS - LVLD AP4: 7 CM
BH CV ECHO MEAS - LVLS AP4: 6.8 CM
BH CV ECHO MEAS - LVOT AREA (M): 3.5 CM^2
BH CV ECHO MEAS - LVOT AREA: 3.5 CM^2
BH CV ECHO MEAS - LVOT DIAM: 2.1 CM
BH CV ECHO MEAS - LVPWD: 1.3 CM
BH CV ECHO MEAS - MV A MAX VEL: 97.1 CM/SEC
BH CV ECHO MEAS - MV E MAX VEL: 74 CM/SEC
BH CV ECHO MEAS - MV E/A: 0.76
BH CV ECHO MEAS - PA ACC SLOPE: 1508 CM/SEC^2
BH CV ECHO MEAS - PA ACC TIME: 0.08 SEC
BH CV ECHO MEAS - PA PR(ACCEL): 42.6 MMHG
BH CV ECHO MEAS - SI(AO): 162.4 ML/M^2
BH CV ECHO MEAS - SI(CUBED): 7.3 ML/M^2
BH CV ECHO MEAS - SI(MOD-SP4): 17.1 ML/M^2
BH CV ECHO MEAS - SI(TEICH): 7.1 ML/M^2
BH CV ECHO MEAS - SV(AO): 351.2 ML
BH CV ECHO MEAS - SV(CUBED): 15.9 ML
BH CV ECHO MEAS - SV(MOD-SP4): 37 ML
BH CV ECHO MEAS - SV(TEICH): 15.4 ML
MAXIMAL PREDICTED HEART RATE: 156 BPM
STRESS TARGET HR: 133 BPM

## 2019-05-09 NOTE — PROGRESS NOTES
Spoke with patient again about CT chest.  Patient has decided to go through with bronchoscopy with biopsy.  We will schedule him for 5/14/2019.  Procedure was explained in detail.  All questions answered to satisfaction.  Echo was also reviewed in detail with the patient.  We will send him to cardiology for further evaluation of CT scan.

## 2019-05-13 ENCOUNTER — ANESTHESIA EVENT (OUTPATIENT)
Dept: PERIOP | Facility: HOSPITAL | Age: 64
End: 2019-05-13

## 2019-05-14 ENCOUNTER — ANESTHESIA (OUTPATIENT)
Dept: PERIOP | Facility: HOSPITAL | Age: 64
End: 2019-05-14

## 2019-05-14 ENCOUNTER — HOSPITAL ENCOUNTER (OUTPATIENT)
Facility: HOSPITAL | Age: 64
Setting detail: HOSPITAL OUTPATIENT SURGERY
Discharge: HOME | End: 2019-05-14
Attending: INTERNAL MEDICINE | Admitting: INTERNAL MEDICINE

## 2019-05-14 ENCOUNTER — APPOINTMENT (OUTPATIENT)
Dept: GENERAL RADIOLOGY | Facility: HOSPITAL | Age: 64
End: 2019-05-14

## 2019-05-14 VITALS
HEART RATE: 72 BPM | RESPIRATION RATE: 18 BRPM | TEMPERATURE: 97.4 F | OXYGEN SATURATION: 93 % | BODY MASS INDEX: 27.17 KG/M2 | WEIGHT: 205 LBS | SYSTOLIC BLOOD PRESSURE: 147 MMHG | DIASTOLIC BLOOD PRESSURE: 89 MMHG | HEIGHT: 73 IN

## 2019-05-14 DIAGNOSIS — R91.1 PULMONARY NODULE: ICD-10-CM

## 2019-05-14 LAB
ALBUMIN SERPL-MCNC: 4.63 G/DL (ref 3.5–5.2)
ALBUMIN/GLOB SERPL: 1.6 G/DL
ALP SERPL-CCNC: 79 U/L (ref 39–117)
ALT SERPL W P-5'-P-CCNC: 25 U/L (ref 1–41)
ANION GAP SERPL CALCULATED.3IONS-SCNC: 10.4 MMOL/L
APPEARANCE FLD: ABNORMAL
APPEARANCE FLD: ABNORMAL
AST SERPL-CCNC: 16 U/L (ref 1–40)
BASOPHILS # BLD AUTO: 0.03 10*3/MM3 (ref 0–0.2)
BASOPHILS NFR BLD AUTO: 0.5 % (ref 0–1.5)
BILIRUB SERPL-MCNC: 0.3 MG/DL (ref 0.2–1.2)
BUN BLD-MCNC: 18 MG/DL (ref 8–23)
BUN/CREAT SERPL: 16.2 (ref 7–25)
CALCIUM SPEC-SCNC: 9 MG/DL (ref 8.6–10.5)
CHLORIDE SERPL-SCNC: 99 MMOL/L (ref 98–107)
CO2 SERPL-SCNC: 30.6 MMOL/L (ref 22–29)
COLOR FLD: COLORLESS
COLOR FLD: COLORLESS
CREAT BLD-MCNC: 1.11 MG/DL (ref 0.76–1.27)
DEPRECATED RDW RBC AUTO: 52.3 FL (ref 37–54)
EOSINOPHIL # BLD AUTO: 0.28 10*3/MM3 (ref 0–0.4)
EOSINOPHIL NFR BLD AUTO: 4.4 % (ref 0.3–6.2)
EOSINOPHIL NFR FLD MANUAL: 1 %
EOSINOPHIL NFR FLD MANUAL: 1 %
ERYTHROCYTE [DISTWIDTH] IN BLOOD BY AUTOMATED COUNT: 16.2 % (ref 12.3–15.4)
GFR SERPL CREATININE-BSD FRML MDRD: 67 ML/MIN/1.73
GLOBULIN UR ELPH-MCNC: 3 GM/DL
GLUCOSE BLD-MCNC: 138 MG/DL (ref 65–99)
HCT VFR BLD AUTO: 40.7 % (ref 37.5–51)
HGB BLD-MCNC: 13.3 G/DL (ref 13–17.7)
IMM GRANULOCYTES # BLD AUTO: 0.03 10*3/MM3 (ref 0–0.05)
IMM GRANULOCYTES NFR BLD AUTO: 0.5 % (ref 0–0.5)
INR PPP: 0.91 (ref 0.9–1.1)
LYMPHOCYTES # BLD AUTO: 1.21 10*3/MM3 (ref 0.7–3.1)
LYMPHOCYTES NFR BLD AUTO: 19.1 % (ref 19.6–45.3)
LYMPHOCYTES NFR FLD MANUAL: 2 %
LYMPHOCYTES NFR FLD MANUAL: 6 %
MCH RBC QN AUTO: 29.5 PG (ref 26.6–33)
MCHC RBC AUTO-ENTMCNC: 32.7 G/DL (ref 31.5–35.7)
MCV RBC AUTO: 90.2 FL (ref 79–97)
MONOCYTES # BLD AUTO: 0.61 10*3/MM3 (ref 0.1–0.9)
MONOCYTES NFR BLD AUTO: 9.7 % (ref 5–12)
MONOS+MACROS NFR FLD: 11 %
MONOS+MACROS NFR FLD: 22 %
NEUTROPHILS # BLD AUTO: 4.16 10*3/MM3 (ref 1.7–7)
NEUTROPHILS NFR BLD AUTO: 65.8 % (ref 42.7–76)
NEUTROPHILS NFR FLD MANUAL: 75 %
NEUTROPHILS NFR FLD MANUAL: 82 %
NUC CELL # FLD: 283 /MM3
NUC CELL # FLD: 480 /MM3
PLATELET # BLD AUTO: 210 10*3/MM3 (ref 140–450)
PMV BLD AUTO: 11 FL (ref 6–12)
POTASSIUM BLD-SCNC: 4 MMOL/L (ref 3.5–5.2)
PROT SERPL-MCNC: 7.6 G/DL (ref 6–8.5)
PROTHROMBIN TIME: 12.7 SECONDS (ref 11–15.4)
RBC # BLD AUTO: 4.51 10*6/MM3 (ref 4.14–5.8)
RBC # FLD AUTO: ABNORMAL /MM3
SODIUM BLD-SCNC: 140 MMOL/L (ref 136–145)
WBC NRBC COR # BLD: 6.32 10*3/MM3 (ref 3.4–10.8)

## 2019-05-14 PROCEDURE — 87116 MYCOBACTERIA CULTURE: CPT | Performed by: INTERNAL MEDICINE

## 2019-05-14 PROCEDURE — 31624 DX BRONCHOSCOPE/LAVAGE: CPT | Performed by: INTERNAL MEDICINE

## 2019-05-14 PROCEDURE — 85025 COMPLETE CBC W/AUTO DIFF WBC: CPT | Performed by: INTERNAL MEDICINE

## 2019-05-14 PROCEDURE — 94799 UNLISTED PULMONARY SVC/PX: CPT

## 2019-05-14 PROCEDURE — 85610 PROTHROMBIN TIME: CPT | Performed by: INTERNAL MEDICINE

## 2019-05-14 PROCEDURE — 87102 FUNGUS ISOLATION CULTURE: CPT | Performed by: INTERNAL MEDICINE

## 2019-05-14 PROCEDURE — 71045 X-RAY EXAM CHEST 1 VIEW: CPT | Performed by: RADIOLOGY

## 2019-05-14 PROCEDURE — 76000 FLUOROSCOPY <1 HR PHYS/QHP: CPT

## 2019-05-14 PROCEDURE — 94640 AIRWAY INHALATION TREATMENT: CPT

## 2019-05-14 PROCEDURE — S0260 H&P FOR SURGERY: HCPCS | Performed by: INTERNAL MEDICINE

## 2019-05-14 PROCEDURE — 87206 SMEAR FLUORESCENT/ACID STAI: CPT | Performed by: INTERNAL MEDICINE

## 2019-05-14 PROCEDURE — 71045 X-RAY EXAM CHEST 1 VIEW: CPT

## 2019-05-14 PROCEDURE — 89051 BODY FLUID CELL COUNT: CPT | Performed by: INTERNAL MEDICINE

## 2019-05-14 PROCEDURE — 87071 CULTURE AEROBIC QUANT OTHER: CPT | Performed by: INTERNAL MEDICINE

## 2019-05-14 PROCEDURE — 76000 FLUOROSCOPY <1 HR PHYS/QHP: CPT | Performed by: RADIOLOGY

## 2019-05-14 PROCEDURE — 87205 SMEAR GRAM STAIN: CPT | Performed by: INTERNAL MEDICINE

## 2019-05-14 PROCEDURE — 25010000002 ONDANSETRON PER 1 MG: Performed by: NURSE ANESTHETIST, CERTIFIED REGISTERED

## 2019-05-14 PROCEDURE — 31632 BRONCHOSCOPY/LUNG BX ADDL: CPT | Performed by: INTERNAL MEDICINE

## 2019-05-14 PROCEDURE — 31628 BRONCHOSCOPY/LUNG BX EACH: CPT | Performed by: INTERNAL MEDICINE

## 2019-05-14 PROCEDURE — 25010000002 PROPOFOL 10 MG/ML EMULSION: Performed by: NURSE ANESTHETIST, CERTIFIED REGISTERED

## 2019-05-14 PROCEDURE — 80053 COMPREHEN METABOLIC PANEL: CPT | Performed by: INTERNAL MEDICINE

## 2019-05-14 PROCEDURE — 25010000002 MIDAZOLAM PER 1 MG: Performed by: NURSE ANESTHETIST, CERTIFIED REGISTERED

## 2019-05-14 PROCEDURE — 25010000002 FENTANYL CITRATE (PF) 100 MCG/2ML SOLUTION: Performed by: NURSE ANESTHETIST, CERTIFIED REGISTERED

## 2019-05-14 RX ORDER — ONDANSETRON 2 MG/ML
4 INJECTION INTRAMUSCULAR; INTRAVENOUS ONCE AS NEEDED
Status: DISCONTINUED | OUTPATIENT
Start: 2019-05-14 | End: 2019-05-14 | Stop reason: HOSPADM

## 2019-05-14 RX ORDER — LABETALOL HYDROCHLORIDE 5 MG/ML
5 INJECTION, SOLUTION INTRAVENOUS AS NEEDED
Status: DISCONTINUED | OUTPATIENT
Start: 2019-05-14 | End: 2019-05-14 | Stop reason: SDUPTHER

## 2019-05-14 RX ORDER — SODIUM CHLORIDE, SODIUM LACTATE, POTASSIUM CHLORIDE, CALCIUM CHLORIDE 600; 310; 30; 20 MG/100ML; MG/100ML; MG/100ML; MG/100ML
125 INJECTION, SOLUTION INTRAVENOUS CONTINUOUS
Status: DISCONTINUED | OUTPATIENT
Start: 2019-05-14 | End: 2019-05-14 | Stop reason: HOSPADM

## 2019-05-14 RX ORDER — OXYCODONE HYDROCHLORIDE AND ACETAMINOPHEN 5; 325 MG/1; MG/1
1 TABLET ORAL ONCE AS NEEDED
Status: DISCONTINUED | OUTPATIENT
Start: 2019-05-14 | End: 2019-05-14 | Stop reason: HOSPADM

## 2019-05-14 RX ORDER — SODIUM CHLORIDE 0.9 % (FLUSH) 0.9 %
3 SYRINGE (ML) INJECTION EVERY 12 HOURS SCHEDULED
Status: DISCONTINUED | OUTPATIENT
Start: 2019-05-14 | End: 2019-05-14 | Stop reason: HOSPADM

## 2019-05-14 RX ORDER — PROPOFOL 10 MG/ML
VIAL (ML) INTRAVENOUS AS NEEDED
Status: DISCONTINUED | OUTPATIENT
Start: 2019-05-14 | End: 2019-05-14 | Stop reason: SURG

## 2019-05-14 RX ORDER — LABETALOL HYDROCHLORIDE 5 MG/ML
5 INJECTION, SOLUTION INTRAVENOUS AS NEEDED
Status: DISCONTINUED | OUTPATIENT
Start: 2019-05-14 | End: 2019-05-14 | Stop reason: HOSPADM

## 2019-05-14 RX ORDER — SODIUM CHLORIDE 0.9 % (FLUSH) 0.9 %
3-10 SYRINGE (ML) INJECTION AS NEEDED
Status: DISCONTINUED | OUTPATIENT
Start: 2019-05-14 | End: 2019-05-14 | Stop reason: HOSPADM

## 2019-05-14 RX ORDER — ONDANSETRON 2 MG/ML
INJECTION INTRAMUSCULAR; INTRAVENOUS AS NEEDED
Status: DISCONTINUED | OUTPATIENT
Start: 2019-05-14 | End: 2019-05-14 | Stop reason: SURG

## 2019-05-14 RX ORDER — MIDAZOLAM HYDROCHLORIDE 1 MG/ML
2 INJECTION INTRAMUSCULAR; INTRAVENOUS
Status: DISCONTINUED | OUTPATIENT
Start: 2019-05-14 | End: 2019-05-14 | Stop reason: HOSPADM

## 2019-05-14 RX ORDER — MEPERIDINE HYDROCHLORIDE 25 MG/ML
12.5 INJECTION INTRAMUSCULAR; INTRAVENOUS; SUBCUTANEOUS
Status: DISCONTINUED | OUTPATIENT
Start: 2019-05-14 | End: 2019-05-14 | Stop reason: HOSPADM

## 2019-05-14 RX ORDER — LIDOCAINE HYDROCHLORIDE 20 MG/ML
INJECTION, SOLUTION INFILTRATION; PERINEURAL AS NEEDED
Status: DISCONTINUED | OUTPATIENT
Start: 2019-05-14 | End: 2019-05-14 | Stop reason: SURG

## 2019-05-14 RX ORDER — IPRATROPIUM BROMIDE AND ALBUTEROL SULFATE 2.5; .5 MG/3ML; MG/3ML
3 SOLUTION RESPIRATORY (INHALATION) ONCE AS NEEDED
Status: DISCONTINUED | OUTPATIENT
Start: 2019-05-14 | End: 2019-05-14 | Stop reason: HOSPADM

## 2019-05-14 RX ORDER — MIDAZOLAM HYDROCHLORIDE 1 MG/ML
INJECTION INTRAMUSCULAR; INTRAVENOUS AS NEEDED
Status: DISCONTINUED | OUTPATIENT
Start: 2019-05-14 | End: 2019-05-14 | Stop reason: SURG

## 2019-05-14 RX ORDER — FAMOTIDINE 10 MG/ML
INJECTION, SOLUTION INTRAVENOUS AS NEEDED
Status: DISCONTINUED | OUTPATIENT
Start: 2019-05-14 | End: 2019-05-14 | Stop reason: SURG

## 2019-05-14 RX ORDER — FENTANYL CITRATE 50 UG/ML
INJECTION, SOLUTION INTRAMUSCULAR; INTRAVENOUS AS NEEDED
Status: DISCONTINUED | OUTPATIENT
Start: 2019-05-14 | End: 2019-05-14 | Stop reason: SURG

## 2019-05-14 RX ORDER — MIDAZOLAM HYDROCHLORIDE 1 MG/ML
1 INJECTION INTRAMUSCULAR; INTRAVENOUS
Status: DISCONTINUED | OUTPATIENT
Start: 2019-05-14 | End: 2019-05-14 | Stop reason: HOSPADM

## 2019-05-14 RX ORDER — LIDOCAINE HYDROCHLORIDE 40 MG/ML
3 INJECTION, SOLUTION RETROBULBAR; TOPICAL ONCE
Status: COMPLETED | OUTPATIENT
Start: 2019-05-14 | End: 2019-05-14

## 2019-05-14 RX ORDER — LIDOCAINE HYDROCHLORIDE 10 MG/ML
INJECTION, SOLUTION EPIDURAL; INFILTRATION; INTRACAUDAL; PERINEURAL AS NEEDED
Status: DISCONTINUED | OUTPATIENT
Start: 2019-05-14 | End: 2019-05-14 | Stop reason: HOSPADM

## 2019-05-14 RX ORDER — ALBUTEROL SULFATE 2.5 MG/3ML
2.5 SOLUTION RESPIRATORY (INHALATION) ONCE
Status: COMPLETED | OUTPATIENT
Start: 2019-05-14 | End: 2019-05-14

## 2019-05-14 RX ORDER — SODIUM CHLORIDE 9 MG/ML
INJECTION, SOLUTION INTRAVENOUS AS NEEDED
Status: DISCONTINUED | OUTPATIENT
Start: 2019-05-14 | End: 2019-05-14 | Stop reason: HOSPADM

## 2019-05-14 RX ORDER — FENTANYL CITRATE 50 UG/ML
50 INJECTION, SOLUTION INTRAMUSCULAR; INTRAVENOUS
Status: DISCONTINUED | OUTPATIENT
Start: 2019-05-14 | End: 2019-05-14 | Stop reason: HOSPADM

## 2019-05-14 RX ADMIN — ONDANSETRON 4 MG: 2 INJECTION, SOLUTION INTRAMUSCULAR; INTRAVENOUS at 10:55

## 2019-05-14 RX ADMIN — LIDOCAINE HYDROCHLORIDE 3 ML: 40 INJECTION, SOLUTION RETROBULBAR; TOPICAL at 10:35

## 2019-05-14 RX ADMIN — ALBUTEROL SULFATE 2.5 MG: 2.5 SOLUTION RESPIRATORY (INHALATION) at 10:28

## 2019-05-14 RX ADMIN — FAMOTIDINE 20 MG: 10 INJECTION, SOLUTION INTRAVENOUS at 10:55

## 2019-05-14 RX ADMIN — LABETALOL HYDROCHLORIDE 5 MG: 5 INJECTION, SOLUTION INTRAVENOUS at 11:33

## 2019-05-14 RX ADMIN — FENTANYL CITRATE 100 MCG: 50 INJECTION INTRAMUSCULAR; INTRAVENOUS at 10:51

## 2019-05-14 RX ADMIN — SODIUM CHLORIDE, POTASSIUM CHLORIDE, SODIUM LACTATE AND CALCIUM CHLORIDE: 600; 310; 30; 20 INJECTION, SOLUTION INTRAVENOUS at 10:51

## 2019-05-14 RX ADMIN — MIDAZOLAM HYDROCHLORIDE 2 MG: 1 INJECTION, SOLUTION INTRAMUSCULAR; INTRAVENOUS at 10:51

## 2019-05-14 RX ADMIN — PROPOFOL 150 MG: 10 INJECTION, EMULSION INTRAVENOUS at 10:55

## 2019-05-14 RX ADMIN — LIDOCAINE HYDROCHLORIDE 60 MG: 20 INJECTION, SOLUTION INFILTRATION; PERINEURAL at 10:51

## 2019-05-14 NOTE — H&P
"Chief Complaint:  Planned diagnostic bronchoscopy with biospy for abnormal CT scan of the chest.    Subjective     Interval History:   CT scan of the chest was reviewed.  Patient denies any new complaints.  There have been no changes since the last time patient was seen in the office.    Review of Systems:   Review of Systems - History obtained from chart review and the patient  General ROS: negative for - chills, fatigue, fever, malaise, night sweats or sleep disturbance  Psychological ROS: negative for - anxiety, behavioral disorder, depression or memory difficulties  Ophthalmic ROS: negative for - blurry vision, decreased vision, double vision or dry eyes  ENT ROS: negative for - epistaxis, hearing change or sneezing  Allergy and Immunology ROS: negative for - hives, itchy/watery eyes or nasal congestion  Hematological and Lymphatic ROS: negative for - bleeding problems, blood clots, blood transfusions, jaundice or night sweats  Endocrine ROS: negative for - malaise/lethargy, mood swings, polydipsia/polyuria or temperature intolerance  Respiratory ROS: Negative for - cough, shortness of breath and wheezing  negative for - orthopnea, pleuritic pain or sputum changes  Cardiovascular ROS: no chest pain or dyspnea on exertion  Gastrointestinal ROS: no abdominal pain, change in bowel habits, or black or bloody stools  Musculoskeletal ROS: negative for - joint pain, joint stiffness or joint swelling  Neurological ROS: no TIA or stroke symptoms  Dermatological ROS: negative for acne, dry skin and eczema    Vital Signs  /88 (BP Location: Left arm, Patient Position: Sitting)   Pulse 71   Temp 97.1 °F (36.2 °C) (Temporal)   Resp 20   Ht 185.4 cm (73\")   Wt 93 kg (205 lb)   SpO2 97%   BMI 27.05 kg/m²     Physical Exam:  Constitutional:  oriented to person, place, and time. appears well-developed and well-nourished. No distress.   HENT:   Head: Normocephalic and atraumatic.   Right Ear: External ear normal. "   Left Ear: External ear normal.   Nose: Nose normal.   Eyes: Pupils are equal, round, and reactive to light. Conjunctivae and EOM are normal. Right eye exhibits no discharge. Left eye exhibits no discharge. No scleral icterus.   Neck: Normal range of motion. Neck supple. No thyromegaly present.   Cardiovascular: Normal rate, regular rhythm, normal heart sounds and intact distal pulses.  Exam reveals no friction rub.    No murmur heard.  Pulmonary/Chest: No stridor.   Bilateral air entry equal.  Decreased breath sounds in left and right posterior lower lung zones.  No rhonchi heard.  Wheezing absent.  No crackles appreciated.    Abdominal: Soft. Bowel sounds are normal.exhibits no distension. There is no tenderness.   Musculoskeletal: Normal range of motion. exhibits no deformity.   +1 lower extremity edema bilateral.   Neurological: alert and oriented to person, place, and time. No cranial nerve deficit. Coordination normal.   Skin: Skin is warm and dry. Capillary refill takes less than 2 seconds. not diaphoretic. No erythema.   Psychiatric:   normal mood and affect.   behavior is normal.      Results Review:   Lab Results   Component Value Date    WBC 6.32 05/14/2019    HGB 13.3 05/14/2019    HCT 40.7 05/14/2019    MCV 90.2 05/14/2019     05/14/2019     Lab Results   Component Value Date    GLUCOSE 138 (H) 05/14/2019    BUN 18 05/14/2019    CREATININE 1.11 05/14/2019    EGFRIFNONA 67 05/14/2019    BCR 16.2 05/14/2019    K 4.0 05/14/2019    CO2 30.6 (H) 05/14/2019    CALCIUM 9.0 05/14/2019    ALBUMIN 4.63 05/14/2019    AST 16 05/14/2019    ALT 25 05/14/2019     Lab Results   Component Value Date    INR 0.91 05/14/2019    INR 1.06 12/25/2018    INR 1.08 05/06/2018    PROTIME 12.7 05/14/2019    PROTIME 14.0 12/25/2018    PROTIME 14.2 05/06/2018       Medication: Reviewed    sodium chloride 3 mL Intravenous Q12H       lactated ringers 125 mL/hr           Assessment/Plan   I reviewed the patient's new clinical  results.    I have personally reviewed the past medical history, social history, family history and past surgical history of the patient.    I reviewed the labs.  I have reviewed the chest imaging.    Due to abnormal CT chest imaging we will proceed with diagnostic bronchoscopy.  The sample will be sent for microbiological and cytological evaluation.  Informed consent has been taken from the patient.    I, Eladio Bethea M.D. attest that the above note accurately reflects the work and decisions made by me.  Patient was seen and evaluated by me, including history of present illness, physical exam, assessment, and treatment plan.  The above note was reviewed and edited by me.    Thank you for involving us in the care of the patient.    Eladio Bethea M.D  Pulmonary and Critical Care Medicine

## 2019-05-14 NOTE — PROCEDURES
Bronchoscopy Procedure Note    Date of Operation: 5/14/2019    Pre-op Diagnosis: Worsening of the nodule size in the right upper lobe and right lower lobe in the setting of severe COPD    Post-op Diagnosis: Worsening of the nodule size in the right upper lobe and right lower lobe in the setting of severe COPD    Surgeon: Eladio Bethea MD    Assistants: None    Anesthesia: Please see anesthesia report for details    Operation:   Flexible fiberoptic bronchoscopy, diagnostic   Bronchoalveolar lavage  Transbronchial forcep biopsy under fluoroscopy guidance       Findings:   Both vocal cords moved appropriately.  Severe tracheobronchomalacia.  The left tracheobronchial tree was inspected closely to the level of the subsegmental bronchi. All bronchi are patent with no endobronchial lesions and no mucosal lesions noted.   The right tracheobronchial tree was inspected closely to the level of subsegmental bronchi.  All bronchi are patent with no endobronchial lesion and no mucosal lesions noted.     Specimen:   Bronchoalveolar lavage:  The bronchoscope was then introduced to right upper lobe.  Bronchoalveolar lavage was performed from right upper lobe of the lung.  40 ML of fluid was instilled.  15 ML of fluid was aspirated.      The bronchoscope was then introduced to right lower lobe.  Bronchoalveolar lavage was performed from right lower lobe of the lung.  40 ML of fluid was instilled.  10 ML of fluid was aspirated.      Transbronchial forcep biopsy under fluoroscopy guidance:  The bronchoscope was then introduced to the right upper lobe and transbronchial lung biopsies were taken from that area. The procedure was completed and all samples were submitted for appropriate studies. A post procedure chest x-ray has been ordered and is pending.     The bronchoscope was then introduced to the right lower lobe and transbronchial lung biopsies were taken from that area. The procedure was completed and all samples were  submitted for appropriate studies. A post procedure chest x-ray has been ordered and is pending.    Estimated Blood Loss: Minimal    Complications: none     Indications and History:  The patient is a 64 y.o. male with worsening of the nodule size in the right upper lobe and right lower lobe in the setting of severe COPD    The risks, benefits, complications, treatment options and expected outcomes were discussed with the patient.  The possibilities of reaction to medication, pulmonary aspiration, perforation of a viscus, bleeding, failure to diagnose a condition and creating a complication requiring transfusion or operation were discussed with the patient who freely signed the consent.      Description of Procedure:  The patient was seen in the Holding Room and the site of surgery properly noted/marked.  The patient was taken to endoscopy suite, identified as Maximo Kwon and the procedure verified as Flexible Fiberoptic Bronchoscopy.  A Time Out was held and the above information confirmed.    After the induction of topical nasopharyngeal anesthesia, the patient was positioned  and the bronchoscope was passed through the LMA . The vocal cords were visualized and  2 ml 1 % lidocaine was topically placed onto the cords.  The scope was then passed into the trachea. 2 ml 1 % lidocaine was used topically on the dylan.  Careful inspection of the tracheal lumen was accomplished.    The scope was sequentially passed into the left main and then left upper and lower bronchi and segmental bronchi.     The scope was then withdrawn and advanced into the right main bronchus and then into the RUL, RML, and RLL bronchi and segmental bronchi.     The Patient was taken to the Endoscopy Recovery area in satisfactory condition.    Patient tolerated the procedure well.  There was no complications post procedure.  The sample will be sent for microbiological and cytological evaluation.    Recommendations:  - Follow the final results on  cell count and differential, cytology and cultures for BAL fluid.  - Follow the final results on cytology and cultures from transbronchial forcep biopsy sample.  - Chest x-ray has been ordered.  Follow-up on chest x-ray.      Eladio Bethea MD

## 2019-05-14 NOTE — ANESTHESIA PROCEDURE NOTES
Airway  Urgency: elective      General Information and Staff    Patient location during procedure: OR  CRNA: Ban Trejo CRNA    Indications and Patient Condition    Preoxygenated: yes  Mask difficulty assessment: 0 - not attempted    Final Airway Details  Final airway type: supraglottic airway      Successful airway: unique  Size 5    Number of attempts at approach: 1

## 2019-05-14 NOTE — ANESTHESIA POSTPROCEDURE EVALUATION
Patient: Maximo Kwon    Procedure Summary     Date:  05/14/19 Room / Location:   [facility] OR 08 /  [facility]    Anesthesia Start:  1051 Anesthesia Stop:  1117    Procedure:  Bronchoscopy with Transbronchial Biopsy with Fluoroscopy (N/A Bronchus) Diagnosis:       Pulmonary nodule      (Pulmonary nodule [R91.1])    Surgeon:  Eladio Bethea MD Provider:  Errol Velez MD    Anesthesia Type:  general ASA Status:  3          Anesthesia Type: general  Last vitals  BP   168/88 (05/14/19 0931)   Temp   97.1 °F (36.2 °C) (05/14/19 0931)   Pulse   71 (05/14/19 1040)   Resp   20 (05/14/19 1040)     SpO2   97 % (05/14/19 1028)     Post Anesthesia Care and Evaluation    Patient location during evaluation: PHASE II  Patient participation: complete - patient participated  Level of consciousness: awake and alert  Pain score: 1  Pain management: adequate  Airway patency: patent  Anesthetic complications: No anesthetic complications  PONV Status: controlled  Cardiovascular status: acceptable  Respiratory status: acceptable  Hydration status: acceptable

## 2019-05-14 NOTE — ANESTHESIA PREPROCEDURE EVALUATION
Anesthesia Evaluation     history of anesthetic complications:  NPO Solid Status: > 8 hours  NPO Liquid Status: > 8 hours           Airway   Mallampati: II  TM distance: >3 FB  Neck ROM: full  Dental    (+) poor dentition    Pulmonary - normal exam   (+) a smoker Former, COPD,   Cardiovascular - normal exam    (+) hypertension, hyperlipidemia,       Neuro/Psych  (+) seizures,     GI/Hepatic/Renal/Endo    (+)  GERD,  diabetes mellitus,     Musculoskeletal     Abdominal  - normal exam   Substance History      OB/GYN          Other                        Anesthesia Plan    ASA 3     general     intravenous induction   Anesthetic plan, all risks, benefits, and alternatives have been provided, discussed and informed consent has been obtained with: patient.

## 2019-05-16 LAB
BACTERIA SPEC AEROBE CULT: NO GROWTH
BACTERIA SPEC AEROBE CULT: NORMAL
GRAM STN SPEC: NORMAL
LAB AP CASE REPORT: NORMAL
LAB AP CASE REPORT: NORMAL
PATH REPORT.FINAL DX SPEC: NORMAL

## 2019-05-17 ENCOUNTER — DOCUMENTATION (OUTPATIENT)
Dept: PULMONOLOGY | Facility: CLINIC | Age: 64
End: 2019-05-17

## 2019-05-17 NOTE — PROGRESS NOTES
Bronchoscopy results were reviewed.  Cytology and pathology were negative for malignancy.  We will repeat CT scan in 6 months.

## 2019-05-20 RX ORDER — BUDESONIDE AND FORMOTEROL FUMARATE DIHYDRATE 160; 4.5 UG/1; UG/1
2 AEROSOL RESPIRATORY (INHALATION) 2 TIMES DAILY
Qty: 1 INHALER | Refills: 11 | Status: SHIPPED | OUTPATIENT
Start: 2019-05-20 | End: 2021-08-19

## 2019-05-23 ENCOUNTER — TELEPHONE (OUTPATIENT)
Dept: PULMONOLOGY | Facility: CLINIC | Age: 64
End: 2019-05-23

## 2019-05-23 DIAGNOSIS — R91.1 PULMONARY NODULE: Primary | ICD-10-CM

## 2019-05-23 NOTE — TELEPHONE ENCOUNTER
----- Message from Eladio eBthea MD sent at 5/22/2019  5:30 PM EDT -----  Please update the patient on biopsy results. Also, schedule outpatient CT surgery appointment. Thanks         ----- Message -----  From: Lab, Background User  Sent: 5/14/2019   2:38 PM  To: Eladio Bethea MD      5/23/2019  Called patient to go over Biopsy results.  Biopsy came back negative for malignancy and he would like for the patient to see CT Surgery.

## 2019-05-30 ENCOUNTER — OFFICE VISIT (OUTPATIENT)
Dept: CARDIOLOGY | Facility: CLINIC | Age: 64
End: 2019-05-30

## 2019-05-30 VITALS
OXYGEN SATURATION: 99 % | HEART RATE: 76 BPM | SYSTOLIC BLOOD PRESSURE: 140 MMHG | HEIGHT: 73 IN | DIASTOLIC BLOOD PRESSURE: 84 MMHG | WEIGHT: 209 LBS | BODY MASS INDEX: 27.7 KG/M2

## 2019-05-30 DIAGNOSIS — I51.89 DIASTOLIC DYSFUNCTION: ICD-10-CM

## 2019-05-30 DIAGNOSIS — I10 ESSENTIAL HYPERTENSION: Primary | ICD-10-CM

## 2019-05-30 DIAGNOSIS — E78.2 MIXED HYPERLIPIDEMIA: ICD-10-CM

## 2019-05-30 PROCEDURE — 99204 OFFICE O/P NEW MOD 45 MIN: CPT | Performed by: INTERNAL MEDICINE

## 2019-05-30 RX ORDER — DILTIAZEM HYDROCHLORIDE 120 MG/1
120 CAPSULE, COATED, EXTENDED RELEASE ORAL DAILY
Qty: 30 CAPSULE | Refills: 11 | Status: SHIPPED | OUTPATIENT
Start: 2019-05-30 | End: 2020-06-15 | Stop reason: SDUPTHER

## 2019-05-30 NOTE — PROGRESS NOTES
Subjective   Chief Complaint   Patient presents with   • Abnormal Echo     per pulmonary md       History of Present Illness  Patient is 64 years old white male who was referred for cardiology evaluation through her pulmonologist.  Patient states that he had a routine echocardiogram done and was found to have some abnormality and is here for cardiac evaluation.  Patient denies any cardiac symptoms.  He denies any chest pain palpitations or shortness of breath.    He states that he has some pulmonary nodules which had increased this year patient had biopsy which was nonmalignant but has an appointment with the CT surgeon and also plans to see back surgeon.    Patient has no prior cardiac history denies history of rheumatic fever or heart murmur.  No history of endocarditis.    His main problem apparently has been COPD and pulmonary nodules.    Patient also has essential hypertension which according to him has been difficult to control and    Patient used to take statin therapy for hyperlipidemia but it caused some myalgias and he stopped it currently is just taking fish oil.  Patient also has type 2 diabetes mellitus.      Past Surgical History:   Procedure Laterality Date   • BRONCHOSCOPY N/A 5/14/2019    Procedure: Bronchoscopy with Transbronchial Biopsy with Fluoroscopy;  Surgeon: Eladio Bethea MD;  Location: [facility];  Service: Pulmonary   • COLON RESECTION  2016    had colostomy and reveresed back    • COLONOSCOPY  2016   • LUNG BIOPSY  2014    (non cancerous)     Family History   Problem Relation Age of Onset   • Cancer Father    • Diabetes Sister    • Diabetes Brother      Past Medical History:   Diagnosis Date   • Arthritis    • COPD (chronic obstructive pulmonary disease) (CMS/HCC)    • Depression    • Diabetes mellitus (CMS/HCC)    • Diverticulitis    • GERD (gastroesophageal reflux disease)    • Hypertension        Patient Active Problem List   Diagnosis   • Mixed hyperlipidemia   • Simple chronic  bronchitis (CMS/HCC)   • Pulmonary nodule   • Essential hypertension   • Left ventricular hypertrophy with grade 1 diastolic dysfunction         Social History     Tobacco Use   • Smoking status: Former Smoker     Packs/day: 1.00     Years: 30.00     Pack years: 30.00     Types: Cigarettes     Last attempt to quit:      Years since quittin.4   • Smokeless tobacco: Never Used   Substance Use Topics   • Alcohol use: No   • Drug use: No         The following portions of the patient's history were reviewed and updated as appropriate: allergies, current medications, past family history, past medical history, past social history, past surgical history and problem list.    No Known Allergies      Current Outpatient Medications:   •  albuterol (PROVENTIL) (2.5 MG/3ML) 0.083% nebulizer solution, Take 2.5 mg by nebulization 4 (Four) Times a Day As Needed for Wheezing., Disp: , Rfl:   •  budesonide-formoterol (SYMBICORT) 160-4.5 MCG/ACT inhaler, Inhale 2 puffs 2 (Two) Times a Day., Disp: 1 inhaler, Rfl: 11  •  glyBURIDE (DIAbeta) 5 MG tablet, Take 5 mg by mouth Daily With Breakfast., Disp: , Rfl:   •  hydrochlorothiazide (HYDRODIURIL) 25 MG tablet, Take 25 mg by mouth Daily., Disp: , Rfl:   •  levETIRAcetam (KEPPRA) 750 MG tablet, Take 375 mg by mouth 2 (Two) Times a Day. Prior to [facility] Admission, Patient was on: 1/2 to 1 tablet twice a day, Disp: , Rfl:   •  lisinopril (PRINIVIL,ZESTRIL) 20 MG tablet, Take 20 mg by mouth Daily., Disp: , Rfl:   •  metFORMIN (GLUCOPHAGE) 500 MG tablet, Take 1,000 mg by mouth 2 (Two) Times a Day With Meals., Disp: , Rfl:   •  montelukast (SINGULAIR) 10 MG tablet, Take 1 tablet by mouth Every Night., Disp: 30 tablet, Rfl: 0  •  Omega-3 Fatty Acids (FISH OIL) 1000 MG capsule capsule, Take 1,000 mg by mouth Daily With Breakfast., Disp: , Rfl:   •  omeprazole (priLOSEC) 20 MG capsule, Take 20 mg by mouth Daily., Disp: , Rfl:   •  tiotropium bromide monohydrate (SPIRIVA RESPIMAT) 2.5  "MCG/ACT aerosol solution inhaler, Inhale 2 puffs Daily., Disp: , Rfl:   •  diltiaZEM CD (CARDIZEM CD) 120 MG 24 hr capsule, Take 1 capsule by mouth Daily., Disp: 30 capsule, Rfl: 11    Review of Systems   Constitution: Negative.   HENT: Negative.  Negative for congestion.    Eyes: Negative.    Cardiovascular: Negative.  Negative for chest pain, cyanosis, dyspnea on exertion, irregular heartbeat, leg swelling, near-syncope, orthopnea, palpitations, paroxysmal nocturnal dyspnea and syncope.   Respiratory: Negative.  Negative for cough, hemoptysis, shortness of breath, sputum production and wheezing.    Endocrine: Negative.    Hematologic/Lymphatic: Negative.    Skin: Negative.    Musculoskeletal: Positive for myalgias.   Gastrointestinal: Negative.    Genitourinary: Negative.    Neurological: Positive for numbness and paresthesias. Negative for headaches.   Psychiatric/Behavioral: Negative.         Objective      /84   Pulse 76   Ht 185.4 cm (73\")   Wt 94.8 kg (209 lb)   SpO2 99%   BMI 27.57 kg/m²     Physical Exam   Constitutional: He appears well-developed and well-nourished. No distress.   HENT:   Head: Normocephalic and atraumatic.   Mouth/Throat: Oropharynx is clear and moist. No oropharyngeal exudate.   Eyes: Conjunctivae and EOM are normal. Pupils are equal, round, and reactive to light. No scleral icterus.   Neck: Normal range of motion. Neck supple. No JVD present. No tracheal deviation present. No thyromegaly present.   Cardiovascular: Normal rate, regular rhythm, normal heart sounds and intact distal pulses. PMI is not displaced. Exam reveals no gallop, no friction rub and no decreased pulses.   No murmur heard.  Pulses:       Carotid pulses are 3+ on the right side, and 3+ on the left side.       Radial pulses are 3+ on the right side, and 3+ on the left side.   Pulmonary/Chest: Effort normal and breath sounds normal. No respiratory distress. He has no wheezes. He has no rales. He exhibits no " tenderness.   Abdominal: Soft. Bowel sounds are normal. He exhibits no distension, no abdominal bruit and no mass. There is no splenomegaly or hepatomegaly. There is no tenderness. There is no rebound and no guarding.   Musculoskeletal: Normal range of motion. He exhibits no edema, tenderness or deformity.   Lymphadenopathy:     He has no cervical adenopathy.   Neurological: He is alert. He has normal reflexes. No cranial nerve deficit. He exhibits normal muscle tone. Coordination normal.   Skin: Skin is warm and dry. No rash noted. He is not diaphoretic. No erythema.   Psychiatric: He has a normal mood and affect. His behavior is normal. Judgment and thought content normal.       Lab Review:    Lab Results   Component Value Date     05/14/2019    K 4.0 05/14/2019    CL 99 05/14/2019    BUN 18 05/14/2019    CREATININE 1.11 05/14/2019    GLUCOSE 138 (H) 05/14/2019    CALCIUM 9.0 05/14/2019    ALT 25 05/14/2019    ALKPHOS 79 05/14/2019     No results found for: CKTOTAL  Lab Results   Component Value Date    WBC 6.32 05/14/2019    HGB 13.3 05/14/2019    HCT 40.7 05/14/2019     05/14/2019     Lab Results   Component Value Date    INR 0.91 05/14/2019    INR 1.06 12/25/2018    INR 1.08 05/06/2018     Lab Results   Component Value Date    MG 2.1 12/25/2018     No results found for: PSA, CHOL, CHLPL, TRIG, HDL, VLDL, LDLHDL  Lab Results   Component Value Date    BNP 13.0 12/25/2018       Procedures    I reviewed the patient's new clinical results.  I personally viewed and interpreted the patient's EKG/lab data        Assessment:   Diagnosis Plan   1. Essential hypertension     2. Diastolic dysfunction     3. Mixed hyperlipidemia            Plan:  Blood pressure is poorly controlled.  Patient also complains of palpitations and fast heartbeat.  Beta-blocker therapy/Cardizem was discussed.  Patient has COPD will avoid beta-blocker therapy.  Patient was started on low-dose Cardizem.  He will be reevaluated and 3  months.  No lab work is available.  Patient needs to be on statin therapy if he can tolerate that.    Patient has calcification of posterior mitral leaflet however there is no significant valvular heart disease.  There is no vegetation noted.  LV is hypertrophied without LV outflow tract obstruction.  Will start Cardizem  daily.  Repeat echo in 1 year.    Thank you for giving me the oppertunity to participate in your patient's cardiac care.    Sincerely,    OMAR Cantrell M.D. FACP FAC     No Follow-up on file.

## 2019-06-06 ENCOUNTER — OFFICE VISIT (OUTPATIENT)
Dept: NEUROSURGERY | Facility: CLINIC | Age: 64
End: 2019-06-06

## 2019-06-06 VITALS
HEIGHT: 74 IN | SYSTOLIC BLOOD PRESSURE: 144 MMHG | OXYGEN SATURATION: 98 % | TEMPERATURE: 97.6 F | WEIGHT: 216.2 LBS | DIASTOLIC BLOOD PRESSURE: 76 MMHG | RESPIRATION RATE: 18 BRPM | BODY MASS INDEX: 27.75 KG/M2 | HEART RATE: 90 BPM

## 2019-06-06 DIAGNOSIS — M51.36 DEGENERATIVE DISC DISEASE, LUMBAR: Primary | ICD-10-CM

## 2019-06-06 PROBLEM — M48.061 DEGENERATIVE LUMBAR SPINAL STENOSIS: Status: ACTIVE | Noted: 2019-06-06

## 2019-06-06 PROBLEM — M51.369 DEGENERATIVE DISC DISEASE, LUMBAR: Status: ACTIVE | Noted: 2019-06-06

## 2019-06-06 PROBLEM — G60.9 HEREDITARY AND IDIOPATHIC PERIPHERAL NEUROPATHY: Status: ACTIVE | Noted: 2019-06-06

## 2019-06-06 PROBLEM — J43.8 OTHER EMPHYSEMA (HCC): Status: ACTIVE | Noted: 2019-06-06

## 2019-06-06 PROCEDURE — 99243 OFF/OP CNSLTJ NEW/EST LOW 30: CPT | Performed by: NEUROLOGICAL SURGERY

## 2019-06-06 RX ORDER — MELOXICAM 15 MG/1
TABLET ORAL
COMMUNITY
Start: 2019-05-05 | End: 2021-08-18 | Stop reason: ALTCHOICE

## 2019-06-06 NOTE — PROGRESS NOTES
Maximo Kwon  1955  [id number]      Chief Complaint   Patient presents with   • Back Pain     Spinal Stenosis       HISTORY OF PRESENT ILLNESS: This is a 64-year-old male who is seen with a chief complaint of low back pain which she has had for many years.  The pain is primarily axial.  He has no symptoms to suggest nerve root or spinal cord compression.  Symptoms are worse in the morning however as a day progresses resolves.  At the present time at this encounter he has no specific complaints.  Lumbar MRI was performed and is referred for neurosurgical consultation.  He has been on Mobic 15 mg daily which is helpful.    Past Medical History:   Diagnosis Date   • Arthritis    • COPD (chronic obstructive pulmonary disease) (CMS/HCC)    • Depression    • Diabetes mellitus (CMS/HCC)    • Diverticulitis    • GERD (gastroesophageal reflux disease)    • Hypertension        Past Surgical History:   Procedure Laterality Date   • BRONCHOSCOPY N/A 2019    Procedure: Bronchoscopy with Transbronchial Biopsy with Fluoroscopy;  Surgeon: Eladio Bethea MD;  [facility];  Service: Pulmonary   • COLON RESECTION      had colostomy and reveresed back    • COLONOSCOPY     • LUNG BIOPSY      (non cancerous)       Family History   Problem Relation Age of Onset   • Cancer Father    • Diabetes Sister    • Diabetes Brother        Social History     Socioeconomic History   • Marital status:      Spouse name: Not on file   • Number of children: Not on file   • Years of education: Not on file   • Highest education level: Not on file   Tobacco Use   • Smoking status: Former Smoker     Packs/day: 1.00     Years: 30.00     Pack years: 30.00     Types: Cigarettes     Last attempt to quit:      Years since quittin.4   • Smokeless tobacco: Never Used   Substance and Sexual Activity   • Alcohol use: No   • Drug use: No   • Sexual activity: Defer     Birth control/protection: Other       No Known  Allergies      Current Outpatient Medications:   •  albuterol (PROVENTIL) (2.5 MG/3ML) 0.083% nebulizer solution, Take 2.5 mg by nebulization 4 (Four) Times a Day As Needed for Wheezing., Disp: , Rfl:   •  budesonide-formoterol (SYMBICORT) 160-4.5 MCG/ACT inhaler, Inhale 2 puffs 2 (Two) Times a Day., Disp: 1 inhaler, Rfl: 11  •  diltiaZEM CD (CARDIZEM CD) 120 MG 24 hr capsule, Take 1 capsule by mouth Daily., Disp: 30 capsule, Rfl: 11  •  glyBURIDE (DIAbeta) 5 MG tablet, Take 5 mg by mouth Daily With Breakfast., Disp: , Rfl:   •  hydrochlorothiazide (HYDRODIURIL) 25 MG tablet, Take 25 mg by mouth Daily., Disp: , Rfl:   •  levETIRAcetam (KEPPRA) 750 MG tablet, Take 375 mg by mouth 2 (Two) Times a Day. Prior to [facility] Admission, Patient was on: 1/2 to 1 tablet twice a day, Disp: , Rfl:   •  lisinopril (PRINIVIL,ZESTRIL) 20 MG tablet, Take 20 mg by mouth Daily., Disp: , Rfl:   •  metFORMIN (GLUCOPHAGE) 500 MG tablet, Take 1,000 mg by mouth 2 (Two) Times a Day With Meals., Disp: , Rfl:   •  montelukast (SINGULAIR) 10 MG tablet, Take 1 tablet by mouth Every Night., Disp: 30 tablet, Rfl: 0  •  Omega-3 Fatty Acids (FISH OIL) 1000 MG capsule capsule, Take 1,000 mg by mouth Daily With Breakfast., Disp: , Rfl:   •  omeprazole (priLOSEC) 20 MG capsule, Take 20 mg by mouth Daily., Disp: , Rfl:   •  tiotropium bromide monohydrate (SPIRIVA RESPIMAT) 2.5 MCG/ACT aerosol solution inhaler, Inhale 2 puffs Daily., Disp: , Rfl:     Review of Systems   Constitutional: Positive for activity change and fatigue.   HENT: Negative.    Eyes: Negative.    Respiratory: Positive for shortness of breath.    Cardiovascular: Negative.    Gastrointestinal: Negative.    Endocrine: Negative.    Genitourinary: Negative.    Musculoskeletal: Positive for back pain.   Skin: Negative.    Allergic/Immunologic: Negative.    Neurological: Positive for tremors.   Hematological: Negative.    Psychiatric/Behavioral: Negative.        There were no vitals filed  for this visit.    Neurological Examination:    Mental status/speech: The patient is alert and oriented.  Speech is clear without aphysia or dysarthria.  No overt cognitive deficits.    Cranial nerve examination:    Olfaction: Smell is intact.  Vision: Vision is intact without visual field abnormalities.  Funduscopic examination is normal.  No pupillary irregularity.  Ocular motor examination: The extraocular muscles are intact.  There is no diplopia.  The pupil is round and reactive to both light and accommodation.  There is no nystagmus.  Facial movement/sensation: There is no facial weakness.  Sensation is intact in the first, second, and third divisions of the trigeminal nerve.  The corneal reflex is intact.  Auditory: Hearing is intact to finger rub bilaterally.  Cranial nerves IX, X, XI, XII: Phonation is normal.  No dysphagia.  Tongue is protruded in the midline without atrophy.  The gag reflex is intact.  Shoulder shrug is normal.    Musculoligamentous ligamentous examination: He has full and active range of motion of the lumbar spine.  Straight leg raising low Sagan flip tests are all normal.  There is no evidence of weakness or sensory loss.  He is areflexic.  There is no Babinski Ashley or clonus.    Medical Decision Making:     Diagnostic Data Set: The lumbar MRI data set show moderately severe degenerative osteoarthritic changes manifest by disc space narrowing retrolisthesis and mild to moderate spinal stenosis throughout.      Assessment: Symptomatic degenerative osteoarthritis of the lumbar spine          Recommendations: He is minimally symptomatic and has no symptoms to suggest nerve root or cauda equina compression and therefore surgery is not a consideration.  He gets relief with Mobic therefore that should be continued.  At present there is no strong recommendation for anything to be done other than taking care with lifting, etc. which he is already doing.  I have told him to call me should he  have any issues.  Thank you for allowing me to same.        I greatly appreciate the opportunity to see and evaluate this individual.  If you have questions or concerns regarding issues that I may have overlooked please call me at any time: [phone].  Travis Nieto M.D.  [facility]  [address] [place] [address]    Scribed for Albert Nieto MD by Maggie Howe CMA. 6/6/2019 10:07 AM     I have read and concur with the information provided by the scribe.  Albert Nieto MD

## 2019-06-13 LAB
BACTERIA SPEC AEROBE CULT: NORMAL
BACTERIA SPEC AEROBE CULT: NORMAL
FUNGUS SPEC CULT: NORMAL
FUNGUS SPEC FUNGUS STN: NORMAL
FUNGUS SPEC FUNGUS STN: NORMAL

## 2019-06-26 LAB
ACID FAST STN SPEC: NEGATIVE
ACID FAST STN SPEC: NEGATIVE
BACTERIA SPEC AEROBE CULT: NEGATIVE
BACTERIA SPEC AEROBE CULT: NEGATIVE
SPECIMEN PREPARATION: NORMAL
SPECIMEN PREPARATION: NORMAL

## 2019-07-10 ENCOUNTER — OFFICE VISIT (OUTPATIENT)
Dept: CARDIAC SURGERY | Facility: CLINIC | Age: 64
End: 2019-07-10

## 2019-07-10 VITALS
BODY MASS INDEX: 29.26 KG/M2 | HEIGHT: 72 IN | WEIGHT: 216 LBS | OXYGEN SATURATION: 98 % | DIASTOLIC BLOOD PRESSURE: 90 MMHG | HEART RATE: 85 BPM | SYSTOLIC BLOOD PRESSURE: 187 MMHG

## 2019-07-10 DIAGNOSIS — R91.1 PULMONARY NODULE: Primary | ICD-10-CM

## 2019-07-10 PROCEDURE — 99203 OFFICE O/P NEW LOW 30 MIN: CPT | Performed by: THORACIC SURGERY (CARDIOTHORACIC VASCULAR SURGERY)

## 2019-07-10 NOTE — PROGRESS NOTES
07/10/2019  Patient Information  Maximo Kwon                                                                                          [address]  [place] [address]   1955  'PCP/Referring Physician'  Zachariah Shay, APRN  [phone]  Eladio Bethea MD  [phone]  Chief Complaint   Patient presents with   • Consult     Np  referred for upper and lower lobe lung nodules,complains of fatigue,cough and nausea.   • Lung Nodule       History of Present Illness: 64-year-old  male with a history of hypertension, hyperlipidemia, diabetes mellitus, COPD tobacco abuse who presents with lung nodules.  The patient feels tired all the time.  He denies a cough, hemoptysis, weight loss, lymphadenopathy, fevers or chills.  The patient does note random nausea that he attributes to medications.      Patient Active Problem List   Diagnosis   • Mixed hyperlipidemia   • Simple chronic bronchitis (CMS/HCC)   • Pulmonary nodule   • Essential hypertension   • Left ventricular hypertrophy with grade 1 diastolic dysfunction   • Hereditary and idiopathic peripheral neuropathy   • Other emphysema (CMS/HCC)   • Degenerative disc disease, lumbar     Past Medical History:   Diagnosis Date   • Arthritis    • COPD (chronic obstructive pulmonary disease) (CMS/HCC)    • Depression    • Diabetes mellitus (CMS/HCC)    • Diverticulitis    • Dyslipidemia    • Emphysema (subcutaneous) (surgical) resulting from a procedure    • GERD (gastroesophageal reflux disease)    • Hypertension    • Neuropathy      Past Surgical History:   Procedure Laterality Date   • BRONCHOSCOPY N/A 5/14/2019    Procedure: Bronchoscopy with Transbronchial Biopsy with Fluoroscopy;  Surgeon: Eladio Bethea MD;  Location: [facility];  Service: Pulmonary   • COLON RESECTION  2016    had colostomy and reveresed back    • COLONOSCOPY  2016   • LUNG BIOPSY  2014    (non cancerous)   • OTHER SURGICAL HISTORY Left     LEFT ELBOW SURGERY       Current Outpatient  Medications:   •  albuterol (PROVENTIL) (2.5 MG/3ML) 0.083% nebulizer solution, Take 2.5 mg by nebulization 4 (Four) Times a Day As Needed for Wheezing., Disp: , Rfl:   •  budesonide-formoterol (SYMBICORT) 160-4.5 MCG/ACT inhaler, Inhale 2 puffs 2 (Two) Times a Day., Disp: 1 inhaler, Rfl: 11  •  glyBURIDE (DIAbeta) 5 MG tablet, Take 5 mg by mouth Daily With Breakfast., Disp: , Rfl:   •  hydrochlorothiazide (HYDRODIURIL) 25 MG tablet, Take 25 mg by mouth Daily., Disp: , Rfl:   •  levETIRAcetam (KEPPRA) 750 MG tablet, Take 375 mg by mouth 2 (Two) Times a Day. Prior to [facility] Admission, Patient was on: 1/2 to 1 tablet twice a day, Disp: , Rfl:   •  lisinopril (PRINIVIL,ZESTRIL) 20 MG tablet, Take 20 mg by mouth Daily., Disp: , Rfl:   •  meloxicam (MOBIC) 15 MG tablet, , Disp: , Rfl:   •  metFORMIN (GLUCOPHAGE) 500 MG tablet, Take 1,000 mg by mouth 2 (Two) Times a Day With Meals., Disp: , Rfl:   •  montelukast (SINGULAIR) 10 MG tablet, Take 1 tablet by mouth Every Night., Disp: 30 tablet, Rfl: 0  •  Omega-3 Fatty Acids (FISH OIL) 1000 MG capsule capsule, Take 1,000 mg by mouth Daily With Breakfast., Disp: , Rfl:   •  omeprazole (priLOSEC) 20 MG capsule, Take 20 mg by mouth Daily., Disp: , Rfl:   •  tiotropium bromide monohydrate (SPIRIVA RESPIMAT) 2.5 MCG/ACT aerosol solution inhaler, Inhale 2 puffs Daily., Disp: , Rfl:   •  diltiaZEM CD (CARDIZEM CD) 120 MG 24 hr capsule, Take 1 capsule by mouth Daily., Disp: 30 capsule, Rfl: 11  No Known Allergies  Social History     Socioeconomic History   • Marital status:      Spouse name: Not on file   • Number of children: 0   • Years of education: Not on file   • Highest education level: Not on file   Occupational History   • Occupation: DISABLED      Comment: DISABILITY FOR COPD   Tobacco Use   • Smoking status: Former Smoker     Packs/day: 1.00     Years: 45.00     Pack years: 45.00     Types: Cigarettes     Last attempt to quit: 2013     Years since quitting:  "6.5   • Smokeless tobacco: Never Used   Substance and Sexual Activity   • Alcohol use: No   • Drug use: No   • Sexual activity: Defer     Birth control/protection: Other   Social History Narrative    [facility] IN [facility]     Family History   Problem Relation Age of Onset   • Alzheimer's disease Mother    • Cancer Father         THROAT CANCER   • Diabetes Sister    • Diabetes Brother      Review of Systems   Constitution: Positive for malaise/fatigue. Negative for chills, fever, night sweats and weight loss.   HENT: Negative for hearing loss, odynophagia and sore throat.    Cardiovascular: Positive for dyspnea on exertion. Negative for chest pain, leg swelling, orthopnea and palpitations.   Respiratory: Positive for shortness of breath. Negative for cough and hemoptysis.    Endocrine: Negative for cold intolerance, heat intolerance, polydipsia, polyphagia and polyuria.   Hematologic/Lymphatic: Does not bruise/bleed easily.   Skin: Negative for itching and rash.   Musculoskeletal: Positive for back pain and muscle cramps. Negative for joint pain, joint swelling and myalgias.   Gastrointestinal: Positive for nausea. Negative for abdominal pain, constipation, diarrhea, hematemesis, hematochezia, melena and vomiting.   Genitourinary: Negative for dysuria, frequency and hematuria.   Neurological: Positive for light-headedness. Negative for focal weakness, headaches and seizures. Numbness: feet and legs.   Psychiatric/Behavioral: Negative for suicidal ideas.   All other systems reviewed and are negative.    Vitals:    07/10/19 1020   BP: (!) 187/90   BP Location: Right arm   Patient Position: Sitting   Pulse: 85   SpO2: 98%   Weight: 98 kg (216 lb)   Height: 182.9 cm (72\")      Physical Exam   Constitutional: He is oriented to person, place, and time. He appears well-developed and well-nourished. No distress.    male who is present with his friend Subha Salvador.   HENT:   Head: Normocephalic and atraumatic.   Eyes: " Conjunctivae and EOM are normal. No scleral icterus.   Neck: Normal range of motion. Neck supple. No JVD present. No tracheal deviation present.   Cardiovascular: Normal rate, regular rhythm and normal heart sounds. Exam reveals no gallop and no friction rub.   No murmur heard.  Pulmonary/Chest: Effort normal and breath sounds normal. No stridor. No respiratory distress. He has no wheezes. He has no rales.   Abdominal: Soft. He exhibits no distension and no mass. There is no tenderness. There is no rebound and no guarding.   Musculoskeletal: Normal range of motion. He exhibits no deformity.   Lymphadenopathy:     He has no cervical adenopathy.     He has no axillary adenopathy.        Right: No supraclavicular adenopathy present.        Left: No supraclavicular adenopathy present.   Neurological: He is alert and oriented to person, place, and time.   Skin: No rash noted. No erythema.   Psychiatric: He has a normal mood and affect. His behavior is normal. Judgment and thought content normal.       Labs/Imaging:  -Biopsies performed 5/14/2019, transbronchial biopsy of the right lower lobe negative for malignancy.  Right upper lobe negative for malignancy.  Pathology mentions bronchial wall tissue, but no lung tissue.  Right upper and lower lavage is negative for malignancy.  Pseudomonas aeruginosa is present on sputum culture from 4/5/2019.  -CT of the chest performed 4/26/2019, personally reviewed, demonstrates a well circumscribed  8.3 mm right upper lobe lung nodule.  There is a well circumscribed 7.2 mm right lower lobe nodule.  This has increased from 6.2 mm previously on 8/9/2018.  No mediastinal lymphadenopathy.  -CT the chest performed 8/9/2018, personally reviewed, demonstrates a 6 mm right lower lobe nodule.  There is a 6 mm right upper lobe nodule present.  No mediastinal lymphadenopathy.  -Pulmonary function test performed 8/31/2018, FEV1 1.792 (47% predicted), DLCO 73% predicted    Assessment/Plan:   64-year-old  male with a history of hypertension, hyperlipidemia, diabetes mellitus, COPD tobacco abuse who presents with right lung nodules.  These could represent carcinoma in situ (bronchioalveolar carcinoma), lung cancer, infection, benign lung nodules or an autoimmune process.  Per the patient, he had previous CT scans of the chest in [facility] at [facility] that demonstrated these nodules.  I will obtain these studies for review.  Given the increase in size of these nodules, I will obtain a PET scan to assess for hypermetabolic activity.  I will have the patient follow-up after this scan has been performed.    Patient Active Problem List   Diagnosis   • Mixed hyperlipidemia   • Simple chronic bronchitis (CMS/HCC)   • Pulmonary nodule   • Essential hypertension   • Left ventricular hypertrophy with grade 1 diastolic dysfunction   • Hereditary and idiopathic peripheral neuropathy   • Other emphysema (CMS/HCC)   • Degenerative disc disease, lumbar

## 2019-08-23 ENCOUNTER — HOSPITAL ENCOUNTER (OUTPATIENT)
Dept: PET IMAGING | Facility: HOSPITAL | Age: 64
Discharge: HOME | End: 2019-08-23
Admitting: THORACIC SURGERY (CARDIOTHORACIC VASCULAR SURGERY)

## 2019-08-23 PROCEDURE — A9552 F18 FDG: HCPCS | Performed by: THORACIC SURGERY (CARDIOTHORACIC VASCULAR SURGERY)

## 2019-08-23 PROCEDURE — 0 FLUDEOXYGLUCOSE F18 SOLUTION: Performed by: THORACIC SURGERY (CARDIOTHORACIC VASCULAR SURGERY)

## 2019-08-23 PROCEDURE — 78815 PET IMAGE W/CT SKULL-THIGH: CPT

## 2019-08-23 PROCEDURE — 78815 PET IMAGE W/CT SKULL-THIGH: CPT | Performed by: RADIOLOGY

## 2019-08-23 RX ADMIN — FLUDEOXYGLUCOSE F18 1 DOSE: 300 INJECTION INTRAVENOUS at 10:08

## 2019-10-08 ENCOUNTER — OFFICE VISIT (OUTPATIENT)
Dept: PULMONOLOGY | Facility: CLINIC | Age: 64
End: 2019-10-08

## 2019-10-08 VITALS
OXYGEN SATURATION: 98 % | HEART RATE: 72 BPM | DIASTOLIC BLOOD PRESSURE: 93 MMHG | TEMPERATURE: 98 F | BODY MASS INDEX: 29.26 KG/M2 | HEIGHT: 72 IN | WEIGHT: 216 LBS | SYSTOLIC BLOOD PRESSURE: 152 MMHG

## 2019-10-08 DIAGNOSIS — J43.2 CENTRILOBULAR EMPHYSEMA (HCC): ICD-10-CM

## 2019-10-08 DIAGNOSIS — G47.34 NOCTURNAL OXYGEN DESATURATION: ICD-10-CM

## 2019-10-08 DIAGNOSIS — R91.1 NODULE OF RIGHT LUNG: Primary | ICD-10-CM

## 2019-10-08 PROCEDURE — 94618 PULMONARY STRESS TESTING: CPT | Performed by: INTERNAL MEDICINE

## 2019-10-08 PROCEDURE — 90674 CCIIV4 VAC NO PRSV 0.5 ML IM: CPT | Performed by: INTERNAL MEDICINE

## 2019-10-08 PROCEDURE — 90471 IMMUNIZATION ADMIN: CPT | Performed by: INTERNAL MEDICINE

## 2019-10-08 PROCEDURE — 99214 OFFICE O/P EST MOD 30 MIN: CPT | Performed by: INTERNAL MEDICINE

## 2019-10-10 ENCOUNTER — TELEPHONE (OUTPATIENT)
Dept: PULMONOLOGY | Facility: CLINIC | Age: 64
End: 2019-10-10

## 2019-10-10 DIAGNOSIS — J43.2 CENTRILOBULAR EMPHYSEMA (HCC): Primary | ICD-10-CM

## 2019-10-10 NOTE — TELEPHONE ENCOUNTER
----- Message from Eladio Bethea MD sent at 10/8/2019  2:46 PM EDT -----  Hi,     Please order pulmonary rehabilitation for the patient.  Thank you

## 2019-11-19 ENCOUNTER — OFFICE VISIT (OUTPATIENT)
Dept: PULMONOLOGY | Facility: CLINIC | Age: 64
End: 2019-11-19

## 2019-11-19 VITALS
SYSTOLIC BLOOD PRESSURE: 146 MMHG | WEIGHT: 230 LBS | OXYGEN SATURATION: 94 % | TEMPERATURE: 98 F | BODY MASS INDEX: 31.15 KG/M2 | HEART RATE: 93 BPM | DIASTOLIC BLOOD PRESSURE: 82 MMHG | HEIGHT: 72 IN

## 2019-11-19 DIAGNOSIS — G47.34 NOCTURNAL OXYGEN DESATURATION: ICD-10-CM

## 2019-11-19 DIAGNOSIS — R91.1 NODULE OF RIGHT LUNG: ICD-10-CM

## 2019-11-19 DIAGNOSIS — J44.1 COPD WITH ACUTE EXACERBATION (HCC): Primary | ICD-10-CM

## 2019-11-19 PROCEDURE — 99214 OFFICE O/P EST MOD 30 MIN: CPT | Performed by: INTERNAL MEDICINE

## 2019-11-19 PROCEDURE — 94640 AIRWAY INHALATION TREATMENT: CPT | Performed by: INTERNAL MEDICINE

## 2019-11-19 RX ORDER — AZITHROMYCIN 250 MG/1
TABLET, FILM COATED ORAL
Qty: 6 TABLET | Refills: 0 | Status: SHIPPED | OUTPATIENT
Start: 2019-11-19 | End: 2020-06-15

## 2019-11-19 RX ORDER — PREDNISONE 20 MG/1
40 TABLET ORAL DAILY
Qty: 20 TABLET | Refills: 0 | Status: SHIPPED | OUTPATIENT
Start: 2019-11-19 | End: 2019-11-29

## 2019-11-19 NOTE — PROGRESS NOTES
Subjective    Maximo Kwon presents for the following Shortness of Breath  .    History of Present Illness   Mr. Kwon is a 64-year old very pleasant gentleman with a past medical history of COPD.  He presents to pulmonary outpatient clinic as a regular follow-up. He reports feeling shortness of breath from last 1 week.  His shortness of breath started gradually.  Shortness of breath initially started on exertion which has progressed to shortness of breath even at rest and last 3 4 days.  The symptom of shortness of breath are always present.  Shortness of breath is not assistive any chest pain.  But he does complain of intermittent wheezing and green productive cough for last to 3 days.  He denies any fever or chills.  He does complain of worsening of shortness of breath on activity and decreases with inhalers and rest.  He denies any discomfort while laying down flat.  He denies any night sweats or weight loss.  His cough is productive in nature.  He produces 2 tablespoon of sputum over 24 hours.  The sputum color is green and has mucoid consistency.  He denies any blood in the sputum.  His wheezing happens mostly during the nighttime.  Wheezing response to bronchodilators.  He does have a runny nose with nasal stuffiness and headaches in the early morning.  He does come in a symptom of excessive mucus that drips down the back of the nose and throat.      Review of system  ROS       General: Negative for fatigue or fever  Psychological: Negative for anxiety or depression  Ophthalmic: Negative for blurry vision or double vision  ENT: Negative for epistaxis or hearing changes  Allergy and immunology: Negative for hives or nasal congestion  Hematological and lymphatic: Negative for jaundice or night sweats  Endocrine: Negative for lethargy, temperature intolerance  Respiratory: Positive for shortness of breath.  Negative for cough.  Positive for wheezing.  Cardiovascular: Negative for chest pain.  Positive for  dyspnea on exertion  Gastrointestinal: No abdominal pain, no change in bowel habits  Musculoskeletal: Negative for joint swelling and joint pain  Neurological: No TIA or stroke symptoms  Dermatological: Negative for acne or eczema        Past Medical History:  Past Medical History:   Diagnosis Date   • Arthritis    • COPD (chronic obstructive pulmonary disease) (CMS/[facility])    • Depression    • Diabetes mellitus (CMS/[facility])    • Diverticulitis    • Dyslipidemia    • Emphysema (subcutaneous) (surgical) resulting from a procedure    • GERD (gastroesophageal reflux disease)    • Hypertension    • Neuropathy    • Pneumothorax     Left, status post chest tube       Family History:  Family History   Problem Relation Age of Onset   • Alzheimer's disease Mother    • Cancer Father         THROAT CANCER   • Diabetes Sister    • Diabetes Brother        Social History:  Social History     Tobacco Use   • Smoking status: Former Smoker     Packs/day: 1.00     Years: 45.00     Pack years: 45.00     Types: Cigarettes     Last attempt to quit: 2013     Years since quittin.8   • Smokeless tobacco: Never Used   Substance Use Topics   • Alcohol use: No       Current Medications:  Current Outpatient Medications   Medication Sig Dispense Refill   • albuterol (PROVENTIL) (2.5 MG/3ML) 0.083% nebulizer solution Take 2.5 mg by nebulization 4 (Four) Times a Day As Needed for Wheezing.     • budesonide-formoterol (SYMBICORT) 160-4.5 MCG/ACT inhaler Inhale 2 puffs 2 (Two) Times a Day. 1 inhaler 11   • diltiaZEM CD (CARDIZEM CD) 120 MG 24 hr capsule Take 1 capsule by mouth Daily. 30 capsule 11   • glyBURIDE (DIAbeta) 5 MG tablet Take 5 mg by mouth Daily With Breakfast.     • hydrochlorothiazide (HYDRODIURIL) 25 MG tablet Take 25 mg by mouth Daily.     • levETIRAcetam (KEPPRA) 750 MG tablet Take 375 mg by mouth 2 (Two) Times a Day. Prior to [facility] Admission, Patient was on: 1/2 to 1 tablet twice a day     • lisinopril (PRINIVIL,ZESTRIL) 20 MG  "tablet Take 20 mg by mouth Daily.     • meloxicam (MOBIC) 15 MG tablet      • metFORMIN (GLUCOPHAGE) 500 MG tablet Take 1,000 mg by mouth 2 (Two) Times a Day With Meals.     • montelukast (SINGULAIR) 10 MG tablet Take 1 tablet by mouth Every Night. 30 tablet 0   • Omega-3 Fatty Acids (FISH OIL) 1000 MG capsule capsule Take 1,000 mg by mouth Daily With Breakfast.     • omeprazole (priLOSEC) 20 MG capsule Take 20 mg by mouth Daily.     • tiotropium bromide monohydrate (SPIRIVA RESPIMAT) 2.5 MCG/ACT aerosol solution inhaler Inhale 2 puffs Daily.       No current facility-administered medications for this visit.        Allergies:  No Known Allergies    Vitals:  /82   Pulse 93   Temp 98 °F (36.7 °C)   Ht 182.9 cm (72\")   Wt 104 kg (230 lb)   SpO2 94%   BMI 31.19 kg/m²     Results for orders placed or performed during the hospital encounter of 05/14/19   Fungus Culture - Lavage, Lung, Right Upper Lobe   Result Value Ref Range    Fungus Stain Final report     KOH/Calcofluor preparation Comment     Culture Final report     Fungus (Mycology) Result 1 Comment    AFB Culture - Lavage, Lung, Right Upper Lobe   Result Value Ref Range    AFB Specimen Processing Concentration     Acid Fast Smear Negative     Culture Negative    BAL Culture, Quantitative - Lavage, Lung, Right Upper Lobe   Result Value Ref Range    BAL Culture <25,000 CFU/mL Normal Respiratory Gema     Gram Stain WBCs seen     Gram Stain Gram positive cocci in pairs    Fungus Culture - Lavage, Lung, Right Lower Lobe   Result Value Ref Range    Fungus Stain Final report     KOH/Calcofluor preparation Comment     Culture Final report     Fungus (Mycology) Result 1 Comment    AFB Culture - Lavage, Lung, Right Lower Lobe   Result Value Ref Range    AFB Specimen Processing Concentration     Acid Fast Smear Negative     Culture Negative    BAL Culture, Quantitative - Lavage, Lung, Right Lower Lobe   Result Value Ref Range    BAL Culture No growth     Gram " Stain WBCs seen     Gram Stain No organisms seen    Comprehensive Metabolic Panel   Result Value Ref Range    Glucose 138 (H) 65 - 99 mg/dL    BUN 18 8 - 23 mg/dL    Creatinine 1.11 0.76 - 1.27 mg/dL    Sodium 140 136 - 145 mmol/L    Potassium 4.0 3.5 - 5.2 mmol/L    Chloride 99 98 - 107 mmol/L    CO2 30.6 (H) 22.0 - 29.0 mmol/L    Calcium 9.0 8.6 - 10.5 mg/dL    Total Protein 7.6 6.0 - 8.5 g/dL    Albumin 4.63 3.50 - 5.20 g/dL    ALT (SGPT) 25 1 - 41 U/L    AST (SGOT) 16 1 - 40 U/L    Alkaline Phosphatase 79 39 - 117 U/L    Total Bilirubin 0.3 0.2 - 1.2 mg/dL    eGFR Non African Amer 67 >60 mL/min/1.73    Globulin 3.0 gm/dL    A/G Ratio 1.6 g/dL    BUN/Creatinine Ratio 16.2 7.0 - 25.0    Anion Gap 10.4 mmol/L   Protime-INR   Result Value Ref Range    Protime 12.7 11.0 - 15.4 Seconds    INR 0.91 0.90 - 1.10   CBC Auto Differential   Result Value Ref Range    WBC 6.32 3.40 - 10.80 10*3/mm3    RBC 4.51 4.14 - 5.80 10*6/mm3    Hemoglobin 13.3 13.0 - 17.7 g/dL    Hematocrit 40.7 37.5 - 51.0 %    MCV 90.2 79.0 - 97.0 fL    MCH 29.5 26.6 - 33.0 pg    MCHC 32.7 31.5 - 35.7 g/dL    RDW 16.2 (H) 12.3 - 15.4 %    RDW-SD 52.3 37.0 - 54.0 fl    MPV 11.0 6.0 - 12.0 fL    Platelets 210 140 - 450 10*3/mm3    Neutrophil % 65.8 42.7 - 76.0 %    Lymphocyte % 19.1 (L) 19.6 - 45.3 %    Monocyte % 9.7 5.0 - 12.0 %    Eosinophil % 4.4 0.3 - 6.2 %    Basophil % 0.5 0.0 - 1.5 %    Immature Grans % 0.5 0.0 - 0.5 %    Neutrophils, Absolute 4.16 1.70 - 7.00 10*3/mm3    Lymphocytes, Absolute 1.21 0.70 - 3.10 10*3/mm3    Monocytes, Absolute 0.61 0.10 - 0.90 10*3/mm3    Eosinophils, Absolute 0.28 0.00 - 0.40 10*3/mm3    Basophils, Absolute 0.03 0.00 - 0.20 10*3/mm3    Immature Grans, Absolute 0.03 0.00 - 0.05 10*3/mm3   Body fluid cell count - Lavage, Lung, Right Upper Lobe   Result Value Ref Range    Color, Fluid Colorless     Appearance, Fluid Slightly Cloudy (A) Clear    RBC, Fluid  /mm3    Nucleated Cells, Fluid 480 /mm3   Body fluid cell  count - Lavage, Lung, Right Lower Lobe   Result Value Ref Range    Color, Fluid Colorless     Appearance, Fluid Slightly Cloudy (A) Clear    Nucleated Cells, Fluid 283 /mm3   Body fluid differential - Lavage, Lung, Right Upper Lobe   Result Value Ref Range    Neutrophils, Fluid 82 %    Lymphocytes, Fluid 6 %    Eosinophils, Fluid 1 %    Mononuclear, Fluid 11 %   Body fluid differential - Lavage, Lung, Right Lower Lobe   Result Value Ref Range    Neutrophils, Fluid 75 %    Lymphocytes, Fluid 2 %    Eosinophils, Fluid 1 %    Mononuclear, Fluid 22 %   Non-gynecologic Cytology   Result Value Ref Range    Case Report       [facility] Non-Gyn Cytology                           Case: [id number]                                  Authorizing Provider:  Eladio Bethea MD       Collected:           05/14/2019 10:26 AM          Ordering Location:     [facility]      Received:            05/14/2019 12:53 PM                                 OPERATING ROOM DEPARTMENT                                                    Pathologist:           Kim Babcock MD                                                        Specimens:   1) - Lung, Right Upper Lobe, RUL CYTO                                                               2) - Lung, Right Lower Lobe, rll cyto                                                     Tissue Pathology Exam   Result Value Ref Range    Case Report       Surgical Pathology Report                         Case: [id number]                                  Authorizing Provider:  Eladio Bethea MD       Collected:           05/14/2019 10:28 AM          Ordering Location:     [facility]      Received:            05/14/2019 12:53 PM                                 OPERATING ROOM DEPARTMENT                                                    Pathologist:           Kim Babcock MD                                                        Specimens:   1) - Lung, Right Lower Lobe, RLL  TRANSBRONCHIAL BX                                                  2) - Lung, Right Upper Lobe, RUL TRANSBRONCHIAL BX                                         Final Diagnosis       See Scanned Report           Objective   Physical Exam:  Physical Exam   Constitutional: He is oriented to person, place, and time. He appears well-developed and well-nourished.   HENT:   Head: Normocephalic and atraumatic.   Eyes: Conjunctivae are normal. Pupils are equal, round, and reactive to light.   Neck: Normal range of motion. Neck supple.   Cardiovascular: Normal rate, regular rhythm and normal heart sounds.   No murmur heard.  Pulmonary/Chest: Effort normal. He has wheezes. He has no rales.   Abdominal: Soft. Bowel sounds are normal.   Musculoskeletal: Normal range of motion. He exhibits no edema.   Neurological: He is alert and oriented to person, place, and time. No cranial nerve deficit.   Skin: Skin is warm and dry.   Psychiatric: He has a normal mood and affect. His behavior is normal.   Vitals reviewed.          Labs:  Lab Results   Component Value Date    PHART 7.494 (H) 03/04/2019    EIO2PRH 32.6 (L) 03/04/2019    PO2ART 64.4 (L) 03/04/2019    BVB8IVU 24.5 03/04/2019    BASEEXCESS 1.8 03/04/2019    V9TEWKQC 92.6 03/04/2019     Lab Results   Component Value Date    GLUCOSE 138 (H) 05/14/2019    CALCIUM 9.0 05/14/2019     05/14/2019    K 4.0 05/14/2019    CO2 30.6 (H) 05/14/2019    CL 99 05/14/2019    BUN 18 05/14/2019    CREATININE 1.11 05/14/2019    EGFRIFNONA 67 05/14/2019    BCR 16.2 05/14/2019    ANIONGAP 10.4 05/14/2019     Lab Results   Component Value Date    WBC 6.32 05/14/2019    HGB 13.3 05/14/2019    HCT 40.7 05/14/2019    MCV 90.2 05/14/2019     05/14/2019       I have reviewed the NM PET scan skull base to mid thigh that was performed on 8/23/2019 which showed 2 nodules in the right lung but they are not hypermetabolic.    Assessment/Plan   1. COPD with acute exacerbation (CMS/HCC)  2. Nodule of  right lung  3. Nocturnal oxygen desaturation      Plan  Patient was given inhaler treatment during the clinic visit due to diffuse wheezing.  Started on p.o. steroids for total 10 days  Started on p.o. Azithromycin  Continue with albuterol inhaler  Continue with Symbicort inhaler  Continue with Spiriva inhaler   Patient is enrolled for Pulmonary rehabilitation.  Continue with 2 L/min of supplemental oxygen during sleep to avoid nocturnal desaturation   Follow-up CT in 8 months to reevaluate the nodule in the right lung.      Follow up in 3 months and as needed.

## 2020-03-05 ENCOUNTER — HOSPITAL ENCOUNTER (EMERGENCY)
Facility: HOSPITAL | Age: 65
Discharge: HOME | End: 2020-03-05
Attending: EMERGENCY MEDICINE | Admitting: EMERGENCY MEDICINE

## 2020-03-05 ENCOUNTER — APPOINTMENT (OUTPATIENT)
Dept: CT IMAGING | Facility: HOSPITAL | Age: 65
End: 2020-03-05

## 2020-03-05 ENCOUNTER — APPOINTMENT (OUTPATIENT)
Dept: GENERAL RADIOLOGY | Facility: HOSPITAL | Age: 65
End: 2020-03-05

## 2020-03-05 VITALS
RESPIRATION RATE: 20 BRPM | TEMPERATURE: 98.2 F | BODY MASS INDEX: 28.44 KG/M2 | HEIGHT: 72 IN | HEART RATE: 102 BPM | OXYGEN SATURATION: 98 % | DIASTOLIC BLOOD PRESSURE: 64 MMHG | SYSTOLIC BLOOD PRESSURE: 107 MMHG | WEIGHT: 210 LBS

## 2020-03-05 DIAGNOSIS — D72.829 LEUKOCYTOSIS, UNSPECIFIED TYPE: ICD-10-CM

## 2020-03-05 DIAGNOSIS — A08.11 GASTROENTERITIS DUE TO NOROVIRUS: Primary | ICD-10-CM

## 2020-03-05 LAB
027 TOXIN: NORMAL
A-A DO2: 30.1 MMHG (ref 0–300)
ADV 40+41 DNA STL QL NAA+NON-PROBE: NOT DETECTED
ALBUMIN SERPL-MCNC: 4.69 G/DL (ref 3.5–5.2)
ALBUMIN/GLOB SERPL: 1.6 G/DL
ALP SERPL-CCNC: 83 U/L (ref 39–117)
ALT SERPL W P-5'-P-CCNC: 22 U/L (ref 1–41)
ANION GAP SERPL CALCULATED.3IONS-SCNC: 14.6 MMOL/L (ref 5–15)
ARTERIAL PATENCY WRIST A: ABNORMAL
AST SERPL-CCNC: 14 U/L (ref 1–40)
ASTRO TYP 1-8 RNA STL QL NAA+NON-PROBE: NOT DETECTED
ATMOSPHERIC PRESS: 729 MMHG
BASE EXCESS BLDA CALC-SCNC: 1.5 MMOL/L (ref 0–2)
BDY SITE: ABNORMAL
BILIRUB SERPL-MCNC: 0.4 MG/DL (ref 0.2–1.2)
BILIRUB UR QL STRIP: NEGATIVE
BODY TEMPERATURE: 0 C
BUN BLD-MCNC: 30 MG/DL (ref 8–23)
BUN/CREAT SERPL: 27.8 (ref 7–25)
C CAYETANENSIS DNA STL QL NAA+NON-PROBE: NOT DETECTED
C DIFF TOX GENS STL QL NAA+PROBE: NEGATIVE
CALCIUM SPEC-SCNC: 9.5 MG/DL (ref 8.6–10.5)
CAMPY SP DNA.DIARRHEA STL QL NAA+PROBE: NOT DETECTED
CHLORIDE SERPL-SCNC: 91 MMOL/L (ref 98–107)
CLARITY UR: ABNORMAL
CO2 BLDA-SCNC: 25.8 MMOL/L (ref 22–33)
CO2 SERPL-SCNC: 29.4 MMOL/L (ref 22–29)
COHGB MFR BLD: 0.8 % (ref 0–5)
COLOR UR: YELLOW
CREAT BLD-MCNC: 1.08 MG/DL (ref 0.76–1.27)
CRYPTOSP STL CULT: NOT DETECTED
D-LACTATE SERPL-SCNC: 3.7 MMOL/L (ref 0.5–2)
DEPRECATED RDW RBC AUTO: 44.4 FL (ref 37–54)
E COLI DNA SPEC QL NAA+PROBE: NOT DETECTED
E HISTOLYT AG STL-ACNC: NOT DETECTED
EAEC PAA PLAS AGGR+AATA ST NAA+NON-PRB: NOT DETECTED
EC STX1 + STX2 GENES STL NAA+PROBE: NOT DETECTED
EOSINOPHIL # BLD MANUAL: 0.27 10*3/MM3 (ref 0–0.4)
EOSINOPHIL NFR BLD MANUAL: 1 % (ref 0.3–6.2)
EPEC EAE GENE STL QL NAA+NON-PROBE: NOT DETECTED
ERYTHROCYTE [DISTWIDTH] IN BLOOD BY AUTOMATED COUNT: 13.6 % (ref 12.3–15.4)
ETEC LTA+ST1A+ST1B TOX ST NAA+NON-PROBE: NOT DETECTED
FLUAV AG NPH QL: NEGATIVE
FLUBV AG NPH QL IA: NEGATIVE
G LAMBLIA DNA SPEC QL NAA+PROBE: NOT DETECTED
GFR SERPL CREATININE-BSD FRML MDRD: 69 ML/MIN/1.73
GLOBULIN UR ELPH-MCNC: 2.9 GM/DL
GLUCOSE BLD-MCNC: 335 MG/DL (ref 65–99)
GLUCOSE UR STRIP-MCNC: ABNORMAL MG/DL
HCO3 BLDA-SCNC: 24.7 MMOL/L (ref 20–26)
HCT VFR BLD AUTO: 49 % (ref 37.5–51)
HCT VFR BLD CALC: 49.7 % (ref 38–51)
HEMOCCULT STL QL: NEGATIVE
HGB BLD-MCNC: 16.2 G/DL (ref 13–17.7)
HGB BLDA-MCNC: 16.2 G/DL (ref 14–18)
HGB UR QL STRIP.AUTO: NEGATIVE
HOROWITZ INDEX BLD+IHG-RTO: 21 %
KETONES UR QL STRIP: ABNORMAL
LACTATE HOLD SPECIMEN: NORMAL
LACTOFERRIN STL QL LA: POSITIVE
LEUKOCYTE ESTERASE UR QL STRIP.AUTO: NEGATIVE
LIPASE SERPL-CCNC: 28 U/L (ref 13–60)
LYMPHOCYTES # BLD MANUAL: 1.09 10*3/MM3 (ref 0.7–3.1)
LYMPHOCYTES NFR BLD MANUAL: 4 % (ref 19.6–45.3)
LYMPHOCYTES NFR BLD MANUAL: 4 % (ref 5–12)
Lab: ABNORMAL
MCH RBC QN AUTO: 29.8 PG (ref 26.6–33)
MCHC RBC AUTO-ENTMCNC: 33.1 G/DL (ref 31.5–35.7)
MCV RBC AUTO: 90.2 FL (ref 79–97)
METAMYELOCYTES NFR BLD MANUAL: 1 % (ref 0–0)
METHGB BLD QL: 0.3 % (ref 0–3)
MODALITY: ABNORMAL
MONOCYTES # BLD AUTO: 1.09 10*3/MM3 (ref 0.1–0.9)
NEUTROPHILS # BLD AUTO: 24.56 10*3/MM3 (ref 1.7–7)
NEUTROPHILS NFR BLD MANUAL: 77 % (ref 42.7–76)
NEUTS BAND NFR BLD MANUAL: 13 % (ref 0–5)
NITRITE UR QL STRIP: NEGATIVE
NOROVIRUS GI+II RNA STL QL NAA+NON-PROBE: DETECTED
NOTE: ABNORMAL
OXYHGB MFR BLDV: 94.4 % (ref 94–99)
P SHIGELLOIDES DNA STL QL NAA+PROBE: NOT DETECTED
PCO2 BLDA: 34.3 MM HG (ref 35–45)
PCO2 TEMP ADJ BLD: ABNORMAL MM[HG]
PH BLDA: 7.47 PH UNITS (ref 7.35–7.45)
PH UR STRIP.AUTO: <=5 [PH] (ref 5–8)
PH, TEMP CORRECTED: ABNORMAL
PLAT MORPH BLD: NORMAL
PLATELET # BLD AUTO: 332 10*3/MM3 (ref 140–450)
PMV BLD AUTO: 11.4 FL (ref 6–12)
PO2 BLDA: 74.5 MM HG (ref 83–108)
PO2 TEMP ADJ BLD: ABNORMAL MM[HG]
POTASSIUM BLD-SCNC: 4 MMOL/L (ref 3.5–5.2)
PROT SERPL-MCNC: 7.6 G/DL (ref 6–8.5)
PROT UR QL STRIP: NEGATIVE
RBC # BLD AUTO: 5.43 10*6/MM3 (ref 4.14–5.8)
RBC MORPH BLD: NORMAL
RV RNA STL NAA+PROBE: NOT DETECTED
SALMONELLA DNA SPEC QL NAA+PROBE: NOT DETECTED
SAO2 % BLDCOA: 95.4 % (ref 94–99)
SAPO I+II+IV+V RNA STL QL NAA+NON-PROBE: NOT DETECTED
SCAN SLIDE: NORMAL
SHIGELLA SP+EIEC IPAH STL QL NAA+PROBE: NOT DETECTED
SODIUM BLD-SCNC: 135 MMOL/L (ref 136–145)
SP GR UR STRIP: 1.03 (ref 1–1.03)
TROPONIN T SERPL-MCNC: <0.01 NG/ML (ref 0–0.03)
UROBILINOGEN UR QL STRIP: ABNORMAL
V CHOLERAE DNA SPEC QL NAA+PROBE: NOT DETECTED
VENTILATOR MODE: ABNORMAL
VIBRIO DNA SPEC NAA+PROBE: NOT DETECTED
WBC NRBC COR # BLD: 27.29 10*3/MM3 (ref 3.4–10.8)
YERSINIA STL CULT: NOT DETECTED

## 2020-03-05 PROCEDURE — 96376 TX/PRO/DX INJ SAME DRUG ADON: CPT

## 2020-03-05 PROCEDURE — 83690 ASSAY OF LIPASE: CPT | Performed by: EMERGENCY MEDICINE

## 2020-03-05 PROCEDURE — 82375 ASSAY CARBOXYHB QUANT: CPT

## 2020-03-05 PROCEDURE — 74176 CT ABD & PELVIS W/O CONTRAST: CPT | Performed by: RADIOLOGY

## 2020-03-05 PROCEDURE — 85025 COMPLETE CBC W/AUTO DIFF WBC: CPT | Performed by: EMERGENCY MEDICINE

## 2020-03-05 PROCEDURE — 87040 BLOOD CULTURE FOR BACTERIA: CPT | Performed by: EMERGENCY MEDICINE

## 2020-03-05 PROCEDURE — 81003 URINALYSIS AUTO W/O SCOPE: CPT | Performed by: EMERGENCY MEDICINE

## 2020-03-05 PROCEDURE — 71045 X-RAY EXAM CHEST 1 VIEW: CPT | Performed by: RADIOLOGY

## 2020-03-05 PROCEDURE — 82805 BLOOD GASES W/O2 SATURATION: CPT

## 2020-03-05 PROCEDURE — 84484 ASSAY OF TROPONIN QUANT: CPT | Performed by: EMERGENCY MEDICINE

## 2020-03-05 PROCEDURE — 82272 OCCULT BLD FECES 1-3 TESTS: CPT | Performed by: EMERGENCY MEDICINE

## 2020-03-05 PROCEDURE — 83630 LACTOFERRIN FECAL (QUAL): CPT | Performed by: EMERGENCY MEDICINE

## 2020-03-05 PROCEDURE — 94799 UNLISTED PULMONARY SVC/PX: CPT

## 2020-03-05 PROCEDURE — 0097U HC BIOFIRE FILMARRAY GI PANEL: CPT | Performed by: EMERGENCY MEDICINE

## 2020-03-05 PROCEDURE — 83050 HGB METHEMOGLOBIN QUAN: CPT

## 2020-03-05 PROCEDURE — 71045 X-RAY EXAM CHEST 1 VIEW: CPT

## 2020-03-05 PROCEDURE — 83605 ASSAY OF LACTIC ACID: CPT | Performed by: EMERGENCY MEDICINE

## 2020-03-05 PROCEDURE — 87493 C DIFF AMPLIFIED PROBE: CPT | Performed by: EMERGENCY MEDICINE

## 2020-03-05 PROCEDURE — 85007 BL SMEAR W/DIFF WBC COUNT: CPT | Performed by: EMERGENCY MEDICINE

## 2020-03-05 PROCEDURE — 80053 COMPREHEN METABOLIC PANEL: CPT | Performed by: EMERGENCY MEDICINE

## 2020-03-05 PROCEDURE — 96374 THER/PROPH/DIAG INJ IV PUSH: CPT

## 2020-03-05 PROCEDURE — 99285 EMERGENCY DEPT VISIT HI MDM: CPT

## 2020-03-05 PROCEDURE — 87804 INFLUENZA ASSAY W/OPTIC: CPT | Performed by: EMERGENCY MEDICINE

## 2020-03-05 PROCEDURE — 94640 AIRWAY INHALATION TREATMENT: CPT

## 2020-03-05 PROCEDURE — 74176 CT ABD & PELVIS W/O CONTRAST: CPT

## 2020-03-05 PROCEDURE — 25010000002 ONDANSETRON PER 1 MG: Performed by: EMERGENCY MEDICINE

## 2020-03-05 PROCEDURE — 36600 WITHDRAWAL OF ARTERIAL BLOOD: CPT

## 2020-03-05 RX ORDER — SODIUM CHLORIDE 0.9 % (FLUSH) 0.9 %
10 SYRINGE (ML) INJECTION AS NEEDED
Status: DISCONTINUED | OUTPATIENT
Start: 2020-03-05 | End: 2020-03-05 | Stop reason: HOSPADM

## 2020-03-05 RX ORDER — ONDANSETRON 2 MG/ML
4 INJECTION INTRAMUSCULAR; INTRAVENOUS ONCE
Status: COMPLETED | OUTPATIENT
Start: 2020-03-05 | End: 2020-03-05

## 2020-03-05 RX ORDER — ONDANSETRON 4 MG/1
4 TABLET, ORALLY DISINTEGRATING ORAL EVERY 6 HOURS PRN
Qty: 12 TABLET | Refills: 0 | Status: SHIPPED | OUTPATIENT
Start: 2020-03-05 | End: 2020-06-15

## 2020-03-05 RX ORDER — IPRATROPIUM BROMIDE AND ALBUTEROL SULFATE 2.5; .5 MG/3ML; MG/3ML
3 SOLUTION RESPIRATORY (INHALATION) ONCE
Status: COMPLETED | OUTPATIENT
Start: 2020-03-05 | End: 2020-03-05

## 2020-03-05 RX ORDER — BACLOFEN 20 MG/1
20 TABLET ORAL 3 TIMES DAILY
COMMUNITY
End: 2020-06-15

## 2020-03-05 RX ADMIN — SODIUM CHLORIDE 2000 ML: 9 INJECTION, SOLUTION INTRAVENOUS at 09:23

## 2020-03-05 RX ADMIN — IPRATROPIUM BROMIDE AND ALBUTEROL SULFATE 3 ML: .5; 3 SOLUTION RESPIRATORY (INHALATION) at 11:48

## 2020-03-05 RX ADMIN — ONDANSETRON 4 MG: 2 INJECTION INTRAMUSCULAR; INTRAVENOUS at 10:23

## 2020-03-05 RX ADMIN — ONDANSETRON 4 MG: 2 INJECTION INTRAMUSCULAR; INTRAVENOUS at 09:24

## 2020-03-05 NOTE — ED NOTES
Patient placed in contact isolation, supplies at the bedside.      Kaela Arvizu RN  03/05/20 [id number]

## 2020-03-10 LAB
BACTERIA SPEC AEROBE CULT: NORMAL
BACTERIA SPEC AEROBE CULT: NORMAL

## 2020-06-09 ENCOUNTER — OFFICE VISIT (OUTPATIENT)
Dept: PULMONOLOGY | Facility: CLINIC | Age: 65
End: 2020-06-09

## 2020-06-09 VITALS
DIASTOLIC BLOOD PRESSURE: 76 MMHG | TEMPERATURE: 97.1 F | HEART RATE: 100 BPM | HEIGHT: 72 IN | OXYGEN SATURATION: 93 % | WEIGHT: 210 LBS | BODY MASS INDEX: 28.44 KG/M2 | SYSTOLIC BLOOD PRESSURE: 146 MMHG

## 2020-06-09 DIAGNOSIS — Z99.81 DEPENDENCE ON NOCTURNAL OXYGEN THERAPY: ICD-10-CM

## 2020-06-09 DIAGNOSIS — J43.2 CENTRILOBULAR EMPHYSEMA (HCC): ICD-10-CM

## 2020-06-09 DIAGNOSIS — I51.9 DIASTOLIC DYSFUNCTION, LEFT VENTRICLE: ICD-10-CM

## 2020-06-09 DIAGNOSIS — R91.1 PULMONARY NODULE: Primary | ICD-10-CM

## 2020-06-09 PROCEDURE — 99213 OFFICE O/P EST LOW 20 MIN: CPT | Performed by: INTERNAL MEDICINE

## 2020-06-09 NOTE — PROGRESS NOTES
Subjective    Maximo Kwon presents for the following COPD  .    History of Present Illness   Mr. Kwon is a very pleasant 65-year-old gentleman with a past medical history of COPD, nocturnal desaturation, diastolic dysfunction of the heart, pulmonary nodule.  He presents to pulmonary outpatient clinic as a regular follow-up.  He reports that his shortness of breath is stable.  Symptoms of shortness of breath are intermittent.  Shortness of breath is not necessarily chest pain wheezing or coughing.  He denies any fever or chills.  Shortness of breath increases with activity and decreased on rest.  He is currently using duo nebs, Symbicort nebs and Spiriva nebs.  He attributes his shortness of breath mostly to deconditioning due to self current dining in [employer].  He denies any night sweats or weight loss.  He denies any recent contact with sick patient.         Review of system  ROS  General: Negative for fatigue or fever  Psychological: Negative for anxiety or depression  Ophthalmic: Negative for blurry vision or double vision  ENT: Negative for epistaxis or hearing changes  Allergy and immunology: Negative for hives or nasal congestion  Hematological and lymphatic: Negative for jaundice or night sweats  Endocrine: Negative for lethargy, temperature intolerance  Respiratory: Stable shortness of breath.  Negative for cough.  Negative for wheezing.  Cardiovascular: Negative for chest pain.  Positive for dyspnea on exertion  Gastrointestinal: No abdominal pain, no change in bowel habits  Musculoskeletal: Negative for joint swelling and joint pain  Neurological: No TIA or stroke symptoms  Dermatological: Negative for acne or eczema      Past Medical History:  Past Medical History:   Diagnosis Date   • Arthritis    • COPD (chronic obstructive pulmonary disease) (CMS/HCC)    • Depression    • Diabetes mellitus (CMS/[facility])    • Diverticulitis    • Dyslipidemia    • Emphysema (subcutaneous) (surgical) resulting from  a procedure    • GERD (gastroesophageal reflux disease)    • Hypertension    • Neuropathy    • Pneumothorax     Left, status post chest tube       Family History:  Family History   Problem Relation Age of Onset   • Alzheimer's disease Mother    • Cancer Father         THROAT CANCER   • Diabetes Sister    • Diabetes Brother        Social History:  Social History     Tobacco Use   • Smoking status: Former Smoker     Packs/day: 1.00     Years: 45.00     Pack years: 45.00     Types: Cigarettes     Last attempt to quit: 2013     Years since quittin.4   • Smokeless tobacco: Never Used   Substance Use Topics   • Alcohol use: No       Current Medications:  Current Outpatient Medications   Medication Sig Dispense Refill   • albuterol (PROVENTIL) (2.5 MG/3ML) 0.083% nebulizer solution Take 2.5 mg by nebulization 4 (Four) Times a Day As Needed for Wheezing.     • baclofen (LIORESAL) 20 MG tablet Take 20 mg by mouth 3 (Three) Times a Day.     • budesonide-formoterol (SYMBICORT) 160-4.5 MCG/ACT inhaler Inhale 2 puffs 2 (Two) Times a Day. 1 inhaler 11   • diltiaZEM CD (CARDIZEM CD) 120 MG 24 hr capsule Take 1 capsule by mouth Daily. 30 capsule 11   • glyBURIDE (DIAbeta) 5 MG tablet Take 5 mg by mouth Daily With Breakfast.     • hydrochlorothiazide (HYDRODIURIL) 25 MG tablet Take 25 mg by mouth Daily.     • levETIRAcetam (KEPPRA) 750 MG tablet Take 375 mg by mouth 2 (Two) Times a Day. Prior to [facility] Admission, Patient was on: 1/2 to 1 tablet twice a day     • lisinopril (PRINIVIL,ZESTRIL) 20 MG tablet Take 20 mg by mouth Daily.     • meloxicam (MOBIC) 15 MG tablet      • metFORMIN (GLUCOPHAGE) 500 MG tablet Take 1,000 mg by mouth 2 (Two) Times a Day With Meals.     • montelukast (SINGULAIR) 10 MG tablet Take 1 tablet by mouth Every Night. 30 tablet 0   • Omega-3 Fatty Acids (FISH OIL) 1000 MG capsule capsule Take 1,000 mg by mouth Daily With Breakfast.     • omeprazole (priLOSEC) 20 MG capsule Take 20 mg by mouth Daily.    "  • ondansetron ODT (ZOFRAN-ODT) 4 MG disintegrating tablet Take 1 tablet by mouth Every 6 (Six) Hours As Needed for Nausea or Vomiting. 12 tablet 0   • tiotropium bromide monohydrate (SPIRIVA RESPIMAT) 2.5 MCG/ACT aerosol solution inhaler Inhale 2 puffs Daily.     • azithromycin (ZITHROMAX) 250 MG tablet Take 2 by mouth today then 1 daily for 4 days 6 tablet 0     No current facility-administered medications for this visit.        Allergies:  No Known Allergies    Vitals:  /76   Pulse 100   Temp 97.1 °F (36.2 °C)   Ht 182.9 cm (72\")   Wt 95.3 kg (210 lb)   SpO2 93%   BMI 28.48 kg/m²     Results for orders placed or performed during the hospital encounter of 03/05/20   Blood Culture - Blood, Arm, Right   Result Value Ref Range    Blood Culture No growth at 5 days    Blood Culture - Blood, Arm, Right   Result Value Ref Range    Blood Culture No growth at 5 days    Influenza Antigen, Rapid - Swab, Nasopharynx   Result Value Ref Range    Influenza A Ag, EIA Negative Negative    Influenza B Ag, EIA Negative Negative   Clostridium Difficile Toxin, PCR - Stool, Per Rectum   Result Value Ref Range    C. Difficile Toxins by PCR Negative Negative    027 Toxin Presumptive Negative    Gastrointestinal Panel, PCR - Stool, Per Rectum   Result Value Ref Range    Campylobacter Not Detected Not Detected    Plesiomonas shigelloides Not Detected Not Detected    Salmonella Not Detected Not Detected    Vibrio Not Detected Not Detected    Vibrio cholerae Not Detected Not Detected    Yersinia enterocolitica Not Detected Not Detected    Enteroaggregative E. coli (EAEC) Not Detected Not Detected    Enteropathogenic E. coli (EPEC) Not Detected Not Detected    Enterotoxigenic E. coli (ETEC) lt/st Not Detected Not Detected    Shiga-like toxin-producing E. coli (STEC) stx1/stx2 Not Detected Not Detected    E. coli O157 Not Detected Not Detected    Shigella/Enteroinvasive E. coli (EIEC) Not Detected Not Detected    Cryptosporidium " Not Detected Not Detected    Cyclospora cayetanensis Not Detected Not Detected    Entamoeba histolytica Not Detected Not Detected    Giardia lamblia Not Detected Not Detected    Adenovirus F40/41 Not Detected Not Detected    Astrovirus Not Detected Not Detected    Norovirus GI/GII Detected (A) Not Detected    Rotavirus A Not Detected Not Detected    Sapovirus (I, II, IV or V) Not Detected Not Detected   Comprehensive Metabolic Panel   Result Value Ref Range    Glucose 335 (H) 65 - 99 mg/dL    BUN 30 (H) 8 - 23 mg/dL    Creatinine 1.08 0.76 - 1.27 mg/dL    Sodium 135 (L) 136 - 145 mmol/L    Potassium 4.0 3.5 - 5.2 mmol/L    Chloride 91 (L) 98 - 107 mmol/L    CO2 29.4 (H) 22.0 - 29.0 mmol/L    Calcium 9.5 8.6 - 10.5 mg/dL    Total Protein 7.6 6.0 - 8.5 g/dL    Albumin 4.69 3.50 - 5.20 g/dL    ALT (SGPT) 22 1 - 41 U/L    AST (SGOT) 14 1 - 40 U/L    Alkaline Phosphatase 83 39 - 117 U/L    Total Bilirubin 0.4 0.2 - 1.2 mg/dL    eGFR Non African Amer 69 >60 mL/min/1.73    Globulin 2.9 gm/dL    A/G Ratio 1.6 g/dL    BUN/Creatinine Ratio 27.8 (H) 7.0 - 25.0    Anion Gap 14.6 5.0 - 15.0 mmol/L   Lipase   Result Value Ref Range    Lipase 28 13 - 60 U/L   Urinalysis With Culture If Indicated - Urine, Clean Catch   Result Value Ref Range    Color, UA Yellow Yellow, Straw    Appearance, UA Cloudy (A) Clear    pH, UA <=5.0 5.0 - 8.0    Specific Gravity, UA 1.027 1.005 - 1.030    Glucose, UA >=1000 mg/dL (3+) (A) Negative    Ketones, UA Trace (A) Negative    Bilirubin, UA Negative Negative    Blood, UA Negative Negative    Protein, UA Negative Negative    Leuk Esterase, UA Negative Negative    Nitrite, UA Negative Negative    Urobilinogen, UA 0.2 E.U./dL 0.2 - 1.0 E.U./dL   Troponin   Result Value Ref Range    Troponin T <0.010 0.000 - 0.030 ng/mL   Fecal Lactoferrin - Stool, Per Rectum   Result Value Ref Range    Lactoferrin, Qual Positive (A) Negative   Occult Blood X 1, Stool - Stool, Per Rectum   Result Value Ref Range     Fecal Occult Blood Negative Negative   CBC Auto Differential   Result Value Ref Range    WBC 27.29 (H) 3.40 - 10.80 10*3/mm3    RBC 5.43 4.14 - 5.80 10*6/mm3    Hemoglobin 16.2 13.0 - 17.7 g/dL    Hematocrit 49.0 37.5 - 51.0 %    MCV 90.2 79.0 - 97.0 fL    MCH 29.8 26.6 - 33.0 pg    MCHC 33.1 31.5 - 35.7 g/dL    RDW 13.6 12.3 - 15.4 %    RDW-SD 44.4 37.0 - 54.0 fl    MPV 11.4 6.0 - 12.0 fL    Platelets 332 140 - 450 10*3/mm3   Blood Gas, Arterial With Co-Ox   Result Value Ref Range    Site Right Brachial     Max's Test N/A     pH, Arterial 7.466 (H) 7.350 - 7.450 pH units    pCO2, Arterial 34.3 (L) 35.0 - 45.0 mm Hg    pO2, Arterial 74.5 (L) 83.0 - 108.0 mm Hg    HCO3, Arterial 24.7 20.0 - 26.0 mmol/L    Base Excess, Arterial 1.5 0.0 - 2.0 mmol/L    O2 Saturation, Arterial 95.4 94.0 - 99.0 %    Hemoglobin, Blood Gas 16.2 14 - 18 g/dL    Hematocrit, Blood Gas 49.7 38.0 - 51.0 %    Oxyhemoglobin 94.4 94 - 99 %    Methemoglobin 0.30 0.00 - 3.00 %    Carboxyhemoglobin 0.8 0 - 5 %    A-a Gradiant 30.1 0.0 - 300.0 mmHg    CO2 Content 25.8 22 - 33 mmol/L    Temperature 0.0 C    Barometric Pressure for Blood Gas 729 mmHg    Modality Room Air     FIO2 21 %    Ventilator Mode NA     Note      Collected by [id number]     pH, Temp Corrected      pCO2, Temperature Corrected      pO2, Temperature Corrected     Scan Slide   Result Value Ref Range    Scan Slide     Lactic Acid, Plasma   Result Value Ref Range    Lactate 3.7 (C) 0.5 - 2.0 mmol/L   Lactic Acid, Reflex Timer (This will reflex a repeat order 3-3:15 hours after ordered.)   Result Value Ref Range    Hold Tube Hold for add-ons.    Manual Differential   Result Value Ref Range    Neutrophil % 77.0 (H) 42.7 - 76.0 %    Lymphocyte % 4.0 (L) 19.6 - 45.3 %    Monocyte % 4.0 (L) 5.0 - 12.0 %    Eosinophil % 1.0 0.3 - 6.2 %    Bands %  13.0 (H) 0.0 - 5.0 %    Metamyelocyte % 1.0 (H) 0.0 - 0.0 %    Neutrophils Absolute 24.56 (H) 1.70 - 7.00 10*3/mm3    Lymphocytes Absolute 1.09 0.70  - 3.10 10*3/mm3    Monocytes Absolute 1.09 (H) 0.10 - 0.90 10*3/mm3    Eosinophils Absolute 0.27 0.00 - 0.40 10*3/mm3    RBC Morphology Normal Normal    Platelet Morphology Normal Normal       Objective     Physical Exam    General Appearance:  In no acute distress and comfortable.    HEENT: Normocephalic, atraumatic, normal right and the left external ear.  Normal nose.  Lungs:  Normal effort and normal respiratory rate.  Negative for rales.  Negative for wheezes.  Negative for rhonchi.    Heart: Normal rate.  Regular rhythm.  S1 normal and S2 normal.    Abdomen: Abdomen is soft.  Bowel sounds are normal.   There is no abdominal tenderness.     Extremities: Normal range of motion.  Negative for dependent edema    Pulses: Distal pulses are intact.  There are no decreased pulses.    Neurological: Patient is alert and oriented to person, place and time.  Normal strength.    Pupils:  Pupils are equal, round, and reactive to light.    Skin:  Warm and dry.      I have reviewed the past medical history, past surgical history, family history and social history of the patient.        Labs:  Lab Results   Component Value Date    PHART 7.466 (H) 03/05/2020    GFT2YDU 34.3 (L) 03/05/2020    PO2ART 74.5 (L) 03/05/2020    QBU6ISH 24.7 03/05/2020    BASEEXCESS 1.5 03/05/2020    M1ABCGVU 95.4 03/05/2020     Lab Results   Component Value Date    GLUCOSE 335 (H) 03/05/2020    CALCIUM 9.5 03/05/2020     (L) 03/05/2020    K 4.0 03/05/2020    CO2 29.4 (H) 03/05/2020    CL 91 (L) 03/05/2020    BUN 30 (H) 03/05/2020    CREATININE 1.08 03/05/2020    EGFRIFNONA 69 03/05/2020    BCR 27.8 (H) 03/05/2020    ANIONGAP 14.6 03/05/2020     Lab Results   Component Value Date    WBC 27.29 (H) 03/05/2020    HGB 16.2 03/05/2020    HCT 49.0 03/05/2020    MCV 90.2 03/05/2020     03/05/2020         Imaging  Ct Abdomen Pelvis Without Contrast    Result Date: 3/5/2020  Impression: 1. Unremarkable exam.  No source for the patient symptoms. 2.  Other incidental/non-acute findings as above.  This report was finalized on 3/5/2020 10:30 AM by Dr. Wilfredo Lawrence MD.      Xr Chest 1 View    Result Date: 3/5/2020  No radiographic evidence of acute cardiac or pulmonary disease.  This report was finalized on 3/5/2020 10:30 AM by Dr. Wilfredo Lawrence MD.          Assessment/Plan    Diagnosis Plan   1. Pulmonary nodule   he is due for his CT scan of the chest.  Previous CT scan of the chest done 8 months ago showed pulmonary nodules.  He has 45-pack-year smoking history.  He quit in 2013.   2. Centrilobular emphysema (CMS/HCC)  Full Pulmonary Function Test With Bronchodilator   3. Dependence on nocturnal oxygen therapy   continue with 2 L of minute of supplemental oxygen at nighttime   4. Diastolic dysfunction, left ventricle  Ambulatory Referral to Cardiology           Follow up in 6 months and as needed.

## 2020-06-15 ENCOUNTER — OFFICE VISIT (OUTPATIENT)
Dept: CARDIOLOGY | Facility: CLINIC | Age: 65
End: 2020-06-15

## 2020-06-15 VITALS
BODY MASS INDEX: 29.66 KG/M2 | DIASTOLIC BLOOD PRESSURE: 80 MMHG | HEART RATE: 93 BPM | OXYGEN SATURATION: 94 % | SYSTOLIC BLOOD PRESSURE: 154 MMHG | HEIGHT: 72 IN | WEIGHT: 219 LBS | TEMPERATURE: 97.5 F

## 2020-06-15 DIAGNOSIS — I51.89 DIASTOLIC DYSFUNCTION: ICD-10-CM

## 2020-06-15 DIAGNOSIS — I10 ESSENTIAL HYPERTENSION: Primary | ICD-10-CM

## 2020-06-15 DIAGNOSIS — R00.2 PALPITATIONS: ICD-10-CM

## 2020-06-15 PROCEDURE — 99214 OFFICE O/P EST MOD 30 MIN: CPT | Performed by: INTERNAL MEDICINE

## 2020-06-15 RX ORDER — DILTIAZEM HYDROCHLORIDE 120 MG/1
120 CAPSULE, COATED, EXTENDED RELEASE ORAL DAILY
Qty: 90 CAPSULE | Refills: 1 | OUTPATIENT
Start: 2020-06-15 | End: 2021-07-09

## 2020-06-15 NOTE — PROGRESS NOTES
subjective     Chief Complaint   Patient presents with   • Hypertension     Follow up , pt denies any chest pains    • Shortness of Breath     History of Present Illness    Patient is 65 years old white male who was last seen by me a year ago he was complaining of fast heartbeat and hypertension.  He was  started on diltiazem.  Patient returns today stating that he never started  Diltiazem.  He did not want to take too many medications.  He thought that he was already taking lisinopril and there was enough for his blood pressure.  Today he complains of palpitations fast heartbeat and elevated blood pressure.  He is currently taking hydrochlorothiazide 25 mg daily and lisinopril 20 mg daily.  Patient has COPD and is taking Spiriva, albuterol inhaler and Symbicort.    He denies any chest pain.  His inhalers caused him to have fast heartbeat.  He has quit drinking coffee    Past Surgical History:   Procedure Laterality Date   • BRONCHOSCOPY N/A 5/14/2019    Procedure: Bronchoscopy with Transbronchial Biopsy with Fluoroscopy;  Surgeon: Eladio Bethea MD;  Location: [facility];  Service: Pulmonary   • CHEST TUBE INSERTION Left     Pneumothorax   • COLON RESECTION  2016    had colostomy and reveresed back    • COLONOSCOPY  2016   • COLOSTOMY CLOSURE     • LUNG BIOPSY Right 2014    (non cancerous)   • OTHER SURGICAL HISTORY Left     LEFT ELBOW SURGERY     Family History   Problem Relation Age of Onset   • Alzheimer's disease Mother    • Cancer Father         THROAT CANCER   • Diabetes Sister    • Diabetes Brother      Past Medical History:   Diagnosis Date   • Arthritis    • COPD (chronic obstructive pulmonary disease) (CMS/HCC)    • Depression    • Diabetes mellitus (CMS/HCC)    • Diverticulitis    • Dyslipidemia    • Emphysema (subcutaneous) (surgical) resulting from a procedure    • GERD (gastroesophageal reflux disease)    • Hypertension    • Neuropathy    • Pneumothorax     Left, status post chest tube     Patient  Active Problem List   Diagnosis   • Mixed hyperlipidemia   • Simple chronic bronchitis (CMS/HCC)   • Pulmonary nodule   • Essential hypertension   • Left ventricular hypertrophy with grade 1 diastolic dysfunction   • Hereditary and idiopathic peripheral neuropathy   • Other emphysema (CMS/HCC)   • Degenerative disc disease, lumbar   • Palpitations       Social History     Tobacco Use   • Smoking status: Former Smoker     Packs/day: 1.00     Years: 45.00     Pack years: 45.00     Types: Cigarettes     Last attempt to quit: 2013     Years since quittin.4   • Smokeless tobacco: Never Used   Substance Use Topics   • Alcohol use: No   • Drug use: No       No Known Allergies    Current Outpatient Medications on File Prior to Visit   Medication Sig   • albuterol (PROVENTIL) (2.5 MG/3ML) 0.083% nebulizer solution Take 2.5 mg by nebulization 4 (Four) Times a Day As Needed for Wheezing.   • budesonide-formoterol (SYMBICORT) 160-4.5 MCG/ACT inhaler Inhale 2 puffs 2 (Two) Times a Day.   • glyBURIDE (DIAbeta) 5 MG tablet Take 5 mg by mouth Daily With Breakfast.   • hydrochlorothiazide (HYDRODIURIL) 25 MG tablet Take 25 mg by mouth Daily.   • levETIRAcetam (KEPPRA) 750 MG tablet Take 375 mg by mouth 2 (Two) Times a Day. Prior to [facility] Admission, Patient was on: 1/2 to 1 tablet twice a day   • lisinopril (PRINIVIL,ZESTRIL) 20 MG tablet Take 20 mg by mouth Daily.   • meloxicam (MOBIC) 15 MG tablet    • metFORMIN (GLUCOPHAGE) 500 MG tablet Take 1,000 mg by mouth 2 (Two) Times a Day With Meals.   • montelukast (SINGULAIR) 10 MG tablet Take 1 tablet by mouth Every Night.   • Omega-3 Fatty Acids (FISH OIL) 1000 MG capsule capsule Take 1,000 mg by mouth Daily With Breakfast.   • omeprazole (priLOSEC) 20 MG capsule Take 20 mg by mouth Daily.   • tiotropium bromide monohydrate (SPIRIVA RESPIMAT) 2.5 MCG/ACT aerosol solution inhaler Inhale 2 puffs Daily.   • [DISCONTINUED] azithromycin (ZITHROMAX) 250 MG tablet Take 2 by mouth today  "then 1 daily for 4 days   • [DISCONTINUED] baclofen (LIORESAL) 20 MG tablet Take 20 mg by mouth 3 (Three) Times a Day.   • [DISCONTINUED] diltiaZEM CD (CARDIZEM CD) 120 MG 24 hr capsule Take 1 capsule by mouth Daily.   • [DISCONTINUED] ondansetron ODT (ZOFRAN-ODT) 4 MG disintegrating tablet Take 1 tablet by mouth Every 6 (Six) Hours As Needed for Nausea or Vomiting.     No current facility-administered medications on file prior to visit.          The following portions of the patient's history were reviewed and updated as appropriate: allergies, current medications, past family history, past medical history, past social history, past surgical history and problem list.    Review of Systems   Constitution: Negative.   HENT: Negative.  Negative for congestion.    Eyes: Negative.    Cardiovascular: Positive for palpitations. Negative for chest pain, cyanosis, dyspnea on exertion, irregular heartbeat, leg swelling, near-syncope, orthopnea, paroxysmal nocturnal dyspnea and syncope.        Elevated blood pressure   Respiratory: Negative.  Negative for shortness of breath.    Hematologic/Lymphatic: Negative.    Musculoskeletal: Negative.    Gastrointestinal: Negative.    Neurological: Negative.  Negative for headaches.          Objective:     /80 (BP Location: Left arm, Patient Position: Sitting, Cuff Size: Adult)   Pulse 93   Temp 97.5 °F (36.4 °C)   Ht 182.9 cm (72\")   Wt 99.3 kg (219 lb)   SpO2 94%   BMI 29.70 kg/m²   Physical Exam   Constitutional: He appears well-developed and well-nourished. No distress.   HENT:   Head: Normocephalic and atraumatic.   Mouth/Throat: Oropharynx is clear and moist. No oropharyngeal exudate.   Eyes: Pupils are equal, round, and reactive to light. Conjunctivae and EOM are normal. No scleral icterus.   Neck: Normal range of motion. Neck supple. No JVD present. No tracheal deviation present. No thyromegaly present.   Cardiovascular: Normal rate, regular rhythm, normal heart " sounds and intact distal pulses. PMI is not displaced. Exam reveals no gallop, no friction rub and no decreased pulses.   No murmur heard.  Pulses:       Carotid pulses are 3+ on the right side, and 3+ on the left side.       Radial pulses are 3+ on the right side, and 3+ on the left side.   Pulmonary/Chest: Effort normal and breath sounds normal. No respiratory distress. He has no wheezes. He has no rales. He exhibits no tenderness.   Abdominal: Soft. Bowel sounds are normal. He exhibits no distension, no abdominal bruit and no mass. There is no splenomegaly or hepatomegaly. There is no tenderness. There is no rebound and no guarding.   Musculoskeletal: Normal range of motion. He exhibits no edema, tenderness or deformity.   Lymphadenopathy:     He has no cervical adenopathy.   Neurological: He is alert. He has normal reflexes. No cranial nerve deficit. He exhibits normal muscle tone. Coordination normal.   Skin: Skin is warm and dry. No rash noted. He is not diaphoretic. No erythema.   Psychiatric: He has a normal mood and affect. His behavior is normal. Judgment and thought content normal.         Lab Review  No recent lab work available.  Last lab work was in March when he was in the emergency room.    Lab Results   Component Value Date     (L) 03/05/2020    K 4.0 03/05/2020    CL 91 (L) 03/05/2020    BUN 30 (H) 03/05/2020    CREATININE 1.08 03/05/2020    GLUCOSE 335 (H) 03/05/2020    CALCIUM 9.5 03/05/2020    ALT 22 03/05/2020    ALKPHOS 83 03/05/2020     No results found for: CKTOTAL  Lab Results   Component Value Date    WBC 27.29 (H) 03/05/2020    HGB 16.2 03/05/2020    HCT 49.0 03/05/2020     03/05/2020       Procedures       I personally viewed and interpreted the patient's LAB data         Assessment:     1. Essential hypertension    2. Palpitations    3. Left ventricular hypertrophy with grade 1 diastolic dysfunction          Plan:     Patient complains of elevated blood pressure and fast  heartbeat.  He never started Cardizem which is prescribed a year ago by me.  Patient also never returned back to the office for follow-up.  He is taking Symbicort and albuterol nebulizer solution which causes him to have fast heartbeat.    Patient was advised to start Cardizem  a day and then medication can be adjusted further.  Beta-blocker therapy was avoided because of COPD.  He needs to have his lab work done again to no recent lab work is available.  Patient was advised to call us back in a week with blood pressure and heart rate response  Follow-up scheduled    No follow-ups on file.

## 2020-06-18 ENCOUNTER — TRANSCRIBE ORDERS (OUTPATIENT)
Dept: ADMINISTRATIVE | Facility: HOSPITAL | Age: 65
End: 2020-06-18

## 2020-06-18 DIAGNOSIS — Z11.59 ENCOUNTER FOR SCREENING FOR OTHER VIRAL DISEASES: Primary | ICD-10-CM

## 2020-06-19 ENCOUNTER — LAB (OUTPATIENT)
Dept: LAB | Facility: HOSPITAL | Age: 65
End: 2020-06-19

## 2020-06-19 DIAGNOSIS — Z11.59 ENCOUNTER FOR SCREENING FOR OTHER VIRAL DISEASES: ICD-10-CM

## 2020-06-19 LAB
REF LAB TEST METHOD: NORMAL
SARS-COV-2 RNA RESP QL NAA+PROBE: NOT DETECTED

## 2020-06-19 PROCEDURE — U0002 COVID-19 LAB TEST NON-CDC: HCPCS

## 2020-06-19 PROCEDURE — C9803 HOPD COVID-19 SPEC COLLECT: HCPCS

## 2020-06-19 PROCEDURE — U0004 COV-19 TEST NON-CDC HGH THRU: HCPCS

## 2020-06-23 ENCOUNTER — HOSPITAL ENCOUNTER (OUTPATIENT)
Dept: RESPIRATORY THERAPY | Facility: HOSPITAL | Age: 65
Discharge: HOME | End: 2020-06-23
Admitting: INTERNAL MEDICINE

## 2020-06-23 ENCOUNTER — HOSPITAL ENCOUNTER (OUTPATIENT)
Dept: CT IMAGING | Facility: HOSPITAL | Age: 65
Discharge: HOME | End: 2020-06-23

## 2020-06-23 DIAGNOSIS — J43.2 CENTRILOBULAR EMPHYSEMA (HCC): ICD-10-CM

## 2020-06-23 DIAGNOSIS — R91.1 NODULE OF RIGHT LUNG: ICD-10-CM

## 2020-06-23 PROCEDURE — 94729 DIFFUSING CAPACITY: CPT | Performed by: INTERNAL MEDICINE

## 2020-06-23 PROCEDURE — 94060 EVALUATION OF WHEEZING: CPT | Performed by: INTERNAL MEDICINE

## 2020-06-23 PROCEDURE — 71250 CT THORAX DX C-: CPT

## 2020-06-23 PROCEDURE — 94640 AIRWAY INHALATION TREATMENT: CPT

## 2020-06-23 PROCEDURE — 94727 GAS DIL/WSHOT DETER LNG VOL: CPT | Performed by: INTERNAL MEDICINE

## 2020-06-23 PROCEDURE — 94729 DIFFUSING CAPACITY: CPT

## 2020-06-23 PROCEDURE — 94060 EVALUATION OF WHEEZING: CPT

## 2020-06-23 PROCEDURE — 71250 CT THORAX DX C-: CPT | Performed by: RADIOLOGY

## 2020-06-23 PROCEDURE — 94727 GAS DIL/WSHOT DETER LNG VOL: CPT

## 2020-06-23 RX ORDER — ALBUTEROL SULFATE 2.5 MG/3ML
2.5 SOLUTION RESPIRATORY (INHALATION) ONCE
Status: COMPLETED | OUTPATIENT
Start: 2020-06-23 | End: 2020-06-23

## 2020-06-23 RX ADMIN — ALBUTEROL SULFATE 2.5 MG: 2.5 SOLUTION RESPIRATORY (INHALATION) at 12:30

## 2020-07-10 ENCOUNTER — TELEPHONE (OUTPATIENT)
Dept: PULMONOLOGY | Facility: CLINIC | Age: 65
End: 2020-07-10

## 2020-07-10 DIAGNOSIS — R91.8 MULTIPLE PULMONARY NODULES: Primary | ICD-10-CM

## 2020-07-10 NOTE — TELEPHONE ENCOUNTER
Called patient with CT chest results as forwarded to me by Dr. Bethea.  He was aware that the previous imaging and April 2019 that showed growth of several previously noted pulmonary nodules.    CT completed on 6/23/2020 was notable for  · Diffuse emphysematous changes  · 2 mm groundglass nodule on image 43 of the right lung  · 5 mm groundglass nodule on image 47 of the right lung  · 5-6 mm spiculated nodule on image 49 of the right lung  · 7.6 mm groundglass nodule on image 64 the right lung     imaging report suggested that all nodules were stable except for the nodule on image 64 that had increased from 6.8 mm 7.6 mm.       Dr. Bethea recommend follow-up with [facility] for further evaluation of possible biopsy.  We discussed that all nodules are still concerning for malignancy versus benign etiology until biopsied.  As he was aware that his nodules have been changing in size over the last few years with the newly changing nodule only having a small increase, he declined recommendations for biopsy at this time.  He is also seen Dr. Rehman in the past and did not want to follow-up with him or  at this time.    He prefers to continue with repeat imaging and will consider biopsy if a significant growth increase is noted.    Toes remain on the smaller side less than 8 mm would likely not benefit from PET scan at this time.  I will discuss repeat imaging recommendations with Dr. Bethea and scheduled it appropriately.

## 2020-07-13 ENCOUNTER — TELEPHONE (OUTPATIENT)
Dept: PULMONOLOGY | Facility: CLINIC | Age: 65
End: 2020-07-13

## 2020-07-13 DIAGNOSIS — R91.8 MULTIPLE PULMONARY NODULES: Primary | ICD-10-CM

## 2020-07-13 NOTE — TELEPHONE ENCOUNTER
Called patient back today after discussing repeat imaging options with Dr. Bethea. After CT was recently notable and concerning for increased size a of pulmonary nodule, with multiple pulmonary nodules present but otherwise stable, Dr. Bethea recommended biopsy as etiology is concerning for malignancy versus infection versus benign.  Patient was understanding of the concern, but did not wish to complete any biopsy procedures at this time.  He wished to proceed with imaging follow-up.    I left a message since he was notable stating that Dr. Bethea recommended repeat CT scan in 3 months.  Discussed that I have placed order for this today.  If he later decided that he did not want to complete the imaging, discussed that he could call and let us know so the order could be canceled.  Asked once he received this message to return my call and provided my phone number.    · Ordered CT chest without contrast for September 2020 for  3-month imaging follow-up multiple pulmonary nodules with increasing nodule size      Addendum:   I attempted to call again on 7/16/2020. Again, I was unable to speak with him and left a message confirming that we could repeat the CT scan in 3 months.  I asked him to call the office to confirm that he received the message and that I would call back if he had any further questions.

## 2020-10-12 ENCOUNTER — HOSPITAL ENCOUNTER (OUTPATIENT)
Dept: CT IMAGING | Facility: HOSPITAL | Age: 65
Discharge: HOME | End: 2020-10-12
Admitting: PHYSICIAN ASSISTANT

## 2020-10-12 DIAGNOSIS — R91.8 MULTIPLE PULMONARY NODULES: ICD-10-CM

## 2020-10-12 PROCEDURE — 71250 CT THORAX DX C-: CPT

## 2020-10-12 PROCEDURE — 71250 CT THORAX DX C-: CPT | Performed by: RADIOLOGY

## 2020-10-13 ENCOUNTER — TELEPHONE (OUTPATIENT)
Dept: CARDIAC SURGERY | Facility: CLINIC | Age: 65
End: 2020-10-13

## 2020-10-29 ENCOUNTER — TELEPHONE (OUTPATIENT)
Dept: PULMONOLOGY | Facility: CLINIC | Age: 65
End: 2020-10-29

## 2020-10-29 DIAGNOSIS — R91.8 MULTIPLE PULMONARY NODULES: Primary | ICD-10-CM

## 2020-10-29 NOTE — TELEPHONE ENCOUNTER
Contacted patient with CT scan results.    Nodules were overall stable except for slight increase of the right lower spiculated nodule.  Per personal imaging review from last several years, imaging was notable for  · From August 2018 to April 2019, he developed the right lower nodule 2.7 cm x 1.2 cm.   · From April 2019 to June 2020 (14 months), the nodule decreased significantly to 9 mm x 6 mm.   · From June 2020 to October 2020 (4 months), report says Spiculated nodule posteriorly in the right lung measures 9.2 mm increased from 8.4 mm.   This was personally measured at 11 mm x 7 mm     All other nodules are well rounded, stable.    He reported that he was previously recommended for biopsy in the past but was unable to do this as he had a lung collapse.  This was several years ago while in [facility].       Dr. Bethea recommended referral to [facility] pulmonology services for interventional pulmonology.      Patient would like to instead consider repeat PET scan imaging rather than [facility] referral at this time, as he has been recommended in the past to avoid biopsy due to his significant underlying centrilobular emphysema I will discuss the patient's concerns with Dr. Bethea, and again touch base with the patient of the final plan.   He was agreeable with this at this time.

## 2020-11-18 ENCOUNTER — OFFICE VISIT (OUTPATIENT)
Dept: CARDIAC SURGERY | Facility: CLINIC | Age: 65
End: 2020-11-18

## 2020-11-18 VITALS
WEIGHT: 219.2 LBS | SYSTOLIC BLOOD PRESSURE: 168 MMHG | HEIGHT: 74 IN | DIASTOLIC BLOOD PRESSURE: 82 MMHG | OXYGEN SATURATION: 98 % | TEMPERATURE: 97.5 F | HEART RATE: 76 BPM | BODY MASS INDEX: 28.13 KG/M2

## 2020-11-18 DIAGNOSIS — R91.8 MULTIPLE PULMONARY NODULES: Primary | ICD-10-CM

## 2020-11-18 PROCEDURE — 99213 OFFICE O/P EST LOW 20 MIN: CPT | Performed by: NURSE PRACTITIONER

## 2020-11-18 RX ORDER — AMLODIPINE BESYLATE 5 MG/1
5 TABLET ORAL DAILY
COMMUNITY
End: 2021-07-09

## 2020-11-18 RX ORDER — TIZANIDINE 4 MG/1
4 TABLET ORAL NIGHTLY PRN
COMMUNITY
End: 2022-02-28 | Stop reason: ALTCHOICE

## 2021-04-20 ENCOUNTER — TELEPHONE (OUTPATIENT)
Dept: CARDIAC SURGERY | Facility: CLINIC | Age: 66
End: 2021-04-20

## 2021-04-20 NOTE — TELEPHONE ENCOUNTER
This pt received a letter in the mail and is ready to schedule his 6 mo f/u and CT chest with Dr. Rehman. Verified demo.

## 2021-04-21 DIAGNOSIS — R91.8 MULTIPLE PULMONARY NODULES: Primary | ICD-10-CM

## 2021-04-28 ENCOUNTER — TRANSCRIBE ORDERS (OUTPATIENT)
Dept: ADMINISTRATIVE | Facility: HOSPITAL | Age: 66
End: 2021-04-28

## 2021-04-28 DIAGNOSIS — R60.0 LOCALIZED EDEMA: ICD-10-CM

## 2021-04-28 DIAGNOSIS — R06.02 SHORTNESS OF BREATH: ICD-10-CM

## 2021-04-28 DIAGNOSIS — M79.661 PAIN IN RIGHT LOWER LEG: Primary | ICD-10-CM

## 2021-04-29 ENCOUNTER — HOSPITAL ENCOUNTER (OUTPATIENT)
Dept: CARDIOLOGY | Facility: HOSPITAL | Age: 66
Discharge: HOME | End: 2021-04-29
Admitting: NURSE PRACTITIONER

## 2021-04-29 DIAGNOSIS — M79.661 PAIN IN RIGHT LOWER LEG: ICD-10-CM

## 2021-04-29 DIAGNOSIS — R60.0 LOCALIZED EDEMA: ICD-10-CM

## 2021-04-29 DIAGNOSIS — R06.02 SHORTNESS OF BREATH: ICD-10-CM

## 2021-04-29 PROCEDURE — 93971 EXTREMITY STUDY: CPT

## 2021-04-29 PROCEDURE — 93971 EXTREMITY STUDY: CPT | Performed by: RADIOLOGY

## 2021-05-05 ENCOUNTER — HOSPITAL ENCOUNTER (OUTPATIENT)
Dept: CT IMAGING | Facility: HOSPITAL | Age: 66
Discharge: HOME | End: 2021-05-05
Admitting: NURSE PRACTITIONER

## 2021-05-05 DIAGNOSIS — R91.8 MULTIPLE PULMONARY NODULES: ICD-10-CM

## 2021-05-05 LAB — CREAT BLDA-MCNC: 1.2 MG/DL (ref 0.6–1.3)

## 2021-05-05 PROCEDURE — 25010000002 IOPAMIDOL 61 % SOLUTION: Performed by: NURSE PRACTITIONER

## 2021-05-05 PROCEDURE — 82565 ASSAY OF CREATININE: CPT

## 2021-05-05 PROCEDURE — 71270 CT THORAX DX C-/C+: CPT | Performed by: RADIOLOGY

## 2021-05-05 PROCEDURE — 71270 CT THORAX DX C-/C+: CPT

## 2021-05-05 RX ADMIN — IOPAMIDOL 80 ML: 612 INJECTION, SOLUTION INTRAVENOUS at 09:44

## 2021-07-07 ENCOUNTER — OFFICE VISIT (OUTPATIENT)
Dept: CARDIAC SURGERY | Facility: CLINIC | Age: 66
End: 2021-07-07

## 2021-07-07 VITALS
DIASTOLIC BLOOD PRESSURE: 90 MMHG | OXYGEN SATURATION: 97 % | HEART RATE: 105 BPM | TEMPERATURE: 96.6 F | HEIGHT: 72 IN | WEIGHT: 220 LBS | BODY MASS INDEX: 29.8 KG/M2 | SYSTOLIC BLOOD PRESSURE: 180 MMHG

## 2021-07-07 DIAGNOSIS — I20.8 STABLE ANGINA PECTORIS (HCC): Primary | ICD-10-CM

## 2021-07-07 DIAGNOSIS — R91.8 MULTIPLE PULMONARY NODULES: ICD-10-CM

## 2021-07-07 PROCEDURE — 99214 OFFICE O/P EST MOD 30 MIN: CPT | Performed by: NURSE PRACTITIONER

## 2021-07-07 RX ORDER — HYDRALAZINE HYDROCHLORIDE 25 MG/1
TABLET, FILM COATED ORAL 3 TIMES DAILY
COMMUNITY
Start: 2021-06-11 | End: 2022-02-28 | Stop reason: SDUPTHER

## 2021-07-07 RX ORDER — LISINOPRIL 30 MG/1
TABLET ORAL DAILY
COMMUNITY
Start: 2021-06-27 | End: 2021-08-17 | Stop reason: SDUPTHER

## 2021-07-07 RX ORDER — BACLOFEN 20 MG/1
20 TABLET ORAL 3 TIMES DAILY
COMMUNITY
End: 2021-08-18 | Stop reason: ALTCHOICE

## 2021-07-07 NOTE — PROGRESS NOTES
[facility] Cardiothoracic Surgery Office Follow Up Note     Date of Encounter: 07/07/2021     MRN Number: [id number]  Name: Maximo Kwon  Phone Number: [phone]     Referred By: No ref. provider found  PCP: Zachariah Shay APRN    Chief Complaint:    Chief Complaint   Patient presents with   • Follow-up     6 MO FU with CT Chest for Right Lung Nodule       History of Present Illness:  Maximo Kwon is a 66 y.o. male with a history of HTN, HLP, DM2, former tobacco use, COPD and multiple pulmonary nodules s/p attempted biopsy with subsequent PTX in [place].  He did undergo Transbronchial biopsy of RUL/RLL 5/15/19 with Dr. Bethea and negative for malignancy.  Pt presents today for 6 month follow up of pulmonary nodules with CT chest.  Last seen in the office on 11/18/20.  Since last office visit, pt reports over the last 6 weeks he has a new left sided chest pressure with associated SOA.  He is not able to do his normal activities such as working around the house without having to stop due to SOA.  He denies any radiation and this is occurring anytime he exerts himself.  He denies any prior cardiac workup and no family history.  He denies any current CP.  He was treated in early June for possible bronchitis/PNA following a trip to [place].  He has received his COVID vaccine.  He denies any hemoptysis or unexplained weight loss.  He denies any recent fevers or chills.  He reports some BLE claudication symptoms with walking short distances, but also has neuropathy from his lumbar spinal stenosis.      Review of Systems:  Review of Systems   Constitutional: Positive for chills, fever and night sweats. Negative for decreased appetite, diaphoresis, malaise/fatigue and weight loss.   HENT: Negative for congestion, hoarse voice, sore throat and stridor.    Cardiovascular: Positive for chest pain, dyspnea on exertion, leg swelling and palpitations. Negative for claudication, irregular heartbeat,  In setting of colon mass  Type and screen blood  Transfuse if h/h continues to drop      near-syncope, orthopnea, paroxysmal nocturnal dyspnea and syncope.   Respiratory: Positive for shortness of breath. Negative for cough, hemoptysis, sleep disturbances due to breathing, snoring, sputum production and wheezing.    Hematologic/Lymphatic: Negative for adenopathy and bleeding problem. Bruises/bleeds easily.   Skin: Negative for color change, dry skin, itching, poor wound healing and rash.   Musculoskeletal: Positive for back pain and joint pain. Negative for arthritis, falls and muscle weakness.   Gastrointestinal: Negative for abdominal pain, anorexia, constipation, diarrhea, hematochezia, melena, nausea and vomiting.   Neurological: Positive for dizziness and numbness. Negative for difficulty with concentration, disturbances in coordination, loss of balance, seizures, vertigo and weakness.   Psychiatric/Behavioral: Positive for depression. Negative for altered mental status, memory loss and substance abuse. The patient does not have insomnia and is not nervous/anxious.    Allergic/Immunologic: Negative for persistent infections.       Allergies:  No Known Allergies    Medications:      Current Outpatient Medications:   •  albuterol (PROVENTIL) (2.5 MG/3ML) 0.083% nebulizer solution, Take 2.5 mg by nebulization 4 (Four) Times a Day As Needed for Wheezing., Disp: , Rfl:   •  budesonide-formoterol (SYMBICORT) 160-4.5 MCG/ACT inhaler, Inhale 2 puffs 2 (Two) Times a Day., Disp: 1 inhaler, Rfl: 11  •  glyBURIDE (DIAbeta) 5 MG tablet, Take 5 mg by mouth Daily With Breakfast., Disp: , Rfl:   •  hydrALAZINE (APRESOLINE) 25 MG tablet, 2 (two) times a day., Disp: , Rfl:   •  hydrochlorothiazide (HYDRODIURIL) 25 MG tablet, Take 25 mg by mouth Daily., Disp: , Rfl:   •  levETIRAcetam (KEPPRA) 750 MG tablet, Take 375 mg by mouth 2 (Two) Times a Day. Prior to [facility] Admission, Patient was on: 1/2 to 1 tablet twice a day, Disp: , Rfl:   •  lisinopril (PRINIVIL,ZESTRIL) 30 MG tablet, Daily., Disp: , Rfl:   •  meloxicam  (MOBIC) 15 MG tablet, , Disp: , Rfl:   •  metFORMIN (GLUCOPHAGE) 500 MG tablet, Take 1,000 mg by mouth 2 (Two) Times a Day With Meals., Disp: , Rfl:   •  montelukast (SINGULAIR) 10 MG tablet, Take 1 tablet by mouth Every Night., Disp: 30 tablet, Rfl: 0  •  omeprazole (priLOSEC) 20 MG capsule, Take 20 mg by mouth Daily., Disp: , Rfl:   •  tiotropium bromide monohydrate (SPIRIVA RESPIMAT) 2.5 MCG/ACT aerosol solution inhaler, Inhale 2 puffs Daily., Disp: , Rfl:   •  tiZANidine (ZANAFLEX) 4 MG tablet, Take 4 mg by mouth At Night As Needed for Muscle Spasms., Disp: , Rfl:   •  amLODIPine (NORVASC) 5 MG tablet, Take 5 mg by mouth Daily., Disp: , Rfl:   •  baclofen (LIORESAL) 20 MG tablet, Take 20 mg by mouth 3 (Three) Times a Day., Disp: , Rfl:   •  dilTIAZem CD (CARDIZEM CD) 120 MG 24 hr capsule, Take 1 capsule by mouth Daily., Disp: 90 capsule, Rfl: 1  •  lisinopril (PRINIVIL,ZESTRIL) 20 MG tablet, Take 20 mg by mouth Daily., Disp: , Rfl:   •  Omega-3 Fatty Acids (FISH OIL) 1000 MG capsule capsule, Take 1,000 mg by mouth Daily With Breakfast., Disp: , Rfl:     Social History     Socioeconomic History   • Marital status:      Spouse name: Not on file   • Number of children: 0   • Years of education: Not on file   • Highest education level: Not on file   Tobacco Use   • Smoking status: Former Smoker     Packs/day: 1.00     Years: 45.00     Pack years: 45.00     Types: Cigarettes     Quit date:      Years since quittin.5   • Smokeless tobacco: Never Used   Substance and Sexual Activity   • Alcohol use: No   • Drug use: No   • Sexual activity: Defer     Birth control/protection: Other       Family History   Problem Relation Age of Onset   • Alzheimer's disease Mother    • Cancer Father         THROAT CANCER   • Diabetes Sister    • Diabetes Brother        Past Medical History:   Diagnosis Date   • Arthritis    • COPD (chronic obstructive pulmonary disease) (CMS/HCC)    • Depression    • Diabetes mellitus  "(CMS/HCC)    • Diverticulitis    • Dyslipidemia    • Emphysema (subcutaneous) (surgical) resulting from a procedure    • GERD (gastroesophageal reflux disease)    • Hypertension    • Neuropathy    • Pneumothorax     Left, status post chest tube       Past Surgical History:   Procedure Laterality Date   • BRONCHOSCOPY N/A 5/14/2019    Procedure: Bronchoscopy with Transbronchial Biopsy with Fluoroscopy;  Surgeon: Eladio Bethea MD;  Location: [facility];  Service: Pulmonary   • CHEST TUBE INSERTION Left     Pneumothorax   • COLON RESECTION  2016    had colostomy and reveresed back    • COLONOSCOPY  2016   • COLOSTOMY CLOSURE     • LUNG BIOPSY Right 2014    (non cancerous)   • OTHER SURGICAL HISTORY Left     LEFT ELBOW SURGERY       I have reviewed the following portions of the patient's history: allergies, current medications, past family history, past medical history, past social history, past surgical history and problem list and confirm it's accurate.    Physical Exam:  Vital Signs:    Vitals:    07/07/21 1059   BP: 180/90   BP Location: Left arm   Patient Position: Sitting   Pulse: 105   Temp: 96.6 °F (35.9 °C)   SpO2: 97%   Weight: 99.8 kg (220 lb)   Height: 182.9 cm (72\")     Body mass index is 29.84 kg/m².     Physical Exam  Vitals and nursing note reviewed.   Constitutional:       Appearance: Normal appearance. He is well-developed and well-groomed.   HENT:      Head: Normocephalic and atraumatic.   Cardiovascular:      Rate and Rhythm: Normal rate and regular rhythm.      Pulses:           Dorsalis pedis pulses are 0 on the right side and 0 on the left side.        Posterior tibial pulses are 0 on the right side and 0 on the left side.      Heart sounds: Normal heart sounds, S1 normal and S2 normal. No murmur heard.   No friction rub.      Comments: DP/PT pulses dopplered bilaterally   Pulmonary:      Comments: Unlabored, Clear to auscultation bilaterally  Abdominal:      General: Bowel sounds are normal. "      Palpations: Abdomen is soft.      Tenderness: There is no abdominal tenderness.   Musculoskeletal:      Cervical back: Neck supple.      Right lower leg: No edema.      Left lower leg: No edema.   Skin:     General: Skin is warm and dry.      Comments: Bilateral feet with purple discoloration   Neurological:      Mental Status: He is alert and oriented to person, place, and time.   Psychiatric:         Attention and Perception: Attention normal.         Mood and Affect: Mood normal.         Speech: Speech normal.         Behavior: Behavior is cooperative.         Labs/Imagin/5/21 CT chest with and without contrast:  CT FINDINGS: On the lung windows there are moderate emphysematous  changes in the apices of the lungs with several emphysematous blebs.  There continue to be 3 lung nodules in the right lung. The right upper  lobe nodule is stable at approximately 8 mm. It is on image 46 of  today's exam. The right lower lobe nodule on image 62 is stable  measuring 8.6 mm on today's study. The right lower lobe nodule on  today's study measures 5.9 mm. It is smaller than on the previous exam.  No inflammatory infiltrates have developed in the lungs. In the  mediastinum no enlarged lymph nodes are identified. The heart was not  enlarged. There were no pericardial effusions. The scans that include  the upper abdomen show rather marked fatty change in the liver.     IMPRESSION:  Multiple nodules in the right lung are stable. None have  increased in size. One has decreased. No adenopathy has developed in the  mediastinum. The scans through the upper abdomen show moderate fatty  change in the liver.    Personally reviewed    Time Spent: I spent 30 minutes caring for Maximo on this date of service. This time includes time spent by me in the following activities: preparing for the visit, reviewing tests, obtaining and/or reviewing a separately obtained history, performing a medically appropriate examination and/or  evaluation, counseling and educating the patient/family/caregiver, ordering medications, tests, or procedures, referring and communicating with other health care professionals, documenting information in the medical record, independently interpreting results and communicating that information with the patient/family/caregiver and care coordination.     Assessment / Plan:  Diagnoses and all orders for this visit:    1. Stable angina pectoris (CMS/HCC) (Primary)    2. Multiple pulmonary nodules    Maximo Kwon is a 66 y.o. male with a history of HTN, HLP, DM2, former tobacco use, COPD and multiple pulmonary nodules s/p attempted biopsy with subsequent PTX in [place] and Transbronchial biopsy of RUL/RLL 5/15/19 with Dr. Bethea and negative for malignancy.  Discussed findings of stable CT chest with unchanged pulmonary nodules.  We will follow up in 6 months with repeat CT Chest.  In regards to his CP, will have our office contact Dr. Elias for referral and ischemic evaluation.  He currently denies any chest pain, however have asked patient and significant other to proceed to emergency room with any ongoing chest pain.   On examination, was unable to palpate patient's DP/PT pulses, but dopplered with bilateral feet discoloration and no LE ulcerations.  He has had some ongoing bilateral lower extremity claudication symptoms as well as his baseline neuropathy.  Following chest pain work-up, will need to evaluate his lower extremities for PVD.     OSITO Magana  [facility] Cardiothoracic Surgery

## 2021-07-08 PROBLEM — R07.9 CHEST PAIN: Status: ACTIVE | Noted: 2021-07-08

## 2021-07-09 ENCOUNTER — APPOINTMENT (OUTPATIENT)
Dept: ULTRASOUND IMAGING | Facility: HOSPITAL | Age: 66
End: 2021-07-09

## 2021-07-09 ENCOUNTER — APPOINTMENT (OUTPATIENT)
Dept: GENERAL RADIOLOGY | Facility: HOSPITAL | Age: 66
End: 2021-07-09

## 2021-07-09 ENCOUNTER — TELEPHONE (OUTPATIENT)
Dept: CARDIAC SURGERY | Facility: CLINIC | Age: 66
End: 2021-07-09

## 2021-07-09 ENCOUNTER — HOSPITAL ENCOUNTER (EMERGENCY)
Facility: HOSPITAL | Age: 66
Discharge: HOME | End: 2021-07-09
Attending: EMERGENCY MEDICINE | Admitting: EMERGENCY MEDICINE

## 2021-07-09 VITALS
HEART RATE: 105 BPM | HEIGHT: 72 IN | WEIGHT: 220 LBS | BODY MASS INDEX: 29.8 KG/M2 | DIASTOLIC BLOOD PRESSURE: 86 MMHG | OXYGEN SATURATION: 95 % | RESPIRATION RATE: 18 BRPM | SYSTOLIC BLOOD PRESSURE: 174 MMHG | TEMPERATURE: 97.3 F

## 2021-07-09 DIAGNOSIS — I10 HYPERTENSION, UNSPECIFIED TYPE: ICD-10-CM

## 2021-07-09 DIAGNOSIS — J44.1 COPD EXACERBATION (HCC): Primary | ICD-10-CM

## 2021-07-09 LAB
A-A DO2: 27.6 MMHG (ref 0–300)
ALBUMIN SERPL-MCNC: 4.51 G/DL (ref 3.5–5.2)
ALBUMIN/GLOB SERPL: 1.9 G/DL
ALP SERPL-CCNC: 77 U/L (ref 39–117)
ALT SERPL W P-5'-P-CCNC: 32 U/L (ref 1–41)
ANION GAP SERPL CALCULATED.3IONS-SCNC: 11.9 MMOL/L (ref 5–15)
ARTERIAL PATENCY WRIST A: POSITIVE
AST SERPL-CCNC: 19 U/L (ref 1–40)
ATMOSPHERIC PRESS: 727 MMHG
B PARAPERT DNA SPEC QL NAA+PROBE: NOT DETECTED
B PERT DNA SPEC QL NAA+PROBE: NOT DETECTED
BASE EXCESS BLDA CALC-SCNC: 5.5 MMOL/L (ref 0–2)
BASOPHILS # BLD AUTO: 0.03 10*3/MM3 (ref 0–0.2)
BASOPHILS NFR BLD AUTO: 0.5 % (ref 0–1.5)
BDY SITE: ABNORMAL
BILIRUB SERPL-MCNC: 0.3 MG/DL (ref 0–1.2)
BILIRUB UR QL STRIP: NEGATIVE
BODY TEMPERATURE: 0 C
BUN SERPL-MCNC: 12 MG/DL (ref 8–23)
BUN/CREAT SERPL: 12.5 (ref 7–25)
C PNEUM DNA NPH QL NAA+NON-PROBE: NOT DETECTED
CALCIUM SPEC-SCNC: 9.6 MG/DL (ref 8.6–10.5)
CHLORIDE SERPL-SCNC: 95 MMOL/L (ref 98–107)
CLARITY UR: CLEAR
CO2 BLDA-SCNC: 29.6 MMOL/L (ref 22–33)
CO2 SERPL-SCNC: 28.1 MMOL/L (ref 22–29)
COHGB MFR BLD: 0.5 % (ref 0–5)
COLOR UR: YELLOW
CREAT SERPL-MCNC: 0.96 MG/DL (ref 0.76–1.27)
CRP SERPL-MCNC: 0.42 MG/DL (ref 0–0.5)
D DIMER PPP FEU-MCNC: 0.31 MCGFEU/ML (ref 0–0.5)
D-LACTATE SERPL-SCNC: 2.4 MMOL/L (ref 0.5–2)
DEPRECATED RDW RBC AUTO: 47.4 FL (ref 37–54)
EOSINOPHIL # BLD AUTO: 0.14 10*3/MM3 (ref 0–0.4)
EOSINOPHIL NFR BLD AUTO: 2.2 % (ref 0.3–6.2)
ERYTHROCYTE [DISTWIDTH] IN BLOOD BY AUTOMATED COUNT: 14.8 % (ref 12.3–15.4)
FLUAV SUBTYP SPEC NAA+PROBE: NOT DETECTED
FLUBV RNA ISLT QL NAA+PROBE: NOT DETECTED
GFR SERPL CREATININE-BSD FRML MDRD: 78 ML/MIN/1.73
GLOBULIN UR ELPH-MCNC: 2.4 GM/DL
GLUCOSE SERPL-MCNC: 187 MG/DL (ref 65–99)
GLUCOSE UR STRIP-MCNC: ABNORMAL MG/DL
HADV DNA SPEC NAA+PROBE: NOT DETECTED
HCO3 BLDA-SCNC: 28.5 MMOL/L (ref 20–26)
HCOV 229E RNA SPEC QL NAA+PROBE: NOT DETECTED
HCOV HKU1 RNA SPEC QL NAA+PROBE: NOT DETECTED
HCOV NL63 RNA SPEC QL NAA+PROBE: NOT DETECTED
HCOV OC43 RNA SPEC QL NAA+PROBE: NOT DETECTED
HCT VFR BLD AUTO: 40.1 % (ref 37.5–51)
HCT VFR BLD CALC: 43 % (ref 38–51)
HGB BLD-MCNC: 13.6 G/DL (ref 13–17.7)
HGB BLDA-MCNC: 14 G/DL (ref 14–18)
HGB UR QL STRIP.AUTO: NEGATIVE
HMPV RNA NPH QL NAA+NON-PROBE: NOT DETECTED
HOLD SPECIMEN: NORMAL
HOLD SPECIMEN: NORMAL
HPIV1 RNA SPEC QL NAA+PROBE: NOT DETECTED
HPIV2 RNA SPEC QL NAA+PROBE: NOT DETECTED
HPIV3 RNA NPH QL NAA+PROBE: NOT DETECTED
HPIV4 P GENE NPH QL NAA+PROBE: NOT DETECTED
IMM GRANULOCYTES # BLD AUTO: 0.04 10*3/MM3 (ref 0–0.05)
IMM GRANULOCYTES NFR BLD AUTO: 0.6 % (ref 0–0.5)
INHALED O2 CONCENTRATION: 21 %
KETONES UR QL STRIP: NEGATIVE
LEUKOCYTE ESTERASE UR QL STRIP.AUTO: NEGATIVE
LIPASE SERPL-CCNC: 29 U/L (ref 13–60)
LYMPHOCYTES # BLD AUTO: 1.15 10*3/MM3 (ref 0.7–3.1)
LYMPHOCYTES NFR BLD AUTO: 18 % (ref 19.6–45.3)
Lab: ABNORMAL
M PNEUMO IGG SER IA-ACNC: NOT DETECTED
MAGNESIUM SERPL-MCNC: 1.7 MG/DL (ref 1.6–2.4)
MCH RBC QN AUTO: 29.9 PG (ref 26.6–33)
MCHC RBC AUTO-ENTMCNC: 33.9 G/DL (ref 31.5–35.7)
MCV RBC AUTO: 88.1 FL (ref 79–97)
METHGB BLD QL: 0.1 % (ref 0–3)
MODALITY: ABNORMAL
MONOCYTES # BLD AUTO: 0.72 10*3/MM3 (ref 0.1–0.9)
MONOCYTES NFR BLD AUTO: 11.3 % (ref 5–12)
NEUTROPHILS NFR BLD AUTO: 4.31 10*3/MM3 (ref 1.7–7)
NEUTROPHILS NFR BLD AUTO: 67.4 % (ref 42.7–76)
NITRITE UR QL STRIP: NEGATIVE
NOTE: ABNORMAL
NOTIFIED BY: ABNORMAL
NOTIFIED WHO: ABNORMAL
NRBC BLD AUTO-RTO: 0 /100 WBC (ref 0–0.2)
NT-PROBNP SERPL-MCNC: 78.8 PG/ML (ref 0–900)
OXYHGB MFR BLDV: 95.6 % (ref 94–99)
PCO2 BLDA: 35.5 MM HG (ref 35–45)
PCO2 TEMP ADJ BLD: ABNORMAL MM[HG]
PH BLDA: 7.51 PH UNITS (ref 7.35–7.45)
PH UR STRIP.AUTO: 7 [PH] (ref 5–8)
PH, TEMP CORRECTED: ABNORMAL
PLATELET # BLD AUTO: 209 10*3/MM3 (ref 140–450)
PMV BLD AUTO: 11.7 FL (ref 6–12)
PO2 BLDA: 75 MM HG (ref 83–108)
PO2 TEMP ADJ BLD: ABNORMAL MM[HG]
POTASSIUM SERPL-SCNC: 4 MMOL/L (ref 3.5–5.2)
PROT SERPL-MCNC: 6.9 G/DL (ref 6–8.5)
PROT UR QL STRIP: NEGATIVE
RBC # BLD AUTO: 4.55 10*6/MM3 (ref 4.14–5.8)
RHINOVIRUS RNA SPEC NAA+PROBE: NOT DETECTED
RSV RNA NPH QL NAA+NON-PROBE: NOT DETECTED
SAO2 % BLDCOA: 96.2 % (ref 94–99)
SARS-COV-2 RNA NPH QL NAA+NON-PROBE: NOT DETECTED
SODIUM SERPL-SCNC: 135 MMOL/L (ref 136–145)
SP GR UR STRIP: 1.01 (ref 1–1.03)
T4 FREE SERPL-MCNC: 1.27 NG/DL (ref 0.93–1.7)
TROPONIN T SERPL-MCNC: <0.01 NG/ML (ref 0–0.03)
TSH SERPL DL<=0.05 MIU/L-ACNC: 0.7 UIU/ML (ref 0.27–4.2)
UROBILINOGEN UR QL STRIP: ABNORMAL
VENTILATOR MODE: ABNORMAL
WBC # BLD AUTO: 6.39 10*3/MM3 (ref 3.4–10.8)
WHOLE BLOOD HOLD SPECIMEN: NORMAL

## 2021-07-09 PROCEDURE — 80053 COMPREHEN METABOLIC PANEL: CPT | Performed by: EMERGENCY MEDICINE

## 2021-07-09 PROCEDURE — 99284 EMERGENCY DEPT VISIT MOD MDM: CPT

## 2021-07-09 PROCEDURE — 93971 EXTREMITY STUDY: CPT | Performed by: RADIOLOGY

## 2021-07-09 PROCEDURE — 85025 COMPLETE CBC W/AUTO DIFF WBC: CPT | Performed by: EMERGENCY MEDICINE

## 2021-07-09 PROCEDURE — 93926 LOWER EXTREMITY STUDY: CPT | Performed by: RADIOLOGY

## 2021-07-09 PROCEDURE — 86140 C-REACTIVE PROTEIN: CPT | Performed by: EMERGENCY MEDICINE

## 2021-07-09 PROCEDURE — 36600 WITHDRAWAL OF ARTERIAL BLOOD: CPT

## 2021-07-09 PROCEDURE — 83735 ASSAY OF MAGNESIUM: CPT | Performed by: EMERGENCY MEDICINE

## 2021-07-09 PROCEDURE — 96361 HYDRATE IV INFUSION ADD-ON: CPT

## 2021-07-09 PROCEDURE — 83605 ASSAY OF LACTIC ACID: CPT | Performed by: EMERGENCY MEDICINE

## 2021-07-09 PROCEDURE — 96375 TX/PRO/DX INJ NEW DRUG ADDON: CPT

## 2021-07-09 PROCEDURE — 0202U NFCT DS 22 TRGT SARS-COV-2: CPT | Performed by: EMERGENCY MEDICINE

## 2021-07-09 PROCEDURE — 84443 ASSAY THYROID STIM HORMONE: CPT | Performed by: EMERGENCY MEDICINE

## 2021-07-09 PROCEDURE — 83050 HGB METHEMOGLOBIN QUAN: CPT

## 2021-07-09 PROCEDURE — 82375 ASSAY CARBOXYHB QUANT: CPT

## 2021-07-09 PROCEDURE — 81003 URINALYSIS AUTO W/O SCOPE: CPT | Performed by: EMERGENCY MEDICINE

## 2021-07-09 PROCEDURE — 93971 EXTREMITY STUDY: CPT

## 2021-07-09 PROCEDURE — 93005 ELECTROCARDIOGRAM TRACING: CPT | Performed by: EMERGENCY MEDICINE

## 2021-07-09 PROCEDURE — 83880 ASSAY OF NATRIURETIC PEPTIDE: CPT | Performed by: EMERGENCY MEDICINE

## 2021-07-09 PROCEDURE — 93010 ELECTROCARDIOGRAM REPORT: CPT | Performed by: INTERNAL MEDICINE

## 2021-07-09 PROCEDURE — 25010000002 HYDRALAZINE PER 20 MG: Performed by: EMERGENCY MEDICINE

## 2021-07-09 PROCEDURE — 96365 THER/PROPH/DIAG IV INF INIT: CPT

## 2021-07-09 PROCEDURE — 83690 ASSAY OF LIPASE: CPT | Performed by: EMERGENCY MEDICINE

## 2021-07-09 PROCEDURE — 84439 ASSAY OF FREE THYROXINE: CPT | Performed by: EMERGENCY MEDICINE

## 2021-07-09 PROCEDURE — 87040 BLOOD CULTURE FOR BACTERIA: CPT | Performed by: EMERGENCY MEDICINE

## 2021-07-09 PROCEDURE — 85379 FIBRIN DEGRADATION QUANT: CPT | Performed by: EMERGENCY MEDICINE

## 2021-07-09 PROCEDURE — 94799 UNLISTED PULMONARY SVC/PX: CPT

## 2021-07-09 PROCEDURE — 93926 LOWER EXTREMITY STUDY: CPT

## 2021-07-09 PROCEDURE — 71045 X-RAY EXAM CHEST 1 VIEW: CPT

## 2021-07-09 PROCEDURE — 25010000002 METHYLPREDNISOLONE PER 125 MG: Performed by: EMERGENCY MEDICINE

## 2021-07-09 PROCEDURE — 82805 BLOOD GASES W/O2 SATURATION: CPT

## 2021-07-09 PROCEDURE — 84484 ASSAY OF TROPONIN QUANT: CPT | Performed by: EMERGENCY MEDICINE

## 2021-07-09 PROCEDURE — 71045 X-RAY EXAM CHEST 1 VIEW: CPT | Performed by: RADIOLOGY

## 2021-07-09 PROCEDURE — 94640 AIRWAY INHALATION TREATMENT: CPT

## 2021-07-09 RX ORDER — IPRATROPIUM BROMIDE AND ALBUTEROL SULFATE 2.5; .5 MG/3ML; MG/3ML
3 SOLUTION RESPIRATORY (INHALATION) ONCE
Status: COMPLETED | OUTPATIENT
Start: 2021-07-09 | End: 2021-07-09

## 2021-07-09 RX ORDER — SODIUM CHLORIDE 9 MG/ML
125 INJECTION, SOLUTION INTRAVENOUS CONTINUOUS
Status: DISCONTINUED | OUTPATIENT
Start: 2021-07-09 | End: 2021-07-10 | Stop reason: HOSPADM

## 2021-07-09 RX ORDER — SODIUM CHLORIDE 0.9 % (FLUSH) 0.9 %
10 SYRINGE (ML) INJECTION AS NEEDED
Status: DISCONTINUED | OUTPATIENT
Start: 2021-07-09 | End: 2021-07-10 | Stop reason: HOSPADM

## 2021-07-09 RX ORDER — HYDRALAZINE HYDROCHLORIDE 25 MG/1
25 TABLET, FILM COATED ORAL ONCE
Status: COMPLETED | OUTPATIENT
Start: 2021-07-09 | End: 2021-07-09

## 2021-07-09 RX ORDER — HYDRALAZINE HYDROCHLORIDE 20 MG/ML
20 INJECTION INTRAMUSCULAR; INTRAVENOUS ONCE
Status: COMPLETED | OUTPATIENT
Start: 2021-07-09 | End: 2021-07-09

## 2021-07-09 RX ORDER — METHYLPREDNISOLONE 4 MG/1
TABLET ORAL
Qty: 1 EACH | Refills: 0 | Status: SHIPPED | OUTPATIENT
Start: 2021-07-09 | End: 2021-08-17

## 2021-07-09 RX ORDER — CLONIDINE HYDROCHLORIDE 0.1 MG/1
0.1 TABLET ORAL ONCE
Status: COMPLETED | OUTPATIENT
Start: 2021-07-09 | End: 2021-07-09

## 2021-07-09 RX ORDER — METHYLPREDNISOLONE SODIUM SUCCINATE 125 MG/2ML
125 INJECTION, POWDER, LYOPHILIZED, FOR SOLUTION INTRAMUSCULAR; INTRAVENOUS ONCE
Status: COMPLETED | OUTPATIENT
Start: 2021-07-09 | End: 2021-07-09

## 2021-07-09 RX ORDER — DOXYCYCLINE 100 MG/1
100 CAPSULE ORAL 2 TIMES DAILY
Qty: 20 CAPSULE | Refills: 0 | Status: SHIPPED | OUTPATIENT
Start: 2021-07-09 | End: 2021-08-17

## 2021-07-09 RX ADMIN — HYDRALAZINE HYDROCHLORIDE 25 MG: 25 TABLET, FILM COATED ORAL at 21:33

## 2021-07-09 RX ADMIN — METHYLPREDNISOLONE SODIUM SUCCINATE 125 MG: 125 INJECTION, POWDER, FOR SOLUTION INTRAMUSCULAR; INTRAVENOUS at 18:18

## 2021-07-09 RX ADMIN — CLONIDINE HYDROCHLORIDE 0.1 MG: 0.1 TABLET ORAL at 19:25

## 2021-07-09 RX ADMIN — IPRATROPIUM BROMIDE AND ALBUTEROL SULFATE 3 ML: .5; 3 SOLUTION RESPIRATORY (INHALATION) at 21:46

## 2021-07-09 RX ADMIN — SODIUM CHLORIDE 500 ML: 9 INJECTION, SOLUTION INTRAVENOUS at 19:46

## 2021-07-09 RX ADMIN — SODIUM CHLORIDE 125 ML/HR: 9 INJECTION, SOLUTION INTRAVENOUS at 19:45

## 2021-07-09 RX ADMIN — IPRATROPIUM BROMIDE AND ALBUTEROL SULFATE 3 ML: .5; 3 SOLUTION RESPIRATORY (INHALATION) at 18:02

## 2021-07-09 RX ADMIN — DOXYCYCLINE 100 MG: 100 INJECTION, POWDER, LYOPHILIZED, FOR SOLUTION INTRAVENOUS at 19:59

## 2021-07-09 RX ADMIN — HYDRALAZINE HYDROCHLORIDE 20 MG: 20 INJECTION INTRAMUSCULAR; INTRAVENOUS at 20:38

## 2021-07-09 NOTE — ED PROVIDER NOTES
Subjective   Patient is a 66-year-old male who has COPD, does not use home oxygen.  He presents complaining of increasing cough and shortness of breath over the last few days.  Cough is nonproductive.  He has been using more albuterol breathing treatments than usual over this time.  He states that his right leg has been swollen for quite some time now, states that recently his PCP was unable to feel the pulses in the right lower extremity.  It is not painful, it is not discolored.  He denies fever, chills, chest pain, hemoptysis, abdominal pain, vomiting, diarrhea, hematemesis, hematochezia melena, syncope or near syncope, focal numbness or weakness, other symptoms or other complaints.  Patient stopped smoking years ago.          Review of Systems   Constitutional: Negative for chills, diaphoresis and fever.   HENT: Negative for ear pain, sore throat and trouble swallowing.    Eyes: Negative for photophobia and pain.   Respiratory: Positive for cough and shortness of breath. Negative for stridor.    Cardiovascular: Negative for chest pain and palpitations.   Gastrointestinal: Negative for abdominal distention, abdominal pain, blood in stool, diarrhea, nausea and vomiting.   Endocrine: Negative for polydipsia and polyphagia.   Genitourinary: Negative for difficulty urinating and flank pain.   Musculoskeletal: Negative for back pain, neck pain and neck stiffness.   Skin: Negative for color change and pallor.   Neurological: Negative for seizures, syncope and speech difficulty.   Psychiatric/Behavioral: Negative for confusion.   All other systems reviewed and are negative.      Past Medical History:   Diagnosis Date   • Arthritis    • COPD (chronic obstructive pulmonary disease) (CMS/[facility])    • Depression    • Diabetes mellitus (CMS/[facility])    • Diverticulitis    • Dyslipidemia    • Emphysema (subcutaneous) (surgical) resulting from a procedure    • GERD (gastroesophageal reflux disease)    • Hypertension    • Neuropathy     • Pneumothorax     Left, status post chest tube       No Known Allergies    Past Surgical History:   Procedure Laterality Date   • BRONCHOSCOPY N/A 2019    Procedure: Bronchoscopy with Transbronchial Biopsy with Fluoroscopy;  Surgeon: Eladio Bethea MD;  Location: [facility] OR;  Service: Pulmonary   • CHEST TUBE INSERTION Left     Pneumothorax   • COLON RESECTION  2016    had colostomy and reveresed back    • COLONOSCOPY     • COLOSTOMY CLOSURE     • LUNG BIOPSY Right     (non cancerous)   • OTHER SURGICAL HISTORY Left     LEFT ELBOW SURGERY       Family History   Problem Relation Age of Onset   • Alzheimer's disease Mother    • Cancer Father         THROAT CANCER   • Diabetes Sister    • Diabetes Brother        Social History     Socioeconomic History   • Marital status:      Spouse name: Not on file   • Number of children: 0   • Years of education: Not on file   • Highest education level: Not on file   Tobacco Use   • Smoking status: Former Smoker     Packs/day: 1.00     Years: 45.00     Pack years: 45.00     Types: Cigarettes     Quit date:      Years since quittin.5   • Smokeless tobacco: Never Used   Substance and Sexual Activity   • Alcohol use: No   • Drug use: No   • Sexual activity: Defer     Birth control/protection: Other           Objective   Physical Exam  Vitals and nursing note reviewed.   Constitutional:       General: He is not in acute distress.     Appearance: Normal appearance. He is well-developed. He is not ill-appearing, toxic-appearing or diaphoretic.      Comments: Pleasant male, alert and well-oriented, in no distress.  He converses easily speaking sentences at a time on a single breath.   HENT:      Head: Normocephalic and atraumatic.   Eyes:      General: No scleral icterus.     Pupils: Pupils are equal, round, and reactive to light.   Neck:      Trachea: No tracheal deviation.   Cardiovascular:      Rate and Rhythm: Normal rate and regular rhythm.    Pulmonary:      Effort: Pulmonary effort is normal. No respiratory distress.      Comments: Only a few mild scattered expiratory wheezes are heard.  Respirations are nonlabored.  Air movement is good.  Chest:      Chest wall: No tenderness.   Abdominal:      General: Bowel sounds are normal.      Palpations: Abdomen is soft.      Tenderness: There is no abdominal tenderness. There is no guarding or rebound.   Musculoskeletal:         General: No tenderness. Normal range of motion.      Cervical back: Normal range of motion and neck supple. No rigidity or tenderness.      Right lower leg: No tenderness. No edema.      Left lower leg: No tenderness. No edema.      Comments: Right lower extremity has an easily palpable dorsalis pedis pulse, 2-3+.  The posterior tibial pulse is mildly diminished, 1-2+.  All extremities are well perfused.  The right leg is not tender.  Is not edematous.  It is not discolored.  There is no Homans' sign.   Skin:     General: Skin is warm and dry.      Capillary Refill: Capillary refill takes less than 2 seconds.      Coloration: Skin is not pale.   Neurological:      General: No focal deficit present.      Mental Status: He is alert and oriented to person, place, and time.      GCS: GCS eye subscore is 4. GCS verbal subscore is 5. GCS motor subscore is 6.      Motor: No abnormal muscle tone.   Psychiatric:         Mood and Affect: Mood normal.         Behavior: Behavior normal.         Procedures  EKG shows sinus rhythm, rate 98.  TX interval 144, QRS duration 84, QTc 439 ms.  Baseline artifact.  No apparent acute ischemia.  No evidence for STEMI.  XR Chest 1 View   Final Result   COPD       This report was finalized on 7/9/2021 6:38 PM by Dr. Sarwat Peñaloza MD.          US Venous Doppler Lower Extremity Right (duplex)   Final Result   No DVT in the right lower extremity on today's exam.        This report was finalized on 7/9/2021 6:39 PM by Dr. Sarwat Peñaloza MD.          US Arterial  Doppler Lower Extremity Right   Final Result   1. No occlusive segments.   2. No hemodynamically significant stenosis.         This report was finalized on 7/9/2021 7:46 PM by Dr. Sarwat Peñaloza MD.            Results for orders placed or performed during the hospital encounter of 07/09/21   Respiratory Panel PCR w/COVID-19(SARS-CoV-2) MERRY/RANDOLPH/RICHARD/PAD/COR/MAD/BRYAN In-House, NP Swab in UTM/VTM, 3-4 HR TAT - Swab, Nasopharynx    Specimen: Nasopharynx; Swab   Result Value Ref Range    ADENOVIRUS, PCR Not Detected Not Detected    Coronavirus 229E Not Detected Not Detected    Coronavirus HKU1 Not Detected Not Detected    Coronavirus NL63 Not Detected Not Detected    Coronavirus OC43 Not Detected Not Detected    COVID19 Not Detected Not Detected - Ref. Range    Human Metapneumovirus Not Detected Not Detected    Human Rhinovirus/Enterovirus Not Detected Not Detected    Influenza A PCR Not Detected Not Detected    Influenza B PCR Not Detected Not Detected    Parainfluenza Virus 1 Not Detected Not Detected    Parainfluenza Virus 2 Not Detected Not Detected    Parainfluenza Virus 3 Not Detected Not Detected    Parainfluenza Virus 4 Not Detected Not Detected    RSV, PCR Not Detected Not Detected    Bordetella pertussis pcr Not Detected Not Detected    Bordetella parapertussis PCR Not Detected Not Detected    Chlamydophila pneumoniae PCR Not Detected Not Detected    Mycoplasma pneumo by PCR Not Detected Not Detected   Comprehensive Metabolic Panel    Specimen: Arm, Right; Blood   Result Value Ref Range    Glucose 187 (H) 65 - 99 mg/dL    BUN 12 8 - 23 mg/dL    Creatinine 0.96 0.76 - 1.27 mg/dL    Sodium 135 (L) 136 - 145 mmol/L    Potassium 4.0 3.5 - 5.2 mmol/L    Chloride 95 (L) 98 - 107 mmol/L    CO2 28.1 22.0 - 29.0 mmol/L    Calcium 9.6 8.6 - 10.5 mg/dL    Total Protein 6.9 6.0 - 8.5 g/dL    Albumin 4.51 3.50 - 5.20 g/dL    ALT (SGPT) 32 1 - 41 U/L    AST (SGOT) 19 1 - 40 U/L    Alkaline Phosphatase 77 39 - 117 U/L    Total  Bilirubin 0.3 0.0 - 1.2 mg/dL    eGFR Non African Amer 78 >60 mL/min/1.73    Globulin 2.4 gm/dL    A/G Ratio 1.9 g/dL    BUN/Creatinine Ratio 12.5 7.0 - 25.0    Anion Gap 11.9 5.0 - 15.0 mmol/L   Lipase    Specimen: Arm, Right; Blood   Result Value Ref Range    Lipase 29 13 - 60 U/L   Urinalysis With Microscopic If Indicated (No Culture) - Urine, Clean Catch    Specimen: Urine, Clean Catch   Result Value Ref Range    Color, UA Yellow Yellow, Straw    Appearance, UA Clear Clear    pH, UA 7.0 5.0 - 8.0    Specific Gravity, UA 1.009 1.005 - 1.030    Glucose,  mg/dL (1+) (A) Negative    Ketones, UA Negative Negative    Bilirubin, UA Negative Negative    Blood, UA Negative Negative    Protein, UA Negative Negative    Leuk Esterase, UA Negative Negative    Nitrite, UA Negative Negative    Urobilinogen, UA 0.2 E.U./dL 0.2 - 1.0 E.U./dL   BNP    Specimen: Arm, Right; Blood   Result Value Ref Range    proBNP 78.8 0.0 - 900.0 pg/mL   D-dimer, Quantitative    Specimen: Arm, Right; Blood   Result Value Ref Range    D-Dimer, Quantitative 0.31 0.00 - 0.50 MCGFEU/mL   Troponin    Specimen: Arm, Right; Blood   Result Value Ref Range    Troponin T <0.010 0.000 - 0.030 ng/mL   Lactic Acid, Plasma    Specimen: Arm, Right; Blood   Result Value Ref Range    Lactate 2.4 (C) 0.5 - 2.0 mmol/L   C-reactive Protein    Specimen: Arm, Right; Blood   Result Value Ref Range    C-Reactive Protein 0.42 0.00 - 0.50 mg/dL   T4, Free    Specimen: Arm, Right; Blood   Result Value Ref Range    Free T4 1.27 0.93 - 1.70 ng/dL   TSH    Specimen: Arm, Right; Blood   Result Value Ref Range    TSH 0.704 0.270 - 4.200 uIU/mL   Magnesium    Specimen: Arm, Right; Blood   Result Value Ref Range    Magnesium 1.7 1.6 - 2.4 mg/dL   CBC Auto Differential    Specimen: Arm, Right; Blood   Result Value Ref Range    WBC 6.39 3.40 - 10.80 10*3/mm3    RBC 4.55 4.14 - 5.80 10*6/mm3    Hemoglobin 13.6 13.0 - 17.7 g/dL    Hematocrit 40.1 37.5 - 51.0 %    MCV 88.1 79.0  - 97.0 fL    MCH 29.9 26.6 - 33.0 pg    MCHC 33.9 31.5 - 35.7 g/dL    RDW 14.8 12.3 - 15.4 %    RDW-SD 47.4 37.0 - 54.0 fl    MPV 11.7 6.0 - 12.0 fL    Platelets 209 140 - 450 10*3/mm3    Neutrophil % 67.4 42.7 - 76.0 %    Lymphocyte % 18.0 (L) 19.6 - 45.3 %    Monocyte % 11.3 5.0 - 12.0 %    Eosinophil % 2.2 0.3 - 6.2 %    Basophil % 0.5 0.0 - 1.5 %    Immature Grans % 0.6 (H) 0.0 - 0.5 %    Neutrophils, Absolute 4.31 1.70 - 7.00 10*3/mm3    Lymphocytes, Absolute 1.15 0.70 - 3.10 10*3/mm3    Monocytes, Absolute 0.72 0.10 - 0.90 10*3/mm3    Eosinophils, Absolute 0.14 0.00 - 0.40 10*3/mm3    Basophils, Absolute 0.03 0.00 - 0.20 10*3/mm3    Immature Grans, Absolute 0.04 0.00 - 0.05 10*3/mm3    nRBC 0.0 0.0 - 0.2 /100 WBC   Blood Gas, Arterial With Co-Ox    Specimen: Arterial Blood   Result Value Ref Range    Site Left Brachial     Max's Test Positive     pH, Arterial 7.513 (H) 7.350 - 7.450 pH units    pCO2, Arterial 35.5 35.0 - 45.0 mm Hg    pO2, Arterial 75.0 (L) 83.0 - 108.0 mm Hg    HCO3, Arterial 28.5 (H) 20.0 - 26.0 mmol/L    Base Excess, Arterial 5.5 (H) 0.0 - 2.0 mmol/L    O2 Saturation, Arterial 96.2 94.0 - 99.0 %    Hemoglobin, Blood Gas 14.0 14 - 18 g/dL    Hematocrit, Blood Gas 43.0 38.0 - 51.0 %    Oxyhemoglobin 95.6 94 - 99 %    Methemoglobin 0.10 0.00 - 3.00 %    Carboxyhemoglobin 0.5 0 - 5 %    A-a Gradiant 27.6 0.0 - 300.0 mmHg    CO2 Content 29.6 22 - 33 mmol/L    Temperature 0.0 C    Barometric Pressure for Blood Gas 727 mmHg    Modality Room Air     FIO2 21 %    Ventilator Mode NA     Note Read back and acknowledge     Notified Who ER DR     Notified By [id number]     Collected by [id number]     pH, Temp Corrected      pCO2, Temperature Corrected      pO2, Temperature Corrected     Green Top (Gel)   Result Value Ref Range    Extra Tube Hold for add-ons.    Lavender Top   Result Value Ref Range    Extra Tube hold for add-on    Gold Top - SST   Result Value Ref Range    Extra Tube Hold for add-ons.                  ED Course  ED Course as of Jul 09 2208 Fri Jul 09, 2021 2120 Blood pressure 167/83.  I have ordered his evening oral hydralazine.    [CM]   2121 Patient's emergency department stay has been uneventful.  He has done well, no distress.  He is comfortable.  His respirations are nonlabored.  We discussed his test results and his plan of care.  He voices understanding and agreement.  He has an appointment scheduled with his PCP on Wednesday, he will keep this appointment as scheduled.  He will return to the emergency department right away if his symptoms worsen or if he has any problems.  He is to increase his hydralazine to 25 mg every 8 hours, rather than twice daily.  He understands this.  He states that he was only started on hydralazine about 2 weeks ago and his blood pressure has been erratic.  He also takes lisinopril 30 mg in the morning and hydrochlorothiazide 25 mg in the morning, he is to continue these as prescribed.    [CM]      ED Course User Index  [CM] Bhavesh Stewart MD                                           [facility]    Final diagnoses:   COPD exacerbation (CMS/[facility])   Hypertension, unspecified type       ED Disposition  ED Disposition     ED Disposition Condition Comment    Discharge Stable           Please note that portions of this note were completed with a voice recognition program.            Bhavesh Stewart MD  07/09/21 2208

## 2021-07-09 NOTE — TELEPHONE ENCOUNTER
Mr. Kwon's POA, Mignon called asking if we had gotten him set up to see Dr. Elias yet. I explained to her that the referral was sent to Dr. Elias 7/8/21. She stated that he was having worsening shortness of breath. I have advised Mignon that she would need to bring him to the ER to be evaluated for the increased shortness of breath.     Tim

## 2021-07-09 NOTE — ED NOTES
Went to obtain blood work , EKG, urine sample, and swab on pt. Pt request to urinate first. Call light within reach. Will check back momentarily.      Rohit Salcedo  07/09/21 0700

## 2021-07-11 LAB
QT INTERVAL: 344 MS
QTC INTERVAL: 439 MS

## 2021-07-14 LAB
BACTERIA SPEC AEROBE CULT: NORMAL
BACTERIA SPEC AEROBE CULT: NORMAL

## 2021-08-04 ENCOUNTER — OFFICE VISIT (OUTPATIENT)
Dept: PULMONOLOGY | Facility: CLINIC | Age: 66
End: 2021-08-04

## 2021-08-04 VITALS
WEIGHT: 220 LBS | HEART RATE: 96 BPM | SYSTOLIC BLOOD PRESSURE: 156 MMHG | HEIGHT: 73 IN | BODY MASS INDEX: 29.16 KG/M2 | DIASTOLIC BLOOD PRESSURE: 88 MMHG | TEMPERATURE: 97.5 F | OXYGEN SATURATION: 95 %

## 2021-08-04 DIAGNOSIS — J44.9 COPD, SEVERE (HCC): Primary | ICD-10-CM

## 2021-08-04 DIAGNOSIS — R91.8 MULTIPLE PULMONARY NODULES: ICD-10-CM

## 2021-08-04 DIAGNOSIS — Z87.891 FORMER SMOKER: ICD-10-CM

## 2021-08-04 PROCEDURE — 99213 OFFICE O/P EST LOW 20 MIN: CPT | Performed by: INTERNAL MEDICINE

## 2021-08-04 NOTE — PROGRESS NOTES
Chief complaint: COPD    History of present illness:   Mr. Kwon is a very pleasant 66-year-old gentleman with a past medical history of Severe COPD, essential hypertension, grade 1 diastolic dysfunction, peripheral neuropathy, degenerative disc disease and multiple pulmonary nodule.  He presents to pulmonary outpatient clinic as a regular follow-up.  He reports gradual worsening of her shortness of breath at rest and on exertion for the last 6 to 8 months.  He currently does not use any supplemental oxygen at rest or on exertion.  He is saturating 95% on room air.  He reports increase in his weight since the start of pandemic and also reports decreasing functional capacity likely due to increase in his weight.  He is compliant with albuterol inhaler, Spiriva Respimat inhaler and Symbicort inhaler.  He quit smoking around 2013.  Recent CT scan of the chest showed stable multiple pulmonary nodules.    Review of Systems:   General ROS: negative for chills, fatigue or fever  Psychological ROS: negative for anxiety or depression  ENT ROS: negative for headaches, visual changes or vocal changes  Respiratory ROS: Negative for cough.  Positive for shortness of breath  Cardiovascular ROS: Negative for chest pain.  Positive for dyspnea on exertion  Gastrointestinal ROS: no abdominal pain, change in bowel habits, or black or bloody stools  Musculoskeletal ROS: negative for joint pain, joint stiffness or joint swelling  Neurological ROS: no TIA or stroke symptoms  Heme- no bleeding, no lumps and bumps in armpit  Skin- no bruises, no rash    Past medical history, past surgical history, social history and family history:  •  has a past medical history of Arthritis, COPD (chronic obstructive pulmonary disease) (CMS/HCC), Depression, Diabetes mellitus (CMS/HCC), Diverticulitis, Dyslipidemia, Emphysema (subcutaneous) (surgical) resulting from a procedure, GERD (gastroesophageal reflux disease), Hypertension, Neuropathy, and  Pneumothorax.  •  has a past surgical history that includes Colonoscopy (2016); Colectomy (2016); Lung biopsy (Right, 2014); Bronchoscopy (N/A, 5/14/2019); Other surgical history (Left); Chest tube insertion (Left); and Colostomy closure.  •  reports that he quit smoking about 8 years ago. His smoking use included cigarettes. He has a 45.00 pack-year smoking history. He has never used smokeless tobacco. He reports that he does not drink alcohol and does not use drugs.  • family history includes Alzheimer's disease in his mother; Cancer in his father; Diabetes in his brother and sister.     Medication and allergies:  • Active medication:  Current Outpatient Medications on File Prior to Visit   Medication Sig   • albuterol (PROVENTIL) (2.5 MG/3ML) 0.083% nebulizer solution Take 2.5 mg by nebulization 4 (Four) Times a Day As Needed for Wheezing.   • baclofen (LIORESAL) 20 MG tablet Take 20 mg by mouth 3 (Three) Times a Day.   • budesonide-formoterol (SYMBICORT) 160-4.5 MCG/ACT inhaler Inhale 2 puffs 2 (Two) Times a Day.   • doxycycline (MONODOX) 100 MG capsule Take 1 capsule by mouth 2 (Two) Times a Day.   • glyBURIDE (DIAbeta) 5 MG tablet Take 5 mg by mouth Daily With Breakfast.   • hydrALAZINE (APRESOLINE) 25 MG tablet 2 (two) times a day.   • hydrochlorothiazide (HYDRODIURIL) 25 MG tablet Take 25 mg by mouth Daily.   • levETIRAcetam (KEPPRA) 750 MG tablet Take 375 mg by mouth 2 (Two) Times a Day. Prior to [facility] Admission, Patient was on: 1/2 to 1 tablet twice a day   • lisinopril (PRINIVIL,ZESTRIL) 20 MG tablet Take 30 mg by mouth Daily.   • lisinopril (PRINIVIL,ZESTRIL) 30 MG tablet Daily.   • meloxicam (MOBIC) 15 MG tablet    • metFORMIN (GLUCOPHAGE) 500 MG tablet Take 1,000 mg by mouth 2 (Two) Times a Day With Meals.   • methylPREDNISolone (MEDROL) 4 MG dose pack Take as directed on package instructions.   • montelukast (SINGULAIR) 10 MG tablet Take 1 tablet by mouth Every Night.   • omeprazole (priLOSEC) 20 MG  "capsule Take 20 mg by mouth Daily.   • tiotropium bromide monohydrate (SPIRIVA RESPIMAT) 2.5 MCG/ACT aerosol solution inhaler Inhale 2 puffs Daily.   • tiZANidine (ZANAFLEX) 4 MG tablet Take 4 mg by mouth At Night As Needed for Muscle Spasms.     No current facility-administered medications on file prior to visit.        Allergies:    Patient has no known allergies.      Vital Signs    height is 185.4 cm (73\") and weight is 99.8 kg (220 lb). His temperature is 97.5 °F (36.4 °C). His blood pressure is 156/88 and his pulse is 96. His oxygen saturation is 95%.      Physical Exam:  Bilateral air entry equal.  No wheezing.  No crackles.  Distant breath sounds.  Not using accessory muscles of respiration.  Patient can talk in full sentences.       Result review:      CT Chest With & Without Contrast Diagnostic    Result Date: 5/5/2021  Multiple nodules in the right lung are stable. None have increased in size. One has decreased. No adenopathy has developed in the mediastinum. The scans through the upper abdomen show moderate fatty change in the liver.        1269.38 mGy.cm The radiation dose reduction device was utilized for each scan per the ALARA (as low as reasonably achievable) protocol.  This report was finalized on 5/5/2021 10:12 AM by Dr. Albert Jhaveri II, MD.      XR Chest 1 View    Result Date: 7/9/2021  COPD  This report was finalized on 7/9/2021 6:38 PM by Dr. Sarwat Peñaloza MD.      US Arterial Doppler Lower Extremity Right    Result Date: 7/9/2021  1. No occlusive segments. 2. No hemodynamically significant stenosis.   This report was finalized on 7/9/2021 7:46 PM by Dr. Sarwat Peñaloza MD.      US Venous Doppler Lower Extremity Right (duplex)    Result Date: 7/9/2021  No DVT in the right lower extremity on today's exam.  This report was finalized on 7/9/2021 6:39 PM by Dr. Sarwat Peñaloza MD.      US Venous Doppler Lower Extremity Right (duplex)    Result Date: 4/29/2021  No DVT.  This report was finalized on " 4/29/2021 12:41 PM by Dr. Isaac Salas MD.          Assessment and plan:   Diagnosis Plan   1. COPD, severe (CMS/HCC).  Gradual worsening of shortness of breath likely due to muscular deconditioning and weight gain.  Gradual worsening of COPD cannot be ruled out.  Considering the severity of COPD and age of the patient, I have referred the patient to tertiary care center for further evaluation regarding lung transplant. Ambulatory Referral to Pulmonary Rehab  On albuterol inhaler  On Symbicort inhaler  On Spiriva Respimat inhaler  Will assess patient in 8 weeks and decide on p.o. Daliresp.    Ambulatory Referral to lung transplant pulmonology at [facility]  Strongly advised weight loss.    Walking Oximetry   2. Former smoker     3. Multiple pulmonary nodules   stable multiple nodule in the right lung.  Low-dose CT scan of the chest as per protocol.  Latest CT scan of the chest was performed on 5/5/2021.         Walk oximetry during clinic visit  Patient underwent walk oximetry during clinic visit.  The results will be scanned in the Epic EMR.         Return in about 8 weeks (around 9/29/2021) for Next scheduled follow up.      Thank you for involving us in the care of the patient.  Eladio Bethea MD  Pulmonary and Critical Care Medicine

## 2021-08-16 PROBLEM — J44.9 COPD (CHRONIC OBSTRUCTIVE PULMONARY DISEASE): Status: ACTIVE | Noted: 2019-06-06

## 2021-08-16 PROCEDURE — 93000 ELECTROCARDIOGRAM COMPLETE: CPT | Performed by: INTERNAL MEDICINE

## 2021-08-17 ENCOUNTER — OFFICE VISIT (OUTPATIENT)
Dept: CARDIOLOGY | Facility: CLINIC | Age: 66
End: 2021-08-17

## 2021-08-17 VITALS
BODY MASS INDEX: 29.12 KG/M2 | SYSTOLIC BLOOD PRESSURE: 120 MMHG | HEIGHT: 72 IN | WEIGHT: 215 LBS | OXYGEN SATURATION: 96 % | DIASTOLIC BLOOD PRESSURE: 70 MMHG | HEART RATE: 109 BPM

## 2021-08-17 DIAGNOSIS — R06.09 DOE (DYSPNEA ON EXERTION): ICD-10-CM

## 2021-08-17 DIAGNOSIS — I20.8 STABLE ANGINA PECTORIS (HCC): ICD-10-CM

## 2021-08-17 DIAGNOSIS — I51.89 DIASTOLIC DYSFUNCTION: ICD-10-CM

## 2021-08-17 DIAGNOSIS — I10 ESSENTIAL HYPERTENSION: ICD-10-CM

## 2021-08-17 DIAGNOSIS — R00.0 TACHYCARDIA: Primary | ICD-10-CM

## 2021-08-17 PROCEDURE — 99204 OFFICE O/P NEW MOD 45 MIN: CPT | Performed by: INTERNAL MEDICINE

## 2021-08-17 RX ORDER — LISINOPRIL 30 MG/1
30 TABLET ORAL DAILY
Qty: 90 TABLET | Refills: 3 | Status: SHIPPED | OUTPATIENT
Start: 2021-08-17 | End: 2022-06-03 | Stop reason: HOSPADM

## 2021-08-17 RX ORDER — NEBIVOLOL 5 MG/1
5 TABLET ORAL DAILY
Qty: 30 TABLET | Refills: 5 | Status: SHIPPED | OUTPATIENT
Start: 2021-08-17 | End: 2022-02-09

## 2021-08-17 NOTE — PROGRESS NOTES
Kimberley Kwon is a 66 y.o. male.  Primary Care: Zachariah Shay APRN  Referring: Anton Rehman MD  [address] [id number]      Chief Complaint   Patient presents with   • Chest Pain   • Shortness of Breath   • Edema     Patient Active Problem List    Diagnosis Date Noted   • LAZCANO (dyspnea on exertion) 08/17/2021     Priority: High   • Chest pain 07/08/2021     Priority: High   • Tachycardia 06/15/2020     Priority: High   • Essential hypertension 05/30/2019     Priority: Medium   • Left ventricular hypertrophy with grade 1 diastolic dysfunction 05/30/2019     Priority: Medium     Note Last Updated: 8/16/2021     · Echocardiogram, 5/8/2019: Normal left ventricular cavity size with mild concentric hypertrophy. Left ventricular systolic function is normal. Estimated EF appears to be in the range of 61 - 65%. Left ventricular diastolic dysfunction (grade I) consistent with impaired relaxation. There is mild posterior mitral leaflet thickening present. No significant valvular heart disease   • Mixed hyperlipidemia 05/06/2018     Priority: Medium   • Claudication of both lower extremities (CMS/[facility])      Priority: Low     Note Last Updated: 8/17/2021     · Bilateral lower extremity arterial Doppler, 7/9/2021: No occlusive segments.  No hemodynamically significant stenosis.   • GERD (gastroesophageal reflux disease)    • Arthritis    • Diabetes mellitus (CMS/[facility])    • Neuropathy    • Hereditary and idiopathic peripheral neuropathy 06/06/2019   • COPD (chronic obstructive pulmonary disease) (CMS/[facility]) 06/06/2019   • Degenerative disc disease, lumbar 06/06/2019   • Simple chronic bronchitis (CMS/[facility]) 04/08/2019   • Multiple pulmonary nodules 04/08/2019      History of Present Illness   This is a 66-year old hypertensive dyslipidemic, diabetic male with COPD but no significant prior cardiac history.  He was evaluated in 2020 for tachycardia for which he was prescribed diltiazem.   He was noncompliant with treatment and did not follow-up.  He was recently evaluated in the [facility] emergency department for progressive dyspnea.  He had an echocardiogram which was significant for grade 1 diastolic dysfunction but otherwise shows normal LV systolic function and essentially normal valvular morphology.  He was recently seen by CT surgery for pulmonary nodules.  At follow-up he admitted to progressive exertional dyspnea and chest pain.  He was then referred to our service for further evaluation.  He presents today stating he has right lower extremity swelling which he believes may be heart related.  He complains of midsternal pressure and shortness of breath which are progressively worse over the previous 2 to 3 months and occur randomly.  This chest pressure may last 2 to 3 minutes and is described as heavy in nature.  He relates this to his COPD.  He rates the intensity at 2/10.  He denies any prior history of ischemic study.  His blood pressure at home typically runs between 140 to 150 mmHg systolic with resting heart rates of 100 to 110 bpm.  He has dyslipidemia for which she was prescribed an unknown statin.  This caused lower extremity cramping and was discontinued by the patient.  He does not know which statin he was taking and has not tried alternative treatment.  He complains of dyspnea on exertion with minimal activity such as walking across the house.  He is also noted to have conversational dyspnea.  He is compliant with his inhaled steroids.  He has an albuterol which he is currently using 2-3 times per day, often using 2-3 treatments back to back.  Further complains of swelling in the right lower extremity.  This was previously attributed to an antihypertensive which I presume to be amlodipine.  Discontinuation had no effect on his lower extremity edema which is worse in the evening and typically resolves overnight.  He also has venous congestion of the toes bilaterally.  He has  no complaint of claudication and has had recent lower extremity vascular testing which was unremarkable.  He is also had recent lower extremity venous duplex studies showing no evidence of DVT.  He was previously on aspirin but discontinued use because of bruising.  He quit smoking in about 2013.  He has an appointment with [facility] pulmonology in [place] later this week for further pulmonary evaluation.    Past Surgical History:   Procedure Laterality Date   • BRONCHOSCOPY N/A 5/14/2019    Procedure: Bronchoscopy with Transbronchial Biopsy with Fluoroscopy;  Surgeon: Eladio Bethea MD;  Location: [facility];  Service: Pulmonary   • CHEST TUBE INSERTION Left     Pneumothorax   • COLON RESECTION  2016    had colostomy and reveresed back    • COLONOSCOPY  2016   • COLOSTOMY CLOSURE     • LUNG BIOPSY Right 2014    (non cancerous)   • OTHER SURGICAL HISTORY Left     LEFT ELBOW SURGERY       The following portions of the patient's history were reviewed and updated as appropriate: allergies, current medications, past family history, past medical history, past social history, past surgical history and problem list.    No Known Allergies      Current Outpatient Medications   Medication Instructions   • albuterol (PROVENTIL) 2.5 mg, Nebulization, 4 Times Daily PRN   • baclofen (LIORESAL) 20 mg, Oral, 3 Times Daily   • budesonide-formoterol (SYMBICORT) 160-4.5 MCG/ACT inhaler 2 puffs, Inhalation, 2 Times Daily   • glyburide (DIABETA) 5 mg, Oral, Daily With Breakfast   • hydrALAZINE (APRESOLINE) 25 MG tablet 3 times daily   • hydroCHLOROthiazide (HYDRODIURIL) 25 mg, Oral, Daily   • levETIRAcetam (KEPPRA) 375 mg, Oral, 2 Times Daily, Prior to [facility] Admission, Patient was on: 1/2 to 1 tablet twice a day   • lisinopril (PRINIVIL,ZESTRIL) 30 mg, Oral, Daily   • meloxicam (MOBIC) 15 MG tablet No dose, route, or frequency recorded.   • metFORMIN (GLUCOPHAGE) 1,000 mg, Oral, 2 Times Daily With Meals   • montelukast (SINGULAIR)  "10 mg, Oral, Nightly   • omeprazole (PRILOSEC) 20 mg, Oral, Daily   • tiotropium bromide monohydrate (SPIRIVA RESPIMAT) 2.5 MCG/ACT aerosol solution inhaler 2 puffs, Inhalation, Daily - RT   • tiZANidine (ZANAFLEX) 4 mg, Oral, Nightly PRN         Social History     Socioeconomic History   • Marital status:      Spouse name: Not on file   • Number of children: 0   • Years of education: Not on file   • Highest education level: Not on file   Tobacco Use   • Smoking status: Former Smoker     Packs/day: 1.00     Years: 45.00     Pack years: 45.00     Types: Cigarettes     Quit date:      Years since quittin.6   • Smokeless tobacco: Never Used   Vaping Use   • Vaping Use: Never used   Substance and Sexual Activity   • Alcohol use: No   • Drug use: No   • Sexual activity: Defer     Birth control/protection: Other       Family History   Problem Relation Age of Onset   • Alzheimer's disease Mother    • Cancer Father         THROAT CANCER   • Diabetes Sister    • Diabetes Brother        Objective      /70 (BP Location: Right arm, Patient Position: Sitting)   Pulse 109   Ht 182.9 cm (72\")   Wt 97.5 kg (215 lb)   SpO2 96%   BMI 29.16 kg/m²     Vitals and nursing note reviewed.   Constitutional:       Appearance: Not in distress.   Eyes:      Conjunctiva/sclera: Conjunctivae normal.      Pupils: Pupils are equal, round, and reactive to light.   Neck:      Vascular: JVD normal.   Pulmonary:      Effort: Pulmonary effort is normal.      Breath sounds: Bibasilar Rales present.   Chest:      Chest wall: Not tender to palpatation.   Cardiovascular:      Tachycardia present. Regular rhythm.      Murmurs: There is no murmur.      No gallop. No click. No rub.      Comments: There is venous congestion of the toes and forefoot bilaterally  Pulses:     Intact distal pulses.   Edema:     Peripheral edema present.     Ankle: 1+ edema of the right ankle.     Feet: 1+ edema of the right foot.  Abdominal:      General: " Bowel sounds are normal.      Palpations: Abdomen is soft.   Musculoskeletal: Normal range of motion.         General: No deformity.      Cervical back: Normal range of motion. Skin:     General: Skin is warm and dry.   Neurological:      General: No focal deficit present.      Mental Status: Alert.           ECG 12 Lead    Date/Time: 8/16/2021 10:26 PM  Performed by: Marni Elias MD  Authorized by: Marni Elias MD   Previous ECG: no previous ECG available  Rhythm: sinus tachycardia  Rate: tachycardic  BPM: 109  Conduction: conduction normal  ST Segments: ST segments normal  T Waves: T waves normal  QRS axis: normal  Other: no other findings    Clinical impression: normal ECG  Comments: Normal EKG with the exception of rate            Lab Review:   Lab Results   Component Value Date    GLUCOSE 187 (H) 07/09/2021    BUN 12 07/09/2021    CREATININE 0.96 07/09/2021    EGFRIFNONA 78 07/09/2021    BCR 12.5 07/09/2021    CO2 28.1 07/09/2021    CALCIUM 9.6 07/09/2021    ALBUMIN 4.51 07/09/2021    AST 19 07/09/2021    ALT 32 07/09/2021      Lab Results   Component Value Date    K 4.0 07/09/2021       Lab Results   Component Value Date    WBC 6.39 07/09/2021    HGB 13.6 07/09/2021    HCT 40.1 07/09/2021    MCV 88.1 07/09/2021     07/09/2021       Lab Results   Component Value Date    HGBA1C 6.70 (H) 05/06/2018       Lab Results   Component Value Date    TSH 0.704 07/09/2021 7-9-21    Result Review:    [x]  Laboratory  [x]  Radiology  [x]  EKG/Telemetry   []  Pathology  [x]  Old records  []  Other:    Assessment:   Diagnosis Plan   1. Tachycardia, he has resting sinus tachycardia with exertional increase in heart rate causing worsening dyspnea.  This is multifactorial in the setting of lung disease and deconditioning. add Bystolic 5 mg daily, will further titrate as tolerated.   2. Stable angina pectoris (CMS/HCC) with chest pain and exertional dyspnea. ECG 12 Lead    Stress Test With Myocardial Perfusion One  Day  Bystolic 5 mg daily   3. Left ventricular hypertrophy with grade 1 diastolic dysfunction   currently essentially euvolemic.  We are adding beta-blockade.   4. Essential hypertension   add Bystolic 5 mg daily, hold off hydralazine and use only for systolic pressure greater than 140 as needed.  We will titrate Bystolic as tolerated.     5. LAZCANO (dyspnea on exertion)   add Bystolic 5 mg daily.  Agree with pulmonology evaluation      Plan:  Patient with fairly advanced lung disease presents for evaluation of exertional chest discomfort and dyspnea.  He has significant resting tachycardia.  It appears that some of her symptoms are related to deconditioning and advanced lung disease.  Most recent echocardiogram had shown normal LV function.  He does have edema but does not appear to be in clinical CHF.  His edema appears consistent with venous insufficiency.  At this time we have recommended holding off hydralazine and take it only as needed for systolic pressure greater than 140.  Add Bystolic 5 mg daily for control of heart rate and blood pressure, continue to monitor and will further titrate the dose as needed.  Cardiolite stress test for further assessment of angina equivalent symptoms.  Agree with pulmonary evaluation as scheduled.  Continue all other current medications.   Follow-up in 4 weeks, sooner as needed.  Thank you for allowing us to participate in the care of your patient.     Scribed for Marni Elias MD by Electronically signed by Electronically signed by JG Cedillo, 08/17/21, 2:19 PM EDT.    I, Marni Elias MD, personally performed the services described in this documentation as scribed by the above named individual in my presence, and it is both accurate and complete.  8/18/2021  06:09 EDT

## 2021-08-19 ENCOUNTER — OFFICE VISIT (OUTPATIENT)
Dept: PULMONOLOGY | Facility: CLINIC | Age: 66
End: 2021-08-19

## 2021-08-19 VITALS
WEIGHT: 217.8 LBS | OXYGEN SATURATION: 97 % | BODY MASS INDEX: 29.5 KG/M2 | TEMPERATURE: 98 F | HEIGHT: 72 IN | DIASTOLIC BLOOD PRESSURE: 70 MMHG | SYSTOLIC BLOOD PRESSURE: 120 MMHG | HEART RATE: 78 BPM

## 2021-08-19 DIAGNOSIS — J44.9 CHRONIC OBSTRUCTIVE PULMONARY DISEASE, UNSPECIFIED COPD TYPE (HCC): ICD-10-CM

## 2021-08-19 DIAGNOSIS — R06.02 SHORTNESS OF BREATH: Primary | ICD-10-CM

## 2021-08-19 DIAGNOSIS — I50.32 CHRONIC DIASTOLIC HEART FAILURE (HCC): ICD-10-CM

## 2021-08-19 DIAGNOSIS — R00.0 TACHYCARDIA, UNSPECIFIED: ICD-10-CM

## 2021-08-19 DIAGNOSIS — R91.8 MULTIPLE PULMONARY NODULES: ICD-10-CM

## 2021-08-19 DIAGNOSIS — J96.11 CHRONIC RESPIRATORY FAILURE WITH HYPOXIA (HCC): ICD-10-CM

## 2021-08-19 PROCEDURE — 94726 PLETHYSMOGRAPHY LUNG VOLUMES: CPT | Performed by: INTERNAL MEDICINE

## 2021-08-19 PROCEDURE — 94729 DIFFUSING CAPACITY: CPT | Performed by: INTERNAL MEDICINE

## 2021-08-19 PROCEDURE — 99215 OFFICE O/P EST HI 40 MIN: CPT | Performed by: INTERNAL MEDICINE

## 2021-08-19 PROCEDURE — 94060 EVALUATION OF WHEEZING: CPT | Performed by: INTERNAL MEDICINE

## 2021-08-19 RX ORDER — ALBUTEROL SULFATE 90 UG/1
4 AEROSOL, METERED RESPIRATORY (INHALATION) ONCE
Status: COMPLETED | OUTPATIENT
Start: 2021-08-19 | End: 2021-08-19

## 2021-08-19 RX ADMIN — ALBUTEROL SULFATE 4 PUFF: 90 AEROSOL, METERED RESPIRATORY (INHALATION) at 12:04

## 2021-08-19 NOTE — PROGRESS NOTES
Initial Pulmonary Consult Note    Subjective   Reason for consultation/Chief Complaint: Shortness of Breath    Maximo Kwon is a 66 y.o. male is being seen for consultation today at the request of Eladio Bethea MD    History of Present Illness    Mr. Kwon is a 67yo M with a history of severe COPD, HTN, Diastolic heart failure, and pulmonary nodules who was previously seen by Dr. Bethea and presents for further evaluation. He is currently on Symbicort and Spiriva.     He reports that his breathing acutely worsened after a trip. He was treated with a Zpack and Prednisone but this did not improve his symptoms as it usually does. He is on HCTZ for fluid retention. He also notes increased swelling of his right lower extremity with a negative duplex. He was previously on home O2 but quit using it as it was cumbersome. He has a portable nebulizer and he is using nebulizer treatments about 5x daily. He has been referred to [facility] and has an appointment coming up with them in September.        Active Ambulatory Problems     Diagnosis Date Noted   • Mixed hyperlipidemia 05/06/2018   • Simple chronic bronchitis (CMS/[facility]) 04/08/2019   • Multiple pulmonary nodules 04/08/2019   • Essential hypertension 05/30/2019   • Left ventricular hypertrophy with grade 1 diastolic dysfunction 05/30/2019   • Hereditary and idiopathic peripheral neuropathy 06/06/2019   • COPD (chronic obstructive pulmonary disease) (CMS/[facility]) 06/06/2019   • Degenerative disc disease, lumbar 06/06/2019   • Tachycardia 06/15/2020   • Chest pain 07/08/2021   • Arthritis    • Diabetes mellitus (CMS/[facility])    • Neuropathy    • Claudication of both lower extremities (CMS/[facility])    • LAZCANO (dyspnea on exertion) 08/17/2021   • GERD (gastroesophageal reflux disease)    • Chronic respiratory failure with hypoxia (CMS/[facility]) 08/19/2021   • Chronic diastolic heart failure (CMS/[facility]) 08/19/2021     Resolved Ambulatory Problems     Diagnosis Date Noted   • COPD  exacerbation (CMS/HCC) 2018     Past Medical History:   Diagnosis Date   • Depression    • Diverticulitis    • Emphysema (subcutaneous) (surgical) resulting from a procedure    • Pneumothorax        Past Surgical History:   Procedure Laterality Date   • BRONCHOSCOPY N/A 2019    Procedure: Bronchoscopy with Transbronchial Biopsy with Fluoroscopy;  Surgeon: Eladio Bethea MD;  Location: [facility];  Service: Pulmonary   • CHEST TUBE INSERTION Left     Pneumothorax   • COLON RESECTION      had colostomy and reveresed back    • COLONOSCOPY     • COLOSTOMY CLOSURE     • LUNG BIOPSY Right     (non cancerous)   • OTHER SURGICAL HISTORY Left     LEFT ELBOW SURGERY       Family History   Problem Relation Age of Onset   • Alzheimer's disease Mother    • Cancer Father         THROAT CANCER   • Diabetes Sister    • Diabetes Brother        Social History     Socioeconomic History   • Marital status:      Spouse name: Not on file   • Number of children: 0   • Years of education: Not on file   • Highest education level: Not on file   Tobacco Use   • Smoking status: Former Smoker     Packs/day: 1.00     Years: 45.00     Pack years: 45.00     Types: Cigarettes     Quit date:      Years since quittin.6   • Smokeless tobacco: Never Used   Vaping Use   • Vaping Use: Never used   Substance and Sexual Activity   • Alcohol use: No   • Drug use: No   • Sexual activity: Defer     Birth control/protection: Other       No Known Allergies    Current Outpatient Medications   Medication Sig Dispense Refill   • albuterol (PROVENTIL) (2.5 MG/3ML) 0.083% nebulizer solution Take 2.5 mg by nebulization 4 (Four) Times a Day As Needed for Wheezing.     • glyBURIDE (DIAbeta) 5 MG tablet Take 5 mg by mouth Daily With Breakfast.     • hydrALAZINE (APRESOLINE) 25 MG tablet 3 (Three) Times a Day.     • hydrochlorothiazide (HYDRODIURIL) 25 MG tablet Take 25 mg by mouth Daily. PRN SBP >140     • levETIRAcetam (KEPPRA)  "750 MG tablet Take 375 mg by mouth 2 (Two) Times a Day. Prior to [facility] Admission, Patient was on: 1/2 to 1 tablet twice a day     • lisinopril (PRINIVIL,ZESTRIL) 30 MG tablet Take 1 tablet by mouth Daily. 90 tablet 3   • metFORMIN (GLUCOPHAGE) 500 MG tablet Take 1,000 mg by mouth 2 (Two) Times a Day With Meals.     • nebivolol (Bystolic) 5 MG tablet Take 1 tablet by mouth Daily. 30 tablet 5   • omeprazole (priLOSEC) 20 MG capsule Take 20 mg by mouth Daily.     • tiZANidine (ZANAFLEX) 4 MG tablet Take 4 mg by mouth At Night As Needed for Muscle Spasms.       No current facility-administered medications for this visit.       Review of Systems   Constitutional: Positive for activity change, appetite change and fatigue.   HENT: Positive for dental problem, hearing loss and tinnitus.    Eyes: Positive for redness and visual disturbance.   Respiratory: Positive for apnea, cough, chest tightness, shortness of breath and wheezing.    Cardiovascular: Positive for chest pain and leg swelling.   Gastrointestinal: Negative.    Endocrine: Positive for cold intolerance.   Genitourinary: Negative.    Musculoskeletal: Positive for arthralgias, back pain, gait problem, myalgias, neck pain and neck stiffness.   Skin: Negative.    Allergic/Immunologic: Negative.    Neurological: Positive for tremors, speech difficulty, weakness, light-headedness and numbness.   Hematological: Negative.    Psychiatric/Behavioral: Positive for confusion, decreased concentration and dysphoric mood.         Objective   Blood pressure 120/70, pulse 78, temperature 98 °F (36.7 °C), height 182.9 cm (72\"), weight 98.8 kg (217 lb 12.8 oz), SpO2 97 %.  Physical Exam  Vitals and nursing note reviewed.   Constitutional:       General: He is not in acute distress.     Appearance: He is well-developed.   HENT:      Head: Normocephalic and atraumatic.   Eyes:      General: No scleral icterus.     Conjunctiva/sclera: Conjunctivae normal.      Pupils: Pupils are " equal, round, and reactive to light.   Neck:      Thyroid: No thyromegaly.      Trachea: No tracheal deviation.   Cardiovascular:      Rate and Rhythm: Normal rate and regular rhythm.      Heart sounds: Normal heart sounds.   Pulmonary:      Effort: Pulmonary effort is normal. No respiratory distress.      Breath sounds: Decreased breath sounds present. No wheezing.   Abdominal:      General: Bowel sounds are normal.      Palpations: Abdomen is soft.      Tenderness: There is no abdominal tenderness.   Musculoskeletal:         General: Normal range of motion.      Cervical back: Normal range of motion and neck supple.   Lymphadenopathy:      Cervical: No cervical adenopathy.   Skin:     General: Skin is warm and dry.      Findings: No erythema or rash.   Neurological:      Mental Status: He is alert and oriented to person, place, and time.      Motor: No abnormal muscle tone.      Coordination: Coordination normal.   Psychiatric:         Speech: Speech normal.         Behavior: Behavior normal.         Judgment: Judgment normal.         PFTs:  Performed in clinic and personally reviewed.   There is severe airway obstruction. There is a significant response to bronchodilators.   There is mild restriction.   The DLCO is normal.     Imaging:  CT Chest from 5/5/21 reviewed. There is a right upper lobe nodule which measures 8mm. There is a right lower lobe nodule which measures 8.6mm and there is another right lower lobe nodule which measures 5.9mm.     Assessment/Plan   Diagnoses and all orders for this visit:    1. Shortness of breath (Primary)  -     CT Chest Pulmonary Embolism With Contrast; Future  -     albuterol sulfate HFA (PROVENTIL HFA;VENTOLIN HFA;PROAIR HFA) inhaler 4 puff  -     Pulmonary Function Test    2. Tachycardia, unspecified  -     CT Chest Pulmonary Embolism With Contrast; Future    3. Chronic obstructive pulmonary disease, unspecified COPD type (CMS/[facility])  -     Ambulatory Referral to Pulmonary  Rehab    4. Multiple pulmonary nodules    5. Chronic respiratory failure with hypoxia (CMS/HCC)    6. Chronic diastolic heart failure (CMS/HCC)        Discussion:  Mr. Kwon is a 67yo M who is referred for COPD.     1. Severe COPD  - Currently on Symbicort and Spiriva. I have asked him to stop these and I have given him samples of Breztri with a spacer to use.   - Continue Duonebs as needed. We will order him a new home nebulizer as he does not have one.   - Start Daliresp 500 once daily.   - We will get a CT PE protocol to make sure that we are not missing an occult PE which is worsening his shortness of breath.   - Scheduled to see [facility] in September for an initial evaluation.   - Referral to Pulmonary Rehab at [facility] as this is the closest pulmonary rehab to him.     2. Pulmonary Nodules  - Right upper lobe nodule which measures 8mm and 2 right lower lobe nodules which measure 8.6mm and 5.9mm.   - These have been present since at least 2019.   - Continue yearly screening. Repeat CT chest will be due again in May 2022.     3. Chronic respiratory failure  - Previously prescribed home O2 but he sent it back as it was too cumbersome.     4. Diastolic Heart Failure  - Continue HCTZ.   - Consider repeat echocardiogram and maybe a change to Lasix if remains symptomatic.     Follow up in 4 months. We will call with the results of his CT scan of the chest.     I have spent 65 minutes reviewing the patient record, face to face with the patient and his wife in discussion of test results and plans for further management and in documentation and coordination of care.       eMg PISANO Case, DO  Pulmonary and Critical Care Medicine  Note Electronically Signed

## 2021-08-24 ENCOUNTER — HOSPITAL ENCOUNTER (OUTPATIENT)
Dept: CT IMAGING | Facility: HOSPITAL | Age: 66
Discharge: HOME | End: 2021-08-24
Admitting: INTERNAL MEDICINE

## 2021-08-24 DIAGNOSIS — R06.02 SHORTNESS OF BREATH: ICD-10-CM

## 2021-08-24 DIAGNOSIS — R00.0 TACHYCARDIA, UNSPECIFIED: ICD-10-CM

## 2021-08-24 LAB — CREAT BLDA-MCNC: 1.1 MG/DL (ref 0.6–1.3)

## 2021-08-24 PROCEDURE — 0 IOPAMIDOL PER 1 ML: Performed by: INTERNAL MEDICINE

## 2021-08-24 PROCEDURE — 71275 CT ANGIOGRAPHY CHEST: CPT

## 2021-08-24 PROCEDURE — 71275 CT ANGIOGRAPHY CHEST: CPT | Performed by: RADIOLOGY

## 2021-08-24 PROCEDURE — 82565 ASSAY OF CREATININE: CPT

## 2021-08-24 RX ADMIN — IOPAMIDOL 100 ML: 755 INJECTION, SOLUTION INTRAVENOUS at 13:29

## 2021-08-26 ENCOUNTER — TELEPHONE (OUTPATIENT)
Dept: PULMONOLOGY | Facility: CLINIC | Age: 66
End: 2021-08-26

## 2021-08-26 NOTE — TELEPHONE ENCOUNTER
Lexie called and left me a message about Mr. Kwon's worsening shortness of breath.  I tried to return her call I also called Mr. Kwon, neither one had voicemail boxes that were set up.

## 2021-08-27 ENCOUNTER — TELEPHONE (OUTPATIENT)
Dept: PULMONOLOGY | Facility: CLINIC | Age: 66
End: 2021-08-27

## 2021-08-27 NOTE — TELEPHONE ENCOUNTER
I called Mr. Kwon and reviewed his CTA of the chest with him.  This did not show any PE and severe emphysema.  He has yet to receive a call from the pulmonary rehab program in [place] and I will reach out to them to see if we can get this referral moved forward.  He is agreeable with this plan.    He states that his breathing has been about the same no worse.  I did advise him to call with any additional concerns or questions.

## 2021-09-10 ENCOUNTER — HOSPITAL ENCOUNTER (OUTPATIENT)
Dept: CARDIOLOGY | Facility: HOSPITAL | Age: 66
Discharge: HOME | End: 2021-09-10

## 2021-09-10 ENCOUNTER — HOSPITAL ENCOUNTER (OUTPATIENT)
Dept: NUCLEAR MEDICINE | Facility: HOSPITAL | Age: 66
Discharge: HOME | End: 2021-09-10

## 2021-09-10 PROCEDURE — 25010000002 REGADENOSON 0.4 MG/5ML SOLUTION: Performed by: PHYSICIAN ASSISTANT

## 2021-09-10 PROCEDURE — A9500 TC99M SESTAMIBI: HCPCS | Performed by: INTERNAL MEDICINE

## 2021-09-10 PROCEDURE — 93018 CV STRESS TEST I&R ONLY: CPT | Performed by: INTERNAL MEDICINE

## 2021-09-10 PROCEDURE — 0 TECHNETIUM SESTAMIBI: Performed by: INTERNAL MEDICINE

## 2021-09-10 PROCEDURE — 78452 HT MUSCLE IMAGE SPECT MULT: CPT

## 2021-09-10 PROCEDURE — 93017 CV STRESS TEST TRACING ONLY: CPT

## 2021-09-10 PROCEDURE — 78452 HT MUSCLE IMAGE SPECT MULT: CPT | Performed by: INTERNAL MEDICINE

## 2021-09-10 RX ADMIN — REGADENOSON 0.4 MG: 0.08 INJECTION, SOLUTION INTRAVENOUS at 13:47

## 2021-09-10 RX ADMIN — TECHNETIUM TC 99M SESTAMIBI 1 DOSE: 1 INJECTION INTRAVENOUS at 13:47

## 2021-09-10 RX ADMIN — TECHNETIUM TC 99M SESTAMIBI 1 DOSE: 1 INJECTION INTRAVENOUS at 11:45

## 2021-09-11 LAB
BH CV NUCLEAR PRIOR STUDY: 3
BH CV REST NUCLEAR ISOTOPE DOSE: 9.9 MCI
BH CV STRESS BP STAGE 1: NORMAL
BH CV STRESS BP STAGE 2: NORMAL
BH CV STRESS COMMENTS STAGE 1: NORMAL
BH CV STRESS COMMENTS STAGE 2: NORMAL
BH CV STRESS DOSE REGADENOSON STAGE 1: 0.4
BH CV STRESS DURATION MIN STAGE 1: 0
BH CV STRESS DURATION MIN STAGE 2: 4
BH CV STRESS DURATION SEC STAGE 1: 10
BH CV STRESS DURATION SEC STAGE 2: 0
BH CV STRESS HR STAGE 1: 89
BH CV STRESS HR STAGE 2: 89
BH CV STRESS NUCLEAR ISOTOPE DOSE: 29.3 MCI
BH CV STRESS PROTOCOL 1: NORMAL
BH CV STRESS RECOVERY BP: NORMAL MMHG
BH CV STRESS RECOVERY HR: 76 BPM
BH CV STRESS STAGE 1: 1
BH CV STRESS STAGE 2: 2
LV EF NUC BP: 70 %
MAXIMAL PREDICTED HEART RATE: 154 BPM
PERCENT MAX PREDICTED HR: 57.79 %
STRESS BASELINE BP: NORMAL MMHG
STRESS BASELINE HR: 77 BPM
STRESS PERCENT HR: 68 %
STRESS POST PEAK BP: NORMAL MMHG
STRESS POST PEAK HR: 89 BPM
STRESS TARGET HR: 131 BPM

## 2021-09-29 DIAGNOSIS — J96.11 CHRONIC RESPIRATORY FAILURE WITH HYPOXIA (HCC): ICD-10-CM

## 2021-09-29 DIAGNOSIS — J44.9 COPD, SEVERE (HCC): Primary | ICD-10-CM

## 2021-09-29 RX ORDER — BUDESONIDE, GLYCOPYRROLATE, AND FORMOTEROL FUMARATE 160; 9; 4.8 UG/1; UG/1; UG/1
2 AEROSOL, METERED RESPIRATORY (INHALATION) 2 TIMES DAILY
Qty: 3 EACH | Refills: 3 | Status: SHIPPED | OUTPATIENT
Start: 2021-09-29 | End: 2022-09-20 | Stop reason: SDUPTHER

## 2021-11-03 NOTE — DISCHARGE INSTRUCTIONS
Home care family.  Take your medications as prescribed.  Increase the hydralazine to 25 mg every 8 hours/3 times a day.  Rest and drink plenty of fluids.  Use your breathing treatments as directed.  See Zachariah Shay in the office on Wednesday as scheduled.  Return to the emergency department right away if symptoms worsen/any problems.   no

## 2021-12-07 DIAGNOSIS — J44.9 COPD, SEVERE (HCC): Primary | ICD-10-CM

## 2021-12-07 DIAGNOSIS — R91.8 MULTIPLE PULMONARY NODULES: Primary | ICD-10-CM

## 2021-12-07 RX ORDER — ROFLUMILAST 500 UG/1
500 TABLET ORAL DAILY
Qty: 30 TABLET | Refills: 3 | Status: SHIPPED | OUTPATIENT
Start: 2021-12-07 | End: 2022-03-16 | Stop reason: SDUPTHER

## 2022-01-05 ENCOUNTER — HOSPITAL ENCOUNTER (OUTPATIENT)
Dept: CT IMAGING | Facility: HOSPITAL | Age: 67
Discharge: HOME | End: 2022-01-05
Admitting: NURSE PRACTITIONER

## 2022-01-05 DIAGNOSIS — R91.8 MULTIPLE PULMONARY NODULES: ICD-10-CM

## 2022-01-05 PROCEDURE — 71270 CT THORAX DX C-/C+: CPT | Performed by: RADIOLOGY

## 2022-01-05 PROCEDURE — 71270 CT THORAX DX C-/C+: CPT

## 2022-01-05 PROCEDURE — 25010000002 IOPAMIDOL 61 % SOLUTION: Performed by: NURSE PRACTITIONER

## 2022-01-05 RX ADMIN — IOPAMIDOL 80 ML: 612 INJECTION, SOLUTION INTRAVENOUS at 09:54

## 2022-01-12 ENCOUNTER — OFFICE VISIT (OUTPATIENT)
Dept: CARDIAC SURGERY | Facility: CLINIC | Age: 67
End: 2022-01-12

## 2022-01-12 VITALS
HEART RATE: 98 BPM | WEIGHT: 219.4 LBS | DIASTOLIC BLOOD PRESSURE: 90 MMHG | OXYGEN SATURATION: 98 % | BODY MASS INDEX: 29.72 KG/M2 | SYSTOLIC BLOOD PRESSURE: 162 MMHG | TEMPERATURE: 98.2 F | HEIGHT: 72 IN

## 2022-01-12 DIAGNOSIS — I73.9 PVD (PERIPHERAL VASCULAR DISEASE): ICD-10-CM

## 2022-01-12 DIAGNOSIS — R91.8 MULTIPLE PULMONARY NODULES: Primary | ICD-10-CM

## 2022-01-12 PROCEDURE — 99214 OFFICE O/P EST MOD 30 MIN: CPT | Performed by: NURSE PRACTITIONER

## 2022-01-12 NOTE — PROGRESS NOTES
"     [facility] Cardiothoracic Surgery Office Follow Up Note     Date of Encounter: 2022     Name: Maximo Kwon  : 1955     Referred By: No ref. provider found  PCP: Zachariah Shay APRN    Chief Complaint:    Chief Complaint   Patient presents with   • Lung Nodule     6 month follow up with CT Chest for lung nodule        Subjective      History of Present Illness:    Maximo Kwon is a 66 y.o. male former smoker, with history of HTN, HLP, DM2, former tobacco use, COPD and multiple pulmonary nodules s/p attempted biopsy with subsequent PTX in [place]. He did undergo Transbronchial biopsy of RUL/RLL 5/15/19 with Dr. Bethea and negative for malignancy. Patient has been following with Dr. Rehman for multiple sub-centimeter pulmonary nodules. He was last seen 21 by PRERNA Spencer. At that time, he was having some left sided chest pressure and SOA. He has since had cardiac workup and stress test performed 2021 with Dr. Valdez that were negative. Patient denies any recent recurrence of this pain. Patient presents today for 6 month follow up for surveillance of lung nodules. Patient denies any chest pain, worsening SOA, fatigue, hemoptysis, unintentional weight loss or lymphadenopathy. Patient does not smoke. Patient follow with a pulmonologist in [place] Dr. Cuevas, last seen 2021. Patient reports BLE neuropathy (R=L), pain in BL hips worse in the morning that resolves throughout the day and is not worse with ambulation. His pain did improve when he stopped taking his statin. He reports some intermittent cramping pain at rest, but cannot quantify how often it occurs. He denies any issues with non-healing ulcerations to BLE. It does not appear to cause him significant discomfort. He does report he \"broke his foot and there is nothing they can do about it.\" His left foot pain is causing him to walk with unsteady gait and subsequently causing him to have hip pain. He does closely " follow with a podiatrist Madeleine Noland and his PCP ZAYDA MCNEILL.     Review of Systems:  Review of Systems   Constitutional: Negative for chills, decreased appetite, diaphoresis, fever, malaise/fatigue, night sweats, weight gain and weight loss.   HENT: Negative for hoarse voice.    Eyes: Negative for blurred vision, double vision and visual disturbance.   Cardiovascular: Positive for dyspnea on exertion. Negative for chest pain, claudication, irregular heartbeat, leg swelling, near-syncope, orthopnea, palpitations, paroxysmal nocturnal dyspnea and syncope.   Respiratory: Negative for cough, hemoptysis, shortness of breath, sputum production and wheezing.    Hematologic/Lymphatic: Negative for adenopathy and bleeding problem. Does not bruise/bleed easily.   Skin: Negative for color change, nail changes, poor wound healing and rash.   Musculoskeletal: Negative for back pain, falls and muscle cramps.   Gastrointestinal: Negative for abdominal pain, dysphagia and heartburn.   Genitourinary: Negative for flank pain.   Neurological: Negative for brief paralysis, disturbances in coordination, dizziness, focal weakness, headaches, light-headedness, loss of balance, numbness, paresthesias, sensory change, vertigo and weakness.   Psychiatric/Behavioral: Negative for depression and suicidal ideas.   Allergic/Immunologic: Negative for persistent infections.       I have reviewed the following portions of the patient's history: allergies, current medications, past family history, past medical history, past social history, past surgical history, problem list and ROS and confirm it's accurate.    Allergies:  No Known Allergies    Medications:      Current Outpatient Medications:   •  albuterol (PROVENTIL) (2.5 MG/3ML) 0.083% nebulizer solution, Take 2.5 mg by nebulization 4 (Four) Times a Day As Needed for Wheezing., Disp: , Rfl:   •  Budeson-Glycopyrrol-Formoterol (LoopNetztri Aerosphere) 160-9-4.8 MCG/ACT aerosol inhaler, Inhale 2  puffs 2 (Two) Times a Day., Disp: 3 each, Rfl: 3  •  glyBURIDE (DIAbeta) 5 MG tablet, Take 5 mg by mouth Daily With Breakfast., Disp: , Rfl:   •  hydrochlorothiazide (HYDRODIURIL) 25 MG tablet, Take 25 mg by mouth Daily. PRN SBP >140, Disp: , Rfl:   •  levETIRAcetam (KEPPRA) 750 MG tablet, Take 375 mg by mouth 2 (Two) Times a Day. Prior to [facility] Admission, Patient was on: 1/2 to 1 tablet twice a day, Disp: , Rfl:   •  lisinopril (PRINIVIL,ZESTRIL) 30 MG tablet, Take 1 tablet by mouth Daily., Disp: 90 tablet, Rfl: 3  •  metFORMIN (GLUCOPHAGE) 500 MG tablet, Take 1,000 mg by mouth 2 (Two) Times a Day With Meals., Disp: , Rfl:   •  omeprazole (priLOSEC) 20 MG capsule, Take 20 mg by mouth Daily., Disp: , Rfl:   •  roflumilast (Daliresp) 500 MCG tablet tablet, Take 1 tablet by mouth Daily., Disp: 30 tablet, Rfl: 3  •  tiZANidine (ZANAFLEX) 4 MG tablet, Take 4 mg by mouth At Night As Needed for Muscle Spasms., Disp: , Rfl:   •  hydrALAZINE (APRESOLINE) 25 MG tablet, 3 (Three) Times a Day., Disp: , Rfl:   •  nebivolol (Bystolic) 5 MG tablet, Take 1 tablet by mouth Daily., Disp: 30 tablet, Rfl: 5    History:   Past Medical History:   Diagnosis Date   • Arthritis    • Chronic diastolic heart failure (HCC) 8/19/2021   • COPD (chronic obstructive pulmonary disease) (HCC)    • Depression    • Diabetes mellitus (HCC)    • Diverticulitis    • Emphysema (subcutaneous) (surgical) resulting from a procedure    • GERD (gastroesophageal reflux disease)    • Neuropathy    • Pneumothorax     Left, status post chest tube       Past Surgical History:   Procedure Laterality Date   • BRONCHOSCOPY N/A 5/14/2019    Procedure: Bronchoscopy with Transbronchial Biopsy with Fluoroscopy;  Surgeon: Eladio Bethea MD;  Location: [facility];  Service: Pulmonary   • CHEST TUBE INSERTION Left     Pneumothorax   • COLON RESECTION  2016    had colostomy and reveresed back    • COLONOSCOPY  2016   • COLOSTOMY CLOSURE     • LUNG BIOPSY Right 2014     "(non cancerous)   • OTHER SURGICAL HISTORY Left     LEFT ELBOW SURGERY       Social History     Socioeconomic History   • Marital status:    • Number of children: 0   Tobacco Use   • Smoking status: Former Smoker     Packs/day: 1.00     Years: 45.00     Pack years: 45.00     Types: Cigarettes     Quit date:      Years since quittin.0   • Smokeless tobacco: Never Used   Vaping Use   • Vaping Use: Never used   Substance and Sexual Activity   • Alcohol use: No   • Drug use: No   • Sexual activity: Defer     Birth control/protection: Other        Family History   Problem Relation Age of Onset   • Alzheimer's disease Mother    • Cancer Father         THROAT CANCER   • Diabetes Sister    • Diabetes Brother        Objective     Physical Exam:  Vitals:    22 0943   BP: 162/90   BP Location: Left arm   Patient Position: Sitting   Pulse: 98   Temp: 98.2 °F (36.8 °C)   SpO2: 98%   Weight: 99.5 kg (219 lb 6.4 oz)   Height: 182.9 cm (72\")      Body mass index is 29.76 kg/m².    Physical Exam  Vitals and nursing note reviewed.   Constitutional:       Appearance: Normal appearance. He is well-developed.   HENT:      Head: Normocephalic and atraumatic.   Eyes:      Pupils: Pupils are equal, round, and reactive to light.   Neck:      Vascular: No carotid bruit.   Cardiovascular:      Rate and Rhythm: Normal rate and regular rhythm.      Pulses:           Dorsalis pedis pulses are detected w/ Doppler on the right side and detected w/ Doppler on the left side.        Posterior tibial pulses are detected w/ Doppler on the right side and detected w/ Doppler on the left side.      Heart sounds: Normal heart sounds, S1 normal and S2 normal. No murmur heard.       Comments: Dopplerable pulses to DP/PT but they are not heard easily.   Pulmonary:      Effort: Pulmonary effort is normal.      Breath sounds: Examination of the right-lower field reveals decreased breath sounds. Examination of the left-lower field reveals " decreased breath sounds. Decreased breath sounds present.   Abdominal:      Palpations: Abdomen is soft.   Musculoskeletal:         General: No swelling.      Cervical back: Neck supple.      Right lower leg: No edema.      Left lower leg: No edema.   Skin:     General: Skin is warm and dry.      Capillary Refill: Capillary refill takes less than 2 seconds.      Findings: No bruising.      Comments: Patient has bluish discoloration to BL toes and hair less on shins   Neurological:      General: No focal deficit present.      Mental Status: He is alert and oriented to person, place, and time. Mental status is at baseline.      GCS: GCS eye subscore is 4. GCS verbal subscore is 5. GCS motor subscore is 6.      Motor: Motor function is intact.      Coordination: Coordination is intact.      Gait: Gait is intact.   Psychiatric:         Mood and Affect: Mood normal.         Speech: Speech normal.         Behavior: Behavior normal. Behavior is cooperative.         Cognition and Memory: Cognition normal.         Imaging/Labs:    CT Chest With & Without Contrast Diagnostic    Result Date: 1/5/2022  Stable CT scan of the chest. There continue to be 3 lung nodules in the right lung. There is persistent fatty change in the liver.        929.74 mGy.cm The radiation dose reduction device was utilized for each scan per the ALARA (as low as reasonably achievable) protocol.  This report was finalized on 1/5/2022 11:01 AM by Dr. Albert Jhaveri II, MD.     Personally reviewed, patient has 5 stable lung nodules in the right lung; RUL nodule 8.5mm, RLL nodules 8mm, 6mm, 7mm, 8mm and 5 mm nodule in the right lung. I reviewed patient's CT scan from May 2021 and there were 5 nodules present on that CT as well. All are stable in size,         Assessment / Plan      Assessment / Plan:  Diagnoses and all orders for this visit:    1. Multiple pulmonary nodules (Primary)    2. PVD (peripheral vascular disease) (HCC)       1. Pulmonary nodules:  Patient denies any chest pain, worsening SOA, fatigue, hemoptysis, unintentional weight loss or lymphadenopathy. Patient does not smoke. Patient follow with a pulmonologist in [place] Dr. Cuevas, last seen August 2021. Personally reviewed patient's CT scan results with patient revealing stable size of 5 lung nodules in the right  lung. Patient follows with pulmonology who had plans to repeat CT chest in May 2022. Will plan to see the patient back in 6 months for with CT performed by pulmonology.   2. PVD: BLE neuropathy/claudication pain (R=L), pain in BL hips worse in the morning that resolves throughout the day and is not worse with ambulation. His pain did improve when he stopped taking his statin. He reports some intermittent cramping pain at rest, but cannot quantify how often it occurs. He denies any issues with non-healing ulcerations to BLE. Patient does not have palpable pulses but does have dopplerable pulses on exam and well and hairless shins consistent with PVD. Will plan to obtain ABIs. If significant, will obtain CTA with runoff for further evaluation.     Follow Up:   Return in 6 months (on 7/12/2022) for F/u with imaging, 6 months with repeat CT chest.   Or sooner for any further concerns or worsening sign and symptoms. If unable to reach us in the office please dial 911 or go to the nearest emergency department.      Holly MCNEILL  [facility] Cardiothoracic Surgery    Time Spent: I spent 34 minutes caring for Maximo on this date of service. This time includes time spent by me in the following activities: preparing for the visit, reviewing tests, obtaining and/or reviewing a separately obtained history, performing a medically appropriate examination and/or evaluation, counseling and educating the patient/family/caregiver, ordering medications, tests, or procedures, documenting information in the medical record, independently interpreting results and communicating that information with  the patient/family/caregiver and care coordination.

## 2022-01-17 DIAGNOSIS — I70.213 ATHEROSCLEROSIS OF NATIVE ARTERY OF BOTH LOWER EXTREMITIES WITH INTERMITTENT CLAUDICATION: Primary | ICD-10-CM

## 2022-01-19 ENCOUNTER — HOSPITAL ENCOUNTER (OUTPATIENT)
Dept: CARDIOLOGY | Facility: HOSPITAL | Age: 67
Discharge: HOME | End: 2022-01-19
Admitting: NURSE PRACTITIONER

## 2022-01-19 DIAGNOSIS — I70.213 ATHEROSCLEROSIS OF NATIVE ARTERY OF BOTH LOWER EXTREMITIES WITH INTERMITTENT CLAUDICATION: ICD-10-CM

## 2022-01-19 PROCEDURE — 93923 UPR/LXTR ART STDY 3+ LVLS: CPT | Performed by: RADIOLOGY

## 2022-01-19 PROCEDURE — 93923 UPR/LXTR ART STDY 3+ LVLS: CPT

## 2022-02-09 RX ORDER — NEBIVOLOL 5 MG/1
TABLET ORAL
Qty: 90 TABLET | Refills: 1 | Status: SHIPPED | OUTPATIENT
Start: 2022-02-09 | End: 2022-03-01

## 2022-02-28 ENCOUNTER — OFFICE VISIT (OUTPATIENT)
Dept: FAMILY MEDICINE CLINIC | Facility: CLINIC | Age: 67
End: 2022-02-28

## 2022-02-28 VITALS
BODY MASS INDEX: 29.47 KG/M2 | HEART RATE: 90 BPM | DIASTOLIC BLOOD PRESSURE: 110 MMHG | WEIGHT: 217.6 LBS | HEIGHT: 72 IN | TEMPERATURE: 97 F | SYSTOLIC BLOOD PRESSURE: 180 MMHG | OXYGEN SATURATION: 99 %

## 2022-02-28 DIAGNOSIS — J42 CHRONIC BRONCHITIS, UNSPECIFIED CHRONIC BRONCHITIS TYPE: ICD-10-CM

## 2022-02-28 DIAGNOSIS — R25.1 TREMORS OF NERVOUS SYSTEM: ICD-10-CM

## 2022-02-28 DIAGNOSIS — R12 HEARTBURN: ICD-10-CM

## 2022-02-28 DIAGNOSIS — E11.42 TYPE 2 DIABETES MELLITUS WITH DIABETIC POLYNEUROPATHY, WITHOUT LONG-TERM CURRENT USE OF INSULIN: ICD-10-CM

## 2022-02-28 DIAGNOSIS — E55.9 VITAMIN D DEFICIENCY: ICD-10-CM

## 2022-02-28 DIAGNOSIS — I50.32 CHRONIC DIASTOLIC HEART FAILURE: Primary | ICD-10-CM

## 2022-02-28 DIAGNOSIS — I10 ESSENTIAL HYPERTENSION: ICD-10-CM

## 2022-02-28 PROCEDURE — 85025 COMPLETE CBC W/AUTO DIFF WBC: CPT | Performed by: NURSE PRACTITIONER

## 2022-02-28 PROCEDURE — 80061 LIPID PANEL: CPT | Performed by: NURSE PRACTITIONER

## 2022-02-28 PROCEDURE — 82607 VITAMIN B-12: CPT | Performed by: NURSE PRACTITIONER

## 2022-02-28 PROCEDURE — 80053 COMPREHEN METABOLIC PANEL: CPT | Performed by: NURSE PRACTITIONER

## 2022-02-28 PROCEDURE — 99214 OFFICE O/P EST MOD 30 MIN: CPT | Performed by: NURSE PRACTITIONER

## 2022-02-28 PROCEDURE — 82306 VITAMIN D 25 HYDROXY: CPT | Performed by: NURSE PRACTITIONER

## 2022-02-28 PROCEDURE — 84443 ASSAY THYROID STIM HORMONE: CPT | Performed by: NURSE PRACTITIONER

## 2022-02-28 PROCEDURE — 36415 COLL VENOUS BLD VENIPUNCTURE: CPT | Performed by: NURSE PRACTITIONER

## 2022-02-28 PROCEDURE — 83036 HEMOGLOBIN GLYCOSYLATED A1C: CPT | Performed by: NURSE PRACTITIONER

## 2022-02-28 RX ORDER — GLYBURIDE 5 MG/1
5 TABLET ORAL
Qty: 90 TABLET | Refills: 1 | Status: SHIPPED | OUTPATIENT
Start: 2022-02-28 | End: 2022-10-20 | Stop reason: SDUPTHER

## 2022-02-28 RX ORDER — OMEPRAZOLE 20 MG/1
20 CAPSULE, DELAYED RELEASE ORAL DAILY
Qty: 90 CAPSULE | Refills: 1 | Status: SHIPPED | OUTPATIENT
Start: 2022-02-28 | End: 2022-06-03 | Stop reason: HOSPADM

## 2022-02-28 RX ORDER — HYDRALAZINE HYDROCHLORIDE 25 MG/1
25 TABLET, FILM COATED ORAL 3 TIMES DAILY
Qty: 270 TABLET | Refills: 1 | Status: SHIPPED | OUTPATIENT
Start: 2022-02-28 | End: 2022-04-19 | Stop reason: ALTCHOICE

## 2022-02-28 RX ORDER — LEVETIRACETAM 750 MG/1
375 TABLET ORAL 2 TIMES DAILY
Qty: 90 TABLET | Refills: 1 | Status: ON HOLD | OUTPATIENT
Start: 2022-02-28 | End: 2022-06-02

## 2022-03-01 LAB
25(OH)D3 SERPL-MCNC: 20.4 NG/ML (ref 30–100)
ALBUMIN SERPL-MCNC: 5 G/DL (ref 3.5–5.2)
ALBUMIN/GLOB SERPL: 2.1 G/DL
ALP SERPL-CCNC: 84 U/L (ref 39–117)
ALT SERPL W P-5'-P-CCNC: 28 U/L (ref 1–41)
ANION GAP SERPL CALCULATED.3IONS-SCNC: 14 MMOL/L (ref 5–15)
AST SERPL-CCNC: 19 U/L (ref 1–40)
BASOPHILS # BLD AUTO: 0.05 10*3/MM3 (ref 0–0.2)
BASOPHILS NFR BLD AUTO: 0.7 % (ref 0–1.5)
BILIRUB SERPL-MCNC: 0.3 MG/DL (ref 0–1.2)
BUN SERPL-MCNC: 14 MG/DL (ref 8–23)
BUN/CREAT SERPL: 13.6 (ref 7–25)
CALCIUM SPEC-SCNC: 9.6 MG/DL (ref 8.6–10.5)
CHLORIDE SERPL-SCNC: 95 MMOL/L (ref 98–107)
CHOLEST SERPL-MCNC: 211 MG/DL (ref 0–200)
CO2 SERPL-SCNC: 29 MMOL/L (ref 22–29)
CREAT SERPL-MCNC: 1.03 MG/DL (ref 0.76–1.27)
DEPRECATED RDW RBC AUTO: 49 FL (ref 37–54)
EGFRCR SERPLBLD CKD-EPI 2021: 80.1 ML/MIN/1.73
EOSINOPHIL # BLD AUTO: 0.25 10*3/MM3 (ref 0–0.4)
EOSINOPHIL NFR BLD AUTO: 3.4 % (ref 0.3–6.2)
ERYTHROCYTE [DISTWIDTH] IN BLOOD BY AUTOMATED COUNT: 14.8 % (ref 12.3–15.4)
GLOBULIN UR ELPH-MCNC: 2.4 GM/DL
GLUCOSE SERPL-MCNC: 134 MG/DL (ref 65–99)
HBA1C MFR BLD: 6.8 % (ref 4.8–5.6)
HCT VFR BLD AUTO: 47 % (ref 37.5–51)
HDLC SERPL-MCNC: 41 MG/DL (ref 40–60)
HGB BLD-MCNC: 15.5 G/DL (ref 13–17.7)
IMM GRANULOCYTES # BLD AUTO: 0.03 10*3/MM3 (ref 0–0.05)
IMM GRANULOCYTES NFR BLD AUTO: 0.4 % (ref 0–0.5)
LDLC SERPL CALC-MCNC: 130 MG/DL (ref 0–100)
LDLC/HDLC SERPL: 3.05 {RATIO}
LYMPHOCYTES # BLD AUTO: 1.41 10*3/MM3 (ref 0.7–3.1)
LYMPHOCYTES NFR BLD AUTO: 19.4 % (ref 19.6–45.3)
MCH RBC QN AUTO: 29.6 PG (ref 26.6–33)
MCHC RBC AUTO-ENTMCNC: 33 G/DL (ref 31.5–35.7)
MCV RBC AUTO: 89.9 FL (ref 79–97)
MONOCYTES # BLD AUTO: 0.62 10*3/MM3 (ref 0.1–0.9)
MONOCYTES NFR BLD AUTO: 8.5 % (ref 5–12)
NEUTROPHILS NFR BLD AUTO: 4.91 10*3/MM3 (ref 1.7–7)
NEUTROPHILS NFR BLD AUTO: 67.6 % (ref 42.7–76)
NRBC BLD AUTO-RTO: 0 /100 WBC (ref 0–0.2)
PLATELET # BLD AUTO: 245 10*3/MM3 (ref 140–450)
PMV BLD AUTO: 12.2 FL (ref 6–12)
POTASSIUM SERPL-SCNC: 4.4 MMOL/L (ref 3.5–5.2)
PROT SERPL-MCNC: 7.4 G/DL (ref 6–8.5)
RBC # BLD AUTO: 5.23 10*6/MM3 (ref 4.14–5.8)
SODIUM SERPL-SCNC: 138 MMOL/L (ref 136–145)
TRIGL SERPL-MCNC: 224 MG/DL (ref 0–150)
TSH SERPL DL<=0.05 MIU/L-ACNC: 1.05 UIU/ML (ref 0.27–4.2)
VIT B12 BLD-MCNC: 230 PG/ML (ref 211–946)
VLDLC SERPL-MCNC: 40 MG/DL (ref 5–40)
WBC NRBC COR # BLD: 7.27 10*3/MM3 (ref 3.4–10.8)

## 2022-03-01 RX ORDER — ERGOCALCIFEROL 1.25 MG/1
50000 CAPSULE ORAL WEEKLY
Qty: 12 CAPSULE | Refills: 2 | Status: ON HOLD | OUTPATIENT
Start: 2022-03-01 | End: 2022-06-02

## 2022-03-02 ENCOUNTER — TELEPHONE (OUTPATIENT)
Dept: FAMILY MEDICINE CLINIC | Facility: CLINIC | Age: 67
End: 2022-03-02

## 2022-03-02 NOTE — TELEPHONE ENCOUNTER
----- Message from OSITO Rizvi sent at 3/1/2022  5:52 PM EST -----  Lab results show sugar to be stable. Needs a statin for cholesterol if he will agree to starting.  Vit D is low I sent replacement to his pharmacy to be taken once weekly       Spoke with patient & he verbalized understanding,reports he cannot take statins causes leg cramps,will work on diet.

## 2022-03-04 ENCOUNTER — PATIENT ROUNDING (BHMG ONLY) (OUTPATIENT)
Dept: FAMILY MEDICINE CLINIC | Facility: CLINIC | Age: 67
End: 2022-03-04

## 2022-03-04 ENCOUNTER — TELEPHONE (OUTPATIENT)
Dept: FAMILY MEDICINE CLINIC | Facility: CLINIC | Age: 67
End: 2022-03-04

## 2022-03-04 ENCOUNTER — OFFICE VISIT (OUTPATIENT)
Dept: FAMILY MEDICINE CLINIC | Facility: CLINIC | Age: 67
End: 2022-03-04

## 2022-03-04 VITALS
HEART RATE: 91 BPM | BODY MASS INDEX: 29.61 KG/M2 | HEIGHT: 72 IN | TEMPERATURE: 96.8 F | WEIGHT: 218.6 LBS | DIASTOLIC BLOOD PRESSURE: 80 MMHG | SYSTOLIC BLOOD PRESSURE: 160 MMHG | OXYGEN SATURATION: 96 %

## 2022-03-04 DIAGNOSIS — Z91.199 NONCOMPLIANCE: ICD-10-CM

## 2022-03-04 DIAGNOSIS — I10 ESSENTIAL HYPERTENSION: Primary | ICD-10-CM

## 2022-03-04 PROCEDURE — 99214 OFFICE O/P EST MOD 30 MIN: CPT | Performed by: NURSE PRACTITIONER

## 2022-03-04 NOTE — PROGRESS NOTES
March 4, 2022    Hello, may I speak with Maximo Kwon?    My name is MAAME DASH    I am  with [facility] [place] [facility]  [facility]  [address] [place] [address] [phone].    Before we get started may I verify your date of birth? 1955    I am calling to officially welcome you to our practice and ask about your recent visit. Is this a good time to talk?   YES    Tell me about your visit with us. What things went well?   MY VISIT WAS GOOD       We're always looking for ways to make our patients' experiences even better. Do you have recommendations on ways we may improve?    NONE    Overall were you satisfied with your first visit to our practice?   YES     I appreciate you taking the time to speak with me today. Is there anything else I can do for you?   YES.      Patient was concerned about his blood pressure readings being low.  I had the telephone nurse speak with patient while he was on the phone.  Patient was offered a same day appointment to be evaluated and patient agreed.          Thank you, and have a great day.

## 2022-03-04 NOTE — PROGRESS NOTES
Chief Complaint  Hypertension    Subjective          Maximo Kwon is a 66 y.o. male who presents today to [facility] for follow up    HPI:   History of Present Illness    Presents to clinic for blood pressure machine check. Reports concern as his wrist machine reads differently than his arm machine.  Reports blood pressure readings have been lower on the arm machine and higher on the wrist machine.  Denies any associated symptoms.  Reports he does not usually monitor his blood pressure but did today as he changed the batteries out.    Denies any other concerns or issues.    The following portions of the patient's history were reviewed and updated as appropriate: allergies, current medications, past family history, past medical history, past social history, past surgical history and problem list.    Objective     Problem List:  Patient Active Problem List   Diagnosis   • Mixed hyperlipidemia   • Simple chronic bronchitis (HCC)   • Multiple pulmonary nodules   • Essential hypertension   • Left ventricular hypertrophy with grade 1 diastolic dysfunction   • Hereditary and idiopathic peripheral neuropathy   • COPD (chronic obstructive pulmonary disease) ([facility])   • Degenerative disc disease, lumbar   • Tachycardia   • Chest pain   • Arthritis   • Diabetes mellitus ([facility])   • Neuropathy   • Claudication of both lower extremities ([facility])   • LAZCANO (dyspnea on exertion)   • GERD (gastroesophageal reflux disease)   • Chronic respiratory failure with hypoxia ([facility])   • Chronic diastolic heart failure ([facility])       Allergy:   No Known Allergies     Discontinued Medications:  There are no discontinued medications.    Current Medications:   Current Outpatient Medications   Medication Sig Dispense Refill   • albuterol (PROVENTIL) (2.5 MG/3ML) 0.083% nebulizer solution Take 2.5 mg by nebulization 4 (Four) Times a Day As Needed for Wheezing.     • Budeson-Glycopyrrol-Formoterol (Breztri Aerosphere)  160-9-4.8 MCG/ACT aerosol inhaler Inhale 2 puffs 2 (Two) Times a Day. 3 each 3   • glyburide (DIAbeta) 5 MG tablet Take 1 tablet by mouth Daily With Breakfast. 90 tablet 1   • hydrALAZINE (APRESOLINE) 25 MG tablet Take 1 tablet by mouth 3 (Three) Times a Day. 270 tablet 1   • hydrochlorothiazide (HYDRODIURIL) 25 MG tablet Take 25 mg by mouth Daily. PRN SBP >140     • levETIRAcetam (KEPPRA) 750 MG tablet Take 0.5 tablets by mouth 2 (Two) Times a Day. Prior to [facility] Admission, Patient was on: 1/2 to 1 tablet twice a day 90 tablet 1   • lisinopril (PRINIVIL,ZESTRIL) 30 MG tablet Take 1 tablet by mouth Daily. 90 tablet 3   • metFORMIN (Glucophage) 1000 MG tablet Take 1 tablet by mouth 2 (Two) Times a Day With Meals. 180 tablet 1   • omeprazole (priLOSEC) 20 MG capsule Take 1 capsule by mouth Daily. 90 capsule 1   • roflumilast (Daliresp) 500 MCG tablet tablet Take 1 tablet by mouth Daily. 30 tablet 3   • vitamin D (ERGOCALCIFEROL) 1.25 MG (29424 UT) capsule capsule Take 1 capsule by mouth 1 (One) Time Per Week. 12 capsule 2     No current facility-administered medications for this visit.       Past Medical History:  Past Medical History:   Diagnosis Date   • Arthritis    • Chronic diastolic heart failure (HCC) 8/19/2021   • COPD (chronic obstructive pulmonary disease) ([facility])    • Depression    • Diabetes mellitus (HCC)    • Diverticulitis    • Emphysema (subcutaneous) (surgical) resulting from a procedure    • GERD (gastroesophageal reflux disease)    • Neuropathy    • Pneumothorax     Left, status post chest tube   • Spinal stenosis        Past Surgical History:  Past Surgical History:   Procedure Laterality Date   • BRONCHOSCOPY N/A 5/14/2019    Procedure: Bronchoscopy with Transbronchial Biopsy with Fluoroscopy;  Surgeon: Eladio Bethea MD;  Location: [facility];  Service: Pulmonary   • CHEST TUBE INSERTION Left     Pneumothorax   • COLON RESECTION  2016    had colostomy and reveresed back    • COLONOSCOPY  2016    • COLOSTOMY CLOSURE     • LUNG BIOPSY Right     (non cancerous)   • OTHER SURGICAL HISTORY Left     LEFT ELBOW SURGERY       Social History:  Social History     Socioeconomic History   • Marital status:    • Number of children: 0   Tobacco Use   • Smoking status: Former Smoker     Packs/day: 1.00     Years: 45.00     Pack years: 45.00     Types: Cigarettes     Quit date:      Years since quittin.1   • Smokeless tobacco: Never Used   Vaping Use   • Vaping Use: Never used   Substance and Sexual Activity   • Alcohol use: No   • Drug use: No   • Sexual activity: Defer     Birth control/protection: Other       Family History:  Family History   Problem Relation Age of Onset   • Alzheimer's disease Mother    • Cancer Father         THROAT CANCER   • Diabetes Sister    • Diabetes Brother        Review of Systems:  Review of Systems   Respiratory: Negative for shortness of breath.    Cardiovascular: Negative for chest pain, palpitations and leg swelling.   Neurological: Negative for dizziness.       Physical Exam:  Physical Exam  Vitals and nursing note reviewed.   Constitutional:       General: He is not in acute distress.     Appearance: Normal appearance. He is not ill-appearing or toxic-appearing.   HENT:      Head: Normocephalic.   Eyes:      Pupils: Pupils are equal, round, and reactive to light.   Neck:      Vascular: No carotid bruit.   Cardiovascular:      Rate and Rhythm: Normal rate and regular rhythm.      Pulses: Normal pulses.      Heart sounds: Normal heart sounds.   Pulmonary:      Effort: Pulmonary effort is normal. No respiratory distress.      Breath sounds: Normal breath sounds.   Abdominal:      General: Bowel sounds are normal. There is no distension.      Palpations: Abdomen is soft.   Musculoskeletal:         General: No swelling.   Skin:     General: Skin is warm and dry.      Capillary Refill: Capillary refill takes less than 2 seconds.      Coloration: Skin is not pale.  "  Neurological:      General: No focal deficit present.      Mental Status: He is alert and oriented to person, place, and time.   Psychiatric:         Mood and Affect: Mood normal.         Behavior: Behavior normal.         Thought Content: Thought content normal.         Judgment: Judgment normal.         Vital Signs:   /80   Pulse 91   Temp 96.8 °F (36 °C) (Temporal)   Ht 182.9 cm (72\")   Wt 99.2 kg (218 lb 9.6 oz)   SpO2 96%   BMI 29.65 kg/m²  RR: 16  Second manual bp reading by NP: 160/80              Assessment and Plan   Diagnoses and all orders for this visit:    1. Essential hypertension (Primary)    2. Noncompliance    Patient's wrist blood pressure machine not functioning at this time.  Patient's arm blood pressure machine not consistent with multiple manual readings.  Instructed to discontinue use of both blood pressure machines and to obtain a new one.  Blood pressure elevated today but patient is asymptomatic.  Patient admits he does not take Bystolic as he was prescribed as he \"took himself off of it \".  Patient does not take hydralazine as directed as he does not monitor his blood pressure normally.  Patient agreeable to obtain new blood pressure machine and to monitor blood pressure.  Patient directed that if blood pressures remain outside of establish parameters, develops symptoms, or if he is unable to obtain a machine, to follow-up in clinic.    2.  Non-compliance  Take your medications as prescribed.      Discussed possible differential diagnoses, testing, treatment, recommended non-pharmacological interventions, risks, warning signs to monitor for that would indicate need for follow-up in clinic or ER. If no improvement with these regimens or you have new or worsening symptoms follow-up. Patient verbalizes understanding and agreement with plan of care. Denies further needs or concerns.     I spent 30 minutes caring for patient on this date of service. This time includes time spent " by me in the following activities: preparing for the visit, reviewing tests, obtaining and/or reviewing a separately obtained history, performing a medically appropriate examination and/or evaluation, counseling and educating the patient/family/caregiver, ordering medications, tests, or procedures and documenting information in the medical record    Meds ordered during this visit:  No orders of the defined types were placed in this encounter.      Patient Instructions:  Patient instructions given for the following visit diagnosis:    ICD-10-CM ICD-9-CM   1. Essential hypertension  I10 401.9   2. Noncompliance  Z91.19 V15.81       Follow Up   Return for 1-2 weeks.  Sooner if needed.  Keep appointment on 3/7/22.       This document has been electronically signed by OSITO Poole  March 4, 2022 17:48 EST    Patient was given instructions and counseling regarding his condition or for health maintenance advice. Please see specific information pulled into the AVS if appropriate.     Part of this note may be an electronic transcription/translation of spoken language to printed text using the Dragon Dictation System.

## 2022-03-04 NOTE — TELEPHONE ENCOUNTER
Staff member called patient as part of patient rounding protocol and patient indicated concern for his blood pressure.  His arm cuff is reading very low, his wrist cuff very high.  He reports no abnormal signs and/or symptoms but is concerned about the discrepancy. I have scheduled him to see a provider this afternoon and advised him to bring both cuffs.  He expressed understanding.

## 2022-03-07 ENCOUNTER — TELEPHONE (OUTPATIENT)
Dept: FAMILY MEDICINE CLINIC | Facility: CLINIC | Age: 67
End: 2022-03-07

## 2022-03-07 NOTE — TELEPHONE ENCOUNTER
Please go ahead and put him on Nilsa's schedule sometime this week for re-evaluation.    Spoke with patient & scheduled for Wednesday.

## 2022-03-07 NOTE — TELEPHONE ENCOUNTER
"Patient called reports he has these random episodes in the evenings where he \"sees stars\" denies dizziness but becomes very fatigued usually lasts about 45 minutes to an hour but yesterday lasted for hours,he had these episodes before Hydralazine so he knows its not to blame but yesterday his lips got numb he checked his blood sugar it was 195 but had just ate BP was 77/54 FH-101 rested checked BP throughout the evening 81/45,83/47 then by hs was 120/53 & 114/59 felt fine & has felt fine ever since,instructed if he has another episode like this he needs to go to the ER immediately & he verbalized understanding.  "

## 2022-03-09 ENCOUNTER — OFFICE VISIT (OUTPATIENT)
Dept: FAMILY MEDICINE CLINIC | Facility: CLINIC | Age: 67
End: 2022-03-09

## 2022-03-09 VITALS
SYSTOLIC BLOOD PRESSURE: 114 MMHG | BODY MASS INDEX: 29.61 KG/M2 | DIASTOLIC BLOOD PRESSURE: 64 MMHG | OXYGEN SATURATION: 98 % | TEMPERATURE: 96.8 F | HEIGHT: 72 IN | HEART RATE: 93 BPM | RESPIRATION RATE: 16 BRPM | WEIGHT: 218.6 LBS

## 2022-03-09 DIAGNOSIS — E66.3 OVERWEIGHT (BMI 25.0-29.9): ICD-10-CM

## 2022-03-09 DIAGNOSIS — I95.9 HYPOTENSION, UNSPECIFIED HYPOTENSION TYPE: ICD-10-CM

## 2022-03-09 DIAGNOSIS — R42 DIZZINESS: Primary | ICD-10-CM

## 2022-03-09 DIAGNOSIS — E11.42 TYPE 2 DIABETES MELLITUS WITH DIABETIC POLYNEUROPATHY, WITHOUT LONG-TERM CURRENT USE OF INSULIN: ICD-10-CM

## 2022-03-09 DIAGNOSIS — I10 ESSENTIAL HYPERTENSION: ICD-10-CM

## 2022-03-09 LAB — GLUCOSE BLDC GLUCOMTR-MCNC: 133 MG/DL (ref 70–130)

## 2022-03-09 PROCEDURE — 82962 GLUCOSE BLOOD TEST: CPT | Performed by: NURSE PRACTITIONER

## 2022-03-09 PROCEDURE — 99214 OFFICE O/P EST MOD 30 MIN: CPT | Performed by: NURSE PRACTITIONER

## 2022-03-09 PROCEDURE — 84443 ASSAY THYROID STIM HORMONE: CPT | Performed by: NURSE PRACTITIONER

## 2022-03-09 PROCEDURE — 80053 COMPREHEN METABOLIC PANEL: CPT | Performed by: NURSE PRACTITIONER

## 2022-03-09 PROCEDURE — 85025 COMPLETE CBC W/AUTO DIFF WBC: CPT | Performed by: NURSE PRACTITIONER

## 2022-03-09 PROCEDURE — 36415 COLL VENOUS BLD VENIPUNCTURE: CPT | Performed by: NURSE PRACTITIONER

## 2022-03-09 PROCEDURE — 83735 ASSAY OF MAGNESIUM: CPT | Performed by: NURSE PRACTITIONER

## 2022-03-09 NOTE — PROGRESS NOTES
"Chief Complaint  Hypertension, Dizziness (Lightheadedness, \"Seeing stars\"), and Fatigue    Subjective          Maximo Kwon is a 66 y.o. male who presents today to [facility] for initial evaluation     HPI:   History of Present Illness    Presents to clinic for complaint of an episode of light headedness that occurred Sunday night. Denies LOC.  Reports he was drowsy at that time.  Symptoms resolved without treatment and denies recurrence of symptoms.  No associated symptoms.  Denies history of low blood glucose and reports BG during that episode was 180.     Has history of hypertension.  Previously evaluated on 3/4/2022 to have his blood pressure machine checked for accuracy.  Patient was recommended to discard his blood pressure machines as they were not accurate and to obtain a new one.  Patient reports he has obtained a new blood pressure machine.  Reports during episode of dizziness he checked his blood pressure with his new blood pressure cuff with readings of: 77/54 with heart rate of 101.  Rechecked 30 minutes later and it was 81/45 with heart rate of 97.  A few hours later it was 102/53 and then it increased to 114/59 with heart rate of 83.  Yesterday blood pressures averaged 130s over 70s with heart rate 80-90.     Did not bring blood pressure machine today for accuracy check.      The following portions of the patient's history were reviewed and updated as appropriate: allergies, current medications, past family history, past medical history, past social history, past surgical history and problem list.    Objective     Problem List:  Patient Active Problem List   Diagnosis   • Mixed hyperlipidemia   • Simple chronic bronchitis (HCC)   • Multiple pulmonary nodules   • Essential hypertension   • Left ventricular hypertrophy with grade 1 diastolic dysfunction   • Hereditary and idiopathic peripheral neuropathy   • COPD, severe (HCC)   • Degenerative disc disease, lumbar   • " Tachycardia   • Chest pain   • Arthritis   • Diabetes mellitus (HCC)   • Neuropathy   • Claudication of both lower extremities ([facility])   • LAZCANO (dyspnea on exertion)   • GERD (gastroesophageal reflux disease)   • Chronic respiratory failure with hypoxia ([facility])   • Chronic diastolic heart failure ([facility])       Allergy:   No Known Allergies     Discontinued Medications:  There are no discontinued medications.    Current Medications:   Current Outpatient Medications   Medication Sig Dispense Refill   • Budeson-Glycopyrrol-Formoterol (Breztri Aerosphere) 160-9-4.8 MCG/ACT aerosol inhaler Inhale 2 puffs 2 (Two) Times a Day. 3 each 3   • glyburide (DIAbeta) 5 MG tablet Take 1 tablet by mouth Daily With Breakfast. 90 tablet 1   • hydrALAZINE (APRESOLINE) 25 MG tablet Take 1 tablet by mouth 3 (Three) Times a Day. 270 tablet 1   • levETIRAcetam (KEPPRA) 750 MG tablet Take 0.5 tablets by mouth 2 (Two) Times a Day. Prior to [facility] Admission, Patient was on: 1/2 to 1 tablet twice a day 90 tablet 1   • lisinopril (PRINIVIL,ZESTRIL) 30 MG tablet Take 1 tablet by mouth Daily. 90 tablet 3   • metFORMIN (Glucophage) 1000 MG tablet Take 1 tablet by mouth 2 (Two) Times a Day With Meals. 180 tablet 1   • omeprazole (priLOSEC) 20 MG capsule Take 1 capsule by mouth Daily. 90 capsule 1   • vitamin D (ERGOCALCIFEROL) 1.25 MG (52289 UT) capsule capsule Take 1 capsule by mouth 1 (One) Time Per Week. 12 capsule 2   • albuterol (PROVENTIL) (2.5 MG/3ML) 0.083% nebulizer solution Take 2.5 mg by nebulization 4 (Four) Times a Day As Needed for Wheezing or Shortness of Air. 360 mL 8   • glucose blood test strip Use as instructed 100 each 11   • hydroCHLOROthiazide (HYDRODIURIL) 25 MG tablet Take 1 tablet by mouth Daily. PRN SBP >140 90 tablet 1   • roflumilast (Daliresp) 500 MCG tablet tablet Take 1 tablet by mouth Daily. 30 tablet 8     No current facility-administered medications for this visit.       Past Medical History:  Past Medical History:    Diagnosis Date   • Arthritis    • Chronic diastolic heart failure (HCC) 2021   • COPD (chronic obstructive pulmonary disease) (HCC)    • Depression    • Diabetes mellitus (HCC)    • Diverticulitis    • Emphysema (subcutaneous) (surgical) resulting from a procedure    • GERD (gastroesophageal reflux disease)    • Neuropathy    • Pneumothorax     Left, status post chest tube   • Spinal stenosis        Past Surgical History:  Past Surgical History:   Procedure Laterality Date   • BRONCHOSCOPY N/A 2019    Procedure: Bronchoscopy with Transbronchial Biopsy with Fluoroscopy;  Surgeon: Eladio Bethea MD;  Location: [facility];  Service: Pulmonary   • CHEST TUBE INSERTION Left     Pneumothorax   • COLON RESECTION  2016    had colostomy and reveresed back    • COLONOSCOPY     • COLOSTOMY CLOSURE     • LUNG BIOPSY Right     (non cancerous)   • OTHER SURGICAL HISTORY Left     LEFT ELBOW SURGERY       Social History:  Social History     Socioeconomic History   • Marital status:    • Number of children: 0   Tobacco Use   • Smoking status: Former Smoker     Packs/day: 1.00     Types: Cigarettes     Quit date:      Years since quittin.2   • Smokeless tobacco: Never Used   Vaping Use   • Vaping Use: Never used   Substance and Sexual Activity   • Alcohol use: No   • Drug use: No   • Sexual activity: Defer     Birth control/protection: Other       Family History:  Family History   Problem Relation Age of Onset   • Alzheimer's disease Mother    • Cancer Father         THROAT CANCER   • Diabetes Sister    • Diabetes Brother        Review of Systems:  Review of Systems   Constitutional: Negative for chills and fever.   Eyes: Negative for visual disturbance.   Respiratory: Negative for shortness of breath.    Cardiovascular: Negative for chest pain and palpitations.   Gastrointestinal: Negative for nausea and vomiting.   Neurological: Negative for seizures, syncope, facial asymmetry, weakness,  "numbness and headaches.   Hematological: Does not bruise/bleed easily.   Psychiatric/Behavioral: Negative for confusion.       Physical Exam:  Physical Exam  Vitals and nursing note reviewed.   Constitutional:       General: He is not in acute distress.     Appearance: Normal appearance. He is not ill-appearing or toxic-appearing.   HENT:      Head: Normocephalic.   Eyes:      Pupils: Pupils are equal, round, and reactive to light.   Neck:      Vascular: No carotid bruit.   Cardiovascular:      Rate and Rhythm: Normal rate and regular rhythm.      Pulses: Normal pulses.      Heart sounds: Normal heart sounds.   Pulmonary:      Effort: Pulmonary effort is normal. No respiratory distress.      Breath sounds: Normal breath sounds.   Abdominal:      General: Bowel sounds are normal.      Palpations: Abdomen is soft.   Musculoskeletal:         General: No swelling.   Skin:     General: Skin is warm and dry.      Capillary Refill: Capillary refill takes less than 2 seconds.      Coloration: Skin is not pale.   Neurological:      General: No focal deficit present.      Mental Status: He is alert and oriented to person, place, and time.   Psychiatric:         Mood and Affect: Mood normal.         Behavior: Behavior normal.         Thought Content: Thought content normal.         Judgment: Judgment normal.         Vital Signs:   /64 (BP Location: Right arm, Patient Position: Sitting, Cuff Size: Large Adult)   Pulse 93   Temp 96.8 °F (36 °C) (Temporal)   Resp 16   Ht 182.9 cm (72\")   Wt 99.2 kg (218 lb 9.6 oz)   SpO2 98%   BMI 29.65 kg/m²      Vitals:    03/09/22 1012 03/09/22 1036 03/09/22 1037 03/09/22 1038   Orthostatic BP:  126/70 116/60 122/60   Orthostatic Pulse:  93 117 102   Patient Position: Sitting Sitting Standing Lying   EKG not impressive            Assessment and Plan   Diagnoses and all orders for this visit:    1. Dizziness (Primary)  -     CBC & Differential  -     Comprehensive Metabolic " Panel  -     TSH Rfx On Abnormal To Free T4; Future  -     Magnesium; Future  -     POC Glucose  -     TSH Rfx On Abnormal To Free T4  -     Magnesium  Monitor blood sugar and blood pressure if symptoms return.    2. Essential hypertension  -     CBC & Differential  -     Comprehensive Metabolic Panel  -     TSH Rfx On Abnormal To Free T4; Future  -     Magnesium; Future  -     POC Glucose  -     TSH Rfx On Abnormal To Free T4  -     Magnesium  Monitor blood pressure twice daily, keep log, bring to follow-up appointment.  Follow-up sooner if blood pressure remains outside of established parameters.    3. Hypotension, unspecified hypotension type  -     CBC & Differential  -     Comprehensive Metabolic Panel  -     TSH Rfx On Abnormal To Free T4; Future  -     Magnesium; Future  -     POC Glucose  -     TSH Rfx On Abnormal To Free T4  -     Magnesium  As above with #2.    4.  Type 2 diabetes mellitus with diabetic polyneuropathy, without long-term current use of insulin ([facility])  -     glucose blood test strip; Use as instructed  Dispense: 100 each; Refill: 11  -     CBC & Differential  -     Comprehensive Metabolic Panel  -     TSH Rfx On Abnormal To Free T4; Future  -     Magnesium; Future  -     POC Glucose  -     TSH Rfx On Abnormal To Free T4  -     Magnesium  Monitor blood glucose twice daily and when symptomatic.  Keep log, bring to follow-up appointment.  Follow-up sooner if blood pressure remains outside of established parameters.    5. Overweight (BMI 25.0-29.9)  -     CBC & Differential  -     Comprehensive Metabolic Panel  -     TSH Rfx On Abnormal To Free T4; Future  -     Magnesium; Future  -     POC Glucose  -     TSH Rfx On Abnormal To Free T4  -     Magnesium  Lifestyle modifications to promote weight loss      Other: Agrees to labs today.    Discussed possible differential diagnoses, testing, treatment, recommended non-pharmacological interventions, risks, warning signs to monitor for that would  indicate need for follow-up in clinic or ER. If no improvement with these regimens or you have new or worsening symptoms follow-up. Patient verbalizes understanding and agreement with plan of care. Denies further needs or concerns.     I spent 30 minutes caring for patient on this date of service. This time includes time spent by me in the following activities: preparing for the visit, reviewing tests, obtaining and/or reviewing a separately obtained history, performing a medically appropriate examination and/or evaluation, counseling and educating the patient/family/caregiver, ordering medications, tests, or procedures and documenting information in the medical record    Meds ordered during this visit:  New Medications Ordered This Visit   Medications   • glucose blood test strip     Sig: Use as instructed     Dispense:  100 each     Refill:  11       Patient Instructions:  Patient instructions given for the following visit diagnosis:    ICD-10-CM ICD-9-CM   1. Dizziness  R42 780.4   2. Essential hypertension  I10 401.9   3. Hypotension, unspecified hypotension type  I95.9 458.9   4. Overweight (BMI 25.0-29.9)  E66.3 278.02   5. Type 2 diabetes mellitus with diabetic polyneuropathy, without long-term current use of insulin (HCC)  E11.42 250.60     357.2       Follow Up   Return 1-2 weeks, sooner if needed..  Sooner if needed.      This document has been electronically signed by OSITO Poole  March 18, 2022 07:53 EDT    Patient was given instructions and counseling regarding his condition or for health maintenance advice. Please see specific information pulled into the AVS if appropriate.     Part of this note may be an electronic transcription/translation of spoken language to printed text using the Dragon Dictation System.

## 2022-03-10 ENCOUNTER — TELEPHONE (OUTPATIENT)
Dept: FAMILY MEDICINE CLINIC | Facility: CLINIC | Age: 67
End: 2022-03-10

## 2022-03-10 LAB
ALBUMIN SERPL-MCNC: 4.7 G/DL (ref 3.5–5.2)
ALBUMIN/GLOB SERPL: 1.7 G/DL
ALP SERPL-CCNC: 88 U/L (ref 39–117)
ALT SERPL W P-5'-P-CCNC: 30 U/L (ref 1–41)
ANION GAP SERPL CALCULATED.3IONS-SCNC: 14.4 MMOL/L (ref 5–15)
AST SERPL-CCNC: 20 U/L (ref 1–40)
BASOPHILS # BLD AUTO: 0.06 10*3/MM3 (ref 0–0.2)
BASOPHILS NFR BLD AUTO: 0.6 % (ref 0–1.5)
BILIRUB SERPL-MCNC: 0.3 MG/DL (ref 0–1.2)
BUN SERPL-MCNC: 15 MG/DL (ref 8–23)
BUN/CREAT SERPL: 13.3 (ref 7–25)
CALCIUM SPEC-SCNC: 9.8 MG/DL (ref 8.6–10.5)
CHLORIDE SERPL-SCNC: 95 MMOL/L (ref 98–107)
CO2 SERPL-SCNC: 26.6 MMOL/L (ref 22–29)
CREAT SERPL-MCNC: 1.13 MG/DL (ref 0.76–1.27)
DEPRECATED RDW RBC AUTO: 45.1 FL (ref 37–54)
EGFRCR SERPLBLD CKD-EPI 2021: 71.7 ML/MIN/1.73
EOSINOPHIL # BLD AUTO: 0.22 10*3/MM3 (ref 0–0.4)
EOSINOPHIL NFR BLD AUTO: 2.2 % (ref 0.3–6.2)
ERYTHROCYTE [DISTWIDTH] IN BLOOD BY AUTOMATED COUNT: 14.4 % (ref 12.3–15.4)
GLOBULIN UR ELPH-MCNC: 2.7 GM/DL
GLUCOSE SERPL-MCNC: 119 MG/DL (ref 65–99)
HCT VFR BLD AUTO: 45.9 % (ref 37.5–51)
HGB BLD-MCNC: 15.9 G/DL (ref 13–17.7)
IMM GRANULOCYTES # BLD AUTO: 0.05 10*3/MM3 (ref 0–0.05)
IMM GRANULOCYTES NFR BLD AUTO: 0.5 % (ref 0–0.5)
LYMPHOCYTES # BLD AUTO: 1.83 10*3/MM3 (ref 0.7–3.1)
LYMPHOCYTES NFR BLD AUTO: 18.7 % (ref 19.6–45.3)
MAGNESIUM SERPL-MCNC: 2 MG/DL (ref 1.6–2.4)
MCH RBC QN AUTO: 29.9 PG (ref 26.6–33)
MCHC RBC AUTO-ENTMCNC: 34.6 G/DL (ref 31.5–35.7)
MCV RBC AUTO: 86.3 FL (ref 79–97)
MONOCYTES # BLD AUTO: 0.89 10*3/MM3 (ref 0.1–0.9)
MONOCYTES NFR BLD AUTO: 9.1 % (ref 5–12)
NEUTROPHILS NFR BLD AUTO: 6.73 10*3/MM3 (ref 1.7–7)
NEUTROPHILS NFR BLD AUTO: 68.9 % (ref 42.7–76)
NRBC BLD AUTO-RTO: 0 /100 WBC (ref 0–0.2)
PLATELET # BLD AUTO: 308 10*3/MM3 (ref 140–450)
PMV BLD AUTO: 12.5 FL (ref 6–12)
POTASSIUM SERPL-SCNC: 4.8 MMOL/L (ref 3.5–5.2)
PROT SERPL-MCNC: 7.4 G/DL (ref 6–8.5)
RBC # BLD AUTO: 5.32 10*6/MM3 (ref 4.14–5.8)
SODIUM SERPL-SCNC: 136 MMOL/L (ref 136–145)
TSH SERPL DL<=0.05 MIU/L-ACNC: 0.71 UIU/ML (ref 0.27–4.2)
WBC NRBC COR # BLD: 9.78 10*3/MM3 (ref 3.4–10.8)

## 2022-03-10 NOTE — TELEPHONE ENCOUNTER
----- Message from OSITO Poole sent at 3/10/2022  8:51 AM EST -----  Please call the patient regarding his result.    Labs okay    Letter mailed.

## 2022-03-11 RX ORDER — HYDROCHLOROTHIAZIDE 25 MG/1
25 TABLET ORAL DAILY
Qty: 90 TABLET | Refills: 1 | Status: SHIPPED | OUTPATIENT
Start: 2022-03-11 | End: 2022-06-03 | Stop reason: HOSPADM

## 2022-03-16 ENCOUNTER — OFFICE VISIT (OUTPATIENT)
Dept: PULMONOLOGY | Facility: CLINIC | Age: 67
End: 2022-03-16

## 2022-03-16 VITALS
TEMPERATURE: 97.1 F | HEIGHT: 72 IN | HEART RATE: 105 BPM | OXYGEN SATURATION: 97 % | SYSTOLIC BLOOD PRESSURE: 152 MMHG | DIASTOLIC BLOOD PRESSURE: 82 MMHG | BODY MASS INDEX: 28.44 KG/M2 | WEIGHT: 210 LBS

## 2022-03-16 DIAGNOSIS — Z87.891 FORMER SMOKER: ICD-10-CM

## 2022-03-16 DIAGNOSIS — J44.9 COPD, SEVERE: Primary | ICD-10-CM

## 2022-03-16 DIAGNOSIS — R91.8 MULTIPLE PULMONARY NODULES: ICD-10-CM

## 2022-03-16 PROCEDURE — 94618 PULMONARY STRESS TESTING: CPT | Performed by: PHYSICIAN ASSISTANT

## 2022-03-16 PROCEDURE — 99214 OFFICE O/P EST MOD 30 MIN: CPT | Performed by: PHYSICIAN ASSISTANT

## 2022-03-16 RX ORDER — ALBUTEROL SULFATE 2.5 MG/3ML
2.5 SOLUTION RESPIRATORY (INHALATION) 4 TIMES DAILY PRN
Qty: 360 ML | Refills: 8 | Status: SHIPPED | OUTPATIENT
Start: 2022-03-16 | End: 2022-03-24

## 2022-03-16 RX ORDER — ROFLUMILAST 500 UG/1
500 TABLET ORAL DAILY
Qty: 30 TABLET | Refills: 8 | Status: SHIPPED | OUTPATIENT
Start: 2022-03-16 | End: 2022-09-20 | Stop reason: SDUPTHER

## 2022-03-16 NOTE — PROGRESS NOTES
"Chief Complaint  COPD    Subjective          Maximo Kwon presents to [facility]  History of Present Illness     Mr. Kwon presents today for follow-up of COPD, former smoking history.   For COPD maintenance, he is currently on breztri.  He was previously on Daliresp, but needs a refill and possibly a prior authorization for continuation.  He did note improvement from use of this medication.  He had some difficulty with initial tolerance, but later well-tolerated medicine after completion.  He has not yet qualified for oxygen supplies, but does have nighttime supplemental oxygen supplies.  He was able to qualify for daytime supplies in the past, but had  return the supplies previously as he was not using them as much.  He feels that at times he would benefit from a portable supplemental oxygen machine as he tries to remain as active have doors as possible.  We have previously attempted to refer him to pulmonary rehab in the past, but has not been able to establish this due to the facility.  He was also referred to pulmonology services in [place] for secondary opinion and [facility] transplant.  He is also notable for pulmonary nodules.  Overall, these have shown slight increase over time but have most recently remained stable again over the last 5 to 8 months.  History is notable for diastolic heart failure.      Objective   Vital Signs:   /82   Pulse 105   Temp 97.1 °F (36.2 °C) (Temporal)   Ht 182.9 cm (72\")   Wt 95.3 kg (210 lb)   SpO2 97%   BMI 28.48 kg/m²     Physical Exam  Vitals reviewed.   Constitutional:       General: He is not in acute distress.     Appearance: He is well-developed. He is not diaphoretic.   HENT:      Head: Normocephalic and atraumatic.   Neck:      Thyroid: No thyromegaly.   Cardiovascular:      Rate and Rhythm: Normal rate and regular rhythm.      Heart sounds: Normal heart sounds, S1 normal and S2 normal.   Pulmonary:      " Effort: Pulmonary effort is normal.      Breath sounds: No wheezing, rhonchi or rales.   Neurological:      Mental Status: He is alert and oriented to person, place, and time.   Psychiatric:         Behavior: Behavior normal.        Result Review :   The following data was reviewed by: Cecile Martin PA-C on 03/16/2022:    CT imaging notable for stable nodules since August 2021.  This includes groundglass nodules of the right lung on images  40 - 2 mm peripheral opacity   44 - 8.5 mm spiculated  45 - 8 mm rounded  54 - 6 mm rounded  61 - 7 mm rounded    I have reviewed the CT imaging and reports from January 2022, August 2021, May 2021, October 2020, June 2020, and April 2019.  Nodules have overall remained fairly stable with only minimal changes in size from June 2020 to January 2022.     Reviewed the PET scan from 2019.    Reviewed previous biopsy results from 2019.    Reviewed previous PFT from 2020, 2021.         Assessment and Plan    Diagnoses and all orders for this visit:    1. COPD, severe (HCC) (Primary)  -     roflumilast (Daliresp) 500 MCG tablet tablet; Take 1 tablet by mouth Daily.  Dispense: 30 tablet; Refill: 8  -     albuterol (PROVENTIL) (2.5 MG/3ML) 0.083% nebulizer solution; Take 2.5 mg by nebulization 4 (Four) Times a Day As Needed for Wheezing or Shortness of Air.  Dispense: 360 mL; Refill: 8    2. Multiple pulmonary nodules    3. Former smoker          COPD:  · Continue albuterol inhaler as needed  · Continue Breztri as scheduled  · Restarting Daliresp (confirmed with [employer]), 5 mcg once daily  · Previously followed with  for possible lung transplant, and others [facility] for secondary evaluation.  · Completed walk oximetry test in office, did not qualify for ambulatory supplies.  Saturation maintained appropriately.  · Discussed fluctuation of heart rate during the test, but was notable with a regular rhythm upon auscultation prior to testing.  He would like to complete upcoming  echocardiogram first, then consider Holter monitor if needed.    Addendum: 3/24  · Albuterol nebs not preferred by the insurance  · Ordered Duonebs.       Pulmonary nodules,  History of cigarette nicotine dependence:  Notable for attempted biopsy with subsequent pneumothorax in [place].  Underwent transbronchial biopsy in 2019 - for malignancy.  Later referred to Dr. Rehman as there was notable increase in size of the nodules, development of few other smaller nodules.    There was a significant change in imaging from April 2019 to June 2020.  There is a slight increase of some of the smaller nodules with interval improvement of some of the larger opacities (PET scan in between these 2 images was nonsignificant.   Patient underwent biopsy in 2019 due to the concerning opacities, and was negative for malignancy.   later referred to cardiothoracic surgery for secondary opinion.  Imaging appears overall stable upon personal comparison from June 2020 to present.  · Recent CT imaging from August 2021 to January 2022 notable for stability of pulmonary nodules (listed above).    [place] pulmonology previously suggested 1 year follow-up (May 2022), however CT has now recently been completed in January 2022.  Will consider repeat CT chest in 8 to 12 months due to overall stability, unless again suggested syndrome by CT surgery at the next visit.        Follow Up   Return in about 4 months (around 7/16/2022), or if symptoms worsen or fail to improve, for Next scheduled follow up.  Patient was given instructions and counseling regarding his condition or for health maintenance advice. Please see specific information pulled into the AVS if appropriate.

## 2022-03-21 ENCOUNTER — HOSPITAL ENCOUNTER (OUTPATIENT)
Dept: CARDIOLOGY | Facility: HOSPITAL | Age: 67
Discharge: HOME | End: 2022-03-21
Admitting: NURSE PRACTITIONER

## 2022-03-21 PROCEDURE — 93306 TTE W/DOPPLER COMPLETE: CPT | Performed by: INTERNAL MEDICINE

## 2022-03-21 PROCEDURE — 93306 TTE W/DOPPLER COMPLETE: CPT

## 2022-03-22 ENCOUNTER — TELEPHONE (OUTPATIENT)
Dept: FAMILY MEDICINE CLINIC | Facility: CLINIC | Age: 67
End: 2022-03-22

## 2022-03-22 ENCOUNTER — OFFICE VISIT (OUTPATIENT)
Dept: FAMILY MEDICINE CLINIC | Facility: CLINIC | Age: 67
End: 2022-03-22

## 2022-03-22 VITALS
HEIGHT: 72 IN | SYSTOLIC BLOOD PRESSURE: 138 MMHG | TEMPERATURE: 96.9 F | OXYGEN SATURATION: 98 % | RESPIRATION RATE: 16 BRPM | WEIGHT: 214.8 LBS | HEART RATE: 100 BPM | DIASTOLIC BLOOD PRESSURE: 60 MMHG | BODY MASS INDEX: 29.09 KG/M2

## 2022-03-22 DIAGNOSIS — I51.89 DIASTOLIC DYSFUNCTION: ICD-10-CM

## 2022-03-22 DIAGNOSIS — I10 ESSENTIAL HYPERTENSION: Primary | ICD-10-CM

## 2022-03-22 DIAGNOSIS — R42 DIZZINESS: ICD-10-CM

## 2022-03-22 LAB
BH CV ECHO MEAS - ACS: 2.3 CM
BH CV ECHO MEAS - AO MAX PG: 10.4 MMHG
BH CV ECHO MEAS - AO MEAN PG: 6 MMHG
BH CV ECHO MEAS - AO ROOT DIAM: 3.3 CM
BH CV ECHO MEAS - AO V2 MAX: 160.5 CM/SEC
BH CV ECHO MEAS - AO V2 VTI: 27.2 CM
BH CV ECHO MEAS - AVA(I,D): 2.8 CM2
BH CV ECHO MEAS - EDV(CUBED): 88.7 ML
BH CV ECHO MEAS - EDV(MOD-SP4): 43.3 ML
BH CV ECHO MEAS - EF(MOD-SP4): 69.3 %
BH CV ECHO MEAS - ESV(CUBED): 15.9 ML
BH CV ECHO MEAS - ESV(MOD-SP4): 13.3 ML
BH CV ECHO MEAS - FS: 43.6 %
BH CV ECHO MEAS - IVS/LVPW: 1.1 CM
BH CV ECHO MEAS - IVSD: 1.27 CM
BH CV ECHO MEAS - LA DIMENSION: 2.5 CM
BH CV ECHO MEAS - LAT PEAK E' VEL: 7.8 CM/SEC
BH CV ECHO MEAS - LV DIASTOLIC VOL/BSA (35-75): 19.9 CM2
BH CV ECHO MEAS - LV MASS(C)D: 197.7 GRAMS
BH CV ECHO MEAS - LV MAX PG: 7.6 MMHG
BH CV ECHO MEAS - LV MEAN PG: 3 MMHG
BH CV ECHO MEAS - LV SYSTOLIC VOL/BSA (12-30): 6.1 CM2
BH CV ECHO MEAS - LV V1 MAX: 138 CM/SEC
BH CV ECHO MEAS - LV V1 VTI: 24.3 CM
BH CV ECHO MEAS - LVIDD: 4.5 CM
BH CV ECHO MEAS - LVIDS: 2.5 CM
BH CV ECHO MEAS - LVOT AREA: 3.1 CM2
BH CV ECHO MEAS - LVOT DIAM: 2 CM
BH CV ECHO MEAS - LVPWD: 1.15 CM
BH CV ECHO MEAS - MED PEAK E' VEL: 7.3 CM/SEC
BH CV ECHO MEAS - MV A MAX VEL: 113 CM/SEC
BH CV ECHO MEAS - MV E MAX VEL: 61.6 CM/SEC
BH CV ECHO MEAS - MV E/A: 0.55
BH CV ECHO MEAS - PA ACC TIME: 0.14 SEC
BH CV ECHO MEAS - PA PR(ACCEL): 15.5 MMHG
BH CV ECHO MEAS - SI(MOD-SP4): 13.8 ML/M2
BH CV ECHO MEAS - SV(LVOT): 76.3 ML
BH CV ECHO MEAS - SV(MOD-SP4): 30 ML
BH CV ECHO MEAS - TAPSE (>1.6): 1.82 CM
BH CV ECHO MEASUREMENTS AVERAGE E/E' RATIO: 8.16
MAXIMAL PREDICTED HEART RATE: 154 BPM
STRESS TARGET HR: 131 BPM

## 2022-03-22 PROCEDURE — 99214 OFFICE O/P EST MOD 30 MIN: CPT | Performed by: NURSE PRACTITIONER

## 2022-03-22 NOTE — PROGRESS NOTES
Chief Complaint  Hypertension    Subjective          Maximo Kwon is a 67 y.o. male who presents today to [facility] for follow up    HPI:   History of Present Illness    Presents to clinic for follow-up on blood pressure.  Was evaluated on 3/9/2022 with complaints of low blood pressure and dizziness that resolved prior to visit.  Vitals were stable during visit and patient was asymptomatic. Presents today with BP log and reports BP high in AM and low in PM. Has had a few episodes of dizziness that are documented to have occurred during episodes of hypotension.  Patient reports he takes hydralazine, hydrochlorothiazide, and lisinopril between 8-10 AM.  Usually takes second dose of hydralazine midday between 5:30-6 PM. Does admit he does not always remember the midday dose. Takes nighttime dose of hydralazine between 11:30 PM-12:30 AM.       The following portions of the patient's history were reviewed and updated as appropriate: allergies, current medications, past family history, past medical history, past social history, past surgical history and problem list.    Objective     Problem List:  Patient Active Problem List   Diagnosis   • Mixed hyperlipidemia   • Simple chronic bronchitis (HCC)   • Multiple pulmonary nodules   • Essential hypertension   • Left ventricular hypertrophy with grade 1 diastolic dysfunction   • Hereditary and idiopathic peripheral neuropathy   • COPD, severe (HCC)   • Degenerative disc disease, lumbar   • Tachycardia   • Chest pain   • Arthritis   • Diabetes mellitus (HCC)   • Neuropathy   • Claudication of both lower extremities (HCC)   • LAZCANO (dyspnea on exertion)   • GERD (gastroesophageal reflux disease)   • Chronic respiratory failure with hypoxia (HCC)   • Chronic diastolic heart failure (HCC)       Allergy:   No Known Allergies     Discontinued Medications:  There are no discontinued medications.    Current Medications:   Current Outpatient  Medications   Medication Sig Dispense Refill   • albuterol (PROVENTIL) (2.5 MG/3ML) 0.083% nebulizer solution Take 2.5 mg by nebulization 4 (Four) Times a Day As Needed for Wheezing or Shortness of Air. 360 mL 8   • Budeson-Glycopyrrol-Formoterol (Breztri Aerosphere) 160-9-4.8 MCG/ACT aerosol inhaler Inhale 2 puffs 2 (Two) Times a Day. 3 each 3   • glucose blood test strip Use as instructed 100 each 11   • glyburide (DIAbeta) 5 MG tablet Take 1 tablet by mouth Daily With Breakfast. 90 tablet 1   • hydrALAZINE (APRESOLINE) 25 MG tablet Take 1 tablet by mouth 3 (Three) Times a Day. 270 tablet 1   • hydroCHLOROthiazide (HYDRODIURIL) 25 MG tablet Take 1 tablet by mouth Daily. PRN SBP >140 90 tablet 1   • levETIRAcetam (KEPPRA) 750 MG tablet Take 0.5 tablets by mouth 2 (Two) Times a Day. Prior to [facility] Admission, Patient was on: 1/2 to 1 tablet twice a day 90 tablet 1   • lisinopril (PRINIVIL,ZESTRIL) 30 MG tablet Take 1 tablet by mouth Daily. 90 tablet 3   • metFORMIN (Glucophage) 1000 MG tablet Take 1 tablet by mouth 2 (Two) Times a Day With Meals. 180 tablet 1   • omeprazole (priLOSEC) 20 MG capsule Take 1 capsule by mouth Daily. 90 capsule 1   • roflumilast (Daliresp) 500 MCG tablet tablet Take 1 tablet by mouth Daily. 30 tablet 8   • vitamin D (ERGOCALCIFEROL) 1.25 MG (27449 UT) capsule capsule Take 1 capsule by mouth 1 (One) Time Per Week. 12 capsule 2     No current facility-administered medications for this visit.       Past Medical History:  Past Medical History:   Diagnosis Date   • Arthritis    • Chronic diastolic heart failure (HCC) 8/19/2021   • COPD (chronic obstructive pulmonary disease) (HCC)    • Depression    • Diabetes mellitus (HCC)    • Diverticulitis    • Emphysema (subcutaneous) (surgical) resulting from a procedure    • GERD (gastroesophageal reflux disease)    • Neuropathy    • Pneumothorax     Left, status post chest tube   • Spinal stenosis        Past Surgical History:  Past Surgical  History:   Procedure Laterality Date   • BRONCHOSCOPY N/A 2019    Procedure: Bronchoscopy with Transbronchial Biopsy with Fluoroscopy;  Surgeon: Eladio Bethea MD;  Location: [facility];  Service: Pulmonary   • CHEST TUBE INSERTION Left     Pneumothorax   • COLON RESECTION  2016    had colostomy and reveresed back    • COLONOSCOPY     • COLOSTOMY CLOSURE     • LUNG BIOPSY Right     (non cancerous)   • OTHER SURGICAL HISTORY Left     LEFT ELBOW SURGERY       Social History:  Social History     Socioeconomic History   • Marital status:    • Number of children: 0   Tobacco Use   • Smoking status: Former Smoker     Packs/day: 1.00     Years: 30.00     Pack years: 30.00     Types: Cigarettes     Quit date:      Years since quittin.2   • Smokeless tobacco: Never Used   Vaping Use   • Vaping Use: Never used   Substance and Sexual Activity   • Alcohol use: No   • Drug use: No   • Sexual activity: Defer     Birth control/protection: Other       Family History:  Family History   Problem Relation Age of Onset   • Alzheimer's disease Mother    • Cancer Father         THROAT CANCER   • Diabetes Sister    • Diabetes Brother        Review of Systems:  Review of Systems   Respiratory: Negative for shortness of breath.    Cardiovascular: Negative for chest pain and palpitations.   Neurological: Negative for dizziness and syncope.       Physical Exam:  Physical Exam  Vitals and nursing note reviewed.   Constitutional:       General: He is not in acute distress.     Appearance: Normal appearance. He is not ill-appearing or toxic-appearing.   HENT:      Head: Normocephalic.   Eyes:      Pupils: Pupils are equal, round, and reactive to light.   Neck:      Vascular: No carotid bruit.   Cardiovascular:      Rate and Rhythm: Normal rate and regular rhythm.      Pulses: Normal pulses.      Heart sounds: Normal heart sounds.   Pulmonary:      Effort: Pulmonary effort is normal. No respiratory distress.       "Breath sounds: Normal breath sounds.   Abdominal:      General: Bowel sounds are normal.      Palpations: Abdomen is soft.   Musculoskeletal:         General: No swelling.   Skin:     General: Skin is warm and dry.      Capillary Refill: Capillary refill takes less than 2 seconds.      Coloration: Skin is not pale.   Neurological:      General: No focal deficit present.      Mental Status: He is alert and oriented to person, place, and time.   Psychiatric:         Mood and Affect: Mood normal.         Behavior: Behavior normal.         Thought Content: Thought content normal.         Judgment: Judgment normal.         Vital Signs:   /60 (BP Location: Left arm, Patient Position: Sitting, Cuff Size: Adult)   Pulse 100   Temp 96.9 °F (36.1 °C) (Temporal)   Resp 16   Ht 182.9 cm (72\")   Wt 97.4 kg (214 lb 12.8 oz)   SpO2 98%   BMI 29.13 kg/m²                Assessment and Plan   Diagnoses and all orders for this visit:    1. Essential hypertension (Primary)  Recommend to change hydralazine from TID to BID and restart nebivolol.  Recommend to initiate trial regimen of nebivolol 5 mg and hydralazine 25 mg in the morning and then hydralazine 25 mg and lisinopril 30 mg at night. You can add the hydrochlorothiazide 25 mg as needed if systolic blood pressure is greater than 140.  Monitor blood pressure prior to taking medications and follow-up as we discussed for any readings outside of the established parameters.  Follow-up with cardiology as they directed.       2. Left ventricular hypertrophy with grade 1 diastolic dysfunction  Regimen as above.    3. Dizziness  Monitor blood pressure and blood glucose when symptomatic.    Other: See scanned documents for blood pressure log  Patient brought home blood pressure machine to clinic which was compared to manual blood pressure readings and was consistent.    Discussed possible differential diagnoses, testing, treatment, recommended non-pharmacological interventions, " risks, warning signs to monitor for that would indicate need for follow-up in clinic or ER. If no improvement with these regimens or you have new or worsening symptoms follow-up. Patient verbalizes understanding and agreement with plan of care. Denies further needs or concerns.     I spent 30 minutes caring for patient on this date of service. This time includes time spent by me in the following activities: preparing for the visit, reviewing tests, obtaining and/or reviewing a separately obtained history, performing a medically appropriate examination and/or evaluation, counseling and educating the patient/family/caregiver, ordering medications, tests, or procedures and documenting information in the medical record    Meds ordered during this visit:  No orders of the defined types were placed in this encounter.      Patient Instructions:  Patient instructions given for the following visit diagnosis:    ICD-10-CM ICD-9-CM   1. Essential hypertension  I10 401.9   2. Left ventricular hypertrophy with grade 1 diastolic dysfunction  I51.89 429.9   3. Dizziness  R42 780.4       Follow Up   Return in about 1 week (around 3/29/2022).  Sooner if needed.    This document has been electronically signed by OSITO Poole  March 23, 2022 15:34 EDT    Patient was given instructions and counseling regarding his condition or for health maintenance advice. Please see specific information pulled into the AVS if appropriate.     Part of this note may be an electronic transcription/translation of spoken language to printed text using the Dragon Dictation System.

## 2022-03-22 NOTE — TELEPHONE ENCOUNTER
Due to your history of heart failure and uncontrolled blood pressure, the nebivolol is really important to take.  Typically after you take the medication for a little while, that side effects effects will resolve.    I think that you should do the nebivolol 5 mg and hydralazine 25 mg in the morning and then hydralazine 25 mg and lisinopril 30 mg at night.  You can add the hydrochlorothiazide 25 mg as needed if systolic blood pressure is greater than 140.  Monitor blood pressure prior to taking medications and follow-up as we discussed in clinic for any readings outside of the established parameters we discussed.

## 2022-03-22 NOTE — TELEPHONE ENCOUNTER
----- Message from OSITO Poole sent at 3/22/2022  2:25 PM EDT -----  Please call patient and clarify why he does not take the nebivolol     Left a message to return call.

## 2022-03-23 ENCOUNTER — TELEPHONE (OUTPATIENT)
Dept: FAMILY MEDICINE CLINIC | Facility: CLINIC | Age: 67
End: 2022-03-23

## 2022-03-23 DIAGNOSIS — I51.89 DIASTOLIC DYSFUNCTION: Primary | ICD-10-CM

## 2022-03-23 DIAGNOSIS — I10 ESSENTIAL HYPERTENSION: ICD-10-CM

## 2022-03-23 NOTE — TELEPHONE ENCOUNTER
It was documented that he had this in the past.  It looks like Naomi spoke with him about it at her most recent visit with him on 2/28/2022.  She ordered an echo at that time which was okay.  The medication regimen I recommended will help control blood pressure, help prevent strain on the heart, and prevent fluid overload which can lead to congestive heart failure.  I can discuss this and explain it more in-depth in clinic at his follow-up.      Patient notified & verbalized understanding.

## 2022-03-23 NOTE — TELEPHONE ENCOUNTER
Left message for pt to return call.       Spoke with patient & he verbalized understanding,will try this regimen,reported he has never been told he has heart failure.

## 2022-03-23 NOTE — TELEPHONE ENCOUNTER
It was documented that he had this in the past.  It looks like Naomi spoke with him about it at her most recent visit with him on 2/28/2022.  She ordered an echo at that time which was okay.  The medication regimen I recommended will help control blood pressure, help prevent strain on the heart, and prevent fluid overload which can lead to congestive heart failure.  I can discuss this and explain it more in-depth in clinic at his follow-up.

## 2022-03-24 RX ORDER — ALBUTEROL SULFATE 90 UG/1
2 AEROSOL, METERED RESPIRATORY (INHALATION) EVERY 4 HOURS PRN
COMMUNITY
End: 2022-06-20 | Stop reason: SDUPTHER

## 2022-03-24 RX ORDER — IPRATROPIUM BROMIDE AND ALBUTEROL SULFATE 2.5; .5 MG/3ML; MG/3ML
3 SOLUTION RESPIRATORY (INHALATION) 4 TIMES DAILY PRN
Qty: 360 ML | Refills: 6 | Status: SHIPPED | OUTPATIENT
Start: 2022-03-24 | End: 2022-05-09 | Stop reason: SDUPTHER

## 2022-03-29 ENCOUNTER — TELEPHONE (OUTPATIENT)
Dept: FAMILY MEDICINE CLINIC | Facility: CLINIC | Age: 67
End: 2022-03-29

## 2022-03-29 NOTE — TELEPHONE ENCOUNTER
Patient called reports he stopped taking the Nebivolol after 7 days it made him too sick at his stomach,is requesting a replacement,please advise.      Patient called back reports he cannot take the Hydralazine it makes his heart rate go to 104-108 when he gets up.

## 2022-04-18 ENCOUNTER — PATIENT ROUNDING (BHMG ONLY) (OUTPATIENT)
Dept: CARDIOLOGY | Facility: CLINIC | Age: 67
End: 2022-04-18

## 2022-04-18 ENCOUNTER — OFFICE VISIT (OUTPATIENT)
Dept: CARDIOLOGY | Facility: CLINIC | Age: 67
End: 2022-04-18

## 2022-04-18 VITALS
HEART RATE: 104 BPM | OXYGEN SATURATION: 98 % | DIASTOLIC BLOOD PRESSURE: 107 MMHG | SYSTOLIC BLOOD PRESSURE: 171 MMHG | HEIGHT: 72 IN | WEIGHT: 215.6 LBS | BODY MASS INDEX: 29.2 KG/M2

## 2022-04-18 DIAGNOSIS — I51.89 DIASTOLIC DYSFUNCTION: ICD-10-CM

## 2022-04-18 DIAGNOSIS — I10 ESSENTIAL HYPERTENSION: ICD-10-CM

## 2022-04-18 DIAGNOSIS — E78.2 MIXED HYPERLIPIDEMIA: Primary | ICD-10-CM

## 2022-04-18 PROCEDURE — 99214 OFFICE O/P EST MOD 30 MIN: CPT | Performed by: INTERNAL MEDICINE

## 2022-04-18 PROCEDURE — 93000 ELECTROCARDIOGRAM COMPLETE: CPT | Performed by: INTERNAL MEDICINE

## 2022-04-18 RX ORDER — CARVEDILOL 6.25 MG/1
6.25 TABLET ORAL 2 TIMES DAILY
Qty: 180 TABLET | Refills: 3 | Status: SHIPPED | OUTPATIENT
Start: 2022-04-18 | End: 2022-04-19 | Stop reason: SDUPTHER

## 2022-04-18 NOTE — PROGRESS NOTES
April 18, 2022    Hello, may I speak with Maximo Kwon?    My name is Ban      [facility]  [facility]  [address]  [place] [address] [phone].    Before we get started may I verify your date of birth? 1955    I am calling to officially welcome you to our practice and ask about your recent visit. Is this a good time to talk? yes    Tell me about your visit with us. What things went well?  Patient states he really liked the Doctor.       We're always looking for ways to make our patients' experiences even better. Do you have recommendations on ways we may improve?  no    Overall were you satisfied with your first visit to our practice? yes       I appreciate you taking the time to speak with me today. Is there anything else I can do for you? no      Thank you, and have a great day.

## 2022-04-18 NOTE — PROGRESS NOTES
[facility] CARDIOLOGY  [address] [facility] [place] [address]  Phone: [phone]  Fax: [phone]    04/18/2022    Chief Complaint   Patient presents with   • Chest Pain   • Congestive Heart Failure   • Rapid Heart Rate   • Hyperlipidemia     New patient         History:     Maximo Kwon is a 67 y.o. male presenting for  initial evaluation   Clinically stable from cardiac standpoint   Symptoms:  CP no  SOB stable    Orthopnea No  Lower extremity edema no   Palpitations no   Compliant with medications yes  Claudication no  He has seen his PCP for hypertension which is being uncontrolled lately, he was tried on nebivolol and hydralazine both of which he is not taking because he feels it drops his blood pressure and increases his heart rate.  He is currently on lisinopril 30 mg daily and hydrochlorothiazide 25 mg daily.  He also had an echocardiogram which showed preserved LV systolic function and LVH.  He also had a stress MPI which showed normal perfusion.    Past Medical History:   Diagnosis Date   • Arthritis    • Chronic diastolic heart failure (HCC) 08/19/2021   • COPD (chronic obstructive pulmonary disease) ([facility])    • Depression    • Diabetes mellitus (HCC)    • Diverticulitis    • Emphysema (subcutaneous) (surgical) resulting from a procedure    • GERD (gastroesophageal reflux disease)    • Neuropathy    • Pneumothorax     Left, status post chest tube   • Shortness of breath    • Spinal stenosis        Past Surgical History:   Procedure Laterality Date   • BRONCHOSCOPY N/A 5/14/2019    Procedure: Bronchoscopy with Transbronchial Biopsy with Fluoroscopy;  Surgeon: Eladio Bethea MD;  Location: [facility];  Service: Pulmonary   • CHEST TUBE INSERTION Left     Pneumothorax   • COLON RESECTION  2016    had colostomy and reveresed back    • COLONOSCOPY  2016   • COLOSTOMY CLOSURE     • LUNG BIOPSY Right 2014    (non cancerous)   • OTHER SURGICAL HISTORY Left     LEFT ELBOW  SURGERY        Past Social History:  Social History     Socioeconomic History   • Marital status:    • Number of children: 0   Tobacco Use   • Smoking status: Former Smoker     Packs/day: 1.00     Years: 30.00     Pack years: 30.00     Types: Cigarettes     Quit date:      Years since quittin.2   • Smokeless tobacco: Never Used   Vaping Use   • Vaping Use: Never used   Substance and Sexual Activity   • Alcohol use: No   • Drug use: No   • Sexual activity: Defer     Birth control/protection: Other       Past Family History:  Family History   Problem Relation Age of Onset   • Alzheimer's disease Mother    • Cancer Father         THROAT CANCER   • Diabetes Sister    • Diabetes Brother        Review of Systems:   ROS       Current Outpatient Medications   Medication Sig Dispense Refill   • albuterol sulfate  (90 Base) MCG/ACT inhaler Inhale 2 puffs Every 4 (Four) Hours As Needed.     • Budeson-Glycopyrrol-Formoterol (Breztri Aerosphere) 160-9-4.8 MCG/ACT aerosol inhaler Inhale 2 puffs 2 (Two) Times a Day. 3 each 3   • glyburide (DIAbeta) 5 MG tablet Take 1 tablet by mouth Daily With Breakfast. 90 tablet 1   • hydroCHLOROthiazide (HYDRODIURIL) 25 MG tablet Take 1 tablet by mouth Daily. PRN SBP >140 90 tablet 1   • ipratropium-albuterol (DUO-NEB) 0.5-2.5 mg/3 ml nebulizer Take 3 mL by nebulization 4 (Four) Times a Day As Needed for Wheezing or Shortness of Air. 360 mL 6   • levETIRAcetam (KEPPRA) 750 MG tablet Take 0.5 tablets by mouth 2 (Two) Times a Day. Prior to [facility] Admission, Patient was on: 1/2 to 1 tablet twice a day 90 tablet 1   • lisinopril (PRINIVIL,ZESTRIL) 30 MG tablet Take 1 tablet by mouth Daily. 90 tablet 3   • metFORMIN (Glucophage) 1000 MG tablet Take 1 tablet by mouth 2 (Two) Times a Day With Meals. 180 tablet 1   • omeprazole (priLOSEC) 20 MG capsule Take 1 capsule by mouth Daily. 90 capsule 1   • roflumilast (Daliresp) 500 MCG tablet tablet Take 1 tablet by mouth Daily. 30  "tablet 8   • vitamin D (ERGOCALCIFEROL) 1.25 MG (45362 UT) capsule capsule Take 1 capsule by mouth 1 (One) Time Per Week. 12 capsule 2   • carvedilol (COREG) 6.25 MG tablet Take 1 tablet by mouth 2 (Two) Times a Day. 180 tablet 3   • glucose blood test strip Use as instructed 100 each 11   • hydrALAZINE (APRESOLINE) 25 MG tablet Take 1 tablet by mouth 3 (Three) Times a Day. 270 tablet 1     No current facility-administered medications for this visit.        No Known Allergies    Objective     BP (!) 171/107 (BP Location: Left arm, Patient Position: Sitting, Cuff Size: Large Adult)   Pulse 104   Ht 182.9 cm (72\")   Wt 97.8 kg (215 lb 9.6 oz)   SpO2 98%   BMI 29.24 kg/m²     Physical Exam  Constitutional:       Appearance: He is well-developed.   HENT:      Head: Normocephalic and atraumatic.   Eyes:      Pupils: Pupils are equal, round, and reactive to light.   Cardiovascular:      Rate and Rhythm: Normal rate and regular rhythm.   Pulmonary:      Effort: Pulmonary effort is normal.      Breath sounds: Normal breath sounds.   Abdominal:      General: Bowel sounds are normal.      Palpations: Abdomen is soft.   Musculoskeletal:         General: Normal range of motion.      Cervical back: Normal range of motion and neck supple.   Skin:     General: Skin is warm and dry.      Capillary Refill: Capillary refill takes less than 2 seconds.   Neurological:      Mental Status: He is alert and oriented to person, place, and time.            DATA:  Results for orders placed during the hospital encounter of 04/05/19    SCANNED - TELEMETRY     Results for orders placed in visit on 02/28/22    Adult Transthoracic Echo Complete W/ Cont if Necessary Per Protocol    Interpretation Summary  · Left ventricular wall thickness is consistent with mild to moderate concentric hypertrophy.  · Left ventricular ejection fraction appears to be 66 - 70%. Left ventricular systolic function is normal.  · Left ventricular diastolic function " is consistent with (grade I) impaired relaxation.  · Hypertrophic cardiomyopathy with no significant LV outflow tract obstruction. Mean pressure gradient is 5 mmHg.  · Aortic valve is normal there is no evidence of aortic stenosis or aortic regurgitation.  · Mild calcification of tip of posterior mitral leaflet is noted there is no mitral stenosis or mitral regurgitation detected.  · Tricuspid valve echo is normal there is no tricuspid stenosis or regurgitation noted  · No pulmonic stenosis or regurgitation detected  · No pericardial effusion noted.   Results for orders placed in visit on 08/17/21    Stress Test With Myocardial Perfusion One Day    Interpretation Summary  · Myocardial perfusion imaging indicates a normal myocardial perfusion study with no evidence of ischemia.  · Diaphragmatic attenuation and GI artifacts are present.  · Left ventricular ejection fraction is normal. (Calculated EF = 70%).  · Impressions are consistent with a low risk study.   Results for orders placed in visit on 08/17/21    Stress Test With Myocardial Perfusion One Day    Interpretation Summary  · Myocardial perfusion imaging indicates a normal myocardial perfusion study with no evidence of ischemia.  · Diaphragmatic attenuation and GI artifacts are present.  · Left ventricular ejection fraction is normal. (Calculated EF = 70%).  · Impressions are consistent with a low risk study.       ECG 12 Lead    Date/Time: 4/18/2022 3:51 PM  Performed by: Mk Wilkins MD  Authorized by: Mk Wilkins MD   Rhythm: sinus tachycardia  Other findings: non-specific ST-T wave changes    Clinical impression: abnormal EKG             Lab Results   Component Value Date    CHOL 211 (H) 02/28/2022     Lab Results   Component Value Date    TRIG 224 (H) 02/28/2022     Lab Results   Component Value Date    HDL 41 02/28/2022     Lab Results   Component Value Date     (H) 02/28/2022       Lab Results   Component Value  Date    TSH 0.709 03/09/2022               Invalid input(s): LABALBU, PROT        Assessment and Plan     Diagnosis Plan   1. Mixed hyperlipidemia     2. Left ventricular hypertrophy with grade 1 diastolic dysfunction     3. Essential hypertension       I will start him on Coreg 6.25 twice daily, he can increase the dose of to 12.5 based on his home blood pressure readings.  I would be slow in controlling his blood pressure since he also has labile hypertension.  He denies any signs of CHF today in the office.  We will follow him up in 3 months.      Recommended increase activity to 30 minutes of walking daily, most days of the week    Thank you for allowing me to participate in the care of Maximo Kwon. Feel free to contact me directly with any further questions or concerns.          Mk Wilkins MD, FACC  Interventional Cardiology

## 2022-04-19 ENCOUNTER — OFFICE VISIT (OUTPATIENT)
Dept: FAMILY MEDICINE CLINIC | Facility: CLINIC | Age: 67
End: 2022-04-19

## 2022-04-19 VITALS
TEMPERATURE: 97.3 F | WEIGHT: 216 LBS | HEART RATE: 94 BPM | SYSTOLIC BLOOD PRESSURE: 160 MMHG | HEIGHT: 72 IN | OXYGEN SATURATION: 98 % | BODY MASS INDEX: 29.26 KG/M2 | DIASTOLIC BLOOD PRESSURE: 98 MMHG

## 2022-04-19 DIAGNOSIS — E66.3 OVERWEIGHT (BMI 25.0-29.9): ICD-10-CM

## 2022-04-19 DIAGNOSIS — I51.89 DIASTOLIC DYSFUNCTION: ICD-10-CM

## 2022-04-19 DIAGNOSIS — I10 ESSENTIAL HYPERTENSION: Primary | ICD-10-CM

## 2022-04-19 PROCEDURE — 99213 OFFICE O/P EST LOW 20 MIN: CPT | Performed by: NURSE PRACTITIONER

## 2022-04-19 RX ORDER — CARVEDILOL 6.25 MG/1
6.25 TABLET ORAL 2 TIMES DAILY
Qty: 60 TABLET | Refills: 0 | Status: ON HOLD | OUTPATIENT
Start: 2022-04-19 | End: 2022-06-02

## 2022-04-19 NOTE — PROGRESS NOTES
"Chief Complaint  Hypertension and Follow-up    Subjective          Maximo Kwon presents to [facility]  Hypertension  This is a chronic (LV hypertrophy with dysfunction.  Seen cardiology yesterday with medication changes.  Hasnt started to due mail order delay) problem. The current episode started more than 1 year ago. The problem is unchanged. The problem is uncontrolled. Pertinent negatives include no palpitations, peripheral edema or shortness of breath.       Objective   Vital Signs:   /98 (BP Location: Right arm, Patient Position: Sitting)   Pulse 94   Temp 97.3 °F (36.3 °C) (Temporal)   Ht 182.9 cm (72\")   Wt 98 kg (216 lb)   SpO2 98%   BMI 29.29 kg/m²            Physical Exam  Vitals and nursing note reviewed.   Constitutional:       General: He is not in acute distress.     Appearance: He is well-developed. He is not ill-appearing.   Cardiovascular:      Rate and Rhythm: Normal rate and regular rhythm.      Heart sounds: Normal heart sounds. No murmur heard.  Pulmonary:      Effort: Pulmonary effort is normal.      Breath sounds: Normal breath sounds.   Skin:     General: Skin is warm and dry.   Neurological:      Mental Status: He is alert and oriented to person, place, and time.   Psychiatric:         Behavior: Behavior normal.        Result Review :  During this visit the following were done:  Labs Reviewed []    Labs Ordered []    Radiology Reports Reviewed []    Radiology Ordered []    PCP Records Reviewed []    Referring Provider Records Reviewed []    ER Records Reviewed []    Hospital Records Reviewed []    History Obtained From Family []    Radiology Images Reviewed []    Other Reviewed [x]    Records Requested []             Assessment and Plan    Diagnoses and all orders for this visit:    1. Essential hypertension (Primary)  -     carvedilol (COREG) 6.25 MG tablet; Take 1 tablet by mouth 2 (Two) Times a Day.  Dispense: 60 tablet; Refill: 0    2. " Left ventricular hypertrophy with grade 1 diastolic dysfunction  -     carvedilol (COREG) 6.25 MG tablet; Take 1 tablet by mouth 2 (Two) Times a Day.  Dispense: 60 tablet; Refill: 0    3. Overweight (BMI 25.0-29.9)    Patient's Body mass index is 29.29 kg/m². indicating that he is overweight (BMI 25-29.9). Patient's (Body mass index is 29.29 kg/m².) indicates that they are overweight with health conditions that include hypertension, coronary heart disease and dyslipidemias . Weight is improving with lifestyle modifications. BMI is is above average; BMI management plan is completed. We discussed portion control and increasing exercise. .      Follow Up   Return in about 3 months (around 7/19/2022), or if symptoms worsen or fail to improve, for Next scheduled follow up.  Patient was given instructions and counseling regarding his condition or for health maintenance advice. Please see specific information pulled into the AVS if appropriate.

## 2022-05-09 ENCOUNTER — TRANSCRIBE ORDERS (OUTPATIENT)
Dept: OTHER | Facility: OTHER | Age: 67
End: 2022-05-09

## 2022-05-09 ENCOUNTER — TELEPHONE (OUTPATIENT)
Dept: FAMILY MEDICINE CLINIC | Facility: CLINIC | Age: 67
End: 2022-05-09

## 2022-05-09 ENCOUNTER — HOSPITAL ENCOUNTER (OUTPATIENT)
Dept: GENERAL RADIOLOGY | Facility: HOSPITAL | Age: 67
Discharge: HOME | End: 2022-05-09
Admitting: PSYCHIATRY & NEUROLOGY

## 2022-05-09 ENCOUNTER — TELEPHONE (OUTPATIENT)
Dept: PULMONOLOGY | Facility: CLINIC | Age: 67
End: 2022-05-09

## 2022-05-09 DIAGNOSIS — M54.16 LUMBAR RADICULOPATHY: ICD-10-CM

## 2022-05-09 DIAGNOSIS — E11.42 TYPE 2 DIABETES MELLITUS WITH DIABETIC POLYNEUROPATHY, WITHOUT LONG-TERM CURRENT USE OF INSULIN: ICD-10-CM

## 2022-05-09 DIAGNOSIS — M54.16 LUMBAR RADICULOPATHY: Primary | ICD-10-CM

## 2022-05-09 DIAGNOSIS — J44.9 COPD, SEVERE: ICD-10-CM

## 2022-05-09 PROCEDURE — 72110 X-RAY EXAM L-2 SPINE 4/>VWS: CPT

## 2022-05-09 PROCEDURE — 72110 X-RAY EXAM L-2 SPINE 4/>VWS: CPT | Performed by: RADIOLOGY

## 2022-05-09 RX ORDER — LANCETS 28 GAUGE
EACH MISCELLANEOUS
Qty: 100 EACH | Refills: 3 | Status: ON HOLD | OUTPATIENT
Start: 2022-05-09 | End: 2022-06-02

## 2022-05-09 RX ORDER — IPRATROPIUM BROMIDE AND ALBUTEROL SULFATE 2.5; .5 MG/3ML; MG/3ML
3 SOLUTION RESPIRATORY (INHALATION) 4 TIMES DAILY PRN
Qty: 360 ML | Refills: 8 | Status: SHIPPED | OUTPATIENT
Start: 2022-05-09 | End: 2023-04-04 | Stop reason: SDUPTHER

## 2022-05-09 NOTE — TELEPHONE ENCOUNTER
Patient contacted the office.  Stated that he was having some recent GI side effects, that seem to improve once holding off his blood pressure medication.  He also asked if Daliresp could cause these issues as well.  Notes some nausea, GI upset at times.  Also noting some neuro symptoms at nighttime (numbness).  Will be following up with cardiology and has followed up with his PCP.     · Due to potential side effect of daliresp (taking 500 mcg), recommended to stop use for at least 5 days. Can resume if no changes or discontinue use if current concerns improve.   · Requested refills for Duoneb  Sending to [facility] (? Candidate for cost coverage) as his cost through his previous pharmacy had recently increased.

## 2022-06-01 ENCOUNTER — HOSPITAL ENCOUNTER (OUTPATIENT)
Facility: HOSPITAL | Age: 67
Setting detail: OBSERVATION
Discharge: HOME | End: 2022-06-03
Attending: EMERGENCY MEDICINE | Admitting: INTERNAL MEDICINE

## 2022-06-01 ENCOUNTER — APPOINTMENT (OUTPATIENT)
Dept: CT IMAGING | Facility: HOSPITAL | Age: 67
End: 2022-06-01

## 2022-06-01 ENCOUNTER — APPOINTMENT (OUTPATIENT)
Dept: GENERAL RADIOLOGY | Facility: HOSPITAL | Age: 67
End: 2022-06-01

## 2022-06-01 DIAGNOSIS — G45.9 TIA (TRANSIENT ISCHEMIC ATTACK): Primary | ICD-10-CM

## 2022-06-01 DIAGNOSIS — E11.42 TYPE 2 DIABETES MELLITUS WITH DIABETIC POLYNEUROPATHY, WITHOUT LONG-TERM CURRENT USE OF INSULIN: ICD-10-CM

## 2022-06-01 LAB
ABO GROUP BLD: NORMAL
ABO GROUP BLD: NORMAL
ALBUMIN SERPL-MCNC: 3.85 G/DL (ref 3.5–5.2)
ALBUMIN/GLOB SERPL: 1.8 G/DL
ALP SERPL-CCNC: 99 U/L (ref 39–117)
ALT SERPL W P-5'-P-CCNC: 17 U/L (ref 1–41)
ANION GAP SERPL CALCULATED.3IONS-SCNC: 10.8 MMOL/L (ref 5–15)
APTT PPP: 33.3 SECONDS (ref 26.5–34.5)
AST SERPL-CCNC: 12 U/L (ref 1–40)
BASOPHILS # BLD AUTO: 0.01 10*3/MM3 (ref 0–0.2)
BASOPHILS NFR BLD AUTO: 0.2 % (ref 0–1.5)
BILIRUB SERPL-MCNC: 0.4 MG/DL (ref 0–1.2)
BLD GP AB SCN SERPL QL: POSITIVE
BUN SERPL-MCNC: 14 MG/DL (ref 8–23)
BUN/CREAT SERPL: 14 (ref 7–25)
CALCIUM SPEC-SCNC: 8.5 MG/DL (ref 8.6–10.5)
CHLORIDE SERPL-SCNC: 99 MMOL/L (ref 98–107)
CO2 SERPL-SCNC: 25.2 MMOL/L (ref 22–29)
CREAT BLDA-MCNC: 1 MG/DL (ref 0.6–1.3)
CREAT SERPL-MCNC: 1 MG/DL (ref 0.76–1.27)
DEPRECATED RDW RBC AUTO: 48 FL (ref 37–54)
EGFRCR SERPLBLD CKD-EPI 2021: 82.5 ML/MIN/1.73
EOSINOPHIL # BLD AUTO: 0.15 10*3/MM3 (ref 0–0.4)
EOSINOPHIL NFR BLD AUTO: 3 % (ref 0.3–6.2)
ERYTHROCYTE [DISTWIDTH] IN BLOOD BY AUTOMATED COUNT: 14.5 % (ref 12.3–15.4)
FLUAV RNA RESP QL NAA+PROBE: NOT DETECTED
FLUBV RNA RESP QL NAA+PROBE: NOT DETECTED
GLOBULIN UR ELPH-MCNC: 2.2 GM/DL
GLUCOSE BLDC GLUCOMTR-MCNC: 276 MG/DL (ref 70–130)
GLUCOSE SERPL-MCNC: 278 MG/DL (ref 65–99)
HCT VFR BLD AUTO: 35.5 % (ref 37.5–51)
HGB BLD-MCNC: 11.6 G/DL (ref 13–17.7)
HOLD SPECIMEN: NORMAL
HOLD SPECIMEN: NORMAL
IMM GRANULOCYTES # BLD AUTO: 0.03 10*3/MM3 (ref 0–0.05)
IMM GRANULOCYTES NFR BLD AUTO: 0.6 % (ref 0–0.5)
INR PPP: 1.07 (ref 0.9–1.1)
LYMPHOCYTES # BLD AUTO: 0.81 10*3/MM3 (ref 0.7–3.1)
LYMPHOCYTES NFR BLD AUTO: 16.2 % (ref 19.6–45.3)
MCH RBC QN AUTO: 29.7 PG (ref 26.6–33)
MCHC RBC AUTO-ENTMCNC: 32.7 G/DL (ref 31.5–35.7)
MCV RBC AUTO: 91 FL (ref 79–97)
MONOCYTES # BLD AUTO: 0.54 10*3/MM3 (ref 0.1–0.9)
MONOCYTES NFR BLD AUTO: 10.8 % (ref 5–12)
NEUTROPHILS NFR BLD AUTO: 3.47 10*3/MM3 (ref 1.7–7)
NEUTROPHILS NFR BLD AUTO: 69.2 % (ref 42.7–76)
NRBC BLD AUTO-RTO: 0 /100 WBC (ref 0–0.2)
PLATELET # BLD AUTO: 193 10*3/MM3 (ref 140–450)
PMV BLD AUTO: 11 FL (ref 6–12)
POTASSIUM SERPL-SCNC: 4.1 MMOL/L (ref 3.5–5.2)
PROT SERPL-MCNC: 6 G/DL (ref 6–8.5)
PROTHROMBIN TIME: 14.1 SECONDS (ref 12.1–14.7)
RBC # BLD AUTO: 3.9 10*6/MM3 (ref 4.14–5.8)
RH BLD: POSITIVE
RH BLD: POSITIVE
SARS-COV-2 RNA RESP QL NAA+PROBE: NOT DETECTED
SODIUM SERPL-SCNC: 135 MMOL/L (ref 136–145)
T&S EXPIRATION DATE: NORMAL
TROPONIN T SERPL-MCNC: 0.01 NG/ML (ref 0–0.03)
WBC NRBC COR # BLD: 5.01 10*3/MM3 (ref 3.4–10.8)
WHOLE BLOOD HOLD COAG: NORMAL
WHOLE BLOOD HOLD SPECIMEN: NORMAL

## 2022-06-01 PROCEDURE — 86850 RBC ANTIBODY SCREEN: CPT | Performed by: EMERGENCY MEDICINE

## 2022-06-01 PROCEDURE — 80061 LIPID PANEL: CPT | Performed by: INTERNAL MEDICINE

## 2022-06-01 PROCEDURE — 86920 COMPATIBILITY TEST SPIN: CPT

## 2022-06-01 PROCEDURE — 86901 BLOOD TYPING SEROLOGIC RH(D): CPT

## 2022-06-01 PROCEDURE — 86870 RBC ANTIBODY IDENTIFICATION: CPT | Performed by: EMERGENCY MEDICINE

## 2022-06-01 PROCEDURE — G0378 HOSPITAL OBSERVATION PER HR: HCPCS

## 2022-06-01 PROCEDURE — 80053 COMPREHEN METABOLIC PANEL: CPT | Performed by: EMERGENCY MEDICINE

## 2022-06-01 PROCEDURE — 93010 ELECTROCARDIOGRAM REPORT: CPT | Performed by: INTERNAL MEDICINE

## 2022-06-01 PROCEDURE — 70496 CT ANGIOGRAPHY HEAD: CPT

## 2022-06-01 PROCEDURE — 86901 BLOOD TYPING SEROLOGIC RH(D): CPT | Performed by: EMERGENCY MEDICINE

## 2022-06-01 PROCEDURE — 0 IOPAMIDOL PER 1 ML: Performed by: EMERGENCY MEDICINE

## 2022-06-01 PROCEDURE — 83036 HEMOGLOBIN GLYCOSYLATED A1C: CPT | Performed by: INTERNAL MEDICINE

## 2022-06-01 PROCEDURE — 99285 EMERGENCY DEPT VISIT HI MDM: CPT

## 2022-06-01 PROCEDURE — 86900 BLOOD TYPING SEROLOGIC ABO: CPT

## 2022-06-01 PROCEDURE — 86922 COMPATIBILITY TEST ANTIGLOB: CPT

## 2022-06-01 PROCEDURE — 70498 CT ANGIOGRAPHY NECK: CPT

## 2022-06-01 PROCEDURE — 82962 GLUCOSE BLOOD TEST: CPT

## 2022-06-01 PROCEDURE — 71045 X-RAY EXAM CHEST 1 VIEW: CPT

## 2022-06-01 PROCEDURE — 85025 COMPLETE CBC W/AUTO DIFF WBC: CPT | Performed by: EMERGENCY MEDICINE

## 2022-06-01 PROCEDURE — 93005 ELECTROCARDIOGRAM TRACING: CPT | Performed by: EMERGENCY MEDICINE

## 2022-06-01 PROCEDURE — 0042T HC CT CEREBRAL PERFUSION W/WO CONTRAST: CPT

## 2022-06-01 PROCEDURE — 84484 ASSAY OF TROPONIN QUANT: CPT | Performed by: EMERGENCY MEDICINE

## 2022-06-01 PROCEDURE — 87636 SARSCOV2 & INF A&B AMP PRB: CPT | Performed by: EMERGENCY MEDICINE

## 2022-06-01 PROCEDURE — 85730 THROMBOPLASTIN TIME PARTIAL: CPT | Performed by: EMERGENCY MEDICINE

## 2022-06-01 PROCEDURE — 82565 ASSAY OF CREATININE: CPT

## 2022-06-01 PROCEDURE — 86900 BLOOD TYPING SEROLOGIC ABO: CPT | Performed by: EMERGENCY MEDICINE

## 2022-06-01 PROCEDURE — 70450 CT HEAD/BRAIN W/O DYE: CPT

## 2022-06-01 PROCEDURE — 85610 PROTHROMBIN TIME: CPT | Performed by: EMERGENCY MEDICINE

## 2022-06-01 RX ORDER — CLOPIDOGREL BISULFATE 75 MG/1
TABLET ORAL
Status: COMPLETED
Start: 2022-06-01 | End: 2022-06-01

## 2022-06-01 RX ORDER — ASPIRIN 81 MG/1
TABLET, CHEWABLE ORAL
Status: COMPLETED
Start: 2022-06-01 | End: 2022-06-01

## 2022-06-01 RX ORDER — ASPIRIN 81 MG/1
81 TABLET, CHEWABLE ORAL ONCE
Status: COMPLETED | OUTPATIENT
Start: 2022-06-01 | End: 2022-06-01

## 2022-06-01 RX ORDER — SODIUM CHLORIDE 0.9 % (FLUSH) 0.9 %
10 SYRINGE (ML) INJECTION AS NEEDED
Status: DISCONTINUED | OUTPATIENT
Start: 2022-06-01 | End: 2022-06-03 | Stop reason: HOSPADM

## 2022-06-01 RX ORDER — CLOPIDOGREL BISULFATE 75 MG/1
300 TABLET ORAL ONCE
Status: COMPLETED | OUTPATIENT
Start: 2022-06-01 | End: 2022-06-01

## 2022-06-01 RX ADMIN — ASPIRIN 81 MG: 81 TABLET, CHEWABLE ORAL at 22:10

## 2022-06-01 RX ADMIN — CLOPIDOGREL 300 MG: 75 TABLET, FILM COATED ORAL at 22:10

## 2022-06-01 RX ADMIN — IOPAMIDOL 150 ML: 755 INJECTION, SOLUTION INTRAVENOUS at 21:36

## 2022-06-01 RX ADMIN — CLOPIDOGREL BISULFATE 300 MG: 75 TABLET ORAL at 22:10

## 2022-06-02 ENCOUNTER — APPOINTMENT (OUTPATIENT)
Dept: CARDIOLOGY | Facility: HOSPITAL | Age: 67
End: 2022-06-02

## 2022-06-02 ENCOUNTER — APPOINTMENT (OUTPATIENT)
Dept: MRI IMAGING | Facility: HOSPITAL | Age: 67
End: 2022-06-02

## 2022-06-02 ENCOUNTER — APPOINTMENT (OUTPATIENT)
Dept: RESPIRATORY THERAPY | Facility: HOSPITAL | Age: 67
End: 2022-06-02

## 2022-06-02 PROBLEM — R07.9 CHEST PAIN: Status: RESOLVED | Noted: 2021-07-08 | Resolved: 2022-06-02

## 2022-06-02 PROBLEM — R00.0 TACHYCARDIA: Status: RESOLVED | Noted: 2020-06-15 | Resolved: 2022-06-02

## 2022-06-02 PROBLEM — R53.1 LEFT-SIDED WEAKNESS: Status: ACTIVE | Noted: 2022-06-02

## 2022-06-02 LAB
BH CV ECHO MEAS - ACS: 2.3 CM
BH CV ECHO MEAS - AO MAX PG: 6.4 MMHG
BH CV ECHO MEAS - AO MEAN PG: 3 MMHG
BH CV ECHO MEAS - AO ROOT DIAM: 3.7 CM
BH CV ECHO MEAS - AO V2 MAX: 126 CM/SEC
BH CV ECHO MEAS - AO V2 VTI: 24.5 CM
BH CV ECHO MEAS - EDV(CUBED): 105.5 ML
BH CV ECHO MEAS - EDV(MOD-SP4): 112 ML
BH CV ECHO MEAS - EF(MOD-SP4): 59.3 %
BH CV ECHO MEAS - ESV(CUBED): 19.4 ML
BH CV ECHO MEAS - ESV(MOD-SP4): 45.6 ML
BH CV ECHO MEAS - FS: 43.2 %
BH CV ECHO MEAS - IVS/LVPW: 1.27 CM
BH CV ECHO MEAS - IVSD: 1.46 CM
BH CV ECHO MEAS - LA DIMENSION: 2.8 CM
BH CV ECHO MEAS - LV DIASTOLIC VOL/BSA (35-75): 50.9 CM2
BH CV ECHO MEAS - LV MASS(C)D: 240.5 GRAMS
BH CV ECHO MEAS - LV SYSTOLIC VOL/BSA (12-30): 20.7 CM2
BH CV ECHO MEAS - LVIDD: 4.7 CM
BH CV ECHO MEAS - LVIDS: 2.7 CM
BH CV ECHO MEAS - LVOT AREA: 3.5 CM2
BH CV ECHO MEAS - LVOT DIAM: 2.1 CM
BH CV ECHO MEAS - LVPWD: 1.15 CM
BH CV ECHO MEAS - MV A MAX VEL: 98.5 CM/SEC
BH CV ECHO MEAS - MV E MAX VEL: 60.8 CM/SEC
BH CV ECHO MEAS - MV E/A: 0.62
BH CV ECHO MEAS - PA ACC TIME: 0.12 SEC
BH CV ECHO MEAS - PA PR(ACCEL): 25 MMHG
BH CV ECHO MEAS - SI(MOD-SP4): 30.2 ML/M2
BH CV ECHO MEAS - SV(MOD-SP4): 66.4 ML
CHOLEST SERPL-MCNC: 144 MG/DL (ref 0–200)
GLUCOSE BLDC GLUCOMTR-MCNC: 185 MG/DL (ref 70–130)
GLUCOSE BLDC GLUCOMTR-MCNC: 201 MG/DL (ref 70–130)
GLUCOSE BLDC GLUCOMTR-MCNC: 238 MG/DL (ref 70–130)
HBA1C MFR BLD: 6.3 % (ref 4.8–5.6)
HDLC SERPL-MCNC: 29 MG/DL (ref 40–60)
LDLC SERPL CALC-MCNC: 84 MG/DL (ref 0–100)
LDLC/HDLC SERPL: 2.74 {RATIO}
MAXIMAL PREDICTED HEART RATE: 153 BPM
NONSPECIFIC GEL REACTION: NORMAL
QT INTERVAL: 394 MS
QTC INTERVAL: 462 MS
STRESS TARGET HR: 130 BPM
TRIGL SERPL-MCNC: 177 MG/DL (ref 0–150)
VLDLC SERPL-MCNC: 31 MG/DL (ref 5–40)

## 2022-06-02 PROCEDURE — G0378 HOSPITAL OBSERVATION PER HR: HCPCS

## 2022-06-02 PROCEDURE — 70551 MRI BRAIN STEM W/O DYE: CPT

## 2022-06-02 PROCEDURE — 70549 MR ANGIOGRAPH NECK W/O&W/DYE: CPT | Performed by: RADIOLOGY

## 2022-06-02 PROCEDURE — A9577 INJ MULTIHANCE: HCPCS | Performed by: INTERNAL MEDICINE

## 2022-06-02 PROCEDURE — 94799 UNLISTED PULMONARY SVC/PX: CPT

## 2022-06-02 PROCEDURE — 99220 PR INITIAL OBSERVATION CARE/DAY 70 MINUTES: CPT | Performed by: INTERNAL MEDICINE

## 2022-06-02 PROCEDURE — 70546 MR ANGIOGRAPH HEAD W/O&W/DYE: CPT

## 2022-06-02 PROCEDURE — 99214 OFFICE O/P EST MOD 30 MIN: CPT | Performed by: NURSE PRACTITIONER

## 2022-06-02 PROCEDURE — 70551 MRI BRAIN STEM W/O DYE: CPT | Performed by: RADIOLOGY

## 2022-06-02 PROCEDURE — 93306 TTE W/DOPPLER COMPLETE: CPT

## 2022-06-02 PROCEDURE — 95819 EEG AWAKE AND ASLEEP: CPT

## 2022-06-02 PROCEDURE — 92610 EVALUATE SWALLOWING FUNCTION: CPT

## 2022-06-02 PROCEDURE — 94761 N-INVAS EAR/PLS OXIMETRY MLT: CPT

## 2022-06-02 PROCEDURE — 70549 MR ANGIOGRAPH NECK W/O&W/DYE: CPT

## 2022-06-02 PROCEDURE — 63710000001 INSULIN ASPART PER 5 UNITS: Performed by: INTERNAL MEDICINE

## 2022-06-02 PROCEDURE — 94640 AIRWAY INHALATION TREATMENT: CPT

## 2022-06-02 PROCEDURE — 97166 OT EVAL MOD COMPLEX 45 MIN: CPT

## 2022-06-02 PROCEDURE — 70546 MR ANGIOGRAPH HEAD W/O&W/DYE: CPT | Performed by: RADIOLOGY

## 2022-06-02 PROCEDURE — 94664 DEMO&/EVAL PT USE INHALER: CPT

## 2022-06-02 PROCEDURE — 97161 PT EVAL LOW COMPLEX 20 MIN: CPT

## 2022-06-02 PROCEDURE — 92523 SPEECH SOUND LANG COMPREHEN: CPT

## 2022-06-02 PROCEDURE — 82962 GLUCOSE BLOOD TEST: CPT

## 2022-06-02 PROCEDURE — 0 GADOBENATE DIMEGLUMINE 529 MG/ML SOLUTION: Performed by: INTERNAL MEDICINE

## 2022-06-02 RX ORDER — SODIUM CHLORIDE 0.9 % (FLUSH) 0.9 %
10 SYRINGE (ML) INJECTION EVERY 12 HOURS SCHEDULED
Status: DISCONTINUED | OUTPATIENT
Start: 2022-06-02 | End: 2022-06-03 | Stop reason: HOSPADM

## 2022-06-02 RX ORDER — LISINOPRIL 10 MG/1
20 TABLET ORAL
Status: DISCONTINUED | OUTPATIENT
Start: 2022-06-02 | End: 2022-06-02

## 2022-06-02 RX ORDER — INSULIN ASPART 100 [IU]/ML
0-7 INJECTION, SOLUTION INTRAVENOUS; SUBCUTANEOUS
Status: DISCONTINUED | OUTPATIENT
Start: 2022-06-02 | End: 2022-06-03 | Stop reason: HOSPADM

## 2022-06-02 RX ORDER — CLOPIDOGREL BISULFATE 75 MG/1
75 TABLET ORAL DAILY
Status: DISCONTINUED | OUTPATIENT
Start: 2022-06-02 | End: 2022-06-03 | Stop reason: HOSPADM

## 2022-06-02 RX ORDER — ALBUTEROL SULFATE 2.5 MG/3ML
2.5 SOLUTION RESPIRATORY (INHALATION) EVERY 4 HOURS PRN
Status: DISCONTINUED | OUTPATIENT
Start: 2022-06-02 | End: 2022-06-03 | Stop reason: HOSPADM

## 2022-06-02 RX ORDER — IPRATROPIUM BROMIDE AND ALBUTEROL SULFATE 2.5; .5 MG/3ML; MG/3ML
3 SOLUTION RESPIRATORY (INHALATION) EVERY 4 HOURS PRN
COMMUNITY
End: 2022-06-20

## 2022-06-02 RX ORDER — HYDRALAZINE HYDROCHLORIDE 25 MG/1
25 TABLET, FILM COATED ORAL DAILY
Status: ON HOLD | COMMUNITY
End: 2022-06-03 | Stop reason: SDUPTHER

## 2022-06-02 RX ORDER — ASPIRIN 300 MG/1
300 SUPPOSITORY RECTAL DAILY
Status: DISCONTINUED | OUTPATIENT
Start: 2022-06-02 | End: 2022-06-03 | Stop reason: HOSPADM

## 2022-06-02 RX ORDER — CARVEDILOL 6.25 MG/1
12.5 TABLET ORAL 2 TIMES DAILY WITH MEALS
Status: DISCONTINUED | OUTPATIENT
Start: 2022-06-02 | End: 2022-06-03 | Stop reason: HOSPADM

## 2022-06-02 RX ORDER — NEBIVOLOL 5 MG/1
5 TABLET ORAL DAILY
COMMUNITY
End: 2022-06-03 | Stop reason: HOSPADM

## 2022-06-02 RX ORDER — IPRATROPIUM BROMIDE AND ALBUTEROL SULFATE 2.5; .5 MG/3ML; MG/3ML
3 SOLUTION RESPIRATORY (INHALATION) 4 TIMES DAILY PRN
Status: DISCONTINUED | OUTPATIENT
Start: 2022-06-02 | End: 2022-06-03 | Stop reason: HOSPADM

## 2022-06-02 RX ORDER — BUDESONIDE AND FORMOTEROL FUMARATE DIHYDRATE 160; 4.5 UG/1; UG/1
2 AEROSOL RESPIRATORY (INHALATION)
Status: DISCONTINUED | OUTPATIENT
Start: 2022-06-02 | End: 2022-06-03 | Stop reason: HOSPADM

## 2022-06-02 RX ORDER — ASPIRIN 81 MG/1
81 TABLET, CHEWABLE ORAL DAILY
Status: DISCONTINUED | OUTPATIENT
Start: 2022-06-02 | End: 2022-06-03 | Stop reason: HOSPADM

## 2022-06-02 RX ORDER — LEVETIRACETAM 750 MG/1
750 TABLET ORAL 2 TIMES DAILY
COMMUNITY
End: 2022-06-03 | Stop reason: HOSPADM

## 2022-06-02 RX ORDER — HYDROCHLOROTHIAZIDE 25 MG/1
25 TABLET ORAL DAILY
Status: CANCELLED | OUTPATIENT
Start: 2022-06-02

## 2022-06-02 RX ORDER — PANTOPRAZOLE SODIUM 40 MG/1
40 TABLET, DELAYED RELEASE ORAL EVERY MORNING
Status: DISCONTINUED | OUTPATIENT
Start: 2022-06-02 | End: 2022-06-03 | Stop reason: HOSPADM

## 2022-06-02 RX ORDER — NICOTINE POLACRILEX 4 MG
15 LOZENGE BUCCAL
Status: DISCONTINUED | OUTPATIENT
Start: 2022-06-02 | End: 2022-06-03 | Stop reason: HOSPADM

## 2022-06-02 RX ORDER — DEXTROSE MONOHYDRATE 25 G/50ML
25 INJECTION, SOLUTION INTRAVENOUS
Status: DISCONTINUED | OUTPATIENT
Start: 2022-06-02 | End: 2022-06-03 | Stop reason: HOSPADM

## 2022-06-02 RX ORDER — ATORVASTATIN CALCIUM 40 MG/1
40 TABLET, FILM COATED ORAL NIGHTLY
Status: DISCONTINUED | OUTPATIENT
Start: 2022-06-02 | End: 2022-06-03 | Stop reason: HOSPADM

## 2022-06-02 RX ORDER — LISINOPRIL 10 MG/1
30 TABLET ORAL DAILY
Status: CANCELLED | OUTPATIENT
Start: 2022-06-02

## 2022-06-02 RX ORDER — ATORVASTATIN CALCIUM 40 MG/1
80 TABLET, FILM COATED ORAL NIGHTLY
Status: DISCONTINUED | OUTPATIENT
Start: 2022-06-02 | End: 2022-06-02

## 2022-06-02 RX ORDER — HYDRALAZINE HYDROCHLORIDE 25 MG/1
25 TABLET, FILM COATED ORAL DAILY
Status: CANCELLED | OUTPATIENT
Start: 2022-06-02

## 2022-06-02 RX ORDER — GLIPIZIDE 5 MG/1
5 TABLET ORAL
Status: CANCELLED | OUTPATIENT
Start: 2022-06-02

## 2022-06-02 RX ORDER — TIZANIDINE 4 MG/1
8 TABLET ORAL NIGHTLY PRN
Status: DISCONTINUED | OUTPATIENT
Start: 2022-06-02 | End: 2022-06-03 | Stop reason: HOSPADM

## 2022-06-02 RX ORDER — TIZANIDINE 4 MG/1
8 TABLET ORAL NIGHTLY PRN
COMMUNITY
End: 2022-11-02 | Stop reason: SDDI

## 2022-06-02 RX ORDER — SODIUM CHLORIDE 0.9 % (FLUSH) 0.9 %
10 SYRINGE (ML) INJECTION AS NEEDED
Status: DISCONTINUED | OUTPATIENT
Start: 2022-06-02 | End: 2022-06-03 | Stop reason: HOSPADM

## 2022-06-02 RX ORDER — IPRATROPIUM BROMIDE AND ALBUTEROL SULFATE 2.5; .5 MG/3ML; MG/3ML
3 SOLUTION RESPIRATORY (INHALATION) EVERY 4 HOURS PRN
Status: CANCELLED | OUTPATIENT
Start: 2022-06-02

## 2022-06-02 RX ORDER — LISINOPRIL 10 MG/1
30 TABLET ORAL
Status: DISCONTINUED | OUTPATIENT
Start: 2022-06-02 | End: 2022-06-03 | Stop reason: HOSPADM

## 2022-06-02 RX ORDER — NEBIVOLOL 5 MG/1
5 TABLET ORAL DAILY
Status: CANCELLED | OUTPATIENT
Start: 2022-06-02

## 2022-06-02 RX ADMIN — GADOBENATE DIMEGLUMINE 19 ML: 529 INJECTION, SOLUTION INTRAVENOUS at 11:52

## 2022-06-02 RX ADMIN — BUDESONIDE AND FORMOTEROL FUMARATE DIHYDRATE 2 PUFF: 160; 4.5 AEROSOL RESPIRATORY (INHALATION) at 18:46

## 2022-06-02 RX ADMIN — Medication 10 ML: at 20:39

## 2022-06-02 RX ADMIN — BUDESONIDE AND FORMOTEROL FUMARATE DIHYDRATE 2 PUFF: 160; 4.5 AEROSOL RESPIRATORY (INHALATION) at 11:53

## 2022-06-02 RX ADMIN — IPRATROPIUM BROMIDE AND ALBUTEROL SULFATE 3 ML: .5; 3 SOLUTION RESPIRATORY (INHALATION) at 11:52

## 2022-06-02 RX ADMIN — PANTOPRAZOLE SODIUM 40 MG: 40 TABLET, DELAYED RELEASE ORAL at 11:57

## 2022-06-02 RX ADMIN — INSULIN ASPART 3 UNITS: 100 INJECTION, SOLUTION INTRAVENOUS; SUBCUTANEOUS at 17:00

## 2022-06-02 RX ADMIN — Medication 10 ML: at 09:20

## 2022-06-02 RX ADMIN — INSULIN ASPART 3 UNITS: 100 INJECTION, SOLUTION INTRAVENOUS; SUBCUTANEOUS at 13:38

## 2022-06-02 RX ADMIN — ATORVASTATIN CALCIUM 40 MG: 40 TABLET, FILM COATED ORAL at 20:39

## 2022-06-02 RX ADMIN — IPRATROPIUM BROMIDE AND ALBUTEROL SULFATE 3 ML: .5; 3 SOLUTION RESPIRATORY (INHALATION) at 18:47

## 2022-06-02 RX ADMIN — CLOPIDOGREL 75 MG: 75 TABLET, FILM COATED ORAL at 17:00

## 2022-06-02 RX ADMIN — LEVETIRACETAM 750 MG: 500 TABLET, FILM COATED ORAL at 11:57

## 2022-06-02 RX ADMIN — CARVEDILOL 12.5 MG: 6.25 TABLET, FILM COATED ORAL at 17:00

## 2022-06-02 RX ADMIN — LISINOPRIL 30 MG: 10 TABLET ORAL at 17:00

## 2022-06-02 RX ADMIN — ASPIRIN 81 MG: 81 TABLET, CHEWABLE ORAL at 09:20

## 2022-06-02 NOTE — ED PROVIDER NOTES
Subjective   67-year-old male who presents after the onset at 8:55 PM of numbness and weakness of his left face, left arm, left leg, also slurred speech.  He denies visual disturbances.  He denies aphasic symptoms, either receptive or expressive.  He denies fever, chills, chest pain, shortness of breath, abdominal pain, vomiting, other symptoms or other complaints.  Patient reports that he has been having episodes like this off and on for the past month or so.          Review of Systems   All other systems reviewed and are negative.      Past Medical History:   Diagnosis Date   • Arthritis    • Chronic diastolic heart failure (HCC) 08/19/2021   • COPD (chronic obstructive pulmonary disease) ([facility])    • Depression    • Diabetes mellitus ([facility])    • Diverticulitis    • Emphysema (subcutaneous) (surgical) resulting from a procedure    • GERD (gastroesophageal reflux disease)    • Neuropathy    • Pneumothorax     Left, status post chest tube   • Shortness of breath    • Spinal stenosis        No Known Allergies    Past Surgical History:   Procedure Laterality Date   • BRONCHOSCOPY N/A 5/14/2019    Procedure: Bronchoscopy with Transbronchial Biopsy with Fluoroscopy;  Surgeon: Eladio Bethea MD;  Location: [facility];  Service: Pulmonary   • CHEST TUBE INSERTION Left     Pneumothorax   • COLON RESECTION  2016    had colostomy and reveresed back    • COLONOSCOPY  2016   • COLOSTOMY CLOSURE     • LUNG BIOPSY Right 2014    (non cancerous)   • OTHER SURGICAL HISTORY Left     LEFT ELBOW SURGERY       Family History   Problem Relation Age of Onset   • Alzheimer's disease Mother    • Cancer Father         THROAT CANCER   • Diabetes Sister    • Diabetes Brother        Social History     Socioeconomic History   • Marital status:    • Number of children: 0   Tobacco Use   • Smoking status: Former Smoker     Packs/day: 1.00     Years: 30.00     Pack years: 30.00     Types: Cigarettes     Quit date: 2013     Years since  quittin.4   • Smokeless tobacco: Never Used   Vaping Use   • Vaping Use: Never used   Substance and Sexual Activity   • Alcohol use: No   • Drug use: No   • Sexual activity: Defer     Birth control/protection: Other           Objective   Physical Exam  Vitals and nursing note reviewed.   Constitutional:       General: He is not in acute distress.     Appearance: He is well-developed. He is not toxic-appearing or diaphoretic.   HENT:      Head: Normocephalic and atraumatic.   Eyes:      General: No scleral icterus.     Pupils: Pupils are equal, round, and reactive to light.   Neck:      Trachea: No tracheal deviation.   Cardiovascular:      Rate and Rhythm: Normal rate and regular rhythm.   Pulmonary:      Effort: Pulmonary effort is normal. No respiratory distress.      Breath sounds: Normal breath sounds.   Chest:      Chest wall: No tenderness.   Abdominal:      General: Bowel sounds are normal.      Palpations: Abdomen is soft.      Tenderness: There is no abdominal tenderness. There is no guarding or rebound.   Musculoskeletal:         General: No tenderness. Normal range of motion.      Cervical back: Normal range of motion and neck supple. No rigidity or tenderness.      Right lower leg: No edema.      Left lower leg: No edema.   Skin:     General: Skin is warm and dry.      Capillary Refill: Capillary refill takes less than 2 seconds.      Coloration: Skin is not pale.   Neurological:      Mental Status: He is alert.      GCS: GCS eye subscore is 4. GCS verbal subscore is 5. GCS motor subscore is 6.      Motor: No abnormal muscle tone.      Comments: Patient has a mild left facial droop, mild dysarthria.  No visual deficits, no aphasia.  No objective weakness of the upper or lower extremities.  No appreciable sensory deficits.  NIH stroke scale is 2.   Psychiatric:         Mood and Affect: Mood normal.         Behavior: Behavior normal.         Procedures  EKG shows sinus rhythm, rate 83.  GA interval  164, QRS duration 86, QTc 462 ms.  No apparent acute ischemia.  No evidence for STEMI.  XR Chest 1 View   Final Result   No acute cardiopulmonary findings. COPD.      Signer Name: Cipriano Serrato MD    Signed: 6/1/2022 10:41 PM    Workstation Name: [email or web address]     Radiology [facility]      CT CEREBRAL PERFUSION WITH & WITHOUT CONTRAST   Final Result   Essentially normal brain perfusion CT.               Signer Name: Deloris Lisa MD    Signed: 6/1/2022 10:06 PM    Workstation Name: [facility]     [facility]      CT Angiogram Carotids   Final Result      1. Study excludes the aortic arch and proximal great vessels.   2. There is about 50% diameter stenosis at the bilateral carotid bifurcations using NASA criteria due to partially calcified plaque.   3. There is disease involving both common carotid arteries also with about 50% diameter stenosis. On the left side this appears to be due to noncalcified plaque though its possible that this is due to a thrombosed long segment dissection. This is best   further evaluated with an MR angiogram of the neck using a dissection protocol to evaluate for possible blood products in the wall of the vessel. This should include precontrast axial T1-weighted imaging through the neck vessels with fat saturation.   4. Both vertebral arteries are patent with supply the basilar.   5. Disease involving both carotid siphons.   6. Irregular high-grade stenosis of the left side P1 vessel likely secondary to intracranial atherosclerotic disease. There is a large left posterior communicating artery present and a moderate sized right posterior communicating artery present. No   central intracranial vascular cut off suspected.      Signer Name: Deloris Lisa MD    Signed: 6/1/2022 10:19 PM    Workstation Name: [facility]     Radiology [facility]      CT Angiogram Head   Final Result      1. Study excludes the aortic arch and proximal great  vessels.   2. There is about 50% diameter stenosis at the bilateral carotid bifurcations using NASA criteria due to partially calcified plaque.   3. There is disease involving both common carotid arteries also with about 50% diameter stenosis. On the left side this appears to be due to noncalcified plaque though its possible that this is due to a thrombosed long segment dissection. This is best   further evaluated with an MR angiogram of the neck using a dissection protocol to evaluate for possible blood products in the wall of the vessel. This should include precontrast axial T1-weighted imaging through the neck vessels with fat saturation.   4. Both vertebral arteries are patent with supply the basilar.   5. Disease involving both carotid siphons.   6. Irregular high-grade stenosis of the left side P1 vessel likely secondary to intracranial atherosclerotic disease. There is a large left posterior communicating artery present and a moderate sized right posterior communicating artery present. No   central intracranial vascular cut off suspected.      Signer Name: Deloris Lisa MD    Signed: 6/1/2022 10:19 PM    Workstation Name: [facility]     Radiology [facility]      CT Head Without Contrast Stroke Protocol   Final Result   Normal, negative unenhanced head CT.               Signer Name: Chad Seaman MD    Signed: 6/1/2022 9:32 PM    Workstation Name: [facility]     Radiology [facility]        Results for orders placed or performed during the hospital encounter of 06/01/22   COVID-19 and FLU A/B PCR - Swab, Nasopharynx    Specimen: Nasopharynx; Swab   Result Value Ref Range    COVID19 Not Detected Not Detected - Ref. Range    Influenza A PCR Not Detected Not Detected    Influenza B PCR Not Detected Not Detected   Comprehensive Metabolic Panel    Specimen: Arm, Left; Blood   Result Value Ref Range    Glucose 278 (H) 65 - 99 mg/dL    BUN 14 8 - 23 mg/dL    Creatinine 1.00 0.76 - 1.27 mg/dL     Sodium 135 (L) 136 - 145 mmol/L    Potassium 4.1 3.5 - 5.2 mmol/L    Chloride 99 98 - 107 mmol/L    CO2 25.2 22.0 - 29.0 mmol/L    Calcium 8.5 (L) 8.6 - 10.5 mg/dL    Total Protein 6.0 6.0 - 8.5 g/dL    Albumin 3.85 3.50 - 5.20 g/dL    ALT (SGPT) 17 1 - 41 U/L    AST (SGOT) 12 1 - 40 U/L    Alkaline Phosphatase 99 39 - 117 U/L    Total Bilirubin 0.4 0.0 - 1.2 mg/dL    Globulin 2.2 gm/dL    A/G Ratio 1.8 g/dL    BUN/Creatinine Ratio 14.0 7.0 - 25.0    Anion Gap 10.8 5.0 - 15.0 mmol/L    eGFR 82.5 >60.0 mL/min/1.73   Protime-INR    Specimen: Arm, Left; Blood   Result Value Ref Range    Protime 14.1 12.1 - 14.7 Seconds    INR 1.07 0.90 - 1.10   aPTT    Specimen: Arm, Left; Blood   Result Value Ref Range    PTT 33.3 26.5 - 34.5 seconds   Troponin    Specimen: Arm, Left; Blood   Result Value Ref Range    Troponin T 0.013 0.000 - 0.030 ng/mL   CBC Auto Differential    Specimen: Arm, Left; Blood   Result Value Ref Range    WBC 5.01 3.40 - 10.80 10*3/mm3    RBC 3.90 (L) 4.14 - 5.80 10*6/mm3    Hemoglobin 11.6 (L) 13.0 - 17.7 g/dL    Hematocrit 35.5 (L) 37.5 - 51.0 %    MCV 91.0 79.0 - 97.0 fL    MCH 29.7 26.6 - 33.0 pg    MCHC 32.7 31.5 - 35.7 g/dL    RDW 14.5 12.3 - 15.4 %    RDW-SD 48.0 37.0 - 54.0 fl    MPV 11.0 6.0 - 12.0 fL    Platelets 193 140 - 450 10*3/mm3    Neutrophil % 69.2 42.7 - 76.0 %    Lymphocyte % 16.2 (L) 19.6 - 45.3 %    Monocyte % 10.8 5.0 - 12.0 %    Eosinophil % 3.0 0.3 - 6.2 %    Basophil % 0.2 0.0 - 1.5 %    Immature Grans % 0.6 (H) 0.0 - 0.5 %    Neutrophils, Absolute 3.47 1.70 - 7.00 10*3/mm3    Lymphocytes, Absolute 0.81 0.70 - 3.10 10*3/mm3    Monocytes, Absolute 0.54 0.10 - 0.90 10*3/mm3    Eosinophils, Absolute 0.15 0.00 - 0.40 10*3/mm3    Basophils, Absolute 0.01 0.00 - 0.20 10*3/mm3    Immature Grans, Absolute 0.03 0.00 - 0.05 10*3/mm3    nRBC 0.0 0.0 - 0.2 /100 WBC   POC Glucose Once    Specimen: Blood   Result Value Ref Range    Glucose 276 (H) 70 - 130 mg/dL   POC Creatinine     Specimen: Blood   Result Value Ref Range    Creatinine 1.00 0.60 - 1.30 mg/dL   Type & Screen    Specimen: Arm, Left; Blood   Result Value Ref Range    ABO Type O     RH type Positive    ABO RH Specimen Verification    Specimen: Blood   Result Value Ref Range    ABO Type O     RH type Positive    Green Top (Gel)   Result Value Ref Range    Extra Tube Hold for add-ons.    Lavender Top   Result Value Ref Range    Extra Tube hold for add-on    Gold Top - SST   Result Value Ref Range    Extra Tube Hold for add-ons.    Light Blue Top   Result Value Ref Range    Extra Tube Hold for add-ons.                 ED Course  ED Course as of 06/02/22 0034   Wed Jun 01, 2022 2130 A code stroke was called from triage.  I saw the patient in the CT suite initially.  I have discussed the case with stroke neurologist Dr. Francisco.  She wants to see the patient immediately, she advises to have pharmacy on standby with tPA.  Pharmacy is notified of this.  She advises that in addition to the noncontrasted CT, for me to order CTA and CTP as well, this has been done. [CM]   2155 Patient's symptoms have now completely resolved.  The facial droop has resolved.  The dysarthria has resolved.  Patient appears neurologically normal.  He is being evaluated by stroke neurology Dr. Francisco currently. [CM]   2205 Nursing reports that stroke neurology recommended aspirin 81 mg, Plavix 300 mg, they are entering this as verbal order.  She advised admission for TIA work-up, MRI brain, echocardiogram. [CM]   Thu Jun 02, 2022   0023 Patient is doing well, remains asymptomatic.  I discussed the case with Dr. Cano.  She is admitting patient to the hospitalist service. [CM]      ED Course User Index  [CM] Bhavesh Stewart MD                                                 [facility]    Final diagnoses:   TIA (transient ischemic attack)       ED Disposition  ED Disposition     ED Disposition   Decision to Admit    Condition   --    Comment   Level of Care:  Telemetry [5]   Diagnosis: Left-sided weakness [[id number]]               Please note that portions of this note were completed with a voice recognition program.            Bhavesh Stewart MD  06/02/22 0034

## 2022-06-02 NOTE — ED NOTES
"Patient presents to the ER with patient's family after patient had an episode of numbness noted to left side of body with weakness to left upper and lower extremities that began at 2055. Patient being assessed by teleneurology. While being assessed patient and family reports that these events have been ongoing intermittently for the past 2-3 months, patient states they come and subside quickly after starting but reports tonight his symptoms \"lasted longer than normal.\" On assessment patient's NIH 0, patient reports all symptoms have subsided. Due to patient reporting symptoms have resolved, tPA will not be administered per teleneurologist with Dr. Stewart in agreement.   "

## 2022-06-02 NOTE — THERAPY EVALUATION
Patient Name: Maximo Kwon  : 1955    MRN: [id number]                              Today's Date: 2022       Admit Date: 2022    Visit Dx:     ICD-10-CM ICD-9-CM   1. TIA (transient ischemic attack)  G45.9 435.9     Patient Active Problem List   Diagnosis   • Mixed hyperlipidemia   • Simple chronic bronchitis (HCC)   • Multiple pulmonary nodules   • Essential hypertension   • Left ventricular hypertrophy with grade 1 diastolic dysfunction   • Hereditary and idiopathic peripheral neuropathy   • COPD, severe (HCC)   • Degenerative disc disease, lumbar   • Arthritis   • Diabetes mellitus (HCC)   • Neuropathy   • Claudication of both lower extremities (HCC)   • LAZCANO (dyspnea on exertion)   • GERD (gastroesophageal reflux disease)   • Chronic respiratory failure with hypoxia (HCC)   • Chronic diastolic heart failure (HCC)   • Left-sided weakness     Past Medical History:   Diagnosis Date   • Arthritis    • Chronic diastolic heart failure (HCC) 2021   • COPD (chronic obstructive pulmonary disease) (HCC)    • Depression    • Diverticulitis    • Emphysema (subcutaneous) (surgical) resulting from a procedure    • GERD (gastroesophageal reflux disease)    • Neuropathy    • Pneumothorax     Left, status post chest tube   • Shortness of breath    • Spinal stenosis    • Type 2 diabetes mellitus (HCC)      Past Surgical History:   Procedure Laterality Date   • BRONCHOSCOPY N/A 2019    Procedure: Bronchoscopy with Transbronchial Biopsy with Fluoroscopy;  Surgeon: Eladio Bethea MD;  [place] [facility] [place]  Service: Pulmonary   • CHEST TUBE INSERTION Left     Pneumothorax   • COLON RESECTION  2016    had colostomy and reveresed back    • COLONOSCOPY  2016   • COLOSTOMY CLOSURE     • LUNG BIOPSY Right     (non cancerous)   • OTHER SURGICAL HISTORY Left     LEFT ELBOW SURGERY      General Information     Row Name 22 1106          OT Time and Intention    Document Type evaluation  -LA     Mode of  "Treatment occupational therapy  -LA     Row Name 06/02/22 1106          General Information    Patient Profile Reviewed yes  -LA     Prior Level of Function independent:;ADL's  Patient did have cane from previous foot injury he would use occassionally to assist with balance.  -LA     Existing Precautions/Restrictions fall  -LA     Barriers to Rehab none identified  -LA     Row Name 06/02/22 1106          Occupational Profile    Reason for Services/Referral (Occupational Profile) Patient admitted to hospital with left sided weakness. Patient experiencing stroke like symptoms off/on per patient for months but symptoms don't typically last long. Patient previously independent with ADLs/IADLs. OT evaulation to assess for changes in ADL independence/safety.  -LA     Patient Goals (Occupational Profile) \"Figure out what is causing me problems and get back home.\"  -LA     Row Name 06/02/22 1106          Living Environment    People in Home significant other  -LA     Row Name 06/02/22 1106          Home Main Entrance    Number of Stairs, Main Entrance none  -LA     Row Name 06/02/22 1106          Safety Issues, Functional Mobility    Impairments Affecting Function (Mobility) balance  -LA     Comment, Safety Issues/Impairments (Mobility) Slight deficits in balance noted due to neuropathy. Patient able to self correct this date; patient reports using cane at times due to balance deficit.  -LA           User Key  (r) = Recorded By, (t) = Taken By, (c) = Cosigned By    Initials Name Provider Type    Cheyenne Chatterjee OT Occupational Therapist                 Mobility/ADL's     Row Name 06/02/22 1110          Bed Mobility    Bed Mobility bed mobility (all) activities  -LA     All Activities, [place] (Bed Mobility) independent  -LA     Row Name 06/02/22 1110          Transfers    Transfers sit-stand transfer;toilet transfer  -LA     Sit-Stand [place] (Transfers) independent  -LA     [place] Level (Toilet Transfer) " independent  -[facility] Name 06/02/22 1110          Toilet Transfer    Type (Toilet Transfer) stand pivot/stand step  -[facility] Name 06/02/22 1110          Functional Mobility    Functional Mobility- Ind. Level independent  -[facility] Name 06/02/22 1110          Activities of Daily Living    BADL Assessment/Intervention bathing;upper body dressing;lower body dressing;grooming;feeding;toileting  -[facility] Name 06/02/22 1110          Bathing Assessment/Intervention    [place] Level (Bathing) set up  -[facility] Name 06/02/22 1110          Upper Body Dressing Assessment/Training    [place] Level (Upper Body Dressing) independent  -[facility] Name 06/02/22 1110          Lower Body Dressing Assessment/Training    [place] Level (Lower Body Dressing) independent  -[facility] Name 06/02/22 1110          Grooming Assessment/Training    [place] Level (Grooming) independent  -[facility] Name 06/02/22 1110          Self-Feeding Assessment/Training    [place] Level (Feeding) independent  -[facility] Name 06/02/22 1110          Toileting Assessment/Training    [place] Level (Toileting) independent  -LA           User Key  (r) = Recorded By, (t) = Taken By, (c) = Cosigned By    Initials Name Provider Type    Cheyenne Chatterjee OT Occupational Therapist               Obj/Interventions     [facility] Name 06/02/22 1112          Sensory Assessment (Somatosensory)    Sensory Assessment (Somatosensory) bilateral UE  -LA     Bilateral UE Sensory Assessment impaired  -LA     Sensory Subjective Reports numbness;tingling  -LA     Row Name 06/02/22 1112          Range of Motion Comprehensive    General Range of Motion no range of motion deficits identified  -LA     Row Name 06/02/22 1112          Strength Comprehensive (MMT)    General Manual Muscle Testing (MMT) Assessment no strength deficits identified  -LA     Row Name 06/02/22 1112          Motor Skills    Motor Skills coordination;functional endurance  -LA      Coordination minimal impairment;fine motor deficit  -LA     Functional Endurance good-  -LA     Row Name 06/02/22 1112          Balance    Balance Assessment sitting static balance;sitting dynamic balance  -LA     Static Sitting Balance independent  -LA     Dynamic Sitting Balance independent  -LA     Position, Sitting Balance unsupported  -LA     Balance Interventions moderate challenge  -LA           User Key  (r) = Recorded By, (t) = Taken By, (c) = Cosigned By    Initials Name Provider Type    Cheyenne Chatterjee OT Occupational Therapist               Goals/Plan    No documentation.                Clinical Impression     Row Name 06/02/22 1120          Plan of Care Review    Plan of Care Reviewed With patient  -LA     Outcome Evaluation OT evaluation completed. patient with no residual weakness noted in UEs. Patient at baseline with good safety/independence with ADLs this date. No further OT indicated at this time.  -LA     Row Name 06/02/22 1120          Therapy Assessment/Plan (OT)    Criteria for Skilled Therapeutic Interventions Met (OT) no problems identified which require skilled intervention  -LA     Therapy Frequency (OT) evaluation only  -LA     Row Name 06/02/22 1120          Therapy Plan Review/Discharge Plan (OT)    Anticipated Discharge Disposition (OT) home with assist  -LA     Row Name 06/02/22 1120          Positioning and Restraints    Pre-Treatment Position in bed  -LA     Post Treatment Position bed  -LA     In Bed call light within reach;encouraged to call for assist;exit alarm on  -LA           User Key  (r) = Recorded By, (t) = Taken By, (c) = Cosigned By    Initials Name Provider Type    Cheyenne Chatterjee OT Occupational Therapist               Outcome Measures    No documentation.                   OT Recommendation and Plan  Therapy Frequency (OT): evaluation only  Plan of Care Review  Plan of Care Reviewed With: patient  Outcome Evaluation: OT evaluation completed. patient with no  residual weakness noted in UEs. Patient at baseline with good safety/independence with ADLs this date. No further OT indicated at this time.     Time Calculation:     Therapy Charges for Today     Code Description Service Date Service Provider Modifiers Qty    18276488783 HC OT EVAL MOD COMPLEXITY 4 6/2/2022 Cheyenne Santana OT GO 1               Cheyenne Santana OT  6/2/2022

## 2022-06-02 NOTE — THERAPY EVALUATION
Acute Care - Physical Therapy Initial Evaluation   Neil     Patient Name: Maximo Kwon  : 1955  MRN: [id number]  Today's Date: 2022   Onset of Illness/Injury or Date of Surgery: 22  Visit Dx:     ICD-10-CM ICD-9-CM   1. TIA (transient ischemic attack)  G45.9 435.9     Patient Active Problem List   Diagnosis   • Mixed hyperlipidemia   • Simple chronic bronchitis (HCC)   • Multiple pulmonary nodules   • Essential hypertension   • Left ventricular hypertrophy with grade 1 diastolic dysfunction   • Hereditary and idiopathic peripheral neuropathy   • COPD, severe (HCC)   • Degenerative disc disease, lumbar   • Arthritis   • Diabetes mellitus (HCC)   • Neuropathy   • Claudication of both lower extremities (HCC)   • LAZCANO (dyspnea on exertion)   • GERD (gastroesophageal reflux disease)   • Chronic respiratory failure with hypoxia (HCC)   • Chronic diastolic heart failure (HCC)   • Left-sided weakness     Past Medical History:   Diagnosis Date   • Arthritis    • Chronic diastolic heart failure (HCC) 2021   • COPD (chronic obstructive pulmonary disease) (HCC)    • Depression    • Diverticulitis    • Emphysema (subcutaneous) (surgical) resulting from a procedure    • GERD (gastroesophageal reflux disease)    • Neuropathy    • Pneumothorax     Left, status post chest tube   • Shortness of breath    • Spinal stenosis    • Type 2 diabetes mellitus (HCC)      Past Surgical History:   Procedure Laterality Date   • BRONCHOSCOPY N/A 2019    Procedure: Bronchoscopy with Transbronchial Biopsy with Fluoroscopy;  Surgeon: Eladio Bethea MD;  Location: [facility];  Service: Pulmonary   • CHEST TUBE INSERTION Left     Pneumothorax   • COLON RESECTION  2016    had colostomy and reveresed back    • COLONOSCOPY     • COLOSTOMY CLOSURE     • LUNG BIOPSY Right     (non cancerous)   • OTHER SURGICAL HISTORY Left     LEFT ELBOW SURGERY     PT Assessment (last 12 hours)     PT Evaluation and Treatment      Row Name 06/02/22 1418          Physical Therapy Time and Intention    Subjective Information no complaints  -AG     Document Type evaluation  -AG     Mode of Treatment individual therapy;physical therapy  -AG     Patient Effort good  -AG     Symptoms Noted During/After Treatment none  -AG     Row Name 06/02/22 1418          General Information    Patient Profile Reviewed yes  -AG     Onset of Illness/Injury or Date of Surgery 06/01/22  -AG     Referring Physician Dr. Cano  -     Patient Observations alert;cooperative;agree to therapy  -AG     Patient/Family/Caregiver Comments/Observations pt. supine, talkative; agreeable to participation.  -AG     Prior Level of Function independent:;community mobility  -AG     Equipment Currently Used at Home cane, straight  uses no assistive device majority of the time, however, does use cane at time when he isn't feeling as well.  -AG     Pertinent History of Current Functional Problem pt. admitted with L hemiparesis; acute onset of L UE weakness and L sided facial numbness.  -AG     Barriers to Rehab none identified  -AG     Row Name 06/02/22 1418          Previous Level of Function/Home Environm    Additional Documentation --  previously independent with all functional mobility and self care, either using SC or no assistive device.  -AG     Row Name 06/02/22 1418          Living Environment    Current Living Arrangements home  -AG     People in Home significant other  -AG     Row Name 06/02/22 1418          Home Main Entrance    Number of Stairs, Main Entrance none  -AG     Row Name 06/02/22 1418          Pain    Pretreatment Pain Rating 0/10 - no pain  -AG     Posttreatment Pain Rating 0/10 - no pain  -AG     Row Name 06/02/22 1418          Cognition    Affect/Mental Status (Cognition) WFL  -AG     Orientation Status (Cognition) oriented x 4  -AG     Follows Commands (Cognition) WFL  -AG     Personal Safety Interventions fall prevention program maintained;gait  belt;nonskid shoes/slippers when out of bed;supervised activity  -AG     Row Name 06/02/22 1418          Range of Motion (ROM)    Range of Motion ROM is WFL;bilateral lower extremities;bilateral upper extremities  -AG     [facility] Name 06/02/22 1418          Strength (Manual Muscle Testing)    Strength (Manual Muscle Testing) strength is WFL  4+ to 5/5 B LE  -AG     Row Name 06/02/22 1418          Sensory    Hearing Status WFL  -AG     [facility] Name 06/02/22 1418          Vision Assessment/Intervention    Visual Impairment/Limitations WFL  -AG     [facility] Name 06/02/22 1418          Mobility    Extremity Weight-bearing Status right lower extremity;left lower extremity  -AG     Left Lower Extremity (Weight-bearing Status) full weight-bearing (FWB)  -AG     Right Lower Extremity (Weight-bearing Status) full weight-bearing (FWB)  -AG     [facility] Name 06/02/22 1418          Bed Mobility    Bed Mobility bed mobility (all) activities  -     All Activities, [place] (Bed Mobility) independent  -AG     Row Name 06/02/22 1418          Transfers    Transfers bed-chair transfer;chair-bed transfer;sit-stand transfer;stand-sit transfer;stand pivot/stand step transfer  -AG     Bed-Chair [place] (Transfers) independent  -AG     Chair-Bed [place] (Transfers) independent  -AG     Sit-Stand [place] (Transfers) independent  -AG     Stand-Sit [place] (Transfers) independent  -AG     [facility] Name 06/02/22 1418          Stand Pivot/Stand Step Transfer    Stand Pivot/Stand Step [place] (Transfers) independent  -AG     Row Name 06/02/22 1418          Gait/Stairs (Locomotion)    Gait/Stairs Locomotion gait/ambulation independence;gait/ambulation assistive device;distance ambulated;gait pattern;gait deviations  -AG     [place] Level (Gait) independent  -AG     Assistive Device (Gait) --  none  -AG     Distance in Feet (Gait) 250  -AG     Pattern (Gait) step-through  -AG     [facility] Name 06/02/22 1418          Balance    Balance  Assessment sitting static balance;sitting dynamic balance;sit to stand dynamic balance;standing static balance;standing dynamic balance  -AG     Static Sitting Balance independent  -AG     Dynamic Sitting Balance independent  -AG     Position, Sitting Balance unsupported;sitting edge of bed  -AG     Static Standing Balance independent  -AG     Dynamic Standing Balance independent  -AG     Position/Device Used, Standing Balance --  none  -AG     Row Name 06/02/22 1418          Coping    Observed Emotional State calm;cooperative  -AG     Verbalized Emotional State acceptance  -AG     Trust Relationship/Rapport care explained;choices provided;thoughts/feelings acknowledged  -AG     Family/Support Persons spouse  -AG     Involvement in Care not present at bedside  -AG     Row Name 06/02/22 1418          Plan of Care Review    Plan of Care Reviewed With patient  -AG     Outcome Evaluation PT evaluation complete.  Pt. demonstrates independent functional mobility and no strength deficits at this time.  No further skilled PT services warranted at this time.  -AG     Row Name 06/02/22 1418          Positioning and Restraints    Pre-Treatment Position in bed  -AG     Post Treatment Position bed  -AG     In Bed supine;call light within reach;encouraged to call for assist;side rails up x3  -AG     Row Name 06/02/22 1418          Therapy Assessment/Plan (PT)    Criteria for Skilled Interventions Met (PT) no;no problems identified which require skilled intervention  -AG     Therapy Frequency (PT) evaluation only  -AG     Row Name 06/02/22 1418          Therapy Plan Review/Discharge Plan (PT)    Therapy Plan Review (PT) evaluation/treatment results reviewed;care plan/treatment goals reviewed;risks/benefits reviewed;current/potential barriers reviewed;participants voiced agreement with care plan;participants included;patient  -AG           User Key  (r) = Recorded By, (t) = Taken By, (c) = Cosigned By    Initials Name Provider Type     Naomi Marroquin, PT Physical Therapist                Physical Therapy Education                 Title: PT OT SLP Therapies (Done)     Topic: Physical Therapy (Done)     Point: Mobility training (Done)     Learning Progress Summary           Patient Acceptance, E,D, VU,NR by  at 6/2/2022 1415                   Point: Home exercise program (Done)     Learning Progress Summary           Patient Acceptance, E,D, VU,NR by  at 6/2/2022 1415                   Point: Body mechanics (Done)     Learning Progress Summary           Patient Acceptance, E,D, VU,NR by  at 6/2/2022 1415                   Point: Precautions (Done)     Learning Progress Summary           Patient Acceptance, E,D, VU,NR by  at 6/2/2022 1415                               User Key     Initials Effective Dates Name Provider Type [facility] 06/16/21 -  Naomi Tan, PT Physical Therapist PT              PT Recommendation and Plan  Anticipated Discharge Disposition (PT): home (home with significant other)  Therapy Frequency (PT): evaluation only  Plan of Care Reviewed With: patient  Outcome Evaluation: PT evaluation complete.  Pt. demonstrates independent functional mobility and no strength deficits at this time.  No further skilled PT services warranted at this time.   Outcome Measures     [facility] Name 06/02/22 1300             Modified [place] Scale    Pre-Stroke Modified [place] Scale 0 - No Symptoms at all.  -LA      Modified [place] Scale 0 - No Symptoms at all.  -LA              Functional Assessment    Outcome Measure Options Modified [place]  -LA            User Key  (r) = Recorded By, (t) = Taken By, (c) = Cosigned By    Initials Name Provider Type    Cheyenne Chatterjee, OT Occupational Therapist                 Time Calculation:    PT Charges     [facility] Name 06/02/22 1437             Time Calculation    PT Received On 06/02/22  -            User Key  (r) = Recorded By, (t) = Taken By, (c) = Cosigned By    Initials Name Provider Type     Naomi Marroquin, PT Physical Therapist              Therapy Charges for Today     Code Description Service Date Service Provider Modifiers Qty    [id number] HC PT EVAL LOW COMPLEXITY 4 6/2/2022 Naomi Tan, PT GP 1          PT G-Codes  Outcome Measure Options: Modified [place]  Modified [place] Scale: 0 - No Symptoms at all.    Naomi Tan, PT  6/2/2022

## 2022-06-02 NOTE — PLAN OF CARE
Goal Outcome Evaluation:            Patient Alert and Oriented. PERRLA, Room air. NIH-0, passed Dysphagia Screen. Messaged JG Palacios on cofirmation of Orders for PRBC. MD Cano orders not to administer

## 2022-06-02 NOTE — THERAPY EVALUATION
"Acute Care - Speech Language Pathology   Swallow Initial Evaluation [facility]   CLINICAL DYSPHAGIA ASSESSMENT     Patient Name: Maximo Kwon  : 1955  MRN: [id number]  Today's Date: 2022             Admit Date: 2022    Maximo Kwon  is seen at bedside this AM on 3S to assess safety/efficacy of swallowing fnx, determine safest/least restrictive diet tolerance.     Mr. Kwon presented to [facility] ED 22 from his private home 2/2 new onset of left facial dysarthria, weakness. Code stroke was initiated. CT head revealed to acute changes. Tele-neurology felt that Mr. Kwon presented with \"crescenda TIA\", therefore, he is admitted for further evaluation and treatment.     He is referred to rule out dysphagia.     Social History     Socioeconomic History   • Marital status:    • Number of children: 0   Tobacco Use   • Smoking status: Former Smoker     Packs/day: 1.00     Years: 30.00     Pack years: 30.00     Types: Cigarettes     Quit date:      Years since quittin.4   • Smokeless tobacco: Never Used   Vaping Use   • Vaping Use: Never used   Substance and Sexual Activity   • Alcohol use: No   • Drug use: No   • Sexual activity: Defer     Birth control/protection: Other      Imaging:  MR Head Without Intravenous Contrast     EXAM DATE:    2022 10:07 AM     CLINICAL HISTORY:    Stroke, follow up; G45.9-Transient cerebral ischemic attack,  unspecified     TECHNIQUE:    Magnetic resonance images of the head/brain without intravenous  contrast in multiple planes.     COMPARISON:    No relevant prior studies available.     FINDINGS:    BRAIN:  Unremarkable.  No mass.  No hemorrhage.  No acute infarct.    VENTRICLES:  Unremarkable.  No ventriculomegaly.    BONES/JOINTS:  Unremarkable.    SINUSES:  Unremarkable as visualized.  No acute sinusitis.    MASTOID AIR CELLS:  Unremarkable as visualized.  No mastoid effusion.    ORBITS:  Unremarkable as visualized.     IMPRESSION:    No acute " findings in the head/brain.     This report was finalized on 6/2/2022 12:38 PM by Dr. Wilfredo Lawrence MD.  CR Chest 1 Vw     INDICATION:   Strokelike symptoms. Left-sided weakness.      COMPARISON:    7/9/2021     FINDINGS:  Portable AP view(s) of the chest.  The heart and mediastinal contours are normal. The lungs are clear. No pneumothorax or pleural effusion. Pulmonary emphysema is present.     IMPRESSION:  No acute cardiopulmonary findings. COPD.     Signer Name: Cipriano Serrato MD   Signed: 6/1/2022 10:41 PM   Workstation Name: [facility]    [facility]    Diet Orders (active) (From admission, onward)     Start     Ordered    06/02/22 0322  Diet Regular; Consistent Carbohydrate  Diet Effective Now         06/02/22 0321              He is observed on ra w/o complications.     Mr. Kwon is positioned upright and centered in bed to accept multiple po presentations of ice chips, solid cracker, puree and thin liquids via spoon, cup and straw.  He is finishing his lunch tray as well. He is able to self provide.     Facial/oral structures are symmetrical upon observation. Lingual protrusion reveals no deviation. Oral mucosa are moist, pink, and clean. Secretions are clear, thin, and well controlled. OROM/JACQUELYN is wfl to imitate oral postures. Gag is not assessed. Volitional cough is intact w/ adequate  intensity, clear in quality, non-productive. Voice is adequate in intensity, clear in quality w/ intelligible speech. He denies any odynophagia or dysphagia.     Upon po presentations, adequate bolus anticipation and acceptance w/ good labial seal for bolus clearance via spoon bowl, cup rim stability and suction via straw. Bolus formation, manipulation and control are wfl w/ rotary mastication pattern. A-p transit is timely w/o oral residue. No overt s/s aspiration before the swallow.     Pharyngeal swallow is timely w/ adequate hyolaryngeal elevation per palpation. No overt s/s aspiration evidenced  across this evaluation. No silent aspiration suspected as pt is w/o changes in vocal quality, respirations or secretions post po presentations. He denies odynophagia.    Visit Dx:     ICD-10-CM ICD-9-CM   1. TIA (transient ischemic attack)  G45.9 435.9     Patient Active Problem List   Diagnosis   • Mixed hyperlipidemia   • Simple chronic bronchitis (HCC)   • Multiple pulmonary nodules   • Essential hypertension   • Left ventricular hypertrophy with grade 1 diastolic dysfunction   • Hereditary and idiopathic peripheral neuropathy   • COPD, severe ([facility])   • Degenerative disc disease, lumbar   • Arthritis   • Diabetes mellitus (HCC)   • Neuropathy   • Claudication of both lower extremities (HCC)   • LAZCANO (dyspnea on exertion)   • GERD (gastroesophageal reflux disease)   • Chronic respiratory failure with hypoxia (HCC)   • Chronic diastolic heart failure (HCC)   • Left-sided weakness     Past Medical History:   Diagnosis Date   • Arthritis    • Chronic diastolic heart failure (HCC) 08/19/2021   • COPD (chronic obstructive pulmonary disease) ([facility])    • Depression    • Diverticulitis    • Emphysema (subcutaneous) (surgical) resulting from a procedure    • GERD (gastroesophageal reflux disease)    • Neuropathy    • Pneumothorax     Left, status post chest tube   • Shortness of breath    • Spinal stenosis    • Type 2 diabetes mellitus (HCC)      Past Surgical History:   Procedure Laterality Date   • BRONCHOSCOPY N/A 5/14/2019    Procedure: Bronchoscopy with Transbronchial Biopsy with Fluoroscopy;  Surgeon: Eladio Bethea MD;  Location: [facility];  Service: Pulmonary   • CHEST TUBE INSERTION Left     Pneumothorax   • COLON RESECTION  2016    had colostomy and reveresed back    • COLONOSCOPY  2016   • COLOSTOMY CLOSURE     • LUNG BIOPSY Right 2014    (non cancerous)   • OTHER SURGICAL HISTORY Left     LEFT ELBOW SURGERY     EDUCATION  The patient has been educated in the following areas:   Dysphagia (Swallowing Impairment)  Oral Care/Hydration.     Impression: Mr. Kwon presents w/ wfl oropharyngeal swallow w/o s/s aspiration.No s/s indicative of silent aspiration.  No odynophagia reported.      He is felt to most benefit from continued least restrictive regular consistency po diet, thin liquids. Medications whole in puree/thins. No further formal SLP follow up recommended.     SLP Recommendation and Plan    1. Continue least restrictive regular consistency po diet, thin liquids.    2. Medications whole in puree/thins.   3. Upright and centered for all po intake  4. FELICIA precautions.  5. Oral care protocol.  No further formal SLP f/u warranted at this time.    D/w Mr. Kwon results and recommendations w/ verbal agreement.                         Anticipated Discharge Disposition (SLP): home (06/02/22 3436)     Thank you for allowing me to participate in the care of your patient-  Meryl Vogt M.S., CCC/SLP                                                                     Time Calculation:       Therapy Charges for Today     Code Description Service Date Service Provider Modifiers Qty    13807983687 HC ST EVAL ORAL PHARYNG SWALLOW 3 6/2/2022 Meryl Vogt, MS CCC-SLP GN 1    [id number] HC ST EVAL SPEECH AND PROD W LANG  3 6/2/2022 Meryl Vogt, MS CCC-SLP GN 1               Meryl Vogt, MS CCC-SLP  6/2/2022

## 2022-06-02 NOTE — ACP (ADVANCE CARE PLANNING)
assisted pt in understanding Living Will document.  Left living will form with pt for further consideration per pt's request.

## 2022-06-02 NOTE — PLAN OF CARE
Goal Outcome Evaluation:               Patient has been resting this shift. Patient NIH score was 0 this shift. Patient BP has been elevated this shift, Ml, DO aware. Patient shows no s/s of acute distress. Will continue with plan of care.

## 2022-06-02 NOTE — H&P
[facility]    Patient Identification:  Name:  Maximo Kwon  Age:  67 y.o.  Sex:  male  :  1955  MRN:  [id number]   Visit Number:  [id number]  Admit Date: 2022   Room number:  3314/2S  Primary Care Physician:  Naomi Cárdenas APRN    Date of Admission: 2022     Subjective     Chief complaint:    Chief Complaint   Patient presents with   • Numbness     History of presenting illness:  67 y.o. male who was admitted on 2022 from the ED with left-sided weakness.  The patient has a past medical history of type 2 diabetes mellitus, COPD with emphysema, heart failure with preserved ejection fraction, and a 30-pack-year smoking history (quit ).  At 8:55 PM on 2022, the patient had sudden onset of left-sided arm and leg numbness with weakness.  He also had a left facial droop and some slurred speech.  The patient states that he has experienced the symptoms intermittently for the last 6 weeks.  Sometimes, he will go days without having the symptoms.  Over the last 6 weeks, these episodes only lasted about 5 minutes.  However, the episode that brought him to the emergency department lasted closer to 30 minutes.  The patient denies having a prior heart attack or stroke.  He has not experienced any chest pain, palpitations, nausea, vomiting, or diarrhea with all of this.  In the emergency department, a code stroke was called when the patient arrived.  Tele-neurology evaluated the patient and determined based on imaging that no acute stroke was present.  However, we are concerned that the patient has an increased risk of stroke as he appears to have had several TIAs in the last 6 weeks and thus the patient is being placed in observation on the telemetry floor per neurology recommendations.  ---------------------------------------------------------------------------------------------------------------------   Review of Systems   Constitutional: Negative for  chills, fatigue and fever.   HENT: Positive for rhinorrhea. Negative for congestion.    Eyes: Positive for discharge. Negative for redness.   Respiratory: Negative for cough and shortness of breath.    Cardiovascular: Negative for chest pain and leg swelling.   Gastrointestinal: Negative for diarrhea, nausea and vomiting.   Genitourinary: Negative for dysuria and hematuria.   Musculoskeletal: Negative for arthralgias and myalgias.   Skin: Negative for rash and wound.   Neurological: Positive for facial asymmetry, speech difficulty, weakness (Left sided weakness) and numbness.   Psychiatric/Behavioral: Negative for agitation, behavioral problems and confusion.     ---------------------------------------------------------------------------------------------------------------------   Past Medical History:   Diagnosis Date   • Arthritis    • Chronic diastolic heart failure (HCC) 08/19/2021   • COPD (chronic obstructive pulmonary disease) ([facility])    • Depression    • Diabetes mellitus (HCC)    • Diverticulitis    • Emphysema (subcutaneous) (surgical) resulting from a procedure    • GERD (gastroesophageal reflux disease)    • Neuropathy    • Pneumothorax     Left, status post chest tube   • Shortness of breath    • Spinal stenosis      Past Surgical History:   Procedure Laterality Date   • BRONCHOSCOPY N/A 5/14/2019    Procedure: Bronchoscopy with Transbronchial Biopsy with Fluoroscopy;  Surgeon: Eladio Bethea MD;  Location: [facility];  Service: Pulmonary   • CHEST TUBE INSERTION Left     Pneumothorax   • COLON RESECTION  2016    had colostomy and reveresed back    • COLONOSCOPY  2016   • COLOSTOMY CLOSURE     • LUNG BIOPSY Right 2014    (non cancerous)   • OTHER SURGICAL HISTORY Left     LEFT ELBOW SURGERY     Family History   Problem Relation Age of Onset   • Alzheimer's disease Mother    • Cancer Father         THROAT CANCER   • Diabetes Sister    • Diabetes Brother      Social History     Socioeconomic History   •  Marital status:    • Number of children: 0   Tobacco Use   • Smoking status: Former Smoker     Packs/day: 1.00     Years: 30.00     Pack years: 30.00     Types: Cigarettes     Quit date:      Years since quittin.4   • Smokeless tobacco: Never Used   Vaping Use   • Vaping Use: Never used   Substance and Sexual Activity   • Alcohol use: No   • Drug use: No   • Sexual activity: Defer     Birth control/protection: Other     ---------------------------------------------------------------------------------------------------------------------   Allergies:  Patient has no known allergies.  ---------------------------------------------------------------------------------------------------------------------   Medications below are reported home medications pulling from within the system; at this time, these medications have not been reconciled unless otherwise specified and are in the verification process for further verifcation as current home medications.      Prior to Admission Medications     Prescriptions Last Dose Informant Patient Reported? Taking?    albuterol sulfate  (90 Base) MCG/ACT inhaler   Yes Yes    Inhale 2 puffs Every 4 (Four) Hours As Needed.    Budeson-Glycopyrrol-Formoterol (Breztri Aerosphere) 160-9-4.8 MCG/ACT aerosol inhaler   No Yes    Inhale 2 puffs 2 (Two) Times a Day.    glucose blood test strip   No Yes    Use as instructed to check sugar once daily. Dx code: E11.65    glyburide (DIAbeta) 5 MG tablet   No Yes    Take 1 tablet by mouth Daily With Breakfast.    hydroCHLOROthiazide (HYDRODIURIL) 25 MG tablet   No Yes    Take 1 tablet by mouth Daily. PRN SBP >140    ipratropium-albuterol (DUO-NEB) 0.5-2.5 mg/3 ml nebulizer   No Yes    Take 3 mL by nebulization 4 (Four) Times a Day As Needed for Wheezing or Shortness of Air.    Lancets (freestyle) lancets   No Yes    Use to check glucose once daily    levETIRAcetam (KEPPRA) 750 MG tablet   No Yes    Take 0.5 tablets by mouth 2  (Two) Times a Day. Prior to [facility] Admission, Patient was on: 1/2 to 1 tablet twice a day    lisinopril (PRINIVIL,ZESTRIL) 30 MG tablet   No Yes    Take 1 tablet by mouth Daily.    metFORMIN (Glucophage) 1000 MG tablet   No Yes    Take 1 tablet by mouth 2 (Two) Times a Day With Meals.    omeprazole (priLOSEC) 20 MG capsule   No Yes    Take 1 capsule by mouth Daily.    vitamin D (ERGOCALCIFEROL) 1.25 MG (42213 UT) capsule capsule   No Yes    Take 1 capsule by mouth 1 (One) Time Per Week.    carvedilol (COREG) 6.25 MG tablet   No No    Take 1 tablet by mouth 2 (Two) Times a Day.    roflumilast (Daliresp) 500 MCG tablet tablet   No No    Take 1 tablet by mouth Daily.        Objective     Vital Signs:  Temp:  [97.8 °F (36.6 °C)] 97.8 °F (36.6 °C)  Heart Rate:  [76-93] 76  Resp:  [18] 18  BP: ()/(46-97) 178/97    Mean Arterial Pressure (Non-Invasive) for the past 24 hrs (Last 3 readings):   Noninvasive MAP (mmHg)   06/02/22 0131 128   06/01/22 2303 97   06/01/22 2218 115     SpO2:  [97 %-98 %] 97 %  on   ;   Device (Oxygen Therapy): room air  Body mass index is 28.5 kg/m².    Wt Readings from Last 3 Encounters:   06/01/22 98 kg (216 lb)   04/19/22 98 kg (216 lb)   04/18/22 97.8 kg (215 lb 9.6 oz)      ----------------------------------------------------------------------------------------------------------------------  Physical Exam  Vitals reviewed.   HENT:      Head: Normocephalic and atraumatic.      Right Ear: External ear normal.      Left Ear: External ear normal.   Eyes:      General: No scleral icterus.        Right eye: No discharge.         Left eye: No discharge.      Pupils: Pupils are equal, round, and reactive to light.   Cardiovascular:      Rate and Rhythm: Normal rate and regular rhythm.      Pulses: Normal pulses.      Heart sounds: No murmur heard.  Pulmonary:      Effort: Pulmonary effort is normal. No respiratory distress.      Breath sounds: Normal breath sounds. No wheezing or rales.    Abdominal:      General: Abdomen is flat. Bowel sounds are normal. There is no distension.      Palpations: Abdomen is soft.   Musculoskeletal:         General: No swelling or signs of injury.   Skin:     Capillary Refill: Capillary refill takes less than 2 seconds.      Coloration: Skin is not jaundiced or pale.   Neurological:      Mental Status: He is alert and oriented to person, place, and time.      Comments: The patient can follow all commands.  He does not have any appreciable weakness on his bilateral arms or his right leg.  However, the patient has a history of polyneuropathy and his left foot/leg is weak, measuring about 4 out of 5.   Psychiatric:         Mood and Affect: Mood normal.         Behavior: Behavior normal.       ---------------------------------------------------------------------------------------------------------------------  EKG: Normal sinus rhythm with heart rate 83 and a QTC of 460 ms.  There were no ischemic changes.  When compared to an EKG dated 8/17/2021, both EKGs appear to be similar.  Please note that I have personally looked at the EKG from this admission, the comparison EKGs in the medical records, and the above is my interpretation of this admission's EKG; I await the final cardiology read.      Telemetry:  NS with heart rates in the 60-80's.  Please note that I personally looked at the telemetry strips.      Last echocardiogram:  Results for orders placed in visit on 02/28/22    Adult Transthoracic Echo Complete W/ Cont if Necessary Per Protocol    Interpretation Summary  · Left ventricular wall thickness is consistent with mild to moderate concentric hypertrophy.  · Left ventricular ejection fraction appears to be 66 - 70%. Left ventricular systolic function is normal.  · Left ventricular diastolic function is consistent with (grade I) impaired relaxation.  · Hypertrophic cardiomyopathy with no significant LV outflow tract obstruction. Mean pressure gradient is 5 mmHg.  ·  Aortic valve is normal there is no evidence of aortic stenosis or aortic regurgitation.  · Mild calcification of tip of posterior mitral leaflet is noted there is no mitral stenosis or mitral regurgitation detected.  · Tricuspid valve echo is normal there is no tricuspid stenosis or regurgitation noted  · No pulmonic stenosis or regurgitation detected  · No pericardial effusion noted.    I have personally read the ECHO final report.  --------------------------------------------------------------------------------------------------------------------  LABS:    CBC and coagulation:  Results from last 7 days   Lab Units 06/01/22 2130   WBC 10*3/mm3 5.01   HEMOGLOBIN g/dL 11.6*   HEMATOCRIT % 35.5*   MCV fL 91.0   MCHC g/dL 32.7   PLATELETS 10*3/mm3 193   INR  1.07     Acid/base balance:      Renal and electrolytes:  Results from last 7 days   Lab Units 06/01/22 2130 06/01/22 2127   SODIUM mmol/L 135*  --    POTASSIUM mmol/L 4.1  --    CHLORIDE mmol/L 99  --    CO2 mmol/L 25.2  --    BUN mg/dL 14  --    CREATININE mg/dL 1.00 1.00   CALCIUM mg/dL 8.5*  --    GLUCOSE mg/dL 278*  --      Estimated Creatinine Clearance: 88.3 mL/min (by C-G formula based on SCr of 1 mg/dL).    Liver and pancreatic function:  Results from last 7 days   Lab Units 06/01/22  2130   ALBUMIN g/dL 3.85   BILIRUBIN mg/dL 0.4   ALK PHOS U/L 99   AST (SGOT) U/L 12   ALT (SGPT) U/L 17     Endocrine function:  Lab Results   Component Value Date    HGBA1C 6.80 (H) 02/28/2022     Point of care bedside glucose levels:  Results from last 7 days   Lab Units 06/01/22 2121   GLUCOSE mg/dL 276*     Lab Results   Component Value Date    TSH 0.709 03/09/2022    FREET4 1.27 07/09/2021     Cardiac:  Results from last 7 days   Lab Units 06/01/22  2130   TROPONIN T ng/mL 0.013       Cultures:  Microbiology Results (last 10 days)     Procedure Component Value - Date/Time    COVID PRE-OP / PRE-PROCEDURE SCREENING ORDER (NO ISOLATION) - Swab, Nasopharynx [[id number]]   (Normal) Collected: 06/01/22 2252    Lab Status: Final result Specimen: Swab from Nasopharynx Updated: 06/01/22 2330    Narrative:      The following orders were created for panel order COVID PRE-OP / PRE-PROCEDURE SCREENING ORDER (NO ISOLATION) - Swab, Nasopharynx.  Procedure                               Abnormality         Status                     ---------                               -----------         ------                     COVID-19 and FLU A/B PCR...[[id number]]  Normal              Final result                 Please view results for these tests on the individual orders.    COVID-19 and FLU A/B PCR - Swab, Nasopharynx [[id number]]  (Normal) Collected: 06/01/22 2252    Lab Status: Final result Specimen: Swab from Nasopharynx Updated: 06/01/22 2330     COVID19 Not Detected     Influenza A PCR Not Detected     Influenza B PCR Not Detected    Narrative:      Fact sheet for providers: https://www.fda.gov/media/150084/download    Fact sheet for patients: https://www.fda.gov/media/230764/download    Test performed by PCR.          I have personally looked at the labs and they are summarized above.  ----------------------------------------------------------------------------------------------------------------------  Detailed radiology reports for the last 24 hours:    Imaging Results (Last 24 Hours)     Procedure Component Value Units Date/Time    XR Chest 1 View [[id number]] Collected: 06/01/22 2241     Updated: 06/01/22 2243    Narrative:      CR Chest 1 Vw    INDICATION:   Strokelike symptoms. Left-sided weakness.     COMPARISON:    7/9/2021    FINDINGS:  Portable AP view(s) of the chest.  The heart and mediastinal contours are normal. The lungs are clear. No pneumothorax or pleural effusion. Pulmonary emphysema is present.      Impression:      No acute cardiopulmonary findings. COPD.    Signer Name: Cipriano Serrato MD   Signed: 6/1/2022 10:41 PM   Workstation Name: [email or web address]    [facility]    CT Angiogram Head [[id number]] Collected: 06/01/22 2219     Updated: 06/01/22 2221    Narrative:      INDICATION:   Left-sided weakness. Today Code stroke.    TECHNIQUE:   CT angiogram of the head and neck with IV contrast. Dose recorded patient chart. 3-D reformatted images were acquired and reviewed. Evaluation for a significant carotid arterial stenosis is based on the NASCET criteria. Radiation dose reduction  techniques included automated exposure control or exposure modulation based on body size. Radiation audit for number of CT and nuclear cardiology exams performed in the last year:  0. Viz AI utilized for LVO. This is a limited study performed with rapid  reconstructions for stroke evaluation.    COMPARISON:   Earlier head CT    FINDINGS:   CTA neck:  The examination excludes the aortic arch and proximal great vessels including the origins.    There is partly calcified plaque at the right carotid bifurcation. By NASCET criteria, there is about 50% diameter stenosis. There is partially calcified plaque involving the right common carotid artery also with up to 50% long segment stenosis.. The  cervical right internal carotid artery is somewhat tortuous in the neck. There is partially calcified plaque at the right carotid siphon with at least moderate stenosis.    There is apparently noncalcified plaque over long segment in the left common carotid artery with up to about 50% diameter stenosis. It is possible that this represents a thrombosed dissection. Consider correlation with an MR angiogram of the neck vessels  using a dissection protocol for further characterization to evaluate for possible blood products within the wall.. At the bifurcation there is partially calcified plaque with about 50% diameter stenosis by NASCET criteria. There is partly calcified  plaque at the siphon with at least moderate stenosis.    Both vertebral arteries are patent with supply to the basilar. The left is mildly  dominant.        CTA head:  There is high-grade irregular stenosis over a long segment of the left P1 vessel probably due to intracranial atherosclerotic disease. There is otherwise no focal central stenosis or intracranial vascular cut off suspected. There is a large  left posterior communicating artery and a moderate sized right posterior communicating artery. There is a small anterior communicating artery. Study is not performed to evaluate for intracranial aneurysm since rapid limited imaging obtained for stroke  assessment. Dural venous sinuses are grossly patent.    There is mucosal disease in the paranasal sinuses. There are degenerative changes in the cervical spine. There are emphysematous changes in the lungs with apical parenchymal scarring.      Impression:        1. Study excludes the aortic arch and proximal great vessels.  2. There is about 50% diameter stenosis at the bilateral carotid bifurcations using NASA criteria due to partially calcified plaque.  3. There is disease involving both common carotid arteries also with about 50% diameter stenosis. On the left side this appears to be due to noncalcified plaque though its possible that this is due to a thrombosed long segment dissection. This is best  further evaluated with an MR angiogram of the neck using a dissection protocol to evaluate for possible blood products in the wall of the vessel. This should include precontrast axial T1-weighted imaging through the neck vessels with fat saturation.  4. Both vertebral arteries are patent with supply the basilar.  5. Disease involving both carotid siphons.  6. Irregular high-grade stenosis of the left side P1 vessel likely secondary to intracranial atherosclerotic disease. There is a large left posterior communicating artery present and a moderate sized right posterior communicating artery present. No  central intracranial vascular cut off suspected.    Signer Name: Deloris Lisa MD   Signed: 6/1/2022  10:19 PM   Workstation [facility]: [email or web address]    [facility]    CT Angiogram Carotids [[id number]] Collected: 06/01/22 2219     Updated: 06/01/22 2221    Narrative:      INDICATION:   Left-sided weakness. Today Code stroke.    TECHNIQUE:   CT angiogram of the head and neck with IV contrast. Dose recorded patient chart. 3-D reformatted images were acquired and reviewed. Evaluation for a significant carotid arterial stenosis is based on the NASCET criteria. Radiation dose reduction  techniques included automated exposure control or exposure modulation based on body size. Radiation audit for number of CT and nuclear cardiology exams performed in the last year:  0. Viz AI utilized for LVO. This is a limited study performed with rapid  reconstructions for stroke evaluation.    COMPARISON:   Earlier head CT    FINDINGS:   CTA neck:  The examination excludes the aortic arch and proximal great vessels including the origins.    There is partly calcified plaque at the right carotid bifurcation. By NASCET criteria, there is about 50% diameter stenosis. There is partially calcified plaque involving the right common carotid artery also with up to 50% long segment stenosis.. The  cervical right internal carotid artery is somewhat tortuous in the neck. There is partially calcified plaque at the right carotid siphon with at least moderate stenosis.    There is apparently noncalcified plaque over long segment in the left common carotid artery with up to about 50% diameter stenosis. It is possible that this represents a thrombosed dissection. Consider correlation with an MR angiogram of the neck vessels  using a dissection protocol for further characterization to evaluate for possible blood products within the wall.. At the bifurcation there is partially calcified plaque with about 50% diameter stenosis by NASCET criteria. There is partly calcified  plaque at the siphon with at least moderate stenosis.    Both  vertebral arteries are patent with supply to the basilar. The left is mildly dominant.        CTA head:  There is high-grade irregular stenosis over a long segment of the left P1 vessel probably due to intracranial atherosclerotic disease. There is otherwise no focal central stenosis or intracranial vascular cut off suspected. There is a large  left posterior communicating artery and a moderate sized right posterior communicating artery. There is a small anterior communicating artery. Study is not performed to evaluate for intracranial aneurysm since rapid limited imaging obtained for stroke  assessment. Dural venous sinuses are grossly patent.    There is mucosal disease in the paranasal sinuses. There are degenerative changes in the cervical spine. There are emphysematous changes in the lungs with apical parenchymal scarring.      Impression:        1. Study excludes the aortic arch and proximal great vessels.  2. There is about 50% diameter stenosis at the bilateral carotid bifurcations using NASA criteria due to partially calcified plaque.  3. There is disease involving both common carotid arteries also with about 50% diameter stenosis. On the left side this appears to be due to noncalcified plaque though its possible that this is due to a thrombosed long segment dissection. This is best  further evaluated with an MR angiogram of the neck using a dissection protocol to evaluate for possible blood products in the wall of the vessel. This should include precontrast axial T1-weighted imaging through the neck vessels with fat saturation.  4. Both vertebral arteries are patent with supply the basilar.  5. Disease involving both carotid siphons.  6. Irregular high-grade stenosis of the left side P1 vessel likely secondary to intracranial atherosclerotic disease. There is a large left posterior communicating artery present and a moderate sized right posterior communicating artery present. No  central intracranial  vascular cut off suspected.    Signer Name: Deloris Lisa MD   Signed: 6/1/2022 10:19 PM   Workstation Name: [facility]    CT CEREBRAL PERFUSION WITH & WITHOUT CONTRAST [[id number]] Collected: 06/01/22 2206     Updated: 06/01/22 2209    Narrative:      CT CEREBRAL PERFUSION W WO CONTRAST    HISTORY:   Stroke evaluation. Left-sided weakness today.    TECHNIQUE:   Axial CT images of the brain without and with intravenous contrast using cerebral perfusion protocol. Post-processing parametric maps were created using RAPID software and reviewed. Radiation dose reduction techniques included automated exposure control  or exposure modulation based on body size. CT and nuclear cardiology exams in the last 12 months: 3. Contrast dose recorded patient chart.    COMPARISON:   Earlier noncontrast head CT.    FINDINGS:   Arterial input function is optimal.     Perfusion maps are fairly symmetric. No convincing evidence for core infarct or ischemic penumbra.      Impression:      Essentially normal brain perfusion CT.          Signer Name: Deloris Lisa MD   Signed: 6/1/2022 10:06 PM   Workstation Name: [facility]    CT Head Without Contrast Stroke Protocol [[id number]] Collected: 06/01/22 2132     Updated: 06/01/22 2134    Narrative:      CT Head WO Code Stroke    HISTORY:   Left-sided weakness today    TECHNIQUE:   Axial unenhanced head CT. Radiation dose reduction techniques included automated exposure control or exposure modulation based on body size. Count of known CT and cardiac nuc med studies performed in previous 12 months: 0.     Time of scan: 9:29 PM    COMPARISON:   None.    FINDINGS:   No intracranial hemorrhage, mass, or infarct. No hydrocephalus or extra-axial fluid collection. Brain parenchymal density is normal. The skull base, calvarium, and extracranial soft tissues are normal.      Impression:      Normal, negative unenhanced head  CT.          Signer Name: Chad Seaman MD   Signed: 6/1/2022 9:32 PM   Workstation Name: [email or web address]    [facility]        Final impressions for the last 30 days of radiology reports:    XR Spine Lumbar Complete 4+VW  Result Date: 5/9/2022    Stable exam demonstrating advanced to level degenerative changes with spondylolisthesis.  This report was finalized on 5/9/2022 2:17 PM by Dr. Isaac Salas MD.              I have personally looked at the radiology images and I have read the available final reports.    Assessment & Plan       -Suspected crescendo TIA that was present on admission causing left-sided body weakness as well as dysarthria and a left facial droop  -Irregular high-grade stenosis of the left side P1 vessel likely secondary to intracranial atherosclerotic disease  -History of bilateral intracranial and extracranial carotid artery stenoses  -History of essential hypertension  -History of hyperlipidemia  -History of COPD and emphysema, currently without an acute exacerbation  -History of heart failure with preserved ejection fraction, currently without an acute exacerbation  -History of right sided pulmonary nodules, stable per CT scan of the chest dated 1/5/2022    Placed in observation on the telemetry floor.  We will perform neurological exams every 4 hours.  We will follow the recommendations of tele-neurology and give the patient aspirin, 21 days of Plavix (with possible 3-month course depending on MRA results), atorvastatin, EEG, echocardiogram with bubble study, and a 28-day Holter monitor at discharge.  The patient has a history of essential hypertension; however, blood pressures were 93/46 when he first arrived in the emergency department.  Therefore, after his medicine reconciliation is available, I will plan to hold all of his antihypertensives.  I will not treat his blood pressures unless they are consistently a systolic over 220.  I will wait PT, OT, and speech  evaluations of this patient.    VTE Prophylaxis:   Mechanical Order History:      Ordered        Signed and Held  Place Sequential Compression Device  Once            Signed and Held  Maintain Sequential Compression Device  Continuous                    Pharmalogical Order History:     None        The patient is high risk due to the following diagnoses/reasons: Suspected TIA    Disposition: Anticipate home    Joao Cano MD  [facility]  06/02/22  01:46 EDT

## 2022-06-02 NOTE — CASE MANAGEMENT/SOCIAL WORK
Discharge Planning Assessment   [facility]     Patient Name: Maximo Kwon  MRN: [id number]  Today's Date: 6/2/2022    Admit Date: 6/1/2022     Discharge Needs Assessment     Row Name 06/02/22 1620       Living Environment    People in Home significant other    Name(s) of People in Home Lives at home with significant other    Current Living Arrangements home    Primary Care Provided by other (see comments);self  Significant other    Family Caregiver if Needed significant other    Quality of Family Relationships helpful;involved;supportive       Resource/Environmental Concerns    Resource/Environmental Concerns none       Transition Planning    Patient/Family Anticipates Transition to home with family       Discharge Needs Assessment    Equipment Currently Used at Home none               Discharge Plan     Row Name 06/02/22 1622       Plan    Plan Pt admitted on 6/1/22.  [place] [facility] [place]  SS spoke with pt at bedside on this date.  Pt lives at home with Significant other and plans to return home at discharge.  Pt currently does not utilize home health or DME.  Pt's PCP is Naomi Cárdenas.  Pt utilizes [facility] and [facility].  SS will follow.              Continued Care and Services - Admitted Since 6/1/2022    Coordination has not been started for this encounter.       Expected Discharge Date and Time     Expected Discharge Date Expected Discharge Time    Jun 4, 2022          Demographic Summary     Row Name 06/02/22 1619       General Information    Admission Type observation    Referral Source nursing    Preferred Language English                      ANTONIO GamboaW

## 2022-06-02 NOTE — PROGRESS NOTES
Stroke Progress Note       Chief Complaint: Left-sided weakness/numbness    Subjective    Subjective     Subjective:  No acute events overnight.  Patient reports he is at his baseline.  He is interested in simplifying his medication regimen.    Review of Systems   Constitutional: Negative.    HENT: Negative.    Eyes: Negative.    Respiratory: Negative.    Cardiovascular: Negative.    Gastrointestinal: Negative.    Musculoskeletal: Negative.    Skin: Negative.    Neurological: Negative.    Psychiatric/Behavioral: Negative.             Objective    Objective      Temp:  [97.4 °F (36.3 °C)-98.5 °F (36.9 °C)] 98.5 °F (36.9 °C)  Heart Rate:  [60-93] 85  Resp:  [16-18] 18  BP: ()/() 179/106        Neurological Exam  Mental Status  Awake and alert. Oriented to person, place, time and situation. Recent and remote memory are intact. Speech is normal. Language is fluent with no aphasia. Attention and concentration are normal. Fund of knowledge is appropriate for level of education.    Cranial Nerves  CN II: Visual acuity is normal. Visual fields full to confrontation.  CN III, IV, VI: Extraocular movements intact bilaterally. Normal lids and orbits bilaterally. Pupils equal round and reactive to light bilaterally.  CN V: Facial sensation is normal.  CN VII: Full and symmetric facial movement.  CN IX, X: Palate elevates symmetrically  CN XI: Shoulder shrug strength is normal.  CN XII: Tongue midline without atrophy or fasciculations.    Motor  Normal muscle bulk throughout. Normal muscle tone. No abnormal involuntary movements. Strength is 5/5 throughout all four extremities.    Sensory  Light touch is normal in upper and lower extremities.     Coordination    No obvious dysmetria.    Gait    Not assessed.      Physical Exam  Vitals and nursing note reviewed.   Constitutional:       General: He is awake. He is not in acute distress.     Appearance: Normal appearance.   HENT:      Head: Normocephalic and atraumatic.       Mouth/Throat:      Mouth: Mucous membranes are moist.      Pharynx: Oropharynx is clear.   Eyes:      General: Lids are normal.      Extraocular Movements: Extraocular movements intact.      Pupils: Pupils are equal, round, and reactive to light.   Cardiovascular:      Rate and Rhythm: Normal rate and regular rhythm.   Pulmonary:      Effort: Pulmonary effort is normal. No respiratory distress.   Musculoskeletal:         General: Normal range of motion.   Skin:     General: Skin is warm and dry.   Neurological:      General: No focal deficit present.      Mental Status: He is alert. Mental status is at baseline.      Deep Tendon Reflexes: Strength normal.   Psychiatric:         Mood and Affect: Mood normal.         Speech: Speech normal.         Behavior: Behavior normal.         Hospital Meds:  Scheduled- aspirin, 81 mg, Oral, Daily   Or  aspirin, 300 mg, Rectal, Daily  atorvastatin, 80 mg, Oral, Nightly  budesonide-formoterol, 2 puff, Inhalation, BID - RT  Insulin Aspart, 0-7 Units, Subcutaneous, TID AC  levETIRAcetam, 750 mg, Oral, BID  pantoprazole, 40 mg, Oral, QAM  sodium chloride, 10 mL, Intravenous, Q12H      Infusions-     PRNs- •  albuterol  •  dextrose  •  dextrose  •  glucagon (human recombinant)  •  ipratropium-albuterol  •  sodium chloride  •  sodium chloride  •  tiZANidine    Results Review:    I reviewed the patient's new clinical results.    Imaging Results (Last 24 Hours)     Procedure Component Value Units Date/Time    MRI Brain Without Contrast [[id number]] Collected: 06/02/22 1238     Updated: 06/02/22 1240    Narrative:      EXAM:    MR Head Without Intravenous Contrast     EXAM DATE:    6/2/2022 10:07 AM     CLINICAL HISTORY:    Stroke, follow up; G45.9-Transient cerebral ischemic attack,  unspecified     TECHNIQUE:    Magnetic resonance images of the head/brain without intravenous  contrast in multiple planes.     COMPARISON:    No relevant prior studies available.     FINDINGS:    BRAIN:   Unremarkable.  No mass.  No hemorrhage.  No acute infarct.    VENTRICLES:  Unremarkable.  No ventriculomegaly.    BONES/JOINTS:  Unremarkable.    SINUSES:  Unremarkable as visualized.  No acute sinusitis.    MASTOID AIR CELLS:  Unremarkable as visualized.  No mastoid effusion.    ORBITS:  Unremarkable as visualized.       Impression:        No acute findings in the head/brain.     This report was finalized on 6/2/2022 12:38 PM by Dr. Wilfredo Lawrence MD.       MRI Angiogram Head With & Without Contrast [[id number]] Collected: 06/02/22 1216     Updated: 06/02/22 1219    Narrative:      EXAM:    MR Angiography Head Without and With Intravenous Contrast     EXAM DATE:    6/2/2022 10:08 AM     CLINICAL HISTORY:    Stroke, follow up; G45.9-Transient cerebral ischemic attack,  unspecified     TECHNIQUE:    Magnetic resonance angiography images of the head without and with  intravenous contrast.     COMPARISON:    No relevant prior studies available.     FINDINGS:    RIGHT INTERNAL CAROTID ARTERY:  No acute findings.  Intracranial  segment is patent with no significant stenosis.  No aneurysm.    RIGHT ANTERIOR CEREBRAL ARTERY:  Unremarkable.  No occlusion or  significant stenosis.  No aneurysm.    RIGHT MIDDLE CEREBRAL ARTERY:  Unremarkable.  No occlusion or  significant stenosis.  No aneurysm.    RIGHT POSTERIOR CEREBRAL ARTERY:  Unremarkable.  No occlusion or  significant stenosis.  No aneurysm.    RIGHT VERTEBRAL ARTERY:  Unremarkable as visualized.       LEFT INTERNAL CAROTID ARTERY:  No acute findings.  Intracranial  segment is patent with no significant stenosis.  No aneurysm.    LEFT ANTERIOR CEREBRAL ARTERY:  Unremarkable.  No occlusion or  significant stenosis.  No aneurysm.    LEFT MIDDLE CEREBRAL ARTERY:  Unremarkable.  No occlusion or  significant stenosis.  No aneurysm.    LEFT POSTERIOR CEREBRAL ARTERY:  Unremarkable.  No occlusion or  significant stenosis.  No aneurysm.    LEFT VERTEBRAL ARTERY:  Unremarkable  as visualized.       BASILAR ARTERY:  Unremarkable.  No occlusion or significant stenosis.   No aneurysm.       Impression:      Unremarkable head/brain MRA.     This report was finalized on 6/2/2022 12:16 PM by Dr. Wilfredo Lawrence MD.       MRI Angiogram Neck With & Without Contrast [[id number]] Collected: 06/02/22 1216     Updated: 06/02/22 1218    Narrative:      EXAM:    MR Angiography Neck Without and With Intravenous Contrast     EXAM DATE:    6/2/2022 10:09 AM     CLINICAL HISTORY:    Stroke, follow up; G45.9-Transient cerebral ischemic attack,  unspecified     TECHNIQUE:    Magnetic resonance angiography images of the neck without and with  intravenous contrast.     COMPARISON:    No relevant prior studies available.     FINDINGS:    RIGHT COMMON CAROTID ARTERY:  Unremarkable.  No slow flow or  occlusion.    RIGHT INTERNAL CAROTID ARTERY:  Unremarkable.  No slow flow or  occlusion.    RIGHT EXTERNAL CAROTID ARTERY:  Unremarkable.  No occlusion.    RIGHT VERTEBRAL ARTERY:  Unremarkable.  No slow flow or occlusion.  Normal directional flow.       LEFT COMMON CAROTID ARTERY:  Unremarkable.  No slow flow or occlusion.    LEFT INTERNAL CAROTID ARTERY:  Unremarkable.  No slow flow or  occlusion.    LEFT EXTERNAL CAROTID ARTERY:  Unremarkable.  No occlusion.    LEFT VERTEBRAL ARTERY:  Unremarkable.  No slow flow or occlusion.  Normal directional flow.       SOFT TISSUES:  Unremarkable as visualized.      CAROTID STENOSIS REFERENCE USING NASCET CRITERIA:    % ICA stenosis = (1 - narrowest ICA diameter/diameter of distal  cervical ICA) x 100.    Mild - <50% stenosis.    Moderate - 50-69% stenosis.    Severe - 70-94% stenosis.    Near occlusion - 95-99% stenosis.    Occluded - 100% stenosis.       Impression:      Unremarkable neck MRA.     This report was finalized on 6/2/2022 12:16 PM by Dr. Wilfredo Lawrence MD.       XR Chest 1 View [[id number]] Collected: 06/01/22 2241     Updated: 06/01/22 2243    Narrative:      CR  Chest 1 Vw    INDICATION:   Strokelike symptoms. Left-sided weakness.     COMPARISON:    7/9/2021    FINDINGS:  Portable AP view(s) of the chest.  The heart and mediastinal contours are normal. The lungs are clear. No pneumothorax or pleural effusion. Pulmonary emphysema is present.      Impression:      No acute cardiopulmonary findings. COPD.    Signer Name: Cipriano Serrato MD   Signed: 6/1/2022 10:41 PM   Workstation Name: [email or web address]    [facility]    CT Angiogram Head [[id number]] Collected: 06/01/22 2219     Updated: 06/01/22 2221    Narrative:      INDICATION:   Left-sided weakness. Today Code stroke.    TECHNIQUE:   CT angiogram of the head and neck with IV contrast. Dose recorded patient chart. 3-D reformatted images were acquired and reviewed. Evaluation for a significant carotid arterial stenosis is based on the NASCET criteria. Radiation dose reduction  techniques included automated exposure control or exposure modulation based on body size. Radiation audit for number of CT and nuclear cardiology exams performed in the last year:  0. Viz AI utilized for LVO. This is a limited study performed with rapid  reconstructions for stroke evaluation.    COMPARISON:   Earlier head CT    FINDINGS:   CTA neck:  The examination excludes the aortic arch and proximal great vessels including the origins.    There is partly calcified plaque at the right carotid bifurcation. By NASCET criteria, there is about 50% diameter stenosis. There is partially calcified plaque involving the right common carotid artery also with up to 50% long segment stenosis.. The  cervical right internal carotid artery is somewhat tortuous in the neck. There is partially calcified plaque at the right carotid siphon with at least moderate stenosis.    There is apparently noncalcified plaque over long segment in the left common carotid artery with up to about 50% diameter stenosis. It is possible that this represents a  thrombosed dissection. Consider correlation with an MR angiogram of the neck vessels  using a dissection protocol for further characterization to evaluate for possible blood products within the wall.. At the bifurcation there is partially calcified plaque with about 50% diameter stenosis by NASCET criteria. There is partly calcified  plaque at the siphon with at least moderate stenosis.    Both vertebral arteries are patent with supply to the basilar. The left is mildly dominant.        CTA head:  There is high-grade irregular stenosis over a long segment of the left P1 vessel probably due to intracranial atherosclerotic disease. There is otherwise no focal central stenosis or intracranial vascular cut off suspected. There is a large  left posterior communicating artery and a moderate sized right posterior communicating artery. There is a small anterior communicating artery. Study is not performed to evaluate for intracranial aneurysm since rapid limited imaging obtained for stroke  assessment. Dural venous sinuses are grossly patent.    There is mucosal disease in the paranasal sinuses. There are degenerative changes in the cervical spine. There are emphysematous changes in the lungs with apical parenchymal scarring.      Impression:        1. Study excludes the aortic arch and proximal great vessels.  2. There is about 50% diameter stenosis at the bilateral carotid bifurcations using NASA criteria due to partially calcified plaque.  3. There is disease involving both common carotid arteries also with about 50% diameter stenosis. On the left side this appears to be due to noncalcified plaque though its possible that this is due to a thrombosed long segment dissection. This is best  further evaluated with an MR angiogram of the neck using a dissection protocol to evaluate for possible blood products in the wall of the vessel. This should include precontrast axial T1-weighted imaging through the neck vessels with fat  saturation.  4. Both vertebral arteries are patent with supply the basilar.  5. Disease involving both carotid siphons.  6. Irregular high-grade stenosis of the left side P1 vessel likely secondary to intracranial atherosclerotic disease. There is a large left posterior communicating artery present and a moderate sized right posterior communicating artery present. No  central intracranial vascular cut off suspected.    Signer Name: Deloris Lisa MD   Signed: 6/1/2022 10:19 PM   Workstation Name: [email or web address]    [facility] [place]    CT Angiogram Carotids [[id number]] Collected: 06/01/22 2219     Updated: 06/01/22 2221    Narrative:      INDICATION:   Left-sided weakness. Today Code stroke.    TECHNIQUE:   CT angiogram of the head and neck with IV contrast. Dose recorded patient chart. 3-D reformatted images were acquired and reviewed. Evaluation for a significant carotid arterial stenosis is based on the NASCET criteria. Radiation dose reduction  techniques included automated exposure control or exposure modulation based on body size. Radiation audit for number of CT and nuclear cardiology exams performed in the last year:  0. Viz AI utilized for LVO. This is a limited study performed with rapid  reconstructions for stroke evaluation.    COMPARISON:   Earlier head CT    FINDINGS:   CTA neck:  The examination excludes the aortic arch and proximal great vessels including the origins.    There is partly calcified plaque at the right carotid bifurcation. By NASCET criteria, there is about 50% diameter stenosis. There is partially calcified plaque involving the right common carotid artery also with up to 50% long segment stenosis.. The  cervical right internal carotid artery is somewhat tortuous in the neck. There is partially calcified plaque at the right carotid siphon with at least moderate stenosis.    There is apparently noncalcified plaque over long segment in the left common carotid artery with up to  about 50% diameter stenosis. It is possible that this represents a thrombosed dissection. Consider correlation with an MR angiogram of the neck vessels  using a dissection protocol for further characterization to evaluate for possible blood products within the wall.. At the bifurcation there is partially calcified plaque with about 50% diameter stenosis by NASCET criteria. There is partly calcified  plaque at the siphon with at least moderate stenosis.    Both vertebral arteries are patent with supply to the basilar. The left is mildly dominant.        CTA head:  There is high-grade irregular stenosis over a long segment of the left P1 vessel probably due to intracranial atherosclerotic disease. There is otherwise no focal central stenosis or intracranial vascular cut off suspected. There is a large  left posterior communicating artery and a moderate sized right posterior communicating artery. There is a small anterior communicating artery. Study is not performed to evaluate for intracranial aneurysm since rapid limited imaging obtained for stroke  assessment. Dural venous sinuses are grossly patent.    There is mucosal disease in the paranasal sinuses. There are degenerative changes in the cervical spine. There are emphysematous changes in the lungs with apical parenchymal scarring.      Impression:        1. Study excludes the aortic arch and proximal great vessels.  2. There is about 50% diameter stenosis at the bilateral carotid bifurcations using NASA criteria due to partially calcified plaque.  3. There is disease involving both common carotid arteries also with about 50% diameter stenosis. On the left side this appears to be due to noncalcified plaque though its possible that this is due to a thrombosed long segment dissection. This is best  further evaluated with an MR angiogram of the neck using a dissection protocol to evaluate for possible blood products in the wall of the vessel. This should include  precontrast axial T1-weighted imaging through the neck vessels with fat saturation.  4. Both vertebral arteries are patent with supply the basilar.  5. Disease involving both carotid siphons.  6. Irregular high-grade stenosis of the left side P1 vessel likely secondary to intracranial atherosclerotic disease. There is a large left posterior communicating artery present and a moderate sized right posterior communicating artery present. No  central intracranial vascular cut off suspected.    Signer Name: Deloris Lisa MD   Signed: 6/1/2022 10:19 PM   Workstation Name: [facility]    CT CEREBRAL PERFUSION WITH & WITHOUT CONTRAST [[id number]] Collected: 06/01/22 2206     Updated: 06/01/22 2209    Narrative:      CT CEREBRAL PERFUSION W WO CONTRAST    HISTORY:   Stroke evaluation. Left-sided weakness today.    TECHNIQUE:   Axial CT images of the brain without and with intravenous contrast using cerebral perfusion protocol. Post-processing parametric maps were created using RAPID software and reviewed. Radiation dose reduction techniques included automated exposure control  or exposure modulation based on body size. CT and nuclear cardiology exams in the last 12 months: 3. Contrast dose recorded patient chart.    COMPARISON:   Earlier noncontrast head CT.    FINDINGS:   Arterial input function is optimal.     Perfusion maps are fairly symmetric. No convincing evidence for core infarct or ischemic penumbra.      Impression:      Essentially normal brain perfusion CT.          Signer Name: Deloris Lisa MD   Signed: 6/1/2022 10:06 PM   Workstation Name: [facility]    CT Head Without Contrast Stroke Protocol [[id number]] Collected: 06/01/22 2132     Updated: 06/01/22 2134    Narrative:      CT Head WO Code Stroke    HISTORY:   Left-sided weakness today    TECHNIQUE:   Axial unenhanced head CT. Radiation dose reduction techniques included automated  exposure control or exposure modulation based on body size. Count of known CT and cardiac nuc med studies performed in previous 12 months: 0.     Time of scan: 9:29 PM    COMPARISON:   None.    FINDINGS:   No intracranial hemorrhage, mass, or infarct. No hydrocephalus or extra-axial fluid collection. Brain parenchymal density is normal. The skull base, calvarium, and extracranial soft tissues are normal.      Impression:      Normal, negative unenhanced head CT.          Signer Name: Chad Seaman MD   Signed: 6/1/2022 9:32 PM   Workstation Name: [email or web address]    [facility]        Results for orders placed in visit on 02/28/22    Adult Transthoracic Echo Complete W/ Cont if Necessary Per Protocol    Interpretation Summary  · Left ventricular wall thickness is consistent with mild to moderate concentric hypertrophy.  · Left ventricular ejection fraction appears to be 66 - 70%. Left ventricular systolic function is normal.  · Left ventricular diastolic function is consistent with (grade I) impaired relaxation.  · Hypertrophic cardiomyopathy with no significant LV outflow tract obstruction. Mean pressure gradient is 5 mmHg.  · Aortic valve is normal there is no evidence of aortic stenosis or aortic regurgitation.  · Mild calcification of tip of posterior mitral leaflet is noted there is no mitral stenosis or mitral regurgitation detected.  · Tricuspid valve echo is normal there is no tricuspid stenosis or regurgitation noted  · No pulmonic stenosis or regurgitation detected  · No pericardial effusion noted.      Lab Results   Component Value Date    HGBA1C 6.30 (H) 06/01/2022      Lab Results   Component Value Date    CHOL 144 06/01/2022    TRIG 177 (H) 06/01/2022    HDL 29 (L) 06/01/2022    LDL 84 06/01/2022      Lab Results   Component Value Date    WBC 5.01 06/01/2022    HGB 11.6 (L) 06/01/2022    HCT 35.5 (L) 06/01/2022    MCV 91.0 06/01/2022     06/01/2022      Lab Results   Component  "Value Date    GLUCOSE 278 (H) 06/01/2022    BUN 14 06/01/2022    CREATININE 1.00 06/01/2022    EGFRIFNONA >60 09/08/2021    EGFRIFAFRI >60 09/08/2021    BCR 14.0 06/01/2022    K 4.1 06/01/2022    CO2 25.2 06/01/2022    CALCIUM 8.5 (L) 06/01/2022    ALBUMIN 3.85 06/01/2022    AST 12 06/01/2022    ALT 17 06/01/2022              Assessment/Plan     Assessment/Plan:  67-year-old male with PMHx significant for hypertension, diabetes, and hyperlipidemia presented  ED on 6/1/2022 after he developed sudden onset of numbness and weakness to his left face, arm, and leg.  This came on suddenly was present on initial presentation.He underwent CT imaging and by the time the patient returned from CT his symptoms had completely resolved with exception of very mild dysarthria which the patient's wife reports is his baseline speech. Therefore, tPA was differed. Mr. Kwon states over the last 3 months he has experienced recurrent episodes of left hemibody symptoms. He reports most are numbness of the face, arm and leg but a few episodes have been prolonged lasting for >30 minutes and associated with weakness. They all completely resolve. Over the last few weeks they have significantly increased in frequency. No associated loss of consciousness or loss of bowel or bladder. He does report associated fatigue with these episodes. He has significant polypharmacy and sees a number of subspecialists. He previously self discontinued aspirin as he states he was prescribed over 30 medications at one time. He denies any history of bleeding symptoms. Patient is also prescribed keppra for unclear reasons. A chart review reveals it is prescribed for \"tremors of the nervous system\" which would be an unusual recommendation. I do not see neurology notes to review.  Initial CT of head did not show any acute abnormality.  CTA showed 50% diameter stenosis of bilateral carotid bifurcations and bilateral carotids, radiology also " raised concern of a thrombosed long segment dissection on the left and recommended MRI angiograms.  This also showed intracranial atherosclerotic disease.  There was no perfusion deficit on CTP.  MR angiograms  were unremarkable.  MRI brain without contrast did not show any acute ischemia or other changes.      1. Crescendo TIAs  2. Bilateral intra/extracranial carotid stenosis  3. Essential hypertension  4. Mixed hyperlipidemia  5. Diabetes mellitus type 2  6. ?  Seizure disorder, described as a right hand tremor has been taking Keppra for couple years    -DAPT with aspirin and Plavix x21 days (last day 6/22/2022), followed by aspirin monotherapy indefinitely  -EEG performed, results pending  -Initial evaluation by SLP/PT/OT complete  -Patient reports leg cramping with prior statin therapy, he agrees to attempt atorvastatin 40 mg daily, if he cannot tolerate will consider alternative therapy at follow-up  -Blood pressure goal is systolic less than 40, we have spoke with the patient extensively regarding his hypertension medication regimen as he reports some of them have caused him to be nauseated.  We recommended that he continue to take Coreg and lisinopril as previously prescribed as well as keep a log of blood pressures to bring to follow-up visit  -A1c is under goal of 7.0, he reports he currently takes metformin and glyburide, recommend to keep continue that as prescribed  -Advised patient he can stop taking the Keppra, we will follow-up on the return of symptoms at his office visit and will resume when if symptoms recur  -Follow-up in stroke clinic  -TTE results pending      The case was discussed with the patient, and will discuss with hospitalist team.  Stroke neurology will sign off for now, please feel free to call with any questions/concerns.  Thank you again for the consult.    Discussed plan of care with patient as well as individual stroke risk factors carotid stenosis, diabetes mellitus,  hyperlipidemia, hypertension, physical inactivity and/or obesity and TIA's. We also discussed medications that the patient would be discharged on daily aspirin, Plavix, and atorvastatin daily and the importance of medication compliance in prevention of future strokes. He voices his understanding. Stroke signs and symptoms were reviewed with the patient (F.A.S.T., visual disturbances, dizziness/loss of balance, mental status changes, and severe headache) and he  has been instructed to call 911 if symptoms of TIA/Stroke occur.      Isaac Vogt, OSITO  06/02/22  15:31 EDT    This was an audio and video enabled telemedicine encounter.  Dr. Holly Francisco was present for encounter.  Telemedicine.  Verbal consent taken

## 2022-06-02 NOTE — CONSULTS
[facility]   Teleneurology Note    Patient Name: Maximo Kwon  : 1955  MRN: [id number]  Primary Care Physician: Naomi Cárdenas APRN  Referring Site: [facility]  Referring Provider: Terry Chu   Teleneurology Initial Data     Arrival Date Telestroke Site: 22     Neurologist Evaluation Date: 22 Neurologist Evaluation Time:    Date Last Known Well: 22 Time Last Known Well:      History     Chief Complaint: left hemibody numbness and weakness    Stroke Risk Factors/ Pertinent Data     Stroke risk factors: hypertension, heart failure, diabetes, dyslipidemia, smoking, obesity/ physical inactivity  Anticoagulants prior to arrival: none  Antiplatelets prior to arrival: none     Pre- Stroke Modified Creston Scale     Pre-Stroke Modified C[place]on Scale: 0 - No Symptoms at all.    NIH Stroke Scale     NIHSS Performed Date: 22     Interval: baseline  1a. Level of Consciousness: 0-->Alert, keenly responsive  1b. LOC Questions: 0-->Answers both questions correctly  1c. LOC Commands: 0-->Performs both tasks correctly  2. Best Gaze: 0-->Normal  3. Visual: 0-->No visual loss  4. Facial Palsy: 0-->Normal symmetrical movements  5a. Motor Arm, Left: 0-->No drift, limb holds 90 (or 45) degrees for full 10 secs  5b. Motor Arm, Right: 0-->No drift, limb holds 90 (or 45) degrees for full 10 secs  6a. Motor Leg, Left: 0-->No drift, leg holds 30 degree position for full 5 secs  6b. Motor Leg, Right: 0-->No drift, leg holds 30 degree position for full 5 secs  7. Limb Ataxia: 0-->Absent  8. Sensory: 0-->Normal, no sensory loss  9. Best Language: 0-->No aphasia, normal  10. Dysarthria: 1-->Mild-to-moderate dysarthria, patient slurs at least some words and, at worst, can be understood with some difficulty  11. Extinction and Inattention (formerly Neglect): 0-->No abnormality  Total (NIH Stroke Scale): 1     Review of Systems     Review of Systems  Objective   Exam     Exam performed with the help of  support staff from the referring site  Neurological Exam  Result Review    Results        Personal review of CNS imaging:(Official report by radiologist pending)  Imaging  CT Imaging Review: CT Imaging reviewed, NO acute infarct/ hemorrhage seen  CTA Imaging Review: CTA reviewed, other (specify)    Thrombolytic   Thrombolytics: tPA not given  Tissue Plasminogen Activator (tPA) Inclusion Criteria: 18 years of age or greater, Onset of symptoms < 4.5 hours before beginning treatment, Diagnosis of acute ischemic stroke causing measurable disabling deficit     Assessment & Plan   Assessment/ Plan     Assessment:  Acute Stroke Evaluation: Suspected ACUTE ischemic stroke     Mr. Kwon is a 68 yo man with a history of hypertension, LVEF, obesity, COPD, hyperlipidemia, DM presenting with left sided numbness and weakness.     Mr. Kwon presents with his wife who helps provide history     Mr. Kwon reports at 8:55pm tonight he developed sudden onset numbness and weakness of his left face, arm and leg. This came and suddenly and was present on initial presentation. He underwent CT imaging and by the time the patient returned from CT his symptoms had completely resolved with exception of very mild dysarthria which the patient's wife reports is his baseline speech. Therefore, tPA was differed.     Mr. Kwon states over the last 3 months he has experienced recurrent episodes of left hemibody symptoms. He reports most are numbness of the face, arm and leg but a few episodes have been prolonged lasting for >30 minutes and associated with weakness. They all completely resolve. Over the last few weeks they have significantly increased in frequency. No associated loss of consciousness or loss of bowel or bladder. He does report associated fatigue with these episodes. He has significant polypharmacy and sees a number of subspecialists. He previously self discontinued aspirin as he states he was prescribed over 30 medications at one  "time. He denies any history of bleeding symptoms.     Patient is also prescribed keppra for unclear reasons. A chart review reveals it is prescribed for \"tremors of the nervous system\" which would be an unusual recommendation. I do not see neurology notes to review. Spent >30 minutes with patient and wife discussing the concern for symptomatic carotid disease and the recommended treatment including asa/plavix for now. Patient with significant hesitation given outpatient polypharmacy and lack of perceived effect of prior medication regimens. Agreed with patient that on rounds tomorrow we will review all medications and see if any can be discontinued at this time.     #Crescendo TIAs   #c/f symptomatic left carotid disease with recurrent TIAs   #Bilateral intracranial carotid stenosis   #Bilateral extracranial carotid stenosis   Recommend for patients with high risk TIA, a short course of Dual Antiplatelet Therapy  - CT, CTa, CTP as above   - [ ] Aspirin 81mg indefinitely  - [ ] Plavix load of 300mg, then 75mg daily for 21 days (end date 06/22) - pending MR angiogram may consider 3 month course  - [ ] start atorvastatin 80mg    - [ ] MRI brain AND MRa angiogram of the head and neck given possible thrombosed dissection.   - [ ] TCDs of bilateral MCAs  - [ ] routine EEG    - [ ] Echocardiogram  - [ ] Stroke labs - lipids, hgbA1c  - [ ] 28 day holter monitor at discharge     #hx of tremors  Currently on keppra 500 BID  In an effort to improve polypharmacy would discontinue at this time.   Given seizure is on the differential for crescendo TIAs would obtain routine EEG this admission   - [ ] routine EEG        Disposition     Disposition: The patient will remain at the referring institution for further evaluation and management    Medical Decision Making  Medical Data Reviewed: Data reviewed including: clinical labs, radiology and/or medical tests  Length of visit: 60 minutes    Holly POSADA MD, saw the " patient on 06/01/22 at 2136 for an initial in-patient or emergency room telememedicine face to face consult using interactive technology for 60 minutes. The [facility] the [facility]. I was located at  . I was assisted with the exam by  .    I have proceeded with this evaluation at the request of the referring practitioner as it is felt to be an emergency setting and no appropriate specialist is available to perform this evaluation. The [facility] has reported that this is the correct patient and has obtained consent from the patient/surrogate to perform this telemedicine evaluation(including obtaining history, performing examination and reviewing data provided by the patient an/or originating site of care provider)    I have introduced myself to the patient, provided my credentials, disclosed my location, and determined that, based on review of the patient's chart and discussion with the patient's primary team, telemedicine via a HIPAA compliant, real-time, face-to-face two-way, interactive audio and video platform is an appropriate and effective means of providing the service.    The patient/surrogate has a right to refuse this evaluation as they have been explained risks including potential loss of confidentiality, benefits, alternatives, and the potential need for subsequent face-to-face care. In this evaluation, we will be providing recommendations only.  The ultimate decision to follow or not to follow these recommendations will be left to the bedside treating/requesting practitioner.    The patient/surrogate has been notified that other healthcare professionals including technical person may be involved in this A/V evaluation.  All laws concerning confidentiality and patient access to medical records and copies of medical records apply to telemedicine.  The patient/surrogate has received the [facility]'s Health Notice of Privacy Practices.    Holly Francisco MD

## 2022-06-02 NOTE — THERAPY EVALUATION
"Acute Care - Speech Language Pathology Initial Evaluation  [facility]   SPEECH LANGUAGE COGNITIVE ASSESSMENT     Patient Name: Maximo Kwon  : 1955  MRN: [id number]  Today's Date: 2022             Admit Date: 2022     Maximo Kwon  is seen this am on  3314/2S to participate in a formal s/l and cognitive evaluation to assess safety/function in adls. He is positioned upright and centered in bed to cooperatively participate. All results and observations of this evaluation are felt to be representative of his current functional status.    Mr. Kwon presented to [facility] ED 22 from his private home 2/2 new onset of left facial dysarthria, weakness. Code stroke was initiated. CT head revealed to acute changes. Tele-neurology felt that Mr. Kwon presented with \"crescenda TIA\", therefore, he is admitted for further evaluation and treatment.     Social History     Socioeconomic History   • Marital status:    • Number of children: 0   Tobacco Use   • Smoking status: Former Smoker     Packs/day: 1.00     Years: 30.00     Pack years: 30.00     Types: Cigarettes     Quit date:      Years since quittin.4   • Smokeless tobacco: Never Used   Vaping Use   • Vaping Use: Never used   Substance and Sexual Activity   • Alcohol use: No   • Drug use: No   • Sexual activity: Defer     Birth control/protection: Other        Diet Orders (active) (From admission, onward)     Start     Ordered    22 0322  Diet Regular; Consistent Carbohydrate  Diet Effective Now         22 0321              He is observed on ra w/o complications.     Receptive language skills are intact. Mr. Kwon is able to id common objects and personal body parts, follow simple and multi-step directives, understand complex \"wh\" questions and participate in conversational exchange w/o difficulties.    Expressive language skills are intact. He is able to complete automatic speech tasks, confrontation naming tasks, complete " "complex oe sentences, respond to complet oe \"wh\" questions, repeat at word/sentence and multiple digit levels, as well as participate in complex conversational exchange. No aphasia, anomia or verbal apraxia evidenced.    Mr Kwon is independently oriented to person, place and time. Immediate, STM and LTM are wfl w/ pt recalling recent adls and providing personal information w/o delays. Thought organization, processing and problem solving are wfl w/ pt demonstrating appropriate comparing/contrasting, understanding of idiomatic language, convergent and divergent thinking skills.    Facial/oral structures are symmetrical upon observation. Oral mucosa are moist, pink and clean. Secretions are clear, thin and well controlled. OROM/JACQUELYN is wfl w/o lingual deviation upon protrusion. Speech intensity, clarity and intelligibility are wfl w/o dysarthria or oral apraxia noted.    Graphic and reading skills are intact, premorbid baseline status. He is able to id isolated letters, simple and moderate words, as well as sentences and small paragraphs. Signature is legible.    Pragmatic skills are wfl w/ appropriate eye gaze patterns and visual tracking. Language is appropriate in context w/ topic initiation and maintenance independently. No left neglect evidenced. Humor response is intact w/ appropriate affect.    Visit Dx:    ICD-10-CM ICD-9-CM   1. TIA (transient ischemic attack)  G45.9 435.9     Patient Active Problem List   Diagnosis   • Mixed hyperlipidemia   • Simple chronic bronchitis ([facility])   • Multiple pulmonary nodules   • Essential hypertension   • Left ventricular hypertrophy with grade 1 diastolic dysfunction   • Hereditary and idiopathic peripheral neuropathy   • COPD, severe ([facility])   • Degenerative disc disease, lumbar   • Arthritis   • Diabetes mellitus ([facility])   • Neuropathy   • Claudication of both lower extremities ([facility])   • LAZCANO (dyspnea on exertion)   • GERD (gastroesophageal reflux disease)   • Chronic respiratory " failure with hypoxia (HCC)   • Chronic diastolic heart failure (HCC)   • Left-sided weakness     Past Medical History:   Diagnosis Date   • Arthritis    • Chronic diastolic heart failure (HCC) 08/19/2021   • COPD (chronic obstructive pulmonary disease) (HCC)    • Depression    • Diverticulitis    • Emphysema (subcutaneous) (surgical) resulting from a procedure    • GERD (gastroesophageal reflux disease)    • Neuropathy    • Pneumothorax     Left, status post chest tube   • Shortness of breath    • Spinal stenosis    • Type 2 diabetes mellitus (HCC)      Past Surgical History:   Procedure Laterality Date   • BRONCHOSCOPY N/A 5/14/2019    Procedure: Bronchoscopy with Transbronchial Biopsy with Fluoroscopy;  Surgeon: Eladio Bethea MD;  Location: [facility];  Service: Pulmonary   • CHEST TUBE INSERTION Left     Pneumothorax   • COLON RESECTION  2016    had colostomy and reveresed back    • COLONOSCOPY  2016   • COLOSTOMY CLOSURE     • LUNG BIOPSY Right 2014    (non cancerous)   • OTHER SURGICAL HISTORY Left     LEFT ELBOW SURGERY     EDUCATION  The patient has been educated in the following areas:     Cognitive Impairment Communication Impairment.    Impression: Mr. Kwon presents with what is felt to representative of his self reported premorbid baseline speech, language and cognitive skills.    SLP Recommendation and Plan    No further formal SLP f/u recommended for s/l and cognitive skills. He did participate in dysphagia assessment. Please see full dysphagia note for details.    D/w Mr. Kwon results and recommendations w/ verbal understanding and agreement.             Anticipated Discharge Disposition (SLP): home (06/02/22 8494)         Thank you for allowing me to participate in the care of your patient-  Meryl Vogt M.S., CCC/SLP                                                            Time Calculation:         Therapy Charges for Today     Code Description Service Date Service Provider Modifiers Qty     35465976976 HC ST EVAL ORAL PHARYNG SWALLOW 3 6/2/2022 Meryl Vogt, MS CCC-SLP GN 1    [id number] HC ST EVAL SPEECH AND PROD W LANG  3 6/2/2022 Meryl Vogt, MS CCC-SLP GN 1                     Meryl Vogt, MS CCC-SLP  6/2/2022

## 2022-06-02 NOTE — PROGRESS NOTES
Patient was admitted earlier this morning by Dr. Cano for left-sided weakness, uncontrolled hypertension and concern for stroke.  Patient seen and examined in 314-2S with Cecy YANG RN.  Patient currently denies any numbness/tingling and/or weakness on the left side.  Patient and I have a long discussion regarding his history of hypertension and different medical regimens. He states that he recently returned from a trip to [place] and forgot his antihypertensive medications at home.  The patient states he did not take these for approximately 4 days.    On exam, the patient does not have any dysarthria.  No left-sided weakness is appreciated.  Heart regular rate and rhythm, lungs clear to auscultation anteriorly.    I discussed with neurology; appreciate their recommendations.  -Continue aspirin; I have added Plavix (discontinue Plavix after 21 days and continue with aspirin monotherapy)  -Discontinue Keppra but follow-up on EEG  -Atorvastatin 40 mg daily; patient reports severe myalgias in the past but is willing to try; also discuss QOD dosing if unable to tolerate daily dosing  -Blood pressure control; begin carvedilol (was on nebivolol at home) and resume home lisinopril; continue to adjust as needed and encourage close monitoring at home  -Follow-up on echocardiogram result    Plan for discharge home tomorrow if patient remains hemodynamically stable.

## 2022-06-03 ENCOUNTER — READMISSION MANAGEMENT (OUTPATIENT)
Dept: CALL CENTER | Facility: HOSPITAL | Age: 67
End: 2022-06-03

## 2022-06-03 VITALS
TEMPERATURE: 98 F | WEIGHT: 222.2 LBS | SYSTOLIC BLOOD PRESSURE: 172 MMHG | HEIGHT: 72 IN | DIASTOLIC BLOOD PRESSURE: 100 MMHG | HEART RATE: 79 BPM | OXYGEN SATURATION: 95 % | BODY MASS INDEX: 30.1 KG/M2 | RESPIRATION RATE: 18 BRPM

## 2022-06-03 PROBLEM — R53.1 LEFT-SIDED WEAKNESS: Status: RESOLVED | Noted: 2022-06-02 | Resolved: 2022-06-03

## 2022-06-03 LAB
GLUCOSE BLDC GLUCOMTR-MCNC: 213 MG/DL (ref 70–130)
GLUCOSE BLDC GLUCOMTR-MCNC: 242 MG/DL (ref 70–130)

## 2022-06-03 PROCEDURE — 63710000001 INSULIN ASPART PER 5 UNITS: Performed by: INTERNAL MEDICINE

## 2022-06-03 PROCEDURE — 99217 PR OBSERVATION CARE DISCHARGE MANAGEMENT: CPT | Performed by: INTERNAL MEDICINE

## 2022-06-03 PROCEDURE — 94799 UNLISTED PULMONARY SVC/PX: CPT

## 2022-06-03 PROCEDURE — 82962 GLUCOSE BLOOD TEST: CPT

## 2022-06-03 PROCEDURE — 94761 N-INVAS EAR/PLS OXIMETRY MLT: CPT

## 2022-06-03 PROCEDURE — 96374 THER/PROPH/DIAG INJ IV PUSH: CPT

## 2022-06-03 PROCEDURE — G0378 HOSPITAL OBSERVATION PER HR: HCPCS

## 2022-06-03 PROCEDURE — 94640 AIRWAY INHALATION TREATMENT: CPT

## 2022-06-03 RX ORDER — CARVEDILOL 12.5 MG/1
12.5 TABLET ORAL 2 TIMES DAILY WITH MEALS
Qty: 60 TABLET | Refills: 0 | Status: SHIPPED | OUTPATIENT
Start: 2022-06-03 | End: 2022-06-07 | Stop reason: SDUPTHER

## 2022-06-03 RX ORDER — PANTOPRAZOLE SODIUM 40 MG/1
40 TABLET, DELAYED RELEASE ORAL EVERY MORNING
Qty: 30 TABLET | Refills: 0 | Status: SHIPPED | OUTPATIENT
Start: 2022-06-04 | End: 2022-06-07 | Stop reason: SDUPTHER

## 2022-06-03 RX ORDER — LISINOPRIL 40 MG/1
40 TABLET ORAL
Qty: 30 TABLET | Refills: 0 | Status: SHIPPED | OUTPATIENT
Start: 2022-06-03 | End: 2022-06-10

## 2022-06-03 RX ORDER — METOPROLOL TARTRATE 5 MG/5ML
5 INJECTION INTRAVENOUS ONCE
Status: COMPLETED | OUTPATIENT
Start: 2022-06-03 | End: 2022-06-03

## 2022-06-03 RX ORDER — CLOPIDOGREL BISULFATE 75 MG/1
75 TABLET ORAL DAILY
Qty: 20 TABLET | Refills: 0 | Status: SHIPPED | OUTPATIENT
Start: 2022-06-03 | End: 2022-06-29

## 2022-06-03 RX ORDER — ATORVASTATIN CALCIUM 40 MG/1
40 TABLET, FILM COATED ORAL NIGHTLY
Qty: 90 TABLET | Refills: 0 | Status: SHIPPED | OUTPATIENT
Start: 2022-06-03 | End: 2022-06-07 | Stop reason: SDUPTHER

## 2022-06-03 RX ORDER — HYDRALAZINE HYDROCHLORIDE 25 MG/1
25 TABLET, FILM COATED ORAL 3 TIMES DAILY PRN
Start: 2022-06-03 | End: 2022-06-29 | Stop reason: SDDI

## 2022-06-03 RX ORDER — HYDRALAZINE HYDROCHLORIDE 10 MG/1
10 TABLET, FILM COATED ORAL ONCE
Status: COMPLETED | OUTPATIENT
Start: 2022-06-03 | End: 2022-06-03

## 2022-06-03 RX ORDER — ASPIRIN 81 MG/1
81 TABLET, CHEWABLE ORAL DAILY
Start: 2022-06-03 | End: 2022-06-21

## 2022-06-03 RX ADMIN — CARVEDILOL 12.5 MG: 6.25 TABLET, FILM COATED ORAL at 07:52

## 2022-06-03 RX ADMIN — ASPIRIN 81 MG: 81 TABLET, CHEWABLE ORAL at 07:52

## 2022-06-03 RX ADMIN — INSULIN ASPART 3 UNITS: 100 INJECTION, SOLUTION INTRAVENOUS; SUBCUTANEOUS at 07:52

## 2022-06-03 RX ADMIN — INSULIN ASPART 3 UNITS: 100 INJECTION, SOLUTION INTRAVENOUS; SUBCUTANEOUS at 12:23

## 2022-06-03 RX ADMIN — METOPROLOL TARTRATE 5 MG: 1 INJECTION, SOLUTION INTRAVENOUS at 05:00

## 2022-06-03 RX ADMIN — Medication 10 ML: at 07:53

## 2022-06-03 RX ADMIN — PANTOPRAZOLE SODIUM 40 MG: 40 TABLET, DELAYED RELEASE ORAL at 06:06

## 2022-06-03 RX ADMIN — HYDRALAZINE HYDROCHLORIDE 10 MG: 10 TABLET, FILM COATED ORAL at 01:24

## 2022-06-03 RX ADMIN — LISINOPRIL 30 MG: 10 TABLET ORAL at 07:52

## 2022-06-03 RX ADMIN — BUDESONIDE AND FORMOTEROL FUMARATE DIHYDRATE 2 PUFF: 160; 4.5 AEROSOL RESPIRATORY (INHALATION) at 07:00

## 2022-06-03 RX ADMIN — CLOPIDOGREL 75 MG: 75 TABLET, FILM COATED ORAL at 07:52

## 2022-06-03 NOTE — PLAN OF CARE
Goal Outcome Evaluation:              Outcome Evaluation: Pt a&o x4.  RA.  Continue on consistent carb cardiac diet.  EEG normal.  Neuro checks Q 2hrs. Stroke scale per shift.  Scuds b/l.  Pt b/p was elevated and hydralazine PO was given. No complaints at this time.  Call light in reach with bed low, locked, and alarmed.

## 2022-06-03 NOTE — CASE MANAGEMENT/SOCIAL WORK
Discharge Planning Assessment   [facility]     Patient Name: Maximo Kwon  MRN: [id number]  Today's Date: 6/3/2022    Admit Date: 6/1/2022       Discharge Plan     Row Name 06/03/22 1338       Plan    Final Discharge Disposition Code 01 - home or self-care    Final Note Pt discharged home.                  BRANDY Gamboa

## 2022-06-03 NOTE — CONSULTS
"Diabetes Education  Assessment/Teaching    Patient Name:  Maximo Kwon  YOB: 1955  MRN: [id number]  Admit Date:  6/1/2022      Assessment Date:  6/3/2022  Flowsheet Row Most Recent Value   General Information     Height 182.9 cm (72\")   Height Method Stated   Weight 101 kg (222 lb 3.2 oz)   Weight Method Bed scale   Pregnancy Assessment    Diabetes History    Education Preferences    Nutrition Information    Assessment Topics    DM Goals           Flowsheet Row Most Recent Value   DM Education Needs    Meter Has own   Frequency of Testing PRN   Medication Oral   Problem Solving Hypoglycemia, Hyperglycemia, Sick days, Signs, Symptoms, Treatment   Reducing Risks Cardiovascular, Immunizations, Foot care, Dental exam, Eye exam, Blood pressure, Lipids, A1C testing, Neuropathy, Retinopathy   Healthy Eating Basic meal plan provided   Physical Activity Walking   Physical Activity Frequency Regularly   Healthy Coping Appropriate   Discharge Plan Home, Follow-up with PCP   Motivation Moderate   Teaching Method Explanation, Discussion, Handouts   Patient Response Verbalized understanding            Other Comments:  A1C 6.3 Patient did not wear a mask. Patient stated that he has run out of lancets and is working on getting more. Patient was seen, educated and received AADE7 and ADA handouts on diet, activity, checking blood glucose, taking medication as prescribed, checking feet daily and S/S of hypo/hyperglycemia. Patient was educated on sick rule days. Patient had no questions or concerns. Please re-consult or call for concerns or questions. Thank you.        Electronically signed by:  Bridgett Rodriguez RN  06/03/22 12:46 EDT  "

## 2022-06-03 NOTE — DISCHARGE INSTR - APPOINTMENTS
APPT WITH PCP  SHERI WEST / ON June 7 AT 2:45   ////    APPT WITH NEUROLOGY EDILBERTO MOSQUERA [place] OFFICE ON June 21 AT 11AM

## 2022-06-03 NOTE — PLAN OF CARE
Goal Outcome Evaluation:               Patient shows no s/s of acute distress. Patient to be discharged this shift. IV and tele removed.

## 2022-06-03 NOTE — DISCHARGE SUMMARY
Discharge Summary:    Date of Admission: 6/1/2022  Date of Discharge:  6/3/2022    PCP: Naomi Cárdenas APRN    DISCHARGE DIAGNOSIS  -Transient ischemic attack with left hemibody numbness, dysarthria, resolved  -Hypertensive urgency, improved  -Bilateral intra-/extracranial carotid stenosis  -Mixed hyperlipidemia  -Diabetes mellitus type 2, non-insulin-dependent  -Questionable seizure disorder, described as right hand tremor with negative EEG; Keppra discontinued    SECONDARY DIAGNOSES  Past Medical History:   Diagnosis Date   • Arthritis    • Chronic diastolic heart failure ([facility]) 08/19/2021   • COPD (chronic obstructive pulmonary disease) ([facility])    • Depression    • Diverticulitis    • Emphysema (subcutaneous) (surgical) resulting from a procedure    • GERD (gastroesophageal reflux disease)    • Neuropathy    • Pneumothorax     Left, status post chest tube   • Shortness of breath    • Spinal stenosis    • Type 2 diabetes mellitus ([facility])        CONSULTS   OSITO Castano/Dr. Francisco - (Tele)Neurology    PROCEDURES PERFORMED  MRA Head/Neck:  FINDINGS:    RIGHT INTERNAL CAROTID ARTERY:  No acute findings.  Intracranial  segment is patent with no significant stenosis.  No aneurysm.    RIGHT ANTERIOR CEREBRAL ARTERY:  Unremarkable.  No occlusion or  significant stenosis.  No aneurysm.    RIGHT MIDDLE CEREBRAL ARTERY:  Unremarkable.  No occlusion or  significant stenosis.  No aneurysm.    RIGHT POSTERIOR CEREBRAL ARTERY:  Unremarkable.  No occlusion or  significant stenosis.  No aneurysm.    RIGHT VERTEBRAL ARTERY:  Unremarkable as visualized.       LEFT INTERNAL CAROTID ARTERY:  No acute findings.  Intracranial  segment is patent with no significant stenosis.  No aneurysm.    LEFT ANTERIOR CEREBRAL ARTERY:  Unremarkable.  No occlusion or  significant stenosis.  No aneurysm.    LEFT MIDDLE CEREBRAL ARTERY:  Unremarkable.  No occlusion or  significant stenosis.  No aneurysm.    LEFT POSTERIOR CEREBRAL ARTERY:  Unremarkable.   No occlusion or  significant stenosis.  No aneurysm.    LEFT VERTEBRAL ARTERY:  Unremarkable as visualized.       BASILAR ARTERY:  Unremarkable.  No occlusion or significant stenosis.   No aneurysm.     IMPRESSION:  Unremarkable head/brain MRA.    MRI brain without contrast:  FINDINGS:    BRAIN:  Unremarkable.  No mass.  No hemorrhage.  No acute infarct.    VENTRICLES:  Unremarkable.  No ventriculomegaly.    BONES/JOINTS:  Unremarkable.    SINUSES:  Unremarkable as visualized.  No acute sinusitis.    MASTOID AIR CELLS:  Unremarkable as visualized.  No mastoid effusion.    ORBITS:  Unremarkable as visualized.     IMPRESSION:    No acute findings in the head/brain.    Echocardiogram:  Interpretation Summary    · Left ventricular wall thickness is consistent with mild concentric hypertrophy.  · Left ventricular ejection fraction appears to be 66 - 70%. Left ventricular systolic function is normal.  · Left ventricular diastolic function is consistent with (grade I) impaired relaxation.  · The cardiac chamber dimensions are normal.  · No significant valvular abnormality is detected.  · There is no evidence of pericardial effusion.    EEG:     SUMMARY:     Normal EEG for age in the awake and asleep states     No focal features or epileptiform activity are seen     IMPRESSION:     Normal study    HOSPITAL COURSE  Patient is a 67 y.o. male presented to [facility] complaining of left sided numbness with dysarthria.  Please see the admitting history and physical for further details.      Patient was placed on telemetry observation. He was evaluated by (tele)neurology. Patient underwent extensive imaging to include CT, MRI, MRAs, echocardiogram and eeg. Patient was not found to have an acute cerebrovascular event.  For risk factor stratification, the patient's hemoglobin A1c was obtained and found to be well controlled at 6.3%.  Patient's lipid panel was also obtained and patient's LDL was slightly uncontrolled at  "84, HDL slightly low at 29 and triglycerides slightly elevated at 177.  Patient's blood pressure was found to be significantly elevated during his hospitalization.  His blood pressure improved to approximately 170s over 100s upon discharge.  Patient was encouraged to monitor his blood pressure and heart rate readings multiple times per day and to keep a log of his blood pressure readings to follow-up with his primary care provider upon discharge for further instruction regarding blood pressure control.  The patient was seen by physical, occupational and speech therapy during his hospitalization as well and was not found to need any further services.  The patient's left-sided numbness had completely resolved by the time of discharge.  The patient was ambulatory and tolerating his diet.  Patient was discharged with aspirin and a 21-day course of Plavix. His omeprazole was converted to pantoprazole in the setting of plavix therapy.  It was felt the patient had maximized the benefit of his hospital stay.  The patient was discharged home in a hemodynamically stable condition.      CONDITION ON DISCHARGE  Stable.      VITAL SIGNS  /100 (BP Location: Right arm, Patient Position: Lying)   Pulse 79   Temp 98 °F (36.7 °C) (Oral)   Resp 18   Ht 182.9 cm (72\")   Wt 101 kg (222 lb 3.2 oz)   SpO2 95%   BMI 30.14 kg/m²   Objective:  General Appearance:  Comfortable, well-appearing and in no acute distress.    Vital signs: (most recent): Blood pressure 172/100, pulse 79, temperature 98 °F (36.7 °C), temperature source Oral, resp. rate 18, height 182.9 cm (72\"), weight 101 kg (222 lb 3.2 oz), SpO2 95 %.  Vital signs are normal.    HEENT: Normal HEENT exam.    Lungs:  Normal effort.  Breath sounds clear to auscultation.  No rales, wheezes or rhonchi.    Heart: Normal rate.  S1 normal and S2 normal.  No murmur, gallop or friction rub.   Chest: Symmetric chest wall expansion.   Abdomen: Abdomen is soft and non-distended.  " Bowel sounds are normal.   There is no abdominal tenderness.     Extremities: Normal range of motion.  There is no deformity or dependent edema.    Pulses: Distal pulses are intact.    Neurological: Patient is alert and oriented to person, place and time.  Normal strength.    Skin:  Warm and dry.  No rash or ulceration.             DISCHARGE DISPOSITION   Home or Self Care    DISCHARGE MEDICATIONS     Discharge Medications      New Medications      Instructions Start Date   aspirin 81 MG chewable tablet   81 mg, Oral, Daily      atorvastatin 40 MG tablet  Commonly known as: LIPITOR   40 mg, Oral, Nightly      carvedilol 12.5 MG tablet  Commonly known as: COREG   12.5 mg, Oral, 2 Times Daily With Meals      clopidogrel 75 MG tablet  Commonly known as: PLAVIX   75 mg, Oral, Daily      pantoprazole 40 MG EC tablet  Commonly known as: PROTONIX  Replaces: omeprazole 20 MG capsule   40 mg, Oral, Every Morning   Start Date: June 4, 2022        Changes to Medications      Instructions Start Date   hydrALAZINE 25 MG tablet  Commonly known as: APRESOLINE  What changed:   · when to take this  · reasons to take this   25 mg, Oral, 3 Times Daily PRN      lisinopril 40 MG tablet  Commonly known as: GÉNESISZESTRIL  What changed:   · medication strength  · how much to take  · when to take this   40 mg, Oral, Every 24 Hours Scheduled         Continue These Medications      Instructions Start Date   albuterol sulfate  (90 Base) MCG/ACT inhaler  Commonly known as: PROVENTIL HFA;VENTOLIN HFA;PROAIR HFA   2 puffs, Inhalation, Every 4 Hours PRN      Breztri Aerosphere 160-9-4.8 MCG/ACT aerosol inhaler  Generic drug: Budeson-Glycopyrrol-Formoterol   2 puffs, Inhalation, 2 Times Daily      glyburide 5 MG tablet  Commonly known as: DIAbeta   5 mg, Oral, Daily With Breakfast      ipratropium-albuterol 0.5-2.5 mg/3 ml nebulizer  Commonly known as: DUO-NEB   3 mL, Nebulization, Every 4 Hours PRN      ipratropium-albuterol 0.5-2.5 mg/3  ml nebulizer  Commonly known as: DUO-NEB   3 mL, Nebulization, 4 Times Daily PRN      metFORMIN 1000 MG tablet  Commonly known as: Glucophage   1,000 mg, Oral, 2 Times Daily With Meals      roflumilast 500 MCG tablet tablet  Commonly known as: Daliresp   500 mcg, Oral, Daily      tiZANidine 4 MG tablet  Commonly known as: ZANAFLEX   8 mg, Oral, Nightly PRN         Stop These Medications    hydroCHLOROthiazide 25 MG tablet  Commonly known as: HYDRODIURIL     levETIRAcetam 750 MG tablet  Commonly known as: KEPPRA     nebivolol 5 MG tablet  Commonly known as: BYSTOLIC     omeprazole 20 MG capsule  Commonly known as: priLOSEC  Replaced by: pantoprazole 40 MG EC tablet            DISCHARGE DIET  cardiac diet, diabetic diet  Dietary Orders (From admission, onward)     Start     Ordered    06/02/22 0322  Diet Regular; Consistent Carbohydrate  Diet Effective Now        Question Answer Comment   Diet Texture / Consistency Regular    Common Modifiers Consistent Carbohydrate        06/02/22 0321                ACTIVITY AT DISCHARGE   activity as tolerated and measure blood pressure and keep log    Future Appointments   Date Time Provider Department Center   6/20/2022  2:00 PM Cecile Martin PA-C [facility]   8/11/2022  2:30 PM Mk Wilkins MD [facility]   1/11/2023 11:30 AM Holly Alvarez APRN [facility]       Additional Instructions for the Follow-ups that You Need to Schedule     Call MD With Problems / Concerns   As directed      Instructions: ongoing uncontrolled blood pressures    Order Comments: Instructions: ongoing uncontrolled blood pressures          Discharge Follow-up with PCP   As directed       Currently Documented PCP:    Naomi Cárdenas APRN    PCP Phone Number:    [phone]     Follow Up Details: 1 week with BMP; hospital follow up tia, uncontrolled hypertension               TEST  RESULTS PENDING AT DISCHARGE       The 10-year ASCVD risk score (Edgar TANIA Jr., et al.,  2013) is: 48.8%    Values used to calculate the score:      Age: 67 years      Sex: Male      Is Non- : No      Diabetic: Yes      Tobacco smoker: No      Systolic Blood Pressure: 172 mmHg      Is BP treated: Yes      HDL Cholesterol: 29 mg/dL      Total Cholesterol: 144 mg/dL     Antonella Bull DO  06/03/22  11:27 EDT      Time: greater than 30 minutes.

## 2022-06-04 LAB
BH BB BLOOD EXPIRATION DATE: NORMAL
BH BB BLOOD EXPIRATION DATE: NORMAL
BH BB BLOOD TYPE BARCODE: 5100
BH BB BLOOD TYPE BARCODE: 5100
BH BB DISPENSE STATUS: NORMAL
BH BB DISPENSE STATUS: NORMAL
BH BB PRODUCT CODE: NORMAL
BH BB PRODUCT CODE: NORMAL
BH BB UNIT NUMBER: NORMAL
BH BB UNIT NUMBER: NORMAL
CROSSMATCH INTERPRETATION: NORMAL
CROSSMATCH INTERPRETATION: NORMAL
UNIT  ABO: NORMAL
UNIT  ABO: NORMAL
UNIT  RH: NORMAL
UNIT  RH: NORMAL

## 2022-06-04 PROCEDURE — 82962 GLUCOSE BLOOD TEST: CPT

## 2022-06-04 PROCEDURE — 99284 EMERGENCY DEPT VISIT MOD MDM: CPT

## 2022-06-04 PROCEDURE — 36415 COLL VENOUS BLD VENIPUNCTURE: CPT

## 2022-06-04 PROCEDURE — 93005 ELECTROCARDIOGRAM TRACING: CPT | Performed by: FAMILY MEDICINE

## 2022-06-04 RX ORDER — SODIUM CHLORIDE 0.9 % (FLUSH) 0.9 %
10 SYRINGE (ML) INJECTION AS NEEDED
Status: DISCONTINUED | OUTPATIENT
Start: 2022-06-04 | End: 2022-06-05 | Stop reason: HOSPADM

## 2022-06-04 NOTE — OUTREACH NOTE
Prep Survey    Flowsheet Row Responses   [facility] facility patient discharged from? [place]   Is LACE score < 7 ? No   Emergency Room discharge w/ pulse ox? No   Eligibility [facility]   Hospital [place]   Date of Admission 06/01/22   Date of Discharge 06/03/22   Discharge Disposition Home or Self Care   Discharge diagnosis [place] TIAs, Hx tremors, uncontrolled HTN, DM   Does the patient have one of the following disease processes/diagnoses(primary or secondary)? Stroke (TIA)   Does the patient have Home health ordered? No   Is there a DME ordered? No   Prep survey completed? Yes          NINI BURROWS - Registered Nurse

## 2022-06-05 ENCOUNTER — APPOINTMENT (OUTPATIENT)
Dept: GENERAL RADIOLOGY | Facility: HOSPITAL | Age: 67
End: 2022-06-05

## 2022-06-05 ENCOUNTER — HOSPITAL ENCOUNTER (EMERGENCY)
Facility: HOSPITAL | Age: 67
Discharge: HOME | End: 2022-06-05
Attending: FAMILY MEDICINE | Admitting: EMERGENCY MEDICINE

## 2022-06-05 ENCOUNTER — APPOINTMENT (OUTPATIENT)
Dept: CT IMAGING | Facility: HOSPITAL | Age: 67
End: 2022-06-05

## 2022-06-05 VITALS
TEMPERATURE: 98 F | SYSTOLIC BLOOD PRESSURE: 122 MMHG | WEIGHT: 215 LBS | RESPIRATION RATE: 16 BRPM | BODY MASS INDEX: 29.12 KG/M2 | HEIGHT: 72 IN | DIASTOLIC BLOOD PRESSURE: 76 MMHG | HEART RATE: 89 BPM | OXYGEN SATURATION: 96 %

## 2022-06-05 DIAGNOSIS — R20.0 LEFT SIDED NUMBNESS: Primary | ICD-10-CM

## 2022-06-05 LAB
ABO GROUP BLD: NORMAL
ALBUMIN SERPL-MCNC: 4.01 G/DL (ref 3.5–5.2)
ALBUMIN/GLOB SERPL: 1.8 G/DL
ALP SERPL-CCNC: 100 U/L (ref 39–117)
ALT SERPL W P-5'-P-CCNC: 22 U/L (ref 1–41)
ANION GAP SERPL CALCULATED.3IONS-SCNC: 11.1 MMOL/L (ref 5–15)
AST SERPL-CCNC: 14 U/L (ref 1–40)
BASOPHILS # BLD AUTO: 0.05 10*3/MM3 (ref 0–0.2)
BASOPHILS NFR BLD AUTO: 0.6 % (ref 0–1.5)
BILIRUB SERPL-MCNC: 0.4 MG/DL (ref 0–1.2)
BLD GP AB SCN SERPL QL: POSITIVE
BUN SERPL-MCNC: 19 MG/DL (ref 8–23)
BUN/CREAT SERPL: 14.8 (ref 7–25)
CALCIUM SPEC-SCNC: 9.3 MG/DL (ref 8.6–10.5)
CHLORIDE SERPL-SCNC: 101 MMOL/L (ref 98–107)
CO2 SERPL-SCNC: 24.9 MMOL/L (ref 22–29)
CREAT SERPL-MCNC: 1.28 MG/DL (ref 0.76–1.27)
DEPRECATED RDW RBC AUTO: 47.4 FL (ref 37–54)
EGFRCR SERPLBLD CKD-EPI 2021: 61.3 ML/MIN/1.73
EOSINOPHIL # BLD AUTO: 0.28 10*3/MM3 (ref 0–0.4)
EOSINOPHIL NFR BLD AUTO: 3.5 % (ref 0.3–6.2)
ERYTHROCYTE [DISTWIDTH] IN BLOOD BY AUTOMATED COUNT: 14.4 % (ref 12.3–15.4)
GLOBULIN UR ELPH-MCNC: 2.2 GM/DL
GLUCOSE BLDC GLUCOMTR-MCNC: 195 MG/DL (ref 70–130)
GLUCOSE SERPL-MCNC: 196 MG/DL (ref 65–99)
HCT VFR BLD AUTO: 38.5 % (ref 37.5–51)
HGB BLD-MCNC: 12.7 G/DL (ref 13–17.7)
HOLD SPECIMEN: NORMAL
HOLD SPECIMEN: NORMAL
IMM GRANULOCYTES # BLD AUTO: 0.04 10*3/MM3 (ref 0–0.05)
IMM GRANULOCYTES NFR BLD AUTO: 0.5 % (ref 0–0.5)
INR PPP: 0.98 (ref 0.9–1.1)
LYMPHOCYTES # BLD AUTO: 1.68 10*3/MM3 (ref 0.7–3.1)
LYMPHOCYTES NFR BLD AUTO: 20.8 % (ref 19.6–45.3)
MAGNESIUM SERPL-MCNC: 2 MG/DL (ref 1.6–2.4)
MCH RBC QN AUTO: 29.9 PG (ref 26.6–33)
MCHC RBC AUTO-ENTMCNC: 33 G/DL (ref 31.5–35.7)
MCV RBC AUTO: 90.6 FL (ref 79–97)
MONOCYTES # BLD AUTO: 0.78 10*3/MM3 (ref 0.1–0.9)
MONOCYTES NFR BLD AUTO: 9.7 % (ref 5–12)
NEUTROPHILS NFR BLD AUTO: 5.23 10*3/MM3 (ref 1.7–7)
NEUTROPHILS NFR BLD AUTO: 64.9 % (ref 42.7–76)
NONSPECIFIC GEL REACTION: NORMAL
NRBC BLD AUTO-RTO: 0 /100 WBC (ref 0–0.2)
PLATELET # BLD AUTO: 243 10*3/MM3 (ref 140–450)
PMV BLD AUTO: 11.2 FL (ref 6–12)
POTASSIUM SERPL-SCNC: 4.3 MMOL/L (ref 3.5–5.2)
PROT SERPL-MCNC: 6.2 G/DL (ref 6–8.5)
PROTHROMBIN TIME: 13.2 SECONDS (ref 12.1–14.7)
QT INTERVAL: 406 MS
QTC INTERVAL: 474 MS
RBC # BLD AUTO: 4.25 10*6/MM3 (ref 4.14–5.8)
RH BLD: POSITIVE
SODIUM SERPL-SCNC: 137 MMOL/L (ref 136–145)
T&S EXPIRATION DATE: NORMAL
T4 FREE SERPL-MCNC: 1.27 NG/DL (ref 0.93–1.7)
TROPONIN T SERPL-MCNC: 0.01 NG/ML (ref 0–0.03)
TSH SERPL DL<=0.05 MIU/L-ACNC: 1.6 UIU/ML (ref 0.27–4.2)
WBC NRBC COR # BLD: 8.06 10*3/MM3 (ref 3.4–10.8)
WHOLE BLOOD HOLD COAG: NORMAL
WHOLE BLOOD HOLD SPECIMEN: NORMAL

## 2022-06-05 PROCEDURE — 86900 BLOOD TYPING SEROLOGIC ABO: CPT | Performed by: FAMILY MEDICINE

## 2022-06-05 PROCEDURE — 84484 ASSAY OF TROPONIN QUANT: CPT | Performed by: FAMILY MEDICINE

## 2022-06-05 PROCEDURE — 83735 ASSAY OF MAGNESIUM: CPT | Performed by: FAMILY MEDICINE

## 2022-06-05 PROCEDURE — 86022 PLATELET ANTIBODIES: CPT | Performed by: FAMILY MEDICINE

## 2022-06-05 PROCEDURE — 80053 COMPREHEN METABOLIC PANEL: CPT | Performed by: FAMILY MEDICINE

## 2022-06-05 PROCEDURE — 93010 ELECTROCARDIOGRAM REPORT: CPT | Performed by: INTERNAL MEDICINE

## 2022-06-05 PROCEDURE — 85025 COMPLETE CBC W/AUTO DIFF WBC: CPT | Performed by: FAMILY MEDICINE

## 2022-06-05 PROCEDURE — 70450 CT HEAD/BRAIN W/O DYE: CPT

## 2022-06-05 PROCEDURE — 93005 ELECTROCARDIOGRAM TRACING: CPT | Performed by: FAMILY MEDICINE

## 2022-06-05 PROCEDURE — 86850 RBC ANTIBODY SCREEN: CPT | Performed by: FAMILY MEDICINE

## 2022-06-05 PROCEDURE — 36415 COLL VENOUS BLD VENIPUNCTURE: CPT

## 2022-06-05 PROCEDURE — 85610 PROTHROMBIN TIME: CPT | Performed by: FAMILY MEDICINE

## 2022-06-05 PROCEDURE — 84439 ASSAY OF FREE THYROXINE: CPT | Performed by: FAMILY MEDICINE

## 2022-06-05 PROCEDURE — 99214 OFFICE O/P EST MOD 30 MIN: CPT | Performed by: PSYCHIATRY & NEUROLOGY

## 2022-06-05 PROCEDURE — 86870 RBC ANTIBODY IDENTIFICATION: CPT | Performed by: FAMILY MEDICINE

## 2022-06-05 PROCEDURE — 86901 BLOOD TYPING SEROLOGIC RH(D): CPT | Performed by: FAMILY MEDICINE

## 2022-06-05 PROCEDURE — 71045 X-RAY EXAM CHEST 1 VIEW: CPT

## 2022-06-05 PROCEDURE — 84443 ASSAY THYROID STIM HORMONE: CPT | Performed by: FAMILY MEDICINE

## 2022-06-05 NOTE — CONSULTS
Teleneurology Consultation Note    Date of Consultation: 6/5/2022  Requesting Attending: Graham Borden MD  Chief Complaint/Reason for consultation:   Location: Emergency Room  Date/Time Telestroke doctor on video: 6/5/2022  0020 ET    Assessment and Plan:  Transfer Recommendation by Teleneurologist to higher level of care: No    Personal review of CNS imaging (Official report by radiology pending)   Head CT scan was unremarkable.    Impression:   Intermittent Left sided numbness/tingling possible hypertensive related episodes vs small vessel pathology.     Recommendation:   - Cont ASA 81 mg QD  - Cont. plavix 75 mg QD for 3 weeks  - Cont. atorvasatin 40 mg QD  - No need for further neurolgical imaging at this point  - Recommend obtaining Plavix reposne time (Plavix P2Y12 reaction) to evaluate for plavix efficacy if available.   - encouraged to monitor BP at home.   - Advice about the need to present to the ED for any acute change or new stroke like symptoms.     Octavio Huynh MD  We will sign off.  If you have any questions about the patient recommendations, please call [phone] at anytime and ask to speak with the neurologist on call. Press 1 for an emergent consult and 2 for a non-emergent consult.    History of Present Illness:   Maximo Kwon is a 67 y.o. male with history of hypertension, COPD, dyslipidemia, diabetes and recent TIA versus hypertensive urgency, was discharged yesterday and returned back today with similar symptoms of left sided tingling/numbness.    During his hospitalization, he had a head and neck CTA that showed suspicious left common carotid soft thrombus, MRA of the head and neck did not show a clear pathology at that location.  Brain MRI was negative for stroke.  Rest of stroke work-up was unremarkable with transthoracic echo with EF at 66-70%.  LDL at 84.  He was loaded with Plavix 300 mg and was placed on dual antiplatelet therapy.  Atorvastatin 80 mg daily.    Since  "discharge yesterday, he had few episodes of \"lesser intensity\" of left upper and lower extremity tingling/numbness.  He measured his blood pressure this time and reported to be in the 110s mmHg.  On prior admission his systolic blood pressure was in the early 200s mmHg.    In the ED: Head CT scan was unremarkable.  Patient was back at baseline with no sensory deficits or complaint.  He denied and his wife any altered mental status episode associated with these numbness/tingling sensations.    Review of System:   All 12 points of review of system has been obtained and pertinent positive mentioned in HPI.     Medical History:  Pertinent past medical history, surgical history, family history and social history has been reviewed.     Allergies:   No Known Allergies    Neurological Exam:     [facility] time: 0020    1a Level of consciousness 0  1b LOC questions 0  1c LOC commands 0  2 Best Gaze 0  3 Visual 0  4 Facial Palsy 0  5a Motor left arm 0  5b Motor right arm 0  6a Motor left leg 0  6b Motor right leg 0  7 Limb ataxia 0  8 Sensory 0  9 Best language 0  10 Dysarthria 0  11 Extinction and inattention 0   Total 0    General Appearance: In no apparent distress    Neurological:  Mental status: Awake, Alert and Oriented to name, date, location and birthdate. Following commands.    Cranial Nerves:  Visual fields: Full to confrontation bilaterally  Eye motility: Full motility both eyes all directions  Facial sensation: No sensory loss to light touch in face  Facial motor: No facial droop or facial asymmetry  Tongue: No tongue deviation    Motor: Antigravity in arms and legs and moving them spontaneously and to command.    Sensation: No sensory loss to light touch in face, arms or legs B/L    Coordination:  Finger to nose: Intact  Heel to shin: Intact  Gait: Not tested    Lab Results:  Lab Results   Component Value Date    HGBA1C 6.30 (H) 06/01/2022     Lab Results   Component Value Date    WBC 8.06 06/05/2022    HGB 12.7 (L) " 06/05/2022    HCT 38.5 06/05/2022    MCV 90.6 06/05/2022     06/05/2022     Lab Results   Component Value Date    GLUCOSEU 250 mg/dL (1+) (A) 07/09/2021    CALCIUM 8.5 (L) 06/01/2022     (L) 06/01/2022    K 4.1 06/01/2022    CO2 25.2 06/01/2022    CL 99 06/01/2022    BUN 14 06/01/2022     Lab Results   Component Value Date    INR 0.98 06/05/2022    INR 1.07 06/01/2022    INR 0.91 05/14/2019    PROTIME 13.2 06/05/2022    PROTIME 14.1 06/01/2022    PROTIME 12.7 05/14/2019     Lab Results   Component Value Date    PTT 33.3 06/01/2022     Lab Results   Component Value Date    TIUVZEKW88 230 02/28/2022     Lab Results   Component Value Date    TSH 0.709 03/09/2022         Teleneurology Patient Verification & Consent    I have proceeded with this evaluation at the direct request of the referring practitioner as there is felt to be no appropriate specialist available at bedside to perform these evaluations. The [facility] ([facility]) practitioner has reported to me this is the correct patient and has obtained verbal consent from the patient/surrogate to perform his voluntary telemedicine evaluation (including obtaining history, performing examination and reviewing data provided by the patient and/or originating [facility] care provider). I attest that I introduced myself to the patient, provided my credentials, disclosed my location, and determined that, based on review of the patient's chart and discussion with the patient's primary team, telemedicine via a HIPAA-compliant, real-time, face-to-face, two-way, interactive audio and video platform is an appropriate and effective means of providing this service.  The patient/surrogate has the right to refuse this evaluation as they have been explained risks (including potential loss of confidentiality), benefits, alternatives, and the potential need for subsequent face to face care. Patient/surrogate understands that there is a risk of medical  inaccuracies given that our recommendations will be made based on reported data (and we must therefore assume this information is accurate). Knowing that there is a risk that this information is not reported accurately, and that the telemedicine video, audio, or data feed may be incomplete, the patient agrees to proceed with evaluation and holds us harmless knowing these risks. In this evaluation, we will be providing recommendations only. The ultimate decision to follow, or not follow, these recommendations will be left to the bedside treating/requesting practitioner. The patient/surrogate has been notified that other healthcare professionals (including technical personnel) may be involved in this audio-video evaluation. All laws concerning confidentiality and patient access to medical records and copies of medical records apply to telemedicine. The patient/surrogate has received the originating site's Health Notice of Privacy Practices.    Medical Data Reviewed:   1. Data reviewed include clinical labs, radiology, medical test;  2. Obtaining/reviewing old medical records;  3. Obtaining case history from another source;  4. Independent review of CNS images    IOctavio MD, saw patient on 6/5/2022 for an initial in-patient or emergency room telemedicine face-to-face consult using interactive technology for 25 minutes. The location of the patient was at [facility]. My location was tele. I was assisted with the exam by Nurse

## 2022-06-05 NOTE — ED PROVIDER NOTES
Subjective   67-year-old male with history of diabetes hypertension presents the emergency room with complaints of left sided numbness.  Patient reports that he was recently admitted to the hospital for strokelike symptoms.  He states he was diagnosed with TIA.  Patient states that today around 2300 he started having left-sided facial numbness as well as left arm and lower extremity numbness.  He states he felt a little weak but symptoms from his weakness did improve.  He states his numbness is slightly improving as well.  He denies chest pain shortness of breath nausea vomiting.  He reports has been taking his medications as prescribed since being discharged in the hospital.  Due to numbness he came to emergency room for evaluation.      Stroke  Presenting symptoms: sensory loss    Last known well:  2300  Timing:  Constant  Progression:  Improving  Associated symptoms: paresthesias    Associated symptoms: no chest pain, no difficulty swallowing, no dizziness, no facial pain, no fever, no nausea, no seizures and no vomiting        Review of Systems   Constitutional: Negative for fever.   HENT: Negative for trouble swallowing.    Cardiovascular: Negative for chest pain.   Gastrointestinal: Negative for nausea and vomiting.   Neurological: Positive for numbness and paresthesias. Negative for dizziness and seizures.   All other systems reviewed and are negative.      Past Medical History:   Diagnosis Date   • Arthritis    • Chronic diastolic heart failure (HCC) 08/19/2021   • COPD (chronic obstructive pulmonary disease) (HCC)    • Depression    • Diverticulitis    • Emphysema (subcutaneous) (surgical) resulting from a procedure    • GERD (gastroesophageal reflux disease)    • Neuropathy    • Pneumothorax     Left, status post chest tube   • Shortness of breath    • Spinal stenosis    • Type 2 diabetes mellitus (HCC)        No Known Allergies    Past Surgical History:   Procedure Laterality Date   • BRONCHOSCOPY N/A  2019    Procedure: Bronchoscopy with Transbronchial Biopsy with Fluoroscopy;  Surgeon: Eladio Bethea MD;  Location: [facility] OR;  Service: Pulmonary   • CHEST TUBE INSERTION Left     Pneumothorax   • COLON RESECTION  2016    had colostomy and reveresed back    • COLONOSCOPY     • COLOSTOMY CLOSURE     • LUNG BIOPSY Right     (non cancerous)   • OTHER SURGICAL HISTORY Left     LEFT ELBOW SURGERY       Family History   Problem Relation Age of Onset   • Alzheimer's disease Mother    • Cancer Father         THROAT CANCER   • Diabetes Sister    • Diabetes Brother        Social History     Socioeconomic History   • Marital status:    • Number of children: 0   Tobacco Use   • Smoking status: Former Smoker     Packs/day: 1.00     Years: 30.00     Pack years: 30.00     Types: Cigarettes     Quit date:      Years since quittin.4   • Smokeless tobacco: Never Used   Vaping Use   • Vaping Use: Never used   Substance and Sexual Activity   • Alcohol use: No   • Drug use: No   • Sexual activity: Defer     Birth control/protection: Other           Objective   Physical Exam  Vitals and nursing note reviewed.   Constitutional:       Appearance: He is not ill-appearing.   HENT:      Head: Normocephalic and atraumatic.      Mouth/Throat:      Mouth: Mucous membranes are moist.   Eyes:      Extraocular Movements: Extraocular movements intact.      Pupils: Pupils are equal, round, and reactive to light.      Comments: No gaze palsy no nystagmus.   Cardiovascular:      Rate and Rhythm: Normal rate and regular rhythm.   Pulmonary:      Effort: Pulmonary effort is normal.      Breath sounds: Normal breath sounds.   Abdominal:      General: Bowel sounds are normal.      Palpations: Abdomen is soft.      Tenderness: There is no abdominal tenderness. There is no guarding.   Musculoskeletal:      Cervical back: Neck supple.   Neurological:      General: No focal deficit present.      Mental Status: He is alert.       Comments: Symmetric sensation light touch.  No pronator drift no lower limb drift no facial asymmetry no expressive or receptive aphasia normal speech.  Normal finger-nose normal heel shin.   Psychiatric:         Mood and Affect: Mood normal.         Procedures  CT Head Without Contrast Stroke Protocol   Final Result   Normal, negative unenhanced head CT.               Signer Name: Chad Seaman MD    Signed: 6/5/2022 12:13 AM    Workstation Name: [facility]-     [facility]      XR Chest 1 View    (Results Pending)     Results for orders placed or performed during the hospital encounter of 06/05/22   Comprehensive Metabolic Panel    Specimen: Arm, Right; Blood   Result Value Ref Range    Glucose 196 (H) 65 - 99 mg/dL    BUN 19 8 - 23 mg/dL    Creatinine 1.28 (H) 0.76 - 1.27 mg/dL    Sodium 137 136 - 145 mmol/L    Potassium 4.3 3.5 - 5.2 mmol/L    Chloride 101 98 - 107 mmol/L    CO2 24.9 22.0 - 29.0 mmol/L    Calcium 9.3 8.6 - 10.5 mg/dL    Total Protein 6.2 6.0 - 8.5 g/dL    Albumin 4.01 3.50 - 5.20 g/dL    ALT (SGPT) 22 1 - 41 U/L    AST (SGOT) 14 1 - 40 U/L    Alkaline Phosphatase 100 39 - 117 U/L    Total Bilirubin 0.4 0.0 - 1.2 mg/dL    Globulin 2.2 gm/dL    A/G Ratio 1.8 g/dL    BUN/Creatinine Ratio 14.8 7.0 - 25.0    Anion Gap 11.1 5.0 - 15.0 mmol/L    eGFR 61.3 >60.0 mL/min/1.73   Protime-INR    Specimen: Arm, Right; Blood   Result Value Ref Range    Protime 13.2 12.1 - 14.7 Seconds    INR 0.98 0.90 - 1.10   Troponin    Specimen: Arm, Right; Blood   Result Value Ref Range    Troponin T 0.012 0.000 - 0.030 ng/mL   CBC Auto Differential    Specimen: Arm, Right; Blood   Result Value Ref Range    WBC 8.06 3.40 - 10.80 10*3/mm3    RBC 4.25 4.14 - 5.80 10*6/mm3    Hemoglobin 12.7 (L) 13.0 - 17.7 g/dL    Hematocrit 38.5 37.5 - 51.0 %    MCV 90.6 79.0 - 97.0 fL    MCH 29.9 26.6 - 33.0 pg    MCHC 33.0 31.5 - 35.7 g/dL    RDW 14.4 12.3 - 15.4 %    RDW-SD 47.4 37.0 - 54.0 fl    MPV 11.2 6.0 - 12.0  fL    Platelets 243 140 - 450 10*3/mm3    Neutrophil % 64.9 42.7 - 76.0 %    Lymphocyte % 20.8 19.6 - 45.3 %    Monocyte % 9.7 5.0 - 12.0 %    Eosinophil % 3.5 0.3 - 6.2 %    Basophil % 0.6 0.0 - 1.5 %    Immature Grans % 0.5 0.0 - 0.5 %    Neutrophils, Absolute 5.23 1.70 - 7.00 10*3/mm3    Lymphocytes, Absolute 1.68 0.70 - 3.10 10*3/mm3    Monocytes, Absolute 0.78 0.10 - 0.90 10*3/mm3    Eosinophils, Absolute 0.28 0.00 - 0.40 10*3/mm3    Basophils, Absolute 0.05 0.00 - 0.20 10*3/mm3    Immature Grans, Absolute 0.04 0.00 - 0.05 10*3/mm3    nRBC 0.0 0.0 - 0.2 /100 WBC   Magnesium    Specimen: Arm, Right; Blood   Result Value Ref Range    Magnesium 2.0 1.6 - 2.4 mg/dL   TSH    Specimen: Arm, Right; Blood   Result Value Ref Range    TSH 1.600 0.270 - 4.200 uIU/mL   T4, Free    Specimen: Arm, Right; Blood   Result Value Ref Range    Free T4 1.27 0.93 - 1.70 ng/dL   POC Glucose Once    Specimen: Blood   Result Value Ref Range    Glucose 195 (H) 70 - 130 mg/dL   Type & Screen    Specimen: Arm, Right; Blood   Result Value Ref Range    ABO Type O     RH type Positive     Antibody Screen Positive     T&S Expiration Date 6/8/2022 11:59:59 PM    Green Top (Gel)   Result Value Ref Range    Extra Tube Hold for add-ons.    Lavender Top   Result Value Ref Range    Extra Tube hold for add-on    Gold Top - SST   Result Value Ref Range    Extra Tube Hold for add-ons.    Light Blue Top   Result Value Ref Range    Extra Tube Hold for add-ons.                 ED Course  ED Course as of 06/05/22 0324   Sun Jun 05, 2022   0019 CBC is unremarkable.  Patient is noted to have NIH stroke scale of 0.  Patient was evaluated upon his presentation to the emergency room.  Neurology Dr. Huynh was contacted upon initial presentation to the emergency room as well while getting CT scan of head.  Neurology is currently evaluating patient in the room with telemetry neuro. [BB]   0033 Patient's coags unremarkable. [BB]   0042 Patient creatinine 1.28  glucose 196.  Troponin unremarkable at 0.012. [BB]   0053 Patient was noted to have recurrence of symptoms.  He states that he is having left-sided numbness again.  Have reassessed patient and patient's NIH stroke scale remains 0. [BB]   0136 Have reviewed neurology recommendations.  They feel the patient needs no further neuro imaging. [BB]   0202 Have discussed case with dr. Stewart who has assumed care [BB]   0318 Patient voices that he is feeling well and wants to go home.  Diagnostic work-up is unrevealing.  Stroke neurology recommends outpatient management. [CM]      ED Course User Index  [BB] Graham Borden MD  [CM] Bhavesh Stewart MD                                                 [facility]    Final diagnoses:   Left sided numbness       ED Disposition  ED Disposition     ED Disposition   Discharge    Condition   Stable    Comment   --             Naomi Cárdenas, APRN  [address] [place] [address]  [phone]    Go in 2 days      [facility] Emergency Department  [address] [facility] [address] [phone]  Go to   If symptoms worsen    Westley Narayan MD  [address]  [facility] [address] [phone]    Go to   Call Monday morning for first available appointment         Medication List      No changes were made to your prescriptions during this visit.     Please note that portions of this note were completed with a voice recognition program.        Bhavesh Stewart MD  06/05/22 4527

## 2022-06-05 NOTE — DISCHARGE INSTRUCTIONS
Home care family.  Take your medications as prescribed.  See your family doctor in the office on Monday.  Call Dr. Narayan's office Monday morning to schedule his first available appointment.  Return to the emergency department right away if symptoms worsen/any problems.

## 2022-06-06 ENCOUNTER — TRANSITIONAL CARE MANAGEMENT TELEPHONE ENCOUNTER (OUTPATIENT)
Dept: CALL CENTER | Facility: HOSPITAL | Age: 67
End: 2022-06-06

## 2022-06-06 NOTE — OUTREACH NOTE
"Call Center TCM Note    Flowsheet Row Responses   [facility] patient discharged from? [facility]   Does the patient have one of the following disease processes/diagnoses(primary or secondary)? Stroke (TIA)   TCM attempt successful? Yes   Call start time 1128   Call end time 1145   Discharge diagnosis Crescendo TIAs, Hx tremors, uncontrolled HTN, DM   Meds reviewed with patient/caregiver? Yes   Is the patient having any side effects they believe may be caused by any medication additions or changes? No   Does the patient have all medications ordered at discharge? N/A   Is the patient taking all medications as directed (includes completed medication regime)? Yes   Does the patient have a primary care provider?  Yes   Does the patient have an appointment with their PCP within 7 days of discharge? Yes   Comments regarding PCP [facility] on 6/7/22 with PCP    Has the patient kept scheduled appointments due by today? N/A   Psychosocial issues? No   Does the patient require any assistance with activities of daily living such as eating, bathing, dressing, walking, etc.? No   ADL comments left arm tingling,  lips \"feel funny\"   Does the patient have any residual symptoms from stroke/TIA? Yes   Does the patient understand the diet ordered at discharge? Yes   Did the patient receive a copy of their discharge instructions? Yes   Nursing interventions Reviewed instructions with patient   What is the patient's perception of their health status since discharge? Same  [still having frequent mini-strokes ]   Nursing interventions Nurse provided patient education   Is the patient able to teach back FAST for Stroke? Yes   Is the patient/caregiver able to teach back the risk factors for a stroke? History of TIAs, High blood pressure-goal below 120/80, Excessive alcohol intake, Physical inactivity and obesity, Diabetes   Is the patient/caregiver able to teach back signs and symptoms related to disease process for when to call PCP? " Yes   Is the patient/caregiver able to teach back signs and symptoms related to disease process for when to call 911? Yes   If the patient is a current smoker, are they able to teach back resources for cessation? Not a smoker   Is the patient/caregiver able to teach back the hierarchy of who to call/visit for symptoms/problems? PCP, Specialist, Home health nurse, Urgent Care, ED, 911 Yes   TCM call completed? Yes   Wrap up additional comments Patient feels about the same and having the same issues.  Pt encouarged to call doctor or 911 if BP remains high after recheck.  Pt is very familiar with the process/who to contact if issues arise.            Felipa Kirkland RN    6/6/2022, 11:47 EDT

## 2022-06-07 ENCOUNTER — OFFICE VISIT (OUTPATIENT)
Dept: FAMILY MEDICINE CLINIC | Facility: CLINIC | Age: 67
End: 2022-06-07

## 2022-06-07 VITALS
SYSTOLIC BLOOD PRESSURE: 180 MMHG | OXYGEN SATURATION: 97 % | HEART RATE: 73 BPM | BODY MASS INDEX: 28.39 KG/M2 | HEIGHT: 72 IN | WEIGHT: 209.6 LBS | TEMPERATURE: 96.6 F | DIASTOLIC BLOOD PRESSURE: 90 MMHG

## 2022-06-07 DIAGNOSIS — Z91.199 NONCOMPLIANCE: ICD-10-CM

## 2022-06-07 DIAGNOSIS — I10 UNCONTROLLED HYPERTENSION: ICD-10-CM

## 2022-06-07 DIAGNOSIS — G45.9 TRANSIENT ISCHEMIC ATTACK: Primary | ICD-10-CM

## 2022-06-07 DIAGNOSIS — E78.5 DYSLIPIDEMIA: ICD-10-CM

## 2022-06-07 DIAGNOSIS — K21.9 GASTROESOPHAGEAL REFLUX DISEASE, UNSPECIFIED WHETHER ESOPHAGITIS PRESENT: ICD-10-CM

## 2022-06-07 LAB — PF4 HEPARIN CMPLX IGG SERPL IA: 0.07 OD (ref 0–0.4)

## 2022-06-07 PROCEDURE — 99495 TRANSJ CARE MGMT MOD F2F 14D: CPT | Performed by: NURSE PRACTITIONER

## 2022-06-07 PROCEDURE — 1111F DSCHRG MED/CURRENT MED MERGE: CPT | Performed by: NURSE PRACTITIONER

## 2022-06-07 RX ORDER — ATORVASTATIN CALCIUM 40 MG/1
40 TABLET, FILM COATED ORAL NIGHTLY
Qty: 90 TABLET | Refills: 0 | Status: SHIPPED | OUTPATIENT
Start: 2022-06-07 | End: 2022-06-30 | Stop reason: SDDI

## 2022-06-07 RX ORDER — CLOPIDOGREL BISULFATE 75 MG/1
75 TABLET ORAL DAILY
Qty: 20 TABLET | Refills: 0 | Status: CANCELLED | OUTPATIENT
Start: 2022-06-07

## 2022-06-07 RX ORDER — PANTOPRAZOLE SODIUM 40 MG/1
40 TABLET, DELAYED RELEASE ORAL EVERY MORNING
Qty: 30 TABLET | Refills: 2 | Status: SHIPPED | OUTPATIENT
Start: 2022-06-07 | End: 2022-06-29

## 2022-06-07 RX ORDER — CARVEDILOL 12.5 MG/1
12.5 TABLET ORAL 2 TIMES DAILY WITH MEALS
Qty: 60 TABLET | Refills: 2 | Status: SHIPPED | OUTPATIENT
Start: 2022-06-07 | End: 2022-06-29 | Stop reason: SDDI

## 2022-06-07 NOTE — PROGRESS NOTES
Transitional Care Follow Up Visit  Subjective     Maximo Kwon is a 67 y.o. male who presents for a transitional care management visit.    Within 48 business hours after discharge our office contacted him via telephone to coordinate his care and needs.      I reviewed and discussed the details of that call along with the discharge summary, hospital problems, inpatient lab results, inpatient diagnostic studies, and consultation reports with Maximo.    Date of TCM Phone Call 6/3/2022   [facility]   Date of Admission 6/1/2022   Date of Discharge 6/3/2022   Discharge Disposition Home or Self Care       History of Present Illness   Course During Hospital Stay:      Presented to [facility] ER on 6/1/2022 with numbness and weakness of left face, left arm, left leg, slurred speech.  Had facial droop and dysarthria that had resolved.  Patient was admitted for TIA.  Patient had extensive imaging which included CT, MRI, MRA, echocardiogram, EEG.  Patient was not found to have an acute cerebrovascular event.  Was discharged home on 6/3/2022 with aspirin and 21-day course of Plavix.  Upon discharge, left-sided numbness, facial droop, and dysarthria that had resolved.    Patient returned to ER on 6/5/2022 for complaints of left-sided numbness. Work-up was unremarkable and neurology recommended outpatient management.  Has follow-up appointment scheduled with neurology on 6/21/2022.  Plans to keep appointment. Also reports he has an appointment with Dr. Ventura next week.     Today patient reports still having tingling in left arm but it has improved. It is weaker than the right side. Denies any other associated symptoms.     History significant for uncontrolled hypertension. Is supposed to take hydralazine as needed for SBP >160 and/or DBP >100 but reports he has not taken any since being in the hospital.  Does monitor blood pressure at home and presents with blood pressure log.     The following portions of the  patient's history were reviewed and updated as appropriate: allergies, current medications, past family history, past medical history, past social history, past surgical history and problem list.    Review of Systems   Cardiovascular: Negative for chest pain, palpitations and leg swelling.   Neurological: Positive for weakness (LUE, LLE). Negative for speech difficulty.   Hematological: Does not bruise/bleed easily.       Objective   Physical Exam  Vitals and nursing note reviewed.   Constitutional:       General: He is not in acute distress.     Appearance: Normal appearance. He is not ill-appearing or toxic-appearing.   HENT:      Head: Normocephalic.   Eyes:      Pupils: Pupils are equal, round, and reactive to light.   Neck:      Vascular: No carotid bruit.   Cardiovascular:      Rate and Rhythm: Normal rate and regular rhythm.      Pulses: Normal pulses.      Heart sounds: Normal heart sounds.   Pulmonary:      Effort: Pulmonary effort is normal. No respiratory distress.      Breath sounds: Normal breath sounds.   Abdominal:      General: Bowel sounds are normal.      Palpations: Abdomen is soft.   Musculoskeletal:         General: No swelling.   Skin:     General: Skin is warm and dry.      Capillary Refill: Capillary refill takes less than 2 seconds.      Coloration: Skin is not pale.   Neurological:      Mental Status: He is alert and oriented to person, place, and time.      Sensory: Sensory deficit (LUE, LLE) present.      Motor: Weakness (LUE, LLE) present.   Psychiatric:         Mood and Affect: Mood normal.         Behavior: Behavior normal.         Thought Content: Thought content normal.         Judgment: Judgment normal.         Assessment & Plan   Diagnoses and all orders for this visit:    1. Transient ischemic attack (Primary)  -     Ambulatory Referral to Physical Therapy Evaluate and treat  Keep follow-up with neurology    2. Uncontrolled hypertension  -     carvedilol (COREG) 12.5 MG tablet;  Take 1 tablet by mouth 2 (Two) Times a Day With Meals.  Dispense: 60 tablet; Refill: 2  Take hydralazine as prescribed.  Can do hydralazine now as blood pressure is 180/90.  Continue to monitor blood pressure, keep log, bring log to follow-up appointment.  Keep follow-up with cardiology on 8/11/2022.  Diet and lifestyle modifications to control condition.    3. Noncompliance  Take medications as prescribed.    4. Dyslipidemia  -     atorvastatin (LIPITOR) 40 MG tablet; Take 1 tablet by mouth Every Night.  Dispense: 90 tablet; Refill: 0  Regimen as above.  Diet and lifestyle modifications to control condition.    5. Gastroesophageal reflux disease, unspecified whether esophagitis present  -     pantoprazole (PROTONIX) 40 MG EC tablet; Take 1 tablet by mouth Every Morning.  Dispense: 30 tablet; Refill: 2  Regimen as above.  Diet and lifestyle modifications to control condition.      Discussed possible differential diagnoses, testing, treatment, recommended non-pharmacological interventions and/or lifestyle modifications, risks, warning signs to monitor for that would indicate need for follow-up in clinic or ER. If no improvement with these regimens or you have new or worsening symptoms follow-up. Patient verbalizes understanding and agreement with plan of care. Denies further needs or concerns.    I spent 30 minutes caring for patient on this date of service. This time includes time spent by me in the following activities: preparing for the visit, reviewing tests, obtaining and/or reviewing a separately obtained history, performing a medically appropriate examination and/or evaluation, counseling and educating the patient/family/caregiver, ordering medications, tests, or procedures and documenting information in the medical record.     Return in about 2 weeks (around 6/21/2022) for Recheck, Sooner if needed.

## 2022-06-08 ENCOUNTER — TELEPHONE (OUTPATIENT)
Dept: FAMILY MEDICINE CLINIC | Facility: CLINIC | Age: 67
End: 2022-06-08

## 2022-06-08 ENCOUNTER — TELEPHONE (OUTPATIENT)
Dept: NEUROLOGY | Facility: CLINIC | Age: 67
End: 2022-06-08

## 2022-06-08 NOTE — TELEPHONE ENCOUNTER
Caller: CHRISTIANA  Relationship to Patient:SELF  Phone Number: [phone]    Reason for Call: SHE STATES PT HAS BEEN MINI STOKE EVERYDAY SOME LASTING 10 TO AN HOUR SINCE BEING DISCHARGED ON 6-1-22 HE WENT BACK TO TO [place] ON 6-5-22, SHE STATES THEY ARE TYPICALLY HAPPING WHEN HIS BP IS DOWN, SHE ALSO STATES HIS NEUROPATHY IN HIS LEFT ARM IS EXTREMELY WORSE (FOR EXAMPLE ON Monday MORNING HIS LEFT SIDE OF HIS FACE WAS STILL NUMB FROM THE EPISODE THE NIGHT BEFORE)     SHE ASKED FOR A SOONER APPOINTMENT I HAVE NOTHING AVAILABLE , ALSO PER OV NOTE Nisla Freeman APRN PT IS ALSO ESTABLISHED WITH

## 2022-06-08 NOTE — TELEPHONE ENCOUNTER
He is scheduled with neurology at [facility] on 6/21/2022 and he told me he also has an appointment with Dr. Ventura next week.  Is he going to see them?  I thought he said he was going to see what they say prior to going somewhere else.

## 2022-06-08 NOTE — TELEPHONE ENCOUNTER
Patient called indicating he would like to be seen at:     [facility] Stroke [place]  [phone]    They only accept referrals by phone.  Please advise.

## 2022-06-09 ENCOUNTER — TELEPHONE (OUTPATIENT)
Dept: NEUROLOGY | Facility: CLINIC | Age: 67
End: 2022-06-09

## 2022-06-09 ENCOUNTER — TELEPHONE (OUTPATIENT)
Dept: PHYSICAL THERAPY | Facility: OTHER | Age: 67
End: 2022-06-09

## 2022-06-09 NOTE — TELEPHONE ENCOUNTER
"Called patient back after receiving message from MA. He states he has been having \"mini strokes\" daily since he left the hospital last weekend. He reports the entire left side of his body goes numb and sometimes weak. His blood pressure has also been fluctuating between 200's down to 70's and then back up again. I instructed him numerous times to call 911 if he is experiencing those symptoms or proceed to the ED. He seems reluctant to do so but prior to the end of the conversation he agrees to be evaluated. I reviewed all the signs and symptoms of stroke which he is very knowledgeable about. He verbalizes understanding of what I have instructed him to do.   "

## 2022-06-09 NOTE — TELEPHONE ENCOUNTER
He is scheduled with neurology at [facility] on 6/21/2022 and he told me he also has an appointment with Dr. Ventura next week.  Is he going to see them?  I thought he said he was going to see what they say prior to going somewhere else.      Left a message to return call.      Spoke with patient he stated he just wanted to be seen asap,informed his appointment on 6/21/2022 is with [facility] neurology & would be before any other referral could be scheduled,he will keep that appointment & see what they say.

## 2022-06-10 ENCOUNTER — TELEPHONE (OUTPATIENT)
Dept: FAMILY MEDICINE CLINIC | Facility: CLINIC | Age: 67
End: 2022-06-10

## 2022-06-10 RX ORDER — LISINOPRIL 20 MG/1
20 TABLET ORAL 2 TIMES DAILY
Qty: 60 TABLET | Refills: 0 | Status: SHIPPED | OUTPATIENT
Start: 2022-06-10 | End: 2022-06-30 | Stop reason: SDUPTHER

## 2022-06-10 RX ORDER — CHLORTHALIDONE 25 MG/1
25 TABLET ORAL DAILY
Qty: 30 TABLET | Refills: 0 | Status: SHIPPED | OUTPATIENT
Start: 2022-06-10 | End: 2022-06-30 | Stop reason: SDUPTHER

## 2022-06-10 NOTE — TELEPHONE ENCOUNTER
How long ago did he have the hydralazine?    He has complained of the symptoms since his stroke.  Are his symptoms different than his baseline?

## 2022-06-10 NOTE — TELEPHONE ENCOUNTER
How long ago did he have the hydralazine?    He has complained of the symptoms since his stroke.  Are his symptoms different than his baseline?    Spoke with patient he took it around 9:30 am,symptoms are no different than his baseline.

## 2022-06-10 NOTE — TELEPHONE ENCOUNTER
Contacted Maximo. Made him an appointment for 8:30 Tuesday morning and informed him to bring his machine and log.

## 2022-06-10 NOTE — TELEPHONE ENCOUNTER
"Patient called indicating he was concerned because his blood pressure was \"staying up\".  He checked it while we were on the the phone and it is 191/93.  He has had all his morning BP meds including a Hydralazine.  Recent BP readings include:  227/110  181/93  142/76    He states he has had some left arm pain (radiating from his elbow to his left hand) for several days, as well as some lip numbness.  Please advise.    "

## 2022-06-10 NOTE — TELEPHONE ENCOUNTER
What is his blood pressure now?      Instruct patient to start chlorthalidone 25 mg in the morning.    Change lisinopril to 20 mg in the morning and 20 mg at night.

## 2022-06-10 NOTE — TELEPHONE ENCOUNTER
Please call patient let him know I have sent a message to try to get him into see neurology sooner.  They will call to reschedule if they can.     Let Jericho know that I verified that her phone number was listed on his chart.    Please make sure he has an appointment with me on Tuesday.    Instruct him to bring his blood pressure machine as well as his blood pressure log to his follow-up appointment with me on Tuesday.

## 2022-06-13 ENCOUNTER — TELEPHONE (OUTPATIENT)
Dept: FAMILY MEDICINE CLINIC | Facility: CLINIC | Age: 67
End: 2022-06-13

## 2022-06-13 NOTE — TELEPHONE ENCOUNTER
Nilsa MCNEILL reached out to see if we could get patient, Maximo, in sooner with his Neurologist. I have patient scheduled with Dr. Ventura this Thursday, the 16th, at 9:30am Patient is aware.

## 2022-06-14 ENCOUNTER — NURSE TRIAGE (OUTPATIENT)
Dept: FAMILY MEDICINE CLINIC | Facility: CLINIC | Age: 67
End: 2022-06-14

## 2022-06-14 ENCOUNTER — READMISSION MANAGEMENT (OUTPATIENT)
Dept: CALL CENTER | Facility: HOSPITAL | Age: 67
End: 2022-06-14

## 2022-06-14 NOTE — TELEPHONE ENCOUNTER
Contacted the pt in regards to his Pantoprazole prescription and if he had received it. The pt stated that he did not remember having a stomach pill in his meds. I will contact the pharmacy for further info.    Pharmacy stated medication needs a PA. I will submit this PA as soon as possible.      PA was submitted but denied initially due to the pharmacy showing it as a duplicate medication. I contacted the pt's pharmacy who stated they were still showing the pt to be taking Omeprazole as well. I informed the pharmacy that the Omeprazole had been discontinued on 06/03/2022 when the pt was discharged from the hospital and had been changed to the Pantoprazole. They have removed the Omeprazole from his list at the pharmacy and sent the claim to Failed Claims for re-evaluation/approval.      6/20/2022:  Called to check on the approval of the re-evaluation. The representative stated that the claim had not been sent in and required a new PA. A new PA was submitted over the phone, reference number: [id number].    06/22/2022  PA for pantoprazole was approved. Pt was called and notified. Pt verbalized understanding. Pt asked for an appointment to be made because his last one had to be cancelled due to the provider being out of the office. Rescheduled that appointment for 6/29 at 1pm. Informed the pt to contact us

## 2022-06-17 ENCOUNTER — TELEPHONE (OUTPATIENT)
Dept: PHYSICAL THERAPY | Facility: CLINIC | Age: 67
End: 2022-06-17

## 2022-06-17 NOTE — TELEPHONE ENCOUNTER
Called patient in regards to no-show for PT evaluation.  Patient reported that he had been helping take care of a family member and forgot appt.  Patient requested for appointment to be re-scheduled for after 6/22/2022.  PT evaluation re-scheduled for 6/23/2022 at 12:45.

## 2022-06-20 ENCOUNTER — OFFICE VISIT (OUTPATIENT)
Dept: PULMONOLOGY | Facility: CLINIC | Age: 67
End: 2022-06-20

## 2022-06-20 VITALS
BODY MASS INDEX: 28.43 KG/M2 | HEART RATE: 90 BPM | OXYGEN SATURATION: 98 % | HEIGHT: 72 IN | SYSTOLIC BLOOD PRESSURE: 168 MMHG | WEIGHT: 209.9 LBS | DIASTOLIC BLOOD PRESSURE: 90 MMHG | TEMPERATURE: 96.9 F

## 2022-06-20 DIAGNOSIS — J44.9 COPD, SEVERE: Primary | ICD-10-CM

## 2022-06-20 DIAGNOSIS — R91.8 MULTIPLE PULMONARY NODULES: ICD-10-CM

## 2022-06-20 DIAGNOSIS — Z87.891 FORMER SMOKER: ICD-10-CM

## 2022-06-20 PROCEDURE — 99214 OFFICE O/P EST MOD 30 MIN: CPT | Performed by: PHYSICIAN ASSISTANT

## 2022-06-20 RX ORDER — ALBUTEROL SULFATE 90 UG/1
2 AEROSOL, METERED RESPIRATORY (INHALATION) EVERY 4 HOURS PRN
Qty: 54 G | Refills: 6 | Status: SHIPPED | OUTPATIENT
Start: 2022-06-20 | End: 2023-04-04 | Stop reason: SDUPTHER

## 2022-06-20 RX ORDER — ALBUTEROL SULFATE 90 UG/1
2 AEROSOL, METERED RESPIRATORY (INHALATION) EVERY 4 HOURS PRN
Qty: 54 G | Refills: 6 | Status: SHIPPED | OUTPATIENT
Start: 2022-06-20 | End: 2022-06-20

## 2022-06-20 RX ORDER — OMEPRAZOLE 20 MG/1
20 CAPSULE, DELAYED RELEASE ORAL DAILY
COMMUNITY
End: 2022-09-07 | Stop reason: SDUPTHER

## 2022-06-21 ENCOUNTER — OFFICE VISIT (OUTPATIENT)
Dept: NEUROLOGY | Facility: CLINIC | Age: 67
End: 2022-06-21

## 2022-06-21 ENCOUNTER — READMISSION MANAGEMENT (OUTPATIENT)
Dept: CALL CENTER | Facility: HOSPITAL | Age: 67
End: 2022-06-21

## 2022-06-21 VITALS
OXYGEN SATURATION: 97 % | DIASTOLIC BLOOD PRESSURE: 82 MMHG | HEART RATE: 103 BPM | SYSTOLIC BLOOD PRESSURE: 147 MMHG | WEIGHT: 207.4 LBS | TEMPERATURE: 97.4 F | BODY MASS INDEX: 28.12 KG/M2

## 2022-06-21 DIAGNOSIS — I10 ESSENTIAL HYPERTENSION: ICD-10-CM

## 2022-06-21 DIAGNOSIS — E11.69 TYPE 2 DIABETES MELLITUS WITH OTHER SPECIFIED COMPLICATION, WITHOUT LONG-TERM CURRENT USE OF INSULIN: ICD-10-CM

## 2022-06-21 DIAGNOSIS — E78.2 MIXED HYPERLIPIDEMIA: ICD-10-CM

## 2022-06-21 DIAGNOSIS — Z86.73 HISTORY OF TIA (TRANSIENT ISCHEMIC ATTACK): Primary | ICD-10-CM

## 2022-06-21 PROCEDURE — 99214 OFFICE O/P EST MOD 30 MIN: CPT | Performed by: NURSE PRACTITIONER

## 2022-06-21 RX ORDER — ASPIRIN 325 MG
325 TABLET ORAL DAILY
Qty: 90 TABLET | Refills: 1 | Status: SHIPPED | OUTPATIENT
Start: 2022-06-21 | End: 2022-11-30 | Stop reason: SDUPTHER

## 2022-06-21 NOTE — OUTREACH NOTE
Stroke Week 3 Survey    Flowsheet Row Responses   [facility] patient discharged from? [facility]   Does the patient have one of the following disease processes/diagnoses(primary or secondary)? Stroke (TIA)   Week 3 attempt successful? Yes   Call start time 1540   Call end time 1552   Discharge diagnosis Crescendo TIAs, Hx tremors, uncontrolled HTN, DM   Person spoke with today (if not patient) and relationship Patient   Meds reviewed with patient/caregiver? Yes   Is the patient having any side effects they believe may be caused by any medication additions or changes? No   Does the patient have all medications ordered at discharge? N/A   Is the patient taking all medications as directed (includes completed medication regime)? Yes   Does the patient have a primary care provider?  Yes   Does the patient have an appointment with their PCP within 7 days of discharge? Yes   Has the patient kept scheduled appointments due by today? Yes   Psychosocial issues? No   Does the patient require any assistance with activities of daily living such as eating, bathing, dressing, walking, etc.? No   ADL comments left hand numbness   Does the patient have any residual symptoms from stroke/TIA? Yes   Does the patient understand the diet ordered at discharge? Yes   What is the patient's perception of their health status since discharge? Improving   Nursing interventions Nurse provided patient education   Is the patient able to teach back FAST for Stroke? Yes   Is the patient/caregiver able to teach back the risk factors for a stroke? History of TIAs, High blood pressure-goal below 120/80, High Cholesterol, Physical inactivity and obesity, Sleep apnea, Excessive alcohol intake   Is the patient/caregiver able to teach back signs and symptoms related to disease process for when to call PCP? Yes   Is the patient/caregiver able to teach back signs and symptoms related to disease process for when to call 911? Yes   Week 3 call completed? Yes    Wrap up additional comments Pt states he is doing better,  reports left hand numbness. Pt has seen neurologist that may do a MRI r/t left hand numbness. Pt was advised by neurologist to fu with a orthopaedic spine MD.           RONIT BURROWS - Registered Nurse

## 2022-06-21 NOTE — PROGRESS NOTES
New Patient Office Visit      Encounter Date: 2022   Patient Name: Maximo Kwon  : 1955   MRN: [id number]   PCP: Nilsa Freeman APRN    Referring Provider: No ref. provider found     Chief Complaint:  No chief complaint on file.      History of Present Illness: Maximo Kwon is a 67 y.o. male with known medical diagnoses of hyperlipidemia, HTN, DM Type 2, CNS tremors, and recently diagnosed with crescendo TIA presents for follow up today after recent admission on 2022 where he had developed left sided numbness and dysarthria which had resolved by the time initial imaging had been performed.  He was started on DAPT for 21 days to be followed by ASA monotherapy.  Also continued atorvastatin 40 mg however patient reports she is unable to tolerate any statin and has stopped taking it.  He complains that it makes his feet so weak that he cannot drive.  He reports that since his last admission he has had multiple episodes of left-sided weakness/numbness and difficulty finding words.  He presented to the ER once on 2022 where he was evaluated by neurology.  It was recommended that he continue his DAPT with aspirin Plavix as he felt his symptoms were possibly hypertension related versus small vessel pathology.  He reports when he has these episodes everything resolves except for 2 days ago the left-sided weakness and speech difficulty resolved however he has some persistent tingling in his left palm.  He reports history of bilateral neuropathy in his hands however the left is much greater than the worse now.  He states that these episodes seem to be associated with a lower blood pressure.  He is consistently has elevated blood pressure in the morning gradually drops during the day typically 110-130 systolic in the evening when symptoms start.    Stroke Risk Factors: carotid stenosis, diabetes mellitus, hyperlipidemia and hypertension      Subjective      Review of Systems:   Review of  Systems    Past Medical History:   Past Medical History:   Diagnosis Date   • Arthritis    • Chronic diastolic heart failure (HCC) 2021   • COPD (chronic obstructive pulmonary disease) (HCC)    • Depression    • Diverticulitis    • Emphysema (subcutaneous) (surgical) resulting from a procedure    • GERD (gastroesophageal reflux disease)    • Neuropathy    • Pneumothorax     Left, status post chest tube   • Shortness of breath    • Spinal stenosis    • Type 2 diabetes mellitus (HCC)        Past Surgical History:   Past Surgical History:   Procedure Laterality Date   • BRONCHOSCOPY N/A 2019    Procedure: Bronchoscopy with Transbronchial Biopsy with Fluoroscopy;  Surgeon: Eladio Bethea MD;  Location: [facility];  Service: Pulmonary   • CHEST TUBE INSERTION Left     Pneumothorax   • COLON RESECTION      had colostomy and reveresed back    • COLONOSCOPY     • COLOSTOMY CLOSURE     • LUNG BIOPSY Right     (non cancerous)   • OTHER SURGICAL HISTORY Left     LEFT ELBOW SURGERY       Family History:   Family History   Problem Relation Age of Onset   • Alzheimer's disease Mother    • Cancer Father         THROAT CANCER   • Diabetes Sister    • Diabetes Brother        Social History:   Social History     Socioeconomic History   • Marital status:    • Number of children: 0   Tobacco Use   • Smoking status: Former Smoker     Packs/day: 1.00     Years: 30.00     Pack years: 30.00     Types: Cigarettes     Quit date:      Years since quittin.4   • Smokeless tobacco: Never Used   Vaping Use   • Vaping Use: Never used   Substance and Sexual Activity   • Alcohol use: No   • Drug use: No   • Sexual activity: Defer     Birth control/protection: Other       Medications:     Current Outpatient Medications:   •  albuterol sulfate  (90 Base) MCG/ACT inhaler, Inhale 2 puffs Every 4 (Four) Hours As Needed for Wheezing or Shortness of Air., Disp: 54 g, Rfl: 6  •  atorvastatin (LIPITOR) 40 MG  tablet, Take 1 tablet by mouth Every Night., Disp: 90 tablet, Rfl: 0  •  Budeson-Glycopyrrol-Formoterol (Breztri Aerosphere) 160-9-4.8 MCG/ACT aerosol inhaler, Inhale 2 puffs 2 (Two) Times a Day., Disp: 3 each, Rfl: 3  •  chlorthalidone (HYGROTON) 25 MG tablet, Take 1 tablet by mouth Daily., Disp: 30 tablet, Rfl: 0  •  clopidogrel (PLAVIX) 75 MG tablet, Take 1 tablet by mouth Daily., Disp: 20 tablet, Rfl: 0  •  glyburide (DIAbeta) 5 MG tablet, Take 1 tablet by mouth Daily With Breakfast., Disp: 90 tablet, Rfl: 1  •  hydrALAZINE (APRESOLINE) 25 MG tablet, Take 1 tablet by mouth 3 (Three) Times a Day As Needed (sustained systolic blood pressure greater than/= 160, diastolic blood pressure greater than/= 100)., Disp: , Rfl:   •  ipratropium-albuterol (DUO-NEB) 0.5-2.5 mg/3 ml nebulizer, Take 3 mL by nebulization 4 (Four) Times a Day As Needed for Wheezing or Shortness of Air., Disp: 360 mL, Rfl: 8  •  lisinopril (PRINIVIL,ZESTRIL) 20 MG tablet, Take 1 tablet by mouth 2 (Two) Times a Day., Disp: 60 tablet, Rfl: 0  •  metFORMIN (Glucophage) 1000 MG tablet, Take 1 tablet by mouth 2 (Two) Times a Day With Meals., Disp: 180 tablet, Rfl: 1  •  omeprazole (priLOSEC) 20 MG capsule, Take 20 mg by mouth Daily., Disp: , Rfl:   •  tiZANidine (ZANAFLEX) 4 MG tablet, Take 8 mg by mouth At Night As Needed for Muscle Spasms., Disp: , Rfl:   •  aspirin (Anthony Aspirin) 325 MG tablet, Take 1 tablet by mouth Daily for 90 days. Start full dose aspirin when no longer taking Plavix this week, Disp: 90 tablet, Rfl: 1  •  carvedilol (COREG) 12.5 MG tablet, Take 1 tablet by mouth 2 (Two) Times a Day With Meals., Disp: 60 tablet, Rfl: 2  •  pantoprazole (PROTONIX) 40 MG EC tablet, Take 1 tablet by mouth Every Morning., Disp: 30 tablet, Rfl: 2  •  roflumilast (Daliresp) 500 MCG tablet tablet, Take 1 tablet by mouth Daily., Disp: 30 tablet, Rfl: 8    Allergies:   No Known Allergies    Objective     Physical Exam:  Vital Signs:   Vitals:     22 1109   BP: 147/82   BP Location: Left arm   Patient Position: Sitting   Cuff Size: Adult   Pulse: 103   Temp: 97.4 °F (36.3 °C)   TempSrc: Oral   SpO2: 97%   Weight: 94.1 kg (207 lb 6.4 oz)     Body mass index is 28.12 kg/m².     Physical Exam  Neurological Exam     NIH Stroke Scale    1a  Level of consciousness: 0=alert; keenly responsive   1b. LOC questions:  0=Answers both questions correctly    1c. LOC commands: 0=Performs both tasks correctly   2.  Best Gaze: 0=normal   3. Visual: 0=No visual loss   4. Facial Palsy: 0=Normal symmetric movement   5a. Motor left arm: 0=No drift, limb holds 90 (or 45) degrees for full 10 seconds   5b.  Motor right arm: 0=No drift, limb holds 90 (or 45) degrees for full 10 seconds   6a. Motor left le=No drift, limb holds 90 (or 45) degrees for full 10 seconds   6b  Motor right le=No drift, limb holds 90 (or 45) degrees for full 10 seconds   7. Limb Ataxia: 0=Absent   8.  Sensory: 0=Normal; no sensory loss   9. Best Language:  0=No aphasia, normal   10. Dysarthria: 0=Normal   11. Extinction and Inattention: 0=No abnormality    Total:   0       STOP-Bang Questionnaire :    Over the past month…    Snoring:   Do you snore loudly (loud enough to be heard through closed doors or your bed partner elbows you for snoring at night)?  No   Tired:   Do you often feel tired, fatigued or sleepy during the daytime (such as falling asleep during driving or talking to someone)?  Yes   Observed:   Has anyone observed you stop breathing or choking/gasping during your sleep?  No   Pressure:   Do you have or are you being treated for high blood pressure?  Yes   Body Mass Index:   Is the BMI > 35kg/m2 ?   BMI = 28.12 No   Age:   Older than 50?  Yes   Neck Size:   Is your shirt collar > 16 inches/40 cm or larger? (measured around Carreon apple).   Neck Size =  Yes     Gender:   Male/Female?  Male   Total Score: 5   0-2 = HOOD Low Risk    3-4 = HOOD Intermediate Risk    5-8 = HOOD High  Risk  Or >2 + male gender  Or >2 + BMI > 35kg/m2  Or > 2 + neck circumference 16 inches / 40cm       Modified [place] Score: 1      Imaging Reviewed:     Results for orders placed during the hospital encounter of 06/01/22    Adult Transthoracic Echo Complete W/ Cont if Necessary Per Protocol (With Agitated Saline)    Interpretation Summary  · Left ventricular wall thickness is consistent with mild concentric hypertrophy.  · Left ventricular ejection fraction appears to be 66 - 70%. Left ventricular systolic function is normal.  · Left ventricular diastolic function is consistent with (grade I) impaired relaxation.  · The cardiac chamber dimensions are normal.  · No significant valvular abnormality is detected.  · There is no evidence of pericardial effusion.      Laboratory Results:   Lab Results   Component Value Date    HGBA1C 6.30 (H) 06/01/2022     Lab Results   Component Value Date    WBC 8.06 06/05/2022    HGB 12.7 (L) 06/05/2022    HCT 38.5 06/05/2022    MCV 90.6 06/05/2022     06/05/2022      Lab Results   Component Value Date    GLUCOSE 196 (H) 06/05/2022    BUN 19 06/05/2022    CREATININE 1.28 (H) 06/05/2022    EGFRIFNONA >60 09/08/2021    EGFRIFAFRI >60 09/08/2021    BCR 14.8 06/05/2022    K 4.3 06/05/2022    CO2 24.9 06/05/2022    CALCIUM 9.3 06/05/2022    ALBUMIN 4.01 06/05/2022    AST 14 06/05/2022    ALT 22 06/05/2022      Lab Results   Component Value Date    CHOL 144 06/01/2022    CHOL 211 (H) 02/28/2022     Lab Results   Component Value Date    TRIG 177 (H) 06/01/2022    TRIG 224 (H) 02/28/2022     Lab Results   Component Value Date    HDL 29 (L) 06/01/2022    HDL 41 02/28/2022     Lab Results   Component Value Date    LDL 84 06/01/2022     (H) 02/28/2022        XR Chest 1 View  Narrative: CR Chest 1 Vw    INDICATION:   Numbness, stroke protocol chest x-ray     COMPARISON:    6/1/2022    FINDINGS:  Portable AP view(s) of the chest.  The heart and mediastinal contours are normal. The  lungs are clear. No pneumothorax or pleural effusion.  Impression: No acute cardiopulmonary findings.    Signer Name: Chad Seaman MD   Signed: 6/5/2022 4:03 AM   Workstation Name: [facility]  CT Head Without Contrast Stroke Protocol  Narrative: CT Head WO Code Stroke    HISTORY:   Left-sided numbness, follow-up stroke    TECHNIQUE:   Axial unenhanced head CT. Radiation dose reduction techniques included automated exposure control or exposure modulation based on body size. Count of known CT and cardiac nuc med studies performed in previous 12 months: 7.     Time of scan: 12 7:00 AM    COMPARISON:   6/1/2022    FINDINGS:   No intracranial hemorrhage, mass, or infarct. No hydrocephalus or extra-axial fluid collection. Brain parenchymal density is normal. The skull base, calvarium, and extracranial soft tissues are normal.  Impression: Normal, negative unenhanced head CT.    Signer Name: Chad Seaman MD   Signed: 6/5/2022 12:13 AM   Workstation Name: [facility]      Results for orders placed or performed during the hospital encounter of 06/01/22   MRI Brain Without Contrast    Narrative    EXAM:    MR Head Without Intravenous Contrast     EXAM DATE:    6/2/2022 10:07 AM     CLINICAL HISTORY:    Stroke, follow up; G45.9-Transient cerebral ischemic attack,  unspecified     TECHNIQUE:    Magnetic resonance images of the head/brain without intravenous  contrast in multiple planes.     COMPARISON:    No relevant prior studies available.     FINDINGS:    BRAIN:  Unremarkable.  No mass.  No hemorrhage.  No acute infarct.    VENTRICLES:  Unremarkable.  No ventriculomegaly.    BONES/JOINTS:  Unremarkable.    SINUSES:  Unremarkable as visualized.  No acute sinusitis.    MASTOID AIR CELLS:  Unremarkable as visualized.  No mastoid effusion.    ORBITS:  Unremarkable as visualized.       Impression      No acute findings in the head/brain.     This report  was finalized on 6/2/2022 12:38 PM by Dr. Wilfredo Lawrence MD.      MRI Angiogram Head With & Without Contrast    Narrative    EXAM:    MR Angiography Head Without and With Intravenous Contrast     EXAM DATE:    6/2/2022 10:08 AM     CLINICAL HISTORY:    Stroke, follow up; G45.9-Transient cerebral ischemic attack,  unspecified     TECHNIQUE:    Magnetic resonance angiography images of the head without and with  intravenous contrast.     COMPARISON:    No relevant prior studies available.     FINDINGS:    RIGHT INTERNAL CAROTID ARTERY:  No acute findings.  Intracranial  segment is patent with no significant stenosis.  No aneurysm.    RIGHT ANTERIOR CEREBRAL ARTERY:  Unremarkable.  No occlusion or  significant stenosis.  No aneurysm.    RIGHT MIDDLE CEREBRAL ARTERY:  Unremarkable.  No occlusion or  significant stenosis.  No aneurysm.    RIGHT POSTERIOR CEREBRAL ARTERY:  Unremarkable.  No occlusion or  significant stenosis.  No aneurysm.    RIGHT VERTEBRAL ARTERY:  Unremarkable as visualized.       LEFT INTERNAL CAROTID ARTERY:  No acute findings.  Intracranial  segment is patent with no significant stenosis.  No aneurysm.    LEFT ANTERIOR CEREBRAL ARTERY:  Unremarkable.  No occlusion or  significant stenosis.  No aneurysm.    LEFT MIDDLE CEREBRAL ARTERY:  Unremarkable.  No occlusion or  significant stenosis.  No aneurysm.    LEFT POSTERIOR CEREBRAL ARTERY:  Unremarkable.  No occlusion or  significant stenosis.  No aneurysm.    LEFT VERTEBRAL ARTERY:  Unremarkable as visualized.       BASILAR ARTERY:  Unremarkable.  No occlusion or significant stenosis.   No aneurysm.       Impression    Unremarkable head/brain MRA.     This report was finalized on 6/2/2022 12:16 PM by Dr. Wilfredo Lawrence MD.      EEG    Narrative    Reason for referral:    67 y.o.male with speech changes, consideration of seizure    Technical Summary:     A 19 channel digital EEG was performed using the international 10-20   placement system, including eye  leads and EKG leads.    Duration: 26 minutes    Findings:    The awake tracing shows diffuse low amplitude theta and alpha activity   present symmetrically over both hemispheres.  There is an overlay of EMG   artifact over the frontal leads.  As a study proceeds, drowsiness is seen   with mild slowing of the background.  Light sleep is present with vertex   waves and sleep spindles.  Photic stimulation does not change the   background.  Hyperventilation is not performed.  No focal features or   epileptiform activity are seen.    Video: On    Technical quality: Good    EKG: Regular, 70-80 bpm    SUMMARY:    Normal EEG for age in the awake and asleep states    No focal features or epileptiform activity are seen      Impression    Normal study    This report is transcribed using the Dragon dictation system.             Assessment / Plan      Assessment/Plan:   Diagnoses and all orders for this visit:    1. History of TIA (transient ischemic attack) (Primary)  -     MRI Brain Without Contrast; Future    2. Mixed hyperlipidemia  -     Lipid Panel; Future    3. Essential hypertension    4. Type 2 diabetes mellitus with other specified complication, without long-term current use of insulin (HCC)  -     Hemoglobin A1c; Future    Other orders  -     aspirin (Anthony Aspirin) 325 MG tablet; Take 1 tablet by mouth Daily for 90 days. Start full dose aspirin when no longer taking Plavix this week  Dispense: 90 tablet; Refill: 1       -We will repeat MRI brain without contrast just to ensure no new ischemic events related to his ongoing symptoms after discharge  -Encouraged him to follow-up with his general neurologist Dr. Ventura in [place] as he has been seeing him in the past for back issues.  MRI of C-spine in 2019 showed some significant degenerative disc disease, this may be partially responsible for his paresthesias left greater than right  -Encouraged patient to take his blood pressure medication as prescribed, advised him that  readings 110 120 systolic are normal and not considered low  -Once patient completes DAPT this week, advised him to start a full dose aspirin daily  -Patient unwilling to change to different statin or alternate dosing, states he will lower his cholesterol with diet and exercise  -A1c/lipid profile before next visit    Discussed the importance of medication compliance and lifestyle modifications (adequate blood pressure control, adequate control of hyperlipidemia, adequate glycemic control, increase physical activity, and healthy diet) to help reduce the risk of future cerebrovascular events.  Also discussed the signs symptoms that would warrant the patient return back to the emergency department including unilateral weakness, unilateral numbness, visual disturbances, loss of balance, speech difficulties, and/or a sudden severe headache.       Follow Up:   Return in about 3 months (around 9/21/2022) for Next scheduled follow up.    MARIUSZ Castano-BC  [facility]     This document has been electronically signed by OSITO Jaun  June 21, 2022 12:49 EDT    Please note that portions of this note were completed with a voice recognition program. Efforts were made to edit dictation, but occasionally words are mistranscribed.

## 2022-06-21 NOTE — PROGRESS NOTES
PHQ-9 Depression Screening  Little interest or pleasure in doing things?     Most days   Feeling down, depressed, or hopeless?  every day   Trouble falling or staying asleep, or sleeping too much?  not at all   Feeling tired or having little energy?  every day   Poor appetite or overeating?  not at all   Feeling bad about yourself - or that you are a failure or have let yourself or your family down?  every day   Trouble concentrating on things, such as reading the newspaper or watching television?  every day   Moving or speaking so slowly that other people could have noticed? Or the opposite - being so fidgety or restless that you have been moving around a lot more than usual?  some days   Thoughts that you would be better off dead, or of hurting yourself in some way?  not at all   PHQ-9 Total Score     If you checked off any problems, how difficult have these problems made it for you to do your work, take care of things at home, or get along with other people?  very difficult, have to push myself to do anything.

## 2022-06-22 ENCOUNTER — TELEPHONE (OUTPATIENT)
Dept: PHYSICAL THERAPY | Facility: CLINIC | Age: 67
End: 2022-06-22

## 2022-06-22 ENCOUNTER — HOSPITAL ENCOUNTER (OUTPATIENT)
Dept: MRI IMAGING | Facility: HOSPITAL | Age: 67
Discharge: HOME | End: 2022-06-22
Admitting: NURSE PRACTITIONER

## 2022-06-22 DIAGNOSIS — Z86.73 HISTORY OF TIA (TRANSIENT ISCHEMIC ATTACK): ICD-10-CM

## 2022-06-22 PROCEDURE — 70551 MRI BRAIN STEM W/O DYE: CPT | Performed by: RADIOLOGY

## 2022-06-22 PROCEDURE — 70551 MRI BRAIN STEM W/O DYE: CPT

## 2022-06-23 ENCOUNTER — TREATMENT (OUTPATIENT)
Dept: PHYSICAL THERAPY | Facility: CLINIC | Age: 67
End: 2022-06-23

## 2022-06-23 ENCOUNTER — TELEPHONE (OUTPATIENT)
Dept: NEUROLOGY | Facility: CLINIC | Age: 67
End: 2022-06-23

## 2022-06-23 DIAGNOSIS — G45.9 TRANSIENT ISCHEMIC ATTACK: Primary | ICD-10-CM

## 2022-06-23 PROCEDURE — 97162 PT EVAL MOD COMPLEX 30 MIN: CPT | Performed by: PHYSICAL THERAPIST

## 2022-06-23 PROCEDURE — 97110 THERAPEUTIC EXERCISES: CPT | Performed by: PHYSICAL THERAPIST

## 2022-06-23 NOTE — TELEPHONE ENCOUNTER
Spoke to  Doyle and relayed the message.  Mr Kwon verbalized understanding and had no further questions.

## 2022-06-23 NOTE — TELEPHONE ENCOUNTER
----- Message from OSITO Juan sent at 6/23/2022  1:36 PM EDT -----  Please call the patient regarding his MRI results.  Let him know that the MRI of his brain was reported as being normal.  There have not been any changes since the last MRI.  We will discuss further at his next appointment.

## 2022-06-23 NOTE — PROGRESS NOTES
Physical Therapy Initial Evaluation and Plan of Care    Patient: Maximo Kwon   : 1955  Diagnosis/ICD-10 Code:  Transient ischemic attack [G45.9]  Referring practitioner: OSITO Poole  Date of Initial Visit: 2022  Today's Date: 2022  Patient seen for 1 session         Visit Diagnoses:    ICD-10-CM ICD-9-CM   1. Transient ischemic attack  G45.9 435.9         Objective Measures: TERAN/56     TU seconds, 10 seconds         Subjective Evaluation    History of Present Illness  Onset date: 1 month ago.  Mechanism of injury: Patient reports that he suffered a TIA approximately 1 month ago.  Patient reports that he has noticed poor balance, but states that it has not worsened since the TIA.  Patient denies any recent falls.  Patient reports that he occasionally uses a cane or walking stick when he is walking for longer distances.  Patient states that he notices weakness in bilateral LEs, but does notice the left LE is weaker.      Patient Occupation: Disabled Pain  Current pain rating: 3  Location: Generalized    Social Support  Lives in: one-story house (2 steps to enter with no HR)  Lives with: significant other    Patient Goals  Patient goals for therapy: improved balance and increased strength             Objective          Neurological Testing     Sensation     Lumbar     Comments   Left light touch: Diminished sensation in left foot due to neuropathy  Right light touch: Diminished sensation in right foot due to neuropathy    Reflexes   Left   Patellar (L4): trace (1+)  Achilles (S1): trace (1+)    Right   Patellar (L4): normal (2+)  Achilles (S1): normal (2+)    Strength/Myotome Testing     Left Hip   Planes of Motion   Flexion: 4  Abduction: 4+  Adduction: 4    Right Hip   Planes of Motion   Flexion: 4  Abduction: 4+  Adduction: 4+    Left Knee   Flexion: 4+  Extension: 4+    Right Knee   Flexion: 4+  Extension: 4+    Left Ankle/Foot   Dorsiflexion: 3-  Plantar flexion:  4-    Right Ankle/Foot   Dorsiflexion: 4  Plantar flexion: 4    Functional Assessment     Comments  BP: 143/82 mmHg  HR: 103 BPM          Assessment & Plan     Assessment  Impairments: abnormal gait, abnormal or restricted ROM, activity intolerance, impaired balance, impaired physical strength, lacks appropriate home exercise program, pain with function, safety issue and weight-bearing intolerance  Functional Limitations: lifting, walking, pulling, pushing, uncomfortable because of pain, moving in bed, standing, stooping and unable to perform repetitive tasks  Assessment details: Patient is a 67 year old male who comes to physical therapy for rehabilitation following a TIA. The patient presents with decreased LE strength, decreased balance, impaired gait, and decreased endurance.  Patient is at increased risk for falls, based on a score of 35/56 on the TERAN. Patient will benefit from skilled PT, so that patient can achieve maximum level of function.     Prognosis: good    Goals  Plan Goals: Short Term Goals: 4 weeks  1. Pt will be able to achieve semi-tandem stance independently and maintain balance for 30 seconds to show improved balance.  2. Pt will score at least 40/56 on the Teran Balance Scale for decreased risk of fall.  3. Pt will perform TUG in less than 10 seconds for improved community involvement.  4. Pt will perform sit<>stand transfers with minimal use of upper extremities to allow for improved independence.    Long Term Goals: 12 weeks  1. Pt will perform bilateral SLS for 5 seconds for improved balance.  2. Pt will score at least 45/56 on the Teran Balance Scale for decreased risk of fall.  3. Bilateral hip and knee strength will improve to at least 4+/5 for improved safety with ambulation on unlevel surfaces.  4. Patient will be able to ascend/descend a flight of stairs independently to allow for improved community involvement.          Plan  Therapy options: will be seen for skilled therapy  services  Planned modality interventions: cryotherapy, thermotherapy (hydrocollator packs) and electrical stimulation/Russian stimulation  Planned therapy interventions: manual therapy, ADL retraining, balance/weight-bearing training, neuromuscular re-education, body mechanics training, postural training, soft tissue mobilization, flexibility, functional ROM exercises, strengthening, gait training, stretching, home exercise program, therapeutic activities, IADL retraining, transfer training and joint mobilization  Frequency: 3x week  Duration in weeks: 8  Treatment plan discussed with: patient  Plan details: Moderate Evaluation  09054  Re-evaluation   78302    Therapeutic exercise  14543  Therapeutic activity    83041  Neuromuscular re-education   72655  Manual therapy   85086  Gait training  45645    Attended e-stim  00405  Moist heat/cryotherapy 70226             History # of Personal Factors and/or Comorbidities: MODERATE (1-2)  Examination of Body System(s): # of elements: MODERATE (3)  Clinical Presentation: STABLE   Clinical Decision Making: MODERATE      Timed:         Manual Therapy:         mins  80729;     Therapeutic Exercise:    12     mins  44546;     Neuromuscular John:        mins  67468;    Therapeutic Activity:          mins  58627;     Gait Training:           mins  25396;     Ultrasound:          mins  30764;    Ionto                                   mins   36966  Self Care                            mins   52301  Canalith Repos         mins 66249      Un-Timed:  Electrical Stimulation:         mins  51812 ( );  Dry Needling          mins self-pay  Traction          mins 83133  Low Eval          Mins  08758  Mod Eval     35     Mins  12829  High Eval                            Mins  36116        Timed Treatment:   12   mins   Total Treatment:     47   mins          PT: Sierra Colon, PT     License Number: [id number]  Electronically signed by Sierra Colon PT, 06/23/22, 12:49 PM  EDT    Certification Period: 6/23/2022 thru 9/20/2022  I certify that the therapy services are furnished while this patient is under my care.  The services outlined above are required by this patient, and will be reviewed every 90 days.         Physician Signature:__________________________________________________    PHYSICIAN: Nilsa Freeman APRN  NPI: [id number]                                      DATE:      Please sign and return via fax to [email or web address] . Thank you, [facility].

## 2022-06-27 ENCOUNTER — TREATMENT (OUTPATIENT)
Dept: PHYSICAL THERAPY | Facility: CLINIC | Age: 67
End: 2022-06-27

## 2022-06-27 DIAGNOSIS — G45.9 TRANSIENT ISCHEMIC ATTACK: Primary | ICD-10-CM

## 2022-06-27 PROCEDURE — 97530 THERAPEUTIC ACTIVITIES: CPT | Performed by: PHYSICAL THERAPIST

## 2022-06-27 PROCEDURE — 97112 NEUROMUSCULAR REEDUCATION: CPT | Performed by: PHYSICAL THERAPIST

## 2022-06-27 PROCEDURE — 97110 THERAPEUTIC EXERCISES: CPT | Performed by: PHYSICAL THERAPIST

## 2022-06-27 NOTE — PROGRESS NOTES
Physical Therapy Daily Treatment Note      Patient: Maximo Kwon   : 1955  Referring practitioner: OSITO Poole  Date of Initial Visit: Type: THERAPY  Noted: 2022  Today's Date: 2022  Patient seen for 2 sessions       Visit Diagnoses:    ICD-10-CM ICD-9-CM   1. Transient ischemic attack  G45.9 435.9       Subjective Evaluation    History of Present Illness    Subjective comment: Pt has no complaints.       Objective   See Exercise, Manual, and Modality Logs for complete treatment.       Assessment & Plan     Assessment    Assessment details: Tx today initiated with sitting exercises for improved leg stability followed by standing proprioceptive static and dynamic activities.  Pt required requent rest breaks today with standing activities.  Pt noted to have impaired balance with declined standing, foam standing with eyes closed and with step over pods.  Pt demonstrated good effort today.    Plan  Plan details: Will follow progressing leg stability and balance training.          Timed:         Manual Therapy:         mins  42718;     Therapeutic Exercise:    17     mins  84660;     Neuromuscular John:    15    mins  56957;    Therapeutic Activity:     12     mins  93993;     Gait Training:           mins  63410;     Ultrasound:          mins  72371;    Ionto                                   mins   40057  Self Care                            mins   74998  Canalith Repos         mins 32748      Un-Timed:  Electrical Stimulation:         mins  30417 ( );  Dry Needling          mins self-pay  Traction          mins 58413      Timed Treatment:   44   mins   Total Treatment:     44   mins    Maldonado Fernandes PT  [place] License: [id number]      Electronically signed by Maldonado Fernandes PT, 22, 9:40 AM EDT

## 2022-06-29 ENCOUNTER — OFFICE VISIT (OUTPATIENT)
Dept: FAMILY MEDICINE CLINIC | Facility: CLINIC | Age: 67
End: 2022-06-29

## 2022-06-29 ENCOUNTER — TREATMENT (OUTPATIENT)
Dept: PHYSICAL THERAPY | Facility: CLINIC | Age: 67
End: 2022-06-29

## 2022-06-29 VITALS
HEART RATE: 104 BPM | RESPIRATION RATE: 18 BRPM | BODY MASS INDEX: 28.09 KG/M2 | SYSTOLIC BLOOD PRESSURE: 131 MMHG | WEIGHT: 207.4 LBS | DIASTOLIC BLOOD PRESSURE: 76 MMHG | TEMPERATURE: 97.9 F | OXYGEN SATURATION: 98 % | HEIGHT: 72 IN

## 2022-06-29 DIAGNOSIS — G45.9 TRANSIENT ISCHEMIC ATTACK: Primary | ICD-10-CM

## 2022-06-29 DIAGNOSIS — I10 ESSENTIAL HYPERTENSION: Primary | ICD-10-CM

## 2022-06-29 DIAGNOSIS — Z91.199 NONCOMPLIANCE: ICD-10-CM

## 2022-06-29 DIAGNOSIS — E66.3 OVERWEIGHT (BMI 25.0-29.9): ICD-10-CM

## 2022-06-29 DIAGNOSIS — R20.2 PARESTHESIAS: ICD-10-CM

## 2022-06-29 PROCEDURE — 97530 THERAPEUTIC ACTIVITIES: CPT | Performed by: PHYSICAL THERAPIST

## 2022-06-29 PROCEDURE — 97110 THERAPEUTIC EXERCISES: CPT | Performed by: PHYSICAL THERAPIST

## 2022-06-29 PROCEDURE — 99214 OFFICE O/P EST MOD 30 MIN: CPT | Performed by: NURSE PRACTITIONER

## 2022-06-29 PROCEDURE — 97112 NEUROMUSCULAR REEDUCATION: CPT | Performed by: PHYSICAL THERAPIST

## 2022-06-29 NOTE — PROGRESS NOTES
Chief Complaint  Hypertension (Follow up)    Subjective          Maximo Kwon is a 67 y.o. male who presents today to [facility] for follow up    HPI:   History of Present Illness    Presents to clinic for follow up on blood pressure.  On 6/10/2022, patient was started on chlorthalidone 25 mg in the morning.  Patient reports he does not think he has been taking this.  At that time, lisinopril was changed to 20 mg in the morning and 20 mg at night.  Patient reports he has been doing this and tolerating it well.  Patient reports he stopped taking his carvedilol as it caused upset stomach.  Reports he has not been taking hydralazine.  Reports blood pressure has been better since stopping these medications.  Presents with blood pressure logs.    Does report he still having some leg weakness but had third PT session today and that's going well.     Had follow up with neurology on 6/21/2022. Had another another MRI that was unremarkable.  Was told to discontinue Plavix and continue with aspirin. Suspects that DDD could be causing paresthesias. Was instructed to f/u with Dr Ventura in 3 months.  Patient reports he has not scheduled this follow-up but is willing to do so.  Has follow up with cardiology on 8/11/2022.       The following portions of the patient's history were reviewed and updated as appropriate: allergies, current medications, past family history, past medical history, past social history, past surgical history and problem list.    Objective     Allergy:   No Known Allergies     Current Medications:   Current Outpatient Medications   Medication Sig Dispense Refill   • albuterol sulfate  (90 Base) MCG/ACT inhaler Inhale 2 puffs Every 4 (Four) Hours As Needed for Wheezing or Shortness of Air. 54 g 6   • aspirin (Anthony Aspirin) 325 MG tablet Take 1 tablet by mouth Daily for 90 days. Start full dose aspirin when no longer taking Plavix this week 90 tablet 1   • atorvastatin  (LIPITOR) 40 MG tablet Take 1 tablet by mouth Every Night. 90 tablet 0   • Budeson-Glycopyrrol-Formoterol (Breztri Aerosphere) 160-9-4.8 MCG/ACT aerosol inhaler Inhale 2 puffs 2 (Two) Times a Day. 3 each 3   • chlorthalidone (HYGROTON) 25 MG tablet Take 1 tablet by mouth Daily. 30 tablet 0   • glyburide (DIAbeta) 5 MG tablet Take 1 tablet by mouth Daily With Breakfast. 90 tablet 1   • ipratropium-albuterol (DUO-NEB) 0.5-2.5 mg/3 ml nebulizer Take 3 mL by nebulization 4 (Four) Times a Day As Needed for Wheezing or Shortness of Air. 360 mL 8   • lisinopril (PRINIVIL,ZESTRIL) 20 MG tablet Take 1 tablet by mouth 2 (Two) Times a Day. 60 tablet 0   • metFORMIN (Glucophage) 1000 MG tablet Take 1 tablet by mouth 2 (Two) Times a Day With Meals. 180 tablet 1   • omeprazole (priLOSEC) 20 MG capsule Take 20 mg by mouth Daily.     • tiZANidine (ZANAFLEX) 4 MG tablet Take 8 mg by mouth At Night As Needed for Muscle Spasms.     • roflumilast (Daliresp) 500 MCG tablet tablet Take 1 tablet by mouth Daily. 30 tablet 8     No current facility-administered medications for this visit.       Past Medical History:  Past Medical History:   Diagnosis Date   • Arthritis    • Chronic diastolic heart failure (HCC) 08/19/2021   • COPD (chronic obstructive pulmonary disease) (HCC)    • Depression    • Diverticulitis    • Emphysema (subcutaneous) (surgical) resulting from a procedure    • GERD (gastroesophageal reflux disease)    • Neuropathy    • Pneumothorax     Left, status post chest tube   • Shortness of breath    • Spinal stenosis    • Type 2 diabetes mellitus (HCC)        Past Surgical History:  Past Surgical History:   Procedure Laterality Date   • BRONCHOSCOPY N/A 5/14/2019    Procedure: Bronchoscopy with Transbronchial Biopsy with Fluoroscopy;  Surgeon: Eladio Bethea MD;  Location: [facility];  Service: Pulmonary   • CHEST TUBE INSERTION Left     Pneumothorax   • COLON RESECTION  2016    had colostomy and reveresed back    •  "COLONOSCOPY  2016   • COLOSTOMY CLOSURE     • LUNG BIOPSY Right     (non cancerous)   • OTHER SURGICAL HISTORY Left     LEFT ELBOW SURGERY       Social History:  Social History     Socioeconomic History   • Marital status:    • Number of children: 0   Tobacco Use   • Smoking status: Former Smoker     Packs/day: 1.00     Years: 30.00     Pack years: 30.00     Types: Cigarettes     Quit date:      Years since quittin.4   • Smokeless tobacco: Never Used   Vaping Use   • Vaping Use: Never used   Substance and Sexual Activity   • Alcohol use: No   • Drug use: No   • Sexual activity: Defer     Birth control/protection: Other       Family History:  Family History   Problem Relation Age of Onset   • Alzheimer's disease Mother    • Cancer Father         THROAT CANCER   • Diabetes Sister    • Diabetes Brother        Review of Systems:  Review of Systems   Respiratory: Negative for shortness of breath.    Cardiovascular: Negative for chest pain, palpitations and leg swelling.   Neurological: Positive for weakness (Per baseline). Negative for speech difficulty.       Vital Signs:   /76 (BP Location: Right arm, Patient Position: Sitting, Cuff Size: Adult)   Pulse 104   Temp 97.9 °F (36.6 °C) (Temporal)   Resp 18   Ht 182.9 cm (72\")   Wt 94.1 kg (207 lb 6.4 oz)   SpO2 98%   BMI 28.13 kg/m²      Physical Exam:  Physical Exam  Vitals and nursing note reviewed.   Constitutional:       General: He is not in acute distress.     Appearance: Normal appearance. He is not ill-appearing or toxic-appearing.   HENT:      Head: Normocephalic.   Cardiovascular:      Rate and Rhythm: Normal rate and regular rhythm.      Pulses: Normal pulses.      Heart sounds: Normal heart sounds.   Pulmonary:      Effort: Pulmonary effort is normal. No respiratory distress.      Breath sounds: Normal breath sounds.   Abdominal:      General: Bowel sounds are normal.      Palpations: Abdomen is soft.   Musculoskeletal:         " General: No swelling.   Skin:     General: Skin is warm and dry.      Capillary Refill: Capillary refill takes less than 2 seconds.      Coloration: Skin is not pale.   Neurological:      Mental Status: He is alert and oriented to person, place, and time.      Motor: Weakness (Per baseline: LUE, LLE) present.   Psychiatric:         Mood and Affect: Mood normal.         Behavior: Behavior normal.         Thought Content: Thought content normal.         Judgment: Judgment normal.              Assessment and Plan   Diagnoses and all orders for this visit:    1. Essential hypertension (Primary)  Diet and lifestyle modifications to control condition.  Low-sodium DASH diet.      Patient is unsure of the exact medications he is taking. Patient is agreeable to f/u in clinic tomorrow with home medications so they can be verified.     2. Noncompliance  Take medications as prescribed.  Follow-up in clinic to discuss any medication complications.    3. Paresthesias  Agreeable to schedule follow-up with Dr. NIKKO Cárdenas as directed.    3. Overweight BMI 25-29.9  Diet and lifestyle modifications to promote weight loss    Discussed possible differential diagnoses, testing, treatment, recommended non-pharmacological interventions, risks, warning signs to monitor for that would indicate need for follow-up in clinic or ER. If no improvement with these regimens or you have new or worsening symptoms follow-up. Patient verbalizes understanding and agreement with plan of care. Denies further needs or concerns.     Patient was given instructions and counseling regarding her condition and for health maintenance advised.    BMI is >= 25 and <30. (Overweight) The following options were offered after discussion;: weight loss educational material (shared in after visit summary), exercise counseling/recommendations and nutrition counseling/recommendations       I spent 30 minutes caring for patient on this date of service. This time includes time  spent by me in the following activities: preparing for the visit, reviewing tests, obtaining and/or reviewing a separately obtained history, performing a medically appropriate examination and/or evaluation, counseling and educating the patient/family/caregiver, ordering medications, tests, or procedures and documenting information in the medical record    Meds ordered during this visit:  No orders of the defined types were placed in this encounter.      Patient Instructions:  Patient instructions given for the following visit diagnosis:    ICD-10-CM ICD-9-CM   1. Essential hypertension  I10 401.9   2. Noncompliance  Z91.19 V15.81   3. Paresthesias  R20.2 782.0   4. Overweight (BMI 25.0-29.9)  E66.3 278.02       Follow Up   Return for Recheck tomorrow.         This document has been electronically signed by OSITO Poole  June 29, 2022 17:43 EDT    Patient was given instructions and counseling regarding his condition or for health maintenance advice. Please see specific information pulled into the AVS if appropriate.     Part of this note may be an electronic transcription/translation of spoken language to printed text using the Dragon Dictation System.

## 2022-06-29 NOTE — PROGRESS NOTES
Physical Therapy Daily Treatment Note      Patient: Maximo Kwon   : 1955  Referring practitioner: OSITO Poole  Date of Initial Visit: Type: THERAPY  Noted: 2022  Today's Date: 2022  Patient seen for 3 sessions       Visit Diagnoses:    ICD-10-CM ICD-9-CM   1. Transient ischemic attack  G45.9 435.9       Subjective   Patient reports that he is having no pain prior to treatment session. Patient states that he done good following last treatment session.    Objective   See Exercise, Manual, and Modality Logs for complete treatment.       Assessment/Plan  Patient tolerated treatment session well with rest breaks taken as needed by the patient. Educated patient to perform therex per his tolerance, patient verbalized understanding. No adverse reactions with modalities or treatment session. Reps increased on several exercises with no increase in pain noted. Thera band resistance increased to green with no pain noted. New therex added per the patient's tolerance, patient demonstrated and understood new therex with no increase in pain. Fatigue noted so rest breaks taken as needed by the patient. No pain noted following treatment session. Continue per PT's POC, progress exercises per the patient's tolerance.    Timed:         Manual Therapy:         mins  15836;     Therapeutic Exercise:    24     mins  50258;     Neuromuscular John:    17   mins  91908;    Therapeutic Activity:     13    mins  14847;     Gait Training:           mins  81031;     Ultrasound:          mins  87947;    Ionto                                   mins   73285  Self Care                            mins   79511  Canalith Repos         mins 45909      Un-Timed:  Electrical Stimulation:         mins  58413 ( );  Dry Needling          mins self-pay  Traction          mins 08151      Timed Treatment:   54   mins   Total Treatment:     54   mins    Jackie Hassan PTA  [place] License: [id number]

## 2022-06-30 ENCOUNTER — OFFICE VISIT (OUTPATIENT)
Dept: FAMILY MEDICINE CLINIC | Facility: CLINIC | Age: 67
End: 2022-06-30

## 2022-06-30 VITALS
HEIGHT: 72 IN | HEART RATE: 62 BPM | DIASTOLIC BLOOD PRESSURE: 82 MMHG | WEIGHT: 207 LBS | TEMPERATURE: 97.9 F | BODY MASS INDEX: 28.04 KG/M2 | OXYGEN SATURATION: 99 % | SYSTOLIC BLOOD PRESSURE: 130 MMHG | RESPIRATION RATE: 18 BRPM

## 2022-06-30 DIAGNOSIS — R20.0 LEFT SIDED NUMBNESS: ICD-10-CM

## 2022-06-30 DIAGNOSIS — M62.81 MUSCLE WEAKNESS: ICD-10-CM

## 2022-06-30 DIAGNOSIS — E78.5 DYSLIPIDEMIA: ICD-10-CM

## 2022-06-30 DIAGNOSIS — Z51.81 MEDICATION MONITORING ENCOUNTER: Primary | ICD-10-CM

## 2022-06-30 DIAGNOSIS — I10 ESSENTIAL HYPERTENSION: ICD-10-CM

## 2022-06-30 PROCEDURE — 99214 OFFICE O/P EST MOD 30 MIN: CPT | Performed by: NURSE PRACTITIONER

## 2022-06-30 RX ORDER — CHLORTHALIDONE 25 MG/1
25 TABLET ORAL DAILY
Qty: 30 TABLET | Refills: 0 | Status: SHIPPED | OUTPATIENT
Start: 2022-06-30 | End: 2022-07-01 | Stop reason: SDUPTHER

## 2022-06-30 RX ORDER — LISINOPRIL 20 MG/1
20 TABLET ORAL 2 TIMES DAILY
Qty: 60 TABLET | Refills: 2 | Status: SHIPPED | OUTPATIENT
Start: 2022-06-30 | End: 2022-10-03 | Stop reason: SDUPTHER

## 2022-06-30 RX ORDER — CHLORAL HYDRATE 500 MG
1000 CAPSULE ORAL 2 TIMES DAILY WITH MEALS
Qty: 60 CAPSULE | Refills: 2 | Status: SHIPPED | OUTPATIENT
Start: 2022-06-30 | End: 2022-11-02 | Stop reason: SINTOL

## 2022-07-01 ENCOUNTER — TREATMENT (OUTPATIENT)
Dept: PHYSICAL THERAPY | Facility: CLINIC | Age: 67
End: 2022-07-01

## 2022-07-01 ENCOUNTER — TELEPHONE (OUTPATIENT)
Dept: FAMILY MEDICINE CLINIC | Facility: CLINIC | Age: 67
End: 2022-07-01

## 2022-07-01 DIAGNOSIS — G45.9 TRANSIENT ISCHEMIC ATTACK: Primary | ICD-10-CM

## 2022-07-01 PROCEDURE — 97110 THERAPEUTIC EXERCISES: CPT | Performed by: PHYSICAL THERAPIST

## 2022-07-01 PROCEDURE — 97112 NEUROMUSCULAR REEDUCATION: CPT | Performed by: PHYSICAL THERAPIST

## 2022-07-01 RX ORDER — LISINOPRIL 30 MG/1
TABLET ORAL
Qty: 90 TABLET | Refills: 3 | OUTPATIENT
Start: 2022-07-01

## 2022-07-01 RX ORDER — CHLORTHALIDONE 25 MG/1
25 TABLET ORAL DAILY
Qty: 30 TABLET | Refills: 2 | Status: SHIPPED | OUTPATIENT
Start: 2022-07-01 | End: 2022-10-03 | Stop reason: SDUPTHER

## 2022-07-01 NOTE — PROGRESS NOTES
Physical Therapy Daily Treatment Note      Patient: Maximo Kwon   : 1955  Referring practitioner: OSITO Poole  Date of Initial Visit: Type: THERAPY  Noted: 2022  Today's Date: 2022  Patient seen for 4 sessions       Visit Diagnoses:    ICD-10-CM ICD-9-CM   1. Transient ischemic attack  G45.9 435.9       Subjective Evaluation    History of Present Illness    Subjective comment: Pt has no complaints today.       Objective   See Exercise, Manual, and Modality Logs for complete treatment.       Assessment & Plan     Assessment    Assessment details: Tx today consisted of sitting exercises progressed with added 2# weight; followed by proprioceptive training for improved balance and reduced risk for falls with standing.  Pt demonstrated improvements with most dynamic balance activities today, yet continues to have impairments with declined standing and foam standing.  Pt required increased rest breaks due to fatigue.     Plan  Plan details: Will follow progressing leg stability and improved balance as tolerated.          Timed:         Manual Therapy:         mins  98175;     Therapeutic Exercise:    33     mins  41000;     Neuromuscular John:    14    mins  35325;    Therapeutic Activity:          mins  23221;     Gait Training:           mins  26725;     Ultrasound:          mins  79774;    Ionto                                   mins   68497  Self Care                            mins   97083  Canalith Repos         mins 04187      Un-Timed:  Electrical Stimulation:         mins  84969 ( );  Dry Needling          mins self-pay  Traction          mins 81173      Timed Treatment:   47   mins   Total Treatment:     47   mins    Maldonado Fernandes PT  [place] License: [id number]      Electronically signed by Maldonado Fernandes PT, 22, 1:36 PM EDT

## 2022-07-01 NOTE — TELEPHONE ENCOUNTER
----- Message from OSITO Poole sent at 6/30/2022  8:49 PM EDT -----  Please call patient to let him know that I have ordered a nerve conduction study to further investigate his left sided weakness. I am going to await those results as well the MRI results prior to ordering the blood work we discussed in clinic today.         Patient notified & verbalized understanding.

## 2022-07-07 PROBLEM — Z87.891 FORMER SMOKER: Status: ACTIVE | Noted: 2022-07-07

## 2022-07-08 ENCOUNTER — TREATMENT (OUTPATIENT)
Dept: PHYSICAL THERAPY | Facility: CLINIC | Age: 67
End: 2022-07-08

## 2022-07-08 DIAGNOSIS — G45.9 TRANSIENT ISCHEMIC ATTACK: Primary | ICD-10-CM

## 2022-07-08 PROCEDURE — 97110 THERAPEUTIC EXERCISES: CPT | Performed by: PHYSICAL THERAPIST

## 2022-07-08 PROCEDURE — 97112 NEUROMUSCULAR REEDUCATION: CPT | Performed by: PHYSICAL THERAPIST

## 2022-07-08 NOTE — PROGRESS NOTES
Physical Therapy Daily Treatment Note      Patient: Maximo Kwon   : 1955  Referring practitioner: OSITO Poole  Date of Initial Visit: Type: THERAPY  Noted: 2022  Today's Date: 2022  Patient seen for 5 sessions       Visit Diagnoses:    ICD-10-CM ICD-9-CM   1. Transient ischemic attack  G45.9 435.9       Subjective Evaluation    History of Present Illness    Subjective comment: Patient reports that he is tired today.       Objective   See Exercise, Manual, and Modality Logs for complete treatment.       Assessment & Plan     Assessment    Assessment details: Vitals assessed prior to therapy session (BP: 133/77 mmHg, HR: 98 BPM).  Therapy session consisted of there ex and neuromuscular re-education.  There ex continues to focus on LE strengthening.  Patient was challenged with balance tasks, with patient showing greatest difficulty with feet together eyes closed on foam and semi-tandem stance with UE tasks.  Rest breaks were provided as necessary throughout session, due to fatigue.  Patient will continue to be progressed per his tolerance and POC.          Timed:         Manual Therapy:         mins  09672;     Therapeutic Exercise:    31     mins  67684;     Neuromuscular John:    25    mins  10147;    Therapeutic Activity:          mins  55145;     Gait Training:           mins  19754;     Ultrasound:          mins  84268;    Ionto                                   mins   83722  Self Care                            mins   01206  Canalith Repos         mins 00773      Un-Timed:  Electrical Stimulation:         mins  00521 ( );  Dry Needling          mins self-pay  Traction          mins 40433      Timed Treatment:   56   mins   Total Treatment:     56   mins    Sierra Colon PT  [place] License: [id number]  Electronically signed by Sierra Colon PT, 22, 2:09 PM EDT.

## 2022-07-11 ENCOUNTER — TREATMENT (OUTPATIENT)
Dept: PHYSICAL THERAPY | Facility: CLINIC | Age: 67
End: 2022-07-11

## 2022-07-11 DIAGNOSIS — G45.9 TRANSIENT ISCHEMIC ATTACK: Primary | ICD-10-CM

## 2022-07-11 PROCEDURE — 97112 NEUROMUSCULAR REEDUCATION: CPT | Performed by: PHYSICAL THERAPIST

## 2022-07-11 PROCEDURE — 97110 THERAPEUTIC EXERCISES: CPT | Performed by: PHYSICAL THERAPIST

## 2022-07-11 NOTE — PROGRESS NOTES
"The patient has been notified of the following:      \"We have found that certain health care needs can be provided without the need for a face to face visit.  This service lets us provide the care you need with a phone conversation.       I will have full access to your [facility] medical record during this entire phone call.   I will be taking notes for your medical record.      Since this is like an office visit, we will bill your insurance company for this service.       There are potential benefits and risks of telephone visits (e.g. limits to patient confidentiality) that differ from in-person visits.?  Confidentiality still applies for telephone services, and nobody will record the visit.  It is important to be in a quiet, private space that is free of distractions (including cell phone or other devices) during the visit.??      If during the course of the call I believe a telephone visit is not appropriate, you will not be charged for this service\"     Consent has been obtained for this service by care team member: Yes     Subjective:      28 year old  at 34w0d presentst for a routine prenatal appointment.   Denies vaginal bleeding or leakage of fluid.  Denies contractions. + fetal movement.      No HA, visual changes, RUQ or epigastric pain.   Patient concerns:  Feeling well overall.   - Questions re: COVID19 and visitors after baby is born, pediatrician, and last ultrasound growth measurements    Objective:  Sounds well, no apparent distress  See ob flowsheet    Assessment/Plan:  Encounter Diagnosis   Name Primary?     Supervision of other normal pregnancy, [facility] CNM Yes     - Reviewed total weight gain, encouraged continued healthy diet and exercise.    - Reviewed importance of daily fetal kick count and why/how to contact provider.  - Reviewed why/how to contact provider if headache/visual changes/RUQ or epigastric pain, decreased fetal movement, vaginal bleeding, leakage of fluid or more than 4 " Physical Therapy Daily Treatment Note      Patient: Maximo Kwon   : 1955  Referring practitioner: OSITO Poole  Date of Initial Visit: Type: THERAPY  Noted: 2022  Today's Date: 2022  Patient seen for 6 sessions       Visit Diagnoses:    ICD-10-CM ICD-9-CM   1. Transient ischemic attack  G45.9 435.9       Subjective Evaluation    History of Present Illness    Subjective comment: Patient notes that he does not feel good today, noting he is worn out today and has been having leg cramps.Pain  Current pain ratin           Objective   See Exercise, Manual, and Modality Logs for complete treatment.       Assessment & Plan     Assessment    Assessment details: Vitals assessed prior to session (BP: 123/66 mmHg, HR: 106 BPM). Therapy session consisted of there ex and neuromuscular re-education.  There ex continues to focus on LE strengthening.  Patient challenged with balance tasks on foam; during balance activities, patient required intermittent UE activities and CGA.  Vitals assessed mid-session at BP:136/77mmH and HR:112 BPM.  Patient fatigued quickly during session, with rest breaks provided as necessary.  At conclusion of session, BP:114/76mmHg and HR:122BPM.  He will continue to be progressed per his tolerance and POC.            Timed:         Manual Therapy:         mins  43357;     Therapeutic Exercise:    25     mins  43364;     Neuromuscular John:  15      mins  49453;    Therapeutic Activity:          mins  87285;     Gait Training:           mins  76345;     Ultrasound:          mins  71105;    Ionto                                   mins   77507  Self Care                            mins   57223  Canalith Repos         mins 28303      Un-Timed:  Electrical Stimulation:         mins  15915 ( );  Dry Needling          mins self-pay  Traction          mins 41591      Timed Treatment:   40   mins   Total Treatment:     40   mins    Sierra Colon, PT  [place] License:  [id number]  Electronically signed by Sierra Colon, PT, 07/11/22, 3:09 PM EDT.                     contractions in an hour.  - Patient education/orders or handouts today: PTL signs/symptoms   - Discussed recommendations for visitors after birth and strict restrictions or precautions to take for visitors.   - Return to clinic in 2 weeks and prn if questions or concerns.     GUANAKO Mcneill CNM    Call start: 1:00PM  Call finish: 1:12PM  Duration of call: 12 minutes  Answers for HPI/ROS submitted by the patient on 5/29/2020   MYNOR 7 TOTAL SCORE: 6

## 2022-07-13 ENCOUNTER — TREATMENT (OUTPATIENT)
Dept: PHYSICAL THERAPY | Facility: CLINIC | Age: 67
End: 2022-07-13

## 2022-07-13 DIAGNOSIS — E11.42 TYPE 2 DIABETES MELLITUS WITH DIABETIC POLYNEUROPATHY, WITHOUT LONG-TERM CURRENT USE OF INSULIN: ICD-10-CM

## 2022-07-13 DIAGNOSIS — G45.9 TRANSIENT ISCHEMIC ATTACK: Primary | ICD-10-CM

## 2022-07-13 PROCEDURE — 97112 NEUROMUSCULAR REEDUCATION: CPT | Performed by: PHYSICAL THERAPIST

## 2022-07-13 PROCEDURE — 97110 THERAPEUTIC EXERCISES: CPT | Performed by: PHYSICAL THERAPIST

## 2022-07-13 NOTE — PROGRESS NOTES
Physical Therapy Daily Treatment Note      Patient: Maximo Kwon   : 1955  Referring practitioner: OSITO Poole  Date of Initial Visit: Type: THERAPY  Noted: 2022  Today's Date: 2022  Patient seen for 7 sessions       Visit Diagnoses:    ICD-10-CM ICD-9-CM   1. Transient ischemic attack  G45.9 435.9       Subjective   Patient reports that he is having no pain prior to treatment session. Patient states that he done good following last treatment session.    Objective   See Exercise, Manual, and Modality Logs for complete treatment.       Assessment/Plan  Weights increased to 2.5# with LAQ and seated march. Patient tolerated treatment session well with rest breaks taken as needed by the patient. Educated patient to perform therex per his tolerance, patient verbalized understanding. Treatment session consisted of exercises to improve strength and ROM to the LE's and activities to improve balance. No adverse reactions with treatment session. No pain noted following treatment session. Patient demonstrated difficulty when performing heel toe walking but especially when he is on foam. Rest breaks required through out session due to fatigue. Continue per PT's POC, progress exercises per the patient's tolerance.    Timed:         Manual Therapy:         mins  37017;     Therapeutic Exercise:    28     mins  61652;     Neuromuscular John:    16    mins  84273;    Therapeutic Activity:          mins  32721;     Gait Training:           mins  46669;     Ultrasound:          mins  58360;    Ionto                                   mins   32873  Self Care                            mins   54901  Canalith Repos         mins 89816      Un-Timed:  Electrical Stimulation:         mins  13213 (MC );  Dry Needling          mins self-pay  Traction          mins 55855      Timed Treatment:   44   mins   Total Treatment:     44   mins    Jackie Hassan PTA  [place] License: [id number]

## 2022-07-18 ENCOUNTER — TREATMENT (OUTPATIENT)
Dept: PHYSICAL THERAPY | Facility: CLINIC | Age: 67
End: 2022-07-18

## 2022-07-18 DIAGNOSIS — G45.9 TRANSIENT ISCHEMIC ATTACK: Primary | ICD-10-CM

## 2022-07-18 PROCEDURE — 97112 NEUROMUSCULAR REEDUCATION: CPT | Performed by: PHYSICAL THERAPIST

## 2022-07-18 PROCEDURE — 97110 THERAPEUTIC EXERCISES: CPT | Performed by: PHYSICAL THERAPIST

## 2022-07-18 NOTE — PROGRESS NOTES
Physical Therapy Daily Treatment Note      Patient: Maximo Kwon   : 1955  Referring practitioner: OSITO Poole  Date of Initial Visit: Type: THERAPY  Noted: 2022  Today's Date: 2022  Patient seen for 8 sessions       Visit Diagnoses:    ICD-10-CM ICD-9-CM   1. Transient ischemic attack  G45.9 435.9       Subjective:  Patient states of no new complaints/ concerns upon arrival to therapy.  Pt reports he tolerated last session well w/ good tolerance.     Objective   See Exercise, Manual, and Modality Logs for complete treatment.       Assessment/Plan:  Patient responded well to today's session, and was provided w/ rest breaks provided as needed due to fatigue.  Treatment initiated w/ therex as listed f/b neuro re-ed for improved static/ dynamic balance.  Activities progressed w/ additional closed chain balance exercises added to program including forward and side stepping over cones.  Pt performed activity to simulate home and outside environment. Pt noted w/ episode of LOB while on airex due to his left ankle giving away, however w/ assist of therapist pt was able to correct; pt reported of no injury or complaints of pain.  Pt educated to continue to monitor w/ pt verbalizing understanding. Pt demonstrated good weight bearing with remaining exercise.  Pt continues to benefit from therapy services, and will be progressed as tolerated to address goals, reduce falls and restore strength.  No signs of distress or adverse reactions observed during, and/ of following tx.  Continue w/ PT's POC.       BP:  150/79, HR 92 pre tx  BP:  146/86,  post tx      Timed:         Manual Therapy:         mins  52121;     Therapeutic Exercise:   24      mins  20078;     Neuromuscular John:  20    mins  31249;    Therapeutic Activity:          mins  22857;     Gait Training:           mins  31451;     Ultrasound:          mins  79637;    Ionto                                   mins   92503  Self Care                             mins   43446  [facility]         mins 93158      Un-Timed:  Electrical Stimulation:         mins  05777 ( );  Dry Needling          mins self-pay  Traction          mins 32816      Timed Treatment:  44    mins   Total Treatment:    44    mins    Clover Garnett. Amor, BREN  [place] License: [id number]

## 2022-07-20 ENCOUNTER — TELEPHONE (OUTPATIENT)
Dept: FAMILY MEDICINE CLINIC | Facility: CLINIC | Age: 67
End: 2022-07-20

## 2022-07-20 ENCOUNTER — HOSPITAL ENCOUNTER (OUTPATIENT)
Dept: MRI IMAGING | Facility: HOSPITAL | Age: 67
Discharge: HOME | End: 2022-07-20

## 2022-07-20 ENCOUNTER — TREATMENT (OUTPATIENT)
Dept: PHYSICAL THERAPY | Facility: CLINIC | Age: 67
End: 2022-07-20

## 2022-07-20 DIAGNOSIS — R20.0 LEFT SIDED NUMBNESS: ICD-10-CM

## 2022-07-20 DIAGNOSIS — M62.81 MUSCLE WEAKNESS: ICD-10-CM

## 2022-07-20 DIAGNOSIS — G45.9 TRANSIENT ISCHEMIC ATTACK: Primary | ICD-10-CM

## 2022-07-20 PROCEDURE — 72157 MRI CHEST SPINE W/O & W/DYE: CPT | Performed by: RADIOLOGY

## 2022-07-20 PROCEDURE — 0 GADOBENATE DIMEGLUMINE 529 MG/ML SOLUTION: Performed by: NURSE PRACTITIONER

## 2022-07-20 PROCEDURE — 97110 THERAPEUTIC EXERCISES: CPT | Performed by: PHYSICAL THERAPIST

## 2022-07-20 PROCEDURE — 72158 MRI LUMBAR SPINE W/O & W/DYE: CPT | Performed by: RADIOLOGY

## 2022-07-20 PROCEDURE — 97112 NEUROMUSCULAR REEDUCATION: CPT | Performed by: PHYSICAL THERAPIST

## 2022-07-20 PROCEDURE — A9577 INJ MULTIHANCE: HCPCS | Performed by: NURSE PRACTITIONER

## 2022-07-20 PROCEDURE — 72157 MRI CHEST SPINE W/O & W/DYE: CPT

## 2022-07-20 PROCEDURE — 72156 MRI NECK SPINE W/O & W/DYE: CPT | Performed by: RADIOLOGY

## 2022-07-20 PROCEDURE — 72156 MRI NECK SPINE W/O & W/DYE: CPT

## 2022-07-20 PROCEDURE — 72158 MRI LUMBAR SPINE W/O & W/DYE: CPT

## 2022-07-20 RX ADMIN — GADOBENATE DIMEGLUMINE 17 ML: 529 INJECTION, SOLUTION INTRAVENOUS at 09:52

## 2022-07-20 NOTE — PROGRESS NOTES
Physical Therapy Re Certification Of Plan of Care  Patient: Maximo Kwon   : 1955  Diagnosis/ICD-10 Code:  Transient ischemic attack [G45.9]  Referring practitioner: OSITO Poole  Date of Initial Visit: Type: THERAPY  Noted: 2022  Today's Date: 2022  Patient seen for 9 sessions         Visit Diagnoses:    ICD-10-CM ICD-9-CM   1. Transient ischemic attack  G45.9 435.9         Clinical Progress: improved  Home Program Compliance: Yes      Subjective       Objective          Strength/Myotome Testing     Left Hip   Planes of Motion   Flexion: 4+  Abduction: 4+  Adduction: 4+    Right Hip   Planes of Motion   Flexion: 4+  Abduction: 4+  Adduction: 4+    Left Knee   Flexion: 4+  Extension: 4+    Right Knee   Flexion: 4+  Extension: 4+    Left Ankle/Foot   Dorsiflexion: 3  Plantar flexion: 4+    Right Ankle/Foot   Dorsiflexion: 4+  Plantar flexion: 4+    Functional Assessment     Comments  BP: 122/70 mmHg  HR: 111 BPM          Assessment & Plan     Assessment  Impairments: abnormal gait, abnormal or restricted ROM, activity intolerance, impaired balance, impaired physical strength, lacks appropriate home exercise program, pain with function, safety issue and weight-bearing intolerance  Functional Limitations: lifting, walking, pulling, pushing, uncomfortable because of pain, moving in bed, standing, stooping and unable to perform repetitive tasks  Assessment details: Patient has been attending physical therapy for rehabilitation following a TIA; patient has attended therapy for a total of 9 sessions.  Patient has shown significant improvements in balance with skilled PT.  TERAN has improved by 9 points, with score improving from 35/56 to 44 /56.  Patient continues to display left dorsiflexion strength. Patient will continue to benefit from skilled PT so that he can achieve his maximum level of function.    Prognosis: good    Goals  Plan Goals: Short Term Goals: 4 weeks  1. Pt will be able to  achieve semi-tandem stance independently and maintain balance for 30 seconds to show improved balance.  Met  2. Pt will score at least 40/56 on the [place] Balance Scale for decreased risk of fall.  Met-44/56  3. Pt will perform TUG in less than 10 seconds for improved community involvement.  Met  4. Pt will perform sit<>stand transfers with minimal use of upper extremities to allow for improved independence.  Met    Long Term Goals: 12 weeks  1. Pt will perform bilateral SLS for 5 seconds for improved balance.  Ongoing  2. Pt will score at least 45/56 on the [place] Balance Scale for decreased risk of fall.  Ongoing, progressing  3. Bilateral hip and knee strength will improve to at least 4+/5 for improved safety with ambulation on unlevel surfaces.  Met  4. Patient will be able to ascend/descend a flight of stairs independently to allow for improved community involvement.  Ongoing        Plan  Therapy options: will be seen for skilled therapy services  Planned modality interventions: cryotherapy, thermotherapy (hydrocollator packs) and electrical stimulation/Russian stimulation  Planned therapy interventions: manual therapy, ADL retraining, balance/weight-bearing training, neuromuscular re-education, body mechanics training, postural training, soft tissue mobilization, flexibility, functional ROM exercises, strengthening, gait training, stretching, home exercise program, therapeutic activities, IADL retraining, transfer training and joint mobilization  Frequency: 2x week  Duration in weeks: 4  Treatment plan discussed with: patient  Plan details:   Re-evaluation   03823    Therapeutic exercise  74263  Therapeutic activity    32911  Neuromuscular re-education   44364  Manual therapy   62079  Gait training  89957    Attended e-stim  71532  Moist heat/cryotherapy 33832                Recommendations: Continue as planned  Timeframe: 1 month  Prognosis to achieve goals: good      Timed:         Manual Therapy:         mins   35342;     Therapeutic Exercise:    25     mins  95686;   (billed for 10 minutes of there ex, due to shared minutes)  Neuromuscular John:    19    mins  46932;    Therapeutic Activity:          mins  56363;     Gait Training:           mins  54994;     Ultrasound:          mins  44037;    Ionto                                   mins   35942  Self Care                            mins   63454  Canalith Repos         mins 35604      Un-Timed:  Electrical Stimulation:         mins  37136 ( );  Dry Needling          mins self-pay  Traction          mins 29734  Re-Eval                               mins  62771      Timed Treatment:   44   mins (billed for 29 minutes of timed treatment, due to shared minutes)  Total Treatment:     44   mins          PT: Sierra Colon PT     [place] License:  [id number]    Electronically signed by Sierra Colon PT, 07/20/22, 1:28 PM EDT    Certification Period: 7/20/2022 thru 10/17/2022  I certify that the therapy services are furnished while this patient is under my care.  The services outlined above are required by this patient, and will be reviewed every 90 days.         Physician Signature:__________________________________________________    PHYSICIAN: Nilsa Freeman APRN  NPI: [id number]                                      DATE:  :     Please sign and return via fax to [email or web address] . Thank you, [facility]

## 2022-07-20 NOTE — TELEPHONE ENCOUNTER
----- Message from OSITO Poole sent at 7/20/2022 10:39 AM EDT -----  Please call patient regarding results.     MRIs reveal multiple bulging and herniated disks.  Would he like to be referred to neurosurgery to discuss treatment options?      Spoke with patient & he verbalized understanding,reports he saw a neurosurgeon in the past & they told him his back was too bad for surgery that it would not even hold a screw so he declines another referral.

## 2022-07-22 ENCOUNTER — TREATMENT (OUTPATIENT)
Dept: PHYSICAL THERAPY | Facility: CLINIC | Age: 67
End: 2022-07-22

## 2022-07-22 DIAGNOSIS — G45.9 TRANSIENT ISCHEMIC ATTACK: Primary | ICD-10-CM

## 2022-07-22 PROCEDURE — 97112 NEUROMUSCULAR REEDUCATION: CPT | Performed by: PHYSICAL THERAPIST

## 2022-07-22 PROCEDURE — 97110 THERAPEUTIC EXERCISES: CPT | Performed by: PHYSICAL THERAPIST

## 2022-07-22 NOTE — PROGRESS NOTES
Physical Therapy Daily Treatment Note      Patient: Maximo Kwon   : 1955  Referring practitioner: OSITO Poole  Date of Initial Visit: Type: THERAPY  Noted: 2022  Today's Date: 2022  Patient seen for 10 sessions       Visit Diagnoses:    ICD-10-CM ICD-9-CM   1. Transient ischemic attack  G45.9 435.9       Subjective:  Patient reports his neck is bothering him some this morning, due to sleeping in a bad position last night.  Otherwise he notes of no new complaints.      Objective   See Exercise, Manual, and Modality Logs for complete treatment.       Assessment/Plan:  Pt's BP assessed prior to beginning tx w/ reading of 160/92, .  Pt reported of slight headache, however no other symptoms or signs of distress reported or observed.  Discussed w/ supervising therapist, Sierra Colon, PT, DPT.  PT advised PTA to continue w/ treatment as tolerated by patient, and monitor symptoms.  PT educated to  reduce program, as to pt's tolerance.  PTA educated patient to perform activities to his tolerance, and patient wished to continue w/ session. Pt performed therex and neuro re-ed as listed w/ focus on improved bilateral LE strength, and to assist w/ balance deficits.  Cuff weights held on seated marches and LAQ's due to elevated BP.  Pt's BP assessed during session w/ reading of 160/89, and at conclusion w/ reading 117/84.  Pt demonstrated good tolerance to tx w/ no distress.  Pt educated to continue to monitor BP at home, and seek medical attention if symptoms worsen, or if additional ones develop.  Pt verbalized understanding.  Pt continues to benefit from therapy services, and will be progressed as tolerated.  Continue w/ PT's POC.        Timed:         Manual Therapy:         mins  76753;     Therapeutic Exercise:    31     mins  02619;     Neuromuscular John:   9     mins  06431;    Therapeutic Activity:          mins  68233;     Gait Training:           mins  75862;     Ultrasound:           mins  77676;    Ionto                                   mins   28047  Self Care                            mins   33153  Canalith Repos         mins 82456      Un-Timed:  Electrical Stimulation:         mins  67722 ( );  Dry Needling          mins self-pay  Traction          mins 53787      Timed Treatment:  40    mins   Total Treatment:    40    mins    Clover Garnett. BREN Chinchilla  [place] License: [id number]

## 2022-07-27 ENCOUNTER — TREATMENT (OUTPATIENT)
Dept: PHYSICAL THERAPY | Facility: CLINIC | Age: 67
End: 2022-07-27

## 2022-07-27 DIAGNOSIS — G45.9 TRANSIENT ISCHEMIC ATTACK: Primary | ICD-10-CM

## 2022-07-27 PROCEDURE — 97110 THERAPEUTIC EXERCISES: CPT | Performed by: PHYSICAL THERAPIST

## 2022-07-27 PROCEDURE — 97112 NEUROMUSCULAR REEDUCATION: CPT | Performed by: PHYSICAL THERAPIST

## 2022-07-27 NOTE — PROGRESS NOTES
"Physical Therapy Daily Treatment Note      Patient: Maximo Kwon   : 1955  Referring practitioner: OSITO Poole  Date of Initial Visit: Type: THERAPY  Noted: 2022  Today's Date: 2022  Patient seen for 11 sessions       Visit Diagnoses:    ICD-10-CM ICD-9-CM   1. Transient ischemic attack  G45.9 435.9       Subjective Evaluation    History of Present Illness    Subjective comment: Pt reports generalized pain prior to today's sesssion stating, \"it never goes away\".        Objective   See Exercise, Manual, and Modality Logs for complete treatment.       Assessment & Plan     Assessment    Assessment details: Today's treatment session was progressed to include tandem walk with assist to address impaired balance and gait. Tandem stance was performed with head movement to address impaired balance with unsteadiness present. Rest breaks were provided as needed secondary to fatigue. Min assist was provided with neuromuscular re-education secondary to impaired balance. Therapeutic exercises were performed for improved BLE strength.     Plan  Plan details: Progress as indicated for improved balance and gait.          Timed:         Manual Therapy:         mins  03834;     Therapeutic Exercise:    26     mins  92906;     Neuromuscular John:    15    mins  72086;    Therapeutic Activity:          mins  72080;     Gait Training:           mins  00617;     Ultrasound:          mins  47362;    Ionto                                   mins   80413  Self Care                            mins   54909  Canalith Repos         mins 40644      Un-Timed:  Electrical Stimulation:         mins  57713 ( );  Dry Needling          mins self-pay  Traction          mins 17128      Timed Treatment:   41   mins   Total Treatment:     41   mins    Ashley Claudene Dalton, PT  [place] License: [id number]                  "

## 2022-07-29 ENCOUNTER — TREATMENT (OUTPATIENT)
Dept: PHYSICAL THERAPY | Facility: CLINIC | Age: 67
End: 2022-07-29

## 2022-07-29 DIAGNOSIS — G45.9 TRANSIENT ISCHEMIC ATTACK: Primary | ICD-10-CM

## 2022-07-29 PROCEDURE — 97110 THERAPEUTIC EXERCISES: CPT | Performed by: PHYSICAL THERAPIST

## 2022-07-29 PROCEDURE — 97112 NEUROMUSCULAR REEDUCATION: CPT | Performed by: PHYSICAL THERAPIST

## 2022-07-29 NOTE — PROGRESS NOTES
Physical Therapy Daily Treatment Note      Patient: Maximo Kwon   : 1955  Referring practitioner: OSITO Poole  Date of Initial Visit: Type: THERAPY  Noted: 2022  Today's Date: 2022  Patient seen for 12 sessions       Visit Diagnoses:    ICD-10-CM ICD-9-CM   1. Transient ischemic attack  G45.9 435.9       Subjective:  Patient reports being a little tired today, however he wishes to continue w/ session.  Pt states he tolerated his last tx well w/ good tolerance.      Objective   See Exercise, Manual, and Modality Logs for complete treatment.       Assessment/Plan:  Patient demonstrated good effort during today's session and was provided w/ rest breaks as needed due to fatigue.  Pt performed therex as listed w/ continued focus on improved bilateral LE strength and functional mobility f/b neuro re-ed for improved static/ dynamic balance.  Pt noted w/ intermittent episodes of LOB with standing balance, and was provided w/ min assist to reduce fall risk, and for improved patient safety.  Pt's BP assessed prior to tx w/ reading of 142/74, and post tx, 140/80, HR 92.  No signs of distress observed.  Pt continues to benefit from therapy services, and will be progressed as tolerated to address goals, improved strength, and reduce fall risk.  Continue w/ PT's POC.       Timed:         Manual Therapy:         mins  75687;     Therapeutic Exercise:    26     mins  09876;     Neuromuscular John:    21    mins  29285;    Therapeutic Activity:          mins  38665;     Gait Training:           mins  77317;     Ultrasound:          mins  03474;    Ionto                                   mins   36832  Self Care                            mins   92752  Canalith Repos         mins 89007      Un-Timed:  Electrical Stimulation:         mins  49749 ( );  Dry Needling          mins self-pay  Traction          mins 87041      Timed Treatment:  47    mins   Total Treatment:    47    mins    Clover Garnett.  Amor, PTA  [place] License: [id number]

## 2022-08-03 ENCOUNTER — TREATMENT (OUTPATIENT)
Dept: PHYSICAL THERAPY | Facility: CLINIC | Age: 67
End: 2022-08-03

## 2022-08-03 DIAGNOSIS — G45.9 TRANSIENT ISCHEMIC ATTACK: Primary | ICD-10-CM

## 2022-08-03 PROCEDURE — 97110 THERAPEUTIC EXERCISES: CPT | Performed by: PHYSICAL THERAPIST

## 2022-08-03 PROCEDURE — 97112 NEUROMUSCULAR REEDUCATION: CPT | Performed by: PHYSICAL THERAPIST

## 2022-08-03 NOTE — PROGRESS NOTES
Physical Therapy Daily Treatment Note      Patient: Maximo Kwon   : 1955  Referring practitioner: OSITO Poole  Date of Initial Visit: Type: THERAPY  Noted: 2022  Today's Date: 8/3/2022  Patient seen for 13 sessions       Visit Diagnoses:    ICD-10-CM ICD-9-CM   1. Transient ischemic attack  G45.9 435.9       Subjective:  Patient arrives to therapy today w/ spouse.  Pt states he has a little bit of a headache, however notes of no additional symptoms.       Objective   See Exercise, Manual, and Modality Logs for complete treatment.       Assessment/Plan:  Pt's BP assessed in seated position prior to beginning session w/ reading of 126/71, HR 93. No signs of distress observed. Pt educated to continue to monitor symptoms and seek medical attention if headache worsens or additional symptoms occur; pt verbalized understanding.  Treatment initiated w/ therex as listed for improved bilateral LE strength, and functional mobility f/b neuro re-ed to address balance deficits.  Pt performed side and long stepping w/ agility dots, as well as forward and retro stepping to simulate home and outside environment.  Pt noted w/ intermittent episodes of LOB with min assist provided to correct and for improved patient safety.  Pt's BP assessed at conclusion w/ reading of 130/81.  Pt continues to benefit from therapy services, and will be progressed as tolerated to address goals, improve strength, and reduce fall risk.  No changes in symptoms noted during tx.  Continue w/ PT's POC.       Timed:         Manual Therapy:         mins  32621;     Therapeutic Exercise:    24     mins  38559;     Neuromuscular John:   21     mins  39140;    Therapeutic Activity:          mins  48403;     Gait Training:           mins  01963;     Ultrasound:          mins  36967;    Ionto                                   mins   02243  Self Care                            mins   55618  Canalith Repos         mins  81751      Un-Timed:  Electrical Stimulation:         mins  22274 ( );  Dry Needling          mins self-pay  Traction          mins 75100      Timed Treatment:  45    mins   Total Treatment:    45    mins    Clover Garnett. BREN Chinchilla  [place] License: [id number]

## 2022-08-08 ENCOUNTER — TREATMENT (OUTPATIENT)
Dept: PHYSICAL THERAPY | Facility: CLINIC | Age: 67
End: 2022-08-08

## 2022-08-08 DIAGNOSIS — G45.9 TRANSIENT ISCHEMIC ATTACK: Primary | ICD-10-CM

## 2022-08-08 PROCEDURE — 97110 THERAPEUTIC EXERCISES: CPT | Performed by: PHYSICAL THERAPIST

## 2022-08-08 NOTE — PROGRESS NOTES
Physical Therapy Daily Treatment Note      Patient: Maximo Kwon   : 1955  Referring practitioner: OSITO Poole  Date of Initial Visit: Type: THERAPY  Noted: 2022  Today's Date: 2022  Patient seen for 14 sessions       Visit Diagnoses:    ICD-10-CM ICD-9-CM   1. Transient ischemic attack  G45.9 435.9       Subjective Evaluation    History of Present Illness    Subjective comment: Patient reports that he is not feeling well today.  He notes that he has a little bit of a headache and feels like his blood pressure might be up some.       Objective   See Exercise, Manual, and Modality Logs for complete treatment.       Assessment & Plan     Assessment    Assessment details: Vitals assessed prior to therapy session (BP: 152/82 mmHg, HR: 98 bpm).  Patient instructed to perform exercises per his tolerance, with patient verbalizing understanding.  Rest breaks were provided as necessary during there ex.  Following seated exercise, vitals assessed after a short rest break (BP: 175/91 mmHg, HR: 97 bpm).  Patient rested for approximately 5 minutes and vitals assessed again (BP: 170/85 mmHg, HR: 94 bpm).  Standing exercise and balance not performed at today's session, due to reports of not feeling well and elevated blood pressure.  Patient instructed to continue to monitor BP and to seek medical care if vitals worsen or he experiences any new symptoms; patient verbalized understanding.  Patient will continue to be progressed per his tolerance and POC; vitals will continue to be monitored at therapy sessions.           Timed:         Manual Therapy:         mins  59933;     Therapeutic Exercise:    20     mins  25337;     Neuromuscular John:        mins  99461;    Therapeutic Activity:          mins  27704;     Gait Training:           mins  05496;     Ultrasound:          mins  47704;    Ionto                                   mins   93308  Self Care                            mins   54004  Elise  Repos         mins 32819      Un-Timed:  Electrical Stimulation:         mins  45111 ( );  Dry Needling          mins self-pay  Traction          mins 08067      Timed Treatment:   20   mins   Total Treatment:     29   mins    Sierra Colon, CM  [place] License: [id number]  Electronically signed by Sierra Colon PT, 08/08/22, 3:59 PM EDT.

## 2022-08-10 ENCOUNTER — TREATMENT (OUTPATIENT)
Dept: PHYSICAL THERAPY | Facility: CLINIC | Age: 67
End: 2022-08-10

## 2022-08-10 DIAGNOSIS — G45.9 TRANSIENT ISCHEMIC ATTACK: Primary | ICD-10-CM

## 2022-08-10 DIAGNOSIS — J44.9 COPD, SEVERE: ICD-10-CM

## 2022-08-10 DIAGNOSIS — J96.11 CHRONIC RESPIRATORY FAILURE WITH HYPOXIA: ICD-10-CM

## 2022-08-10 DIAGNOSIS — E11.42 TYPE 2 DIABETES MELLITUS WITH DIABETIC POLYNEUROPATHY, WITHOUT LONG-TERM CURRENT USE OF INSULIN: ICD-10-CM

## 2022-08-10 PROCEDURE — 97112 NEUROMUSCULAR REEDUCATION: CPT | Performed by: PHYSICAL THERAPIST

## 2022-08-10 PROCEDURE — 97110 THERAPEUTIC EXERCISES: CPT | Performed by: PHYSICAL THERAPIST

## 2022-08-10 PROCEDURE — 97530 THERAPEUTIC ACTIVITIES: CPT | Performed by: PHYSICAL THERAPIST

## 2022-08-10 RX ORDER — BUDESONIDE, GLYCOPYRROLATE, AND FORMOTEROL FUMARATE 160; 9; 4.8 UG/1; UG/1; UG/1
2 AEROSOL, METERED RESPIRATORY (INHALATION) 2 TIMES DAILY
Qty: 3 EACH | Refills: 3 | OUTPATIENT
Start: 2022-08-10

## 2022-08-10 NOTE — PROGRESS NOTES
Physical Therapy Daily Treatment Note      Patient: Maximo Kwon   : 1955  Referring practitioner: OSITO Poole  Date of Initial Visit: Type: THERAPY  Noted: 2022  Today's Date: 8/10/2022  Patient seen for 15 sessions       Visit Diagnoses:    ICD-10-CM ICD-9-CM   1. Transient ischemic attack  G45.9 435.9       Subjective   Patient reports that his blood pressure has been running low today. Patient states that he is feeling but his left knee has been sore.    Objective   See Exercise, Manual, and Modality Logs for complete treatment.       Assessment/Plan  Sierra Colon, PT, DPT initated treatment session. Patient tolerated treatment session well with rest breaks taken as needed by the patient. Blood pressure prior to treatment session; 112/78 mmHg, heart rate: 108 beats per minute. Seated exercises performed and blood pressure taken in the left arm; 122/80 mmHg, heart rate: 124 beats per minute, and oxygen: 96 %. Standing exercises performed so oxygen checked throughout session due to shortness of breath at times; 95% to 97%. No adverse reactions with treatment session. No pain noted following treatment session. Continue per PT's POC, progress exercises per the patient's tolerance.    Timed:         Manual Therapy:         mins  04666;     Therapeutic Exercise:    31     mins  61096;     Neuromuscular John:    10    mins  82928;    Therapeutic Activity:    14      mins  37134;     Gait Training:           mins  81265;     Ultrasound:          mins  98534;    Ionto                                   mins   27792  Self Care                            mins   26699  Canalith Repos         mins 75412      Un-Timed:  Electrical Stimulation:         mins  32845 ( );  Dry Needling          mins self-pay  Traction          mins 72336      Timed Treatment:   55   mins   Total Treatment:     55   mins    Jackie Hassan PTA  [place] License: [id number]

## 2022-08-15 ENCOUNTER — TREATMENT (OUTPATIENT)
Dept: PHYSICAL THERAPY | Facility: CLINIC | Age: 67
End: 2022-08-15

## 2022-08-15 DIAGNOSIS — G45.9 TRANSIENT ISCHEMIC ATTACK: Primary | ICD-10-CM

## 2022-08-15 PROCEDURE — 97116 GAIT TRAINING THERAPY: CPT | Performed by: PHYSICAL THERAPIST

## 2022-08-15 PROCEDURE — 97110 THERAPEUTIC EXERCISES: CPT | Performed by: PHYSICAL THERAPIST

## 2022-08-15 PROCEDURE — 97530 THERAPEUTIC ACTIVITIES: CPT | Performed by: PHYSICAL THERAPIST

## 2022-08-15 NOTE — PROGRESS NOTES
"Physical Therapy Daily Treatment Note      Patient: Maximo Kwon   : 1955  Referring practitioner: OSITO Poole  Date of Initial Visit: Type: THERAPY  Noted: 2022  Today's Date: 8/15/2022  Patient seen for 4 sessions       Visit Diagnoses:    ICD-10-CM ICD-9-CM   1. Transient ischemic attack  G45.9 435.9       Subjective Evaluation    History of Present Illness    Subjective comment: Pt reports having \"normal, daily pain\". He states he has not had any falls since his last session.       Objective   See Exercise, Manual, and Modality Logs for complete treatment.       Assessment & Plan     Assessment    Assessment details: Today's session was progressed to include cone reach in tandem stance as well as cone reach while on air-ex mat. The patient demonstrated the greatest amount of difficulty with cone reach while in tandem stance, with CGA and balance checks required. Gait training was performed with the use of cones and pods for improved hip flexion, stance phase, and weight shift. Rest breaks were provided as needed secondary to fatigue. Intra-session oxygen saturation was monitored with a reading of 98%.     Plan  Plan details: Continue to progress neuromuscular re-education and gait training for improved balance and gait.          Timed:         Manual Therapy:         mins  18762;     Therapeutic Exercise:    31     mins  47485;     Neuromuscular John:    15    mins  77522;    Therapeutic Activity:          mins  47921;     Gait Training:     10      mins  24267;     Ultrasound:          mins  50750;    Ionto                                   mins   86210  Self Care                            mins   13191  Canalith Repos         mins 10566      Un-Timed:  Electrical Stimulation:         mins  11378 ( );  Dry Needling          mins self-pay  Traction          mins 39679      Timed Treatment:   56   mins   Total Treatment:     56   mins    Ashley Claudene Dalton, PT  [place] License: " [id number]

## 2022-08-17 ENCOUNTER — TREATMENT (OUTPATIENT)
Dept: PHYSICAL THERAPY | Facility: CLINIC | Age: 67
End: 2022-08-17

## 2022-08-17 DIAGNOSIS — G45.9 TRANSIENT ISCHEMIC ATTACK: Primary | ICD-10-CM

## 2022-08-17 PROCEDURE — 97110 THERAPEUTIC EXERCISES: CPT | Performed by: PHYSICAL THERAPIST

## 2022-08-17 PROCEDURE — 97112 NEUROMUSCULAR REEDUCATION: CPT | Performed by: PHYSICAL THERAPIST

## 2022-08-18 NOTE — PROGRESS NOTES
Physical Therapy Re Certification Of Plan of Care/Discharge  Patient: Maximo Kwon   : 1955  Diagnosis/ICD-10 Code:  Transient ischemic attack [G45.9]  Referring practitioner: OSITO Poole  Date of Initial Visit: Type: THERAPY  Noted: 2022  Today's Date: 2022  Patient seen for 17 sessions         Visit Diagnoses:    ICD-10-CM ICD-9-CM   1. Transient ischemic attack  G45.9 435.9         Maximo Kwon reports:   Subjective Questionnaire: TERAN 48/56  Clinical Progress: improved  Home Program Compliance: Yes  Treatment has included:       Subjective Evaluation    History of Present Illness    Subjective comment: Pt reports being ready for discharge today.  Pt /75; .         Objective          Strength/Myotome Testing     Left Hip   Planes of Motion   Flexion: 5    Right Hip   Planes of Motion   Flexion: 5    Left Knee   Flexion: 5  Extension: 5    Right Knee   Flexion: 5  Extension: 5          Assessment & Plan     Assessment    Assessment details: Pt demonstrated good effort today with understanding of HEP. Pt scored a 48/56 on his TERAN and demonstrated 5/5 gross LE strength.  Pt is motivated to cont with HEP at home and will be discharged at this time.    Goals  Plan Goals: Goals  Plan Goals: Short Term Goals: 4 weeks  1. Pt will be able to achieve semi-tandem stance independently and maintain balance for 30 seconds to show improved balance.  Met  2. Pt will score at least 40/56 on the Teran Balance Scale for decreased risk of fall.  Met-44/56  3. Pt will perform TUG in less than 10 seconds for improved community involvement.  Met  4. Pt will perform sit<>stand transfers with minimal use of upper extremities to allow for improved independence.  Met    Long Term Goals: 12 weeks  1. Pt will perform bilateral SLS for 5 seconds for improved balance.  Ongoing  2. Pt will score at least 45/56 on the Teran Balance Scale for decreased risk of fall.  Met  3. Bilateral hip and knee strength  will improve to at least 4+/5 for improved safety with ambulation on unlevel surfaces.  Met  4. Patient will be able to ascend/descend a flight of stairs independently to allow for improved community involvement.  Met        Plan  Therapy options: will not be seen for skilled therapy services  Plan details: Pt has met his goals and has requested to be discharged with a HEP.  Pt will be discharged and was instructed to contact therapy as needed.           Recommendations: Discharge  Timeframe:   Prognosis to achieve goals:       Timed:         Manual Therapy:         mins  79670;     Therapeutic Exercise:    30     mins  28224;     Neuromuscular John:    10    mins  24458;    Therapeutic Activity:          mins  55857;     Gait Training:           mins  48897;     Ultrasound:          mins  55279;    Ionto                                   mins   25343  Self Care                            mins   72144  Canalith Repos         mins 36383      Un-Timed:  Electrical Stimulation:         mins  23525 ( );  Dry Needling          mins self-pay  Traction          mins 21918  Re-Eval                               mins  81927      Timed Treatment:   40   mins   Total Treatment:     40   mins    Tx performed by Clover Garnett. BREN Chinchilla      PT:  Maldonado Fernandes PT    [place] License:  [id number]    Electronically signed by Maldonado Fernandes PT, 08/17/22, 10:06 PM EDT    Certification Period: 8/17/2022 thru 11/14/2022  I certify that the therapy services are furnished while this patient is under my care.  The services outlined above are required by this patient, and will be reviewed every 90 days.         Physician Signature:__________________________________________________    PHYSICIAN: Nilsa Freeman APRN  NPI: [id number]                                      DATE:  :     Please sign and return via fax to [email or web address] . Thank you, [facility]

## 2022-09-07 RX ORDER — OMEPRAZOLE 20 MG/1
20 CAPSULE, DELAYED RELEASE ORAL DAILY
Qty: 90 CAPSULE | Refills: 0 | Status: SHIPPED | OUTPATIENT
Start: 2022-09-07 | End: 2022-12-08

## 2022-09-07 NOTE — TELEPHONE ENCOUNTER
Caller: Maximo Kwon    Relationship: Self    Best call back number: [phone]    Requested Prescriptions:   Requested Prescriptions     Pending Prescriptions Disp Refills   • omeprazole (priLOSEC) 20 MG capsule       Sig: Take 1 capsule by mouth Daily.        Pharmacy where request should be sent: [facility] (NOW [facility]) - [facility] [address] - [phone]  - [phone]     Additional details provided by patient: PATIENT STATES THAT HE IS COMPLETLEY OUT OF THIS MEDICATION.    Does the patient have less than a 3 day supply:  [x] Yes  [] No    Jocelyn García Rep   09/07/22 16:03 EDT

## 2022-09-20 DIAGNOSIS — J44.9 COPD, SEVERE: ICD-10-CM

## 2022-09-20 DIAGNOSIS — J96.11 CHRONIC RESPIRATORY FAILURE WITH HYPOXIA: ICD-10-CM

## 2022-09-20 RX ORDER — ROFLUMILAST 500 UG/1
500 TABLET ORAL DAILY
Qty: 90 TABLET | Refills: 6 | Status: SHIPPED | OUTPATIENT
Start: 2022-09-20 | End: 2023-01-23 | Stop reason: SDUPTHER

## 2022-09-20 RX ORDER — BUDESONIDE, GLYCOPYRROLATE, AND FORMOTEROL FUMARATE 160; 9; 4.8 UG/1; UG/1; UG/1
2 AEROSOL, METERED RESPIRATORY (INHALATION) 2 TIMES DAILY
Qty: 3 EACH | Refills: 6 | Status: SHIPPED | OUTPATIENT
Start: 2022-09-20 | End: 2023-04-04 | Stop reason: SDUPTHER

## 2022-09-20 RX ORDER — BUDESONIDE, GLYCOPYRROLATE, AND FORMOTEROL FUMARATE 160; 9; 4.8 UG/1; UG/1; UG/1
2 AEROSOL, METERED RESPIRATORY (INHALATION) 2 TIMES DAILY
Qty: 3 EACH | Refills: 3 | OUTPATIENT
Start: 2022-09-20

## 2022-10-03 DIAGNOSIS — I10 ESSENTIAL HYPERTENSION: ICD-10-CM

## 2022-10-03 RX ORDER — CHLORTHALIDONE 25 MG/1
25 TABLET ORAL DAILY
Qty: 30 TABLET | Refills: 2 | Status: SHIPPED | OUTPATIENT
Start: 2022-10-03 | End: 2022-10-20

## 2022-10-03 RX ORDER — LISINOPRIL 20 MG/1
20 TABLET ORAL 2 TIMES DAILY
Qty: 60 TABLET | Refills: 2 | Status: SHIPPED | OUTPATIENT
Start: 2022-10-03 | End: 2023-01-04

## 2022-10-03 NOTE — TELEPHONE ENCOUNTER
Caller: Maximo Kwon    Relationship: Self    Best call back number: [phone]    Requested Prescriptions:   Requested Prescriptions     Pending Prescriptions Disp Refills   • lisinopril (PRINIVIL,ZESTRIL) 20 MG tablet 60 tablet 2     Sig: Take 1 tablet by mouth 2 (Two) Times a Day.   • chlorthalidone (HYGROTON) 25 MG tablet 30 tablet 2     Sig: Take 1 tablet by mouth Daily.        Pharmacy where request should be sent: [facility] - [address] - [phone]  - [phone]     Additional details provided by patient: N/A    Does the patient have less than a 3 day supply:  [] Yes  [x] No    EVELIA Arredondo   10/03/22 13:12 EDT

## 2022-10-20 ENCOUNTER — OFFICE VISIT (OUTPATIENT)
Dept: PULMONOLOGY | Facility: CLINIC | Age: 67
End: 2022-10-20

## 2022-10-20 ENCOUNTER — OFFICE VISIT (OUTPATIENT)
Dept: FAMILY MEDICINE CLINIC | Facility: CLINIC | Age: 67
End: 2022-10-20

## 2022-10-20 VITALS
TEMPERATURE: 96.9 F | DIASTOLIC BLOOD PRESSURE: 62 MMHG | HEIGHT: 72 IN | SYSTOLIC BLOOD PRESSURE: 132 MMHG | WEIGHT: 209 LBS | HEART RATE: 89 BPM | OXYGEN SATURATION: 98 % | BODY MASS INDEX: 28.31 KG/M2

## 2022-10-20 VITALS
WEIGHT: 209.6 LBS | DIASTOLIC BLOOD PRESSURE: 66 MMHG | HEART RATE: 100 BPM | HEIGHT: 72 IN | TEMPERATURE: 96.4 F | SYSTOLIC BLOOD PRESSURE: 136 MMHG | BODY MASS INDEX: 28.39 KG/M2 | OXYGEN SATURATION: 97 %

## 2022-10-20 DIAGNOSIS — Z23 NEED FOR INFLUENZA VACCINATION: ICD-10-CM

## 2022-10-20 DIAGNOSIS — I10 ESSENTIAL HYPERTENSION: Primary | ICD-10-CM

## 2022-10-20 DIAGNOSIS — E78.5 DYSLIPIDEMIA: ICD-10-CM

## 2022-10-20 DIAGNOSIS — E55.9 VITAMIN D DEFICIENCY: ICD-10-CM

## 2022-10-20 DIAGNOSIS — J44.9 COPD, SEVERE: Primary | ICD-10-CM

## 2022-10-20 DIAGNOSIS — E66.3 OVERWEIGHT (BMI 25.0-29.9): ICD-10-CM

## 2022-10-20 DIAGNOSIS — R91.8 MULTIPLE PULMONARY NODULES: ICD-10-CM

## 2022-10-20 DIAGNOSIS — E11.42 TYPE 2 DIABETES MELLITUS WITH DIABETIC POLYNEUROPATHY, WITHOUT LONG-TERM CURRENT USE OF INSULIN: ICD-10-CM

## 2022-10-20 DIAGNOSIS — Z87.891 FORMER SMOKER: ICD-10-CM

## 2022-10-20 PROCEDURE — 90662 IIV NO PRSV INCREASED AG IM: CPT | Performed by: NURSE PRACTITIONER

## 2022-10-20 PROCEDURE — G0008 ADMIN INFLUENZA VIRUS VAC: HCPCS | Performed by: NURSE PRACTITIONER

## 2022-10-20 PROCEDURE — 99214 OFFICE O/P EST MOD 30 MIN: CPT | Performed by: NURSE PRACTITIONER

## 2022-10-20 PROCEDURE — 99214 OFFICE O/P EST MOD 30 MIN: CPT | Performed by: PHYSICIAN ASSISTANT

## 2022-10-20 RX ORDER — GLYBURIDE 5 MG/1
5 TABLET ORAL
Qty: 90 TABLET | Refills: 1 | Status: SHIPPED | OUTPATIENT
Start: 2022-10-20 | End: 2022-11-02

## 2022-10-20 NOTE — PROGRESS NOTES
"Chief Complaint  Hypertension    Subjective          Maximo Kwon is a 67 y.o. male who presents today to [facility] for follow up    HPI:   History of Present Illness    Presents to clinic for follow up for flu shot and labs.  Reports he is tolerating medications well with no issues or side effects.  Reports blood pressure at home has improved. No associated symptoms.     Patient reports he is following with Dr. Ventura, neurology, and had a normal tilt table done at [place]. Reports he has been doing well and has not had any \"TIA episodes\" in months. Is not sure when his follow up date is at this time, does not have the information with him currently.     Denies any other concerns or issues at this time.    The following portions of the patient's history were reviewed and updated as appropriate: allergies, current medications, past family history, past medical history, past social history, past surgical history and problem list.    Objective     Allergy:   No Known Allergies     Current Medications:   Current Outpatient Medications   Medication Sig Dispense Refill   • albuterol sulfate  (90 Base) MCG/ACT inhaler Inhale 2 puffs Every 4 (Four) Hours As Needed for Wheezing or Shortness of Air. 54 g 6   • Budeson-Glycopyrrol-Formoterol (Breztri Aerosphere) 160-9-4.8 MCG/ACT aerosol inhaler Inhale 2 puffs 2 (Two) Times a Day. 3 each 6   • glyburide (DIAbeta) 5 MG tablet Take 1 tablet by mouth Daily With Breakfast. 90 tablet 1   • ipratropium-albuterol (DUO-NEB) 0.5-2.5 mg/3 ml nebulizer Take 3 mL by nebulization 4 (Four) Times a Day As Needed for Wheezing or Shortness of Air. 360 mL 8   • lisinopril (PRINIVIL,ZESTRIL) 20 MG tablet Take 1 tablet by mouth 2 (Two) Times a Day. 60 tablet 2   • metFORMIN (GLUCOPHAGE) 1000 MG tablet TAKE 1 TABLET TWICE DAILY WITH MEALS 180 tablet 1   • omeprazole (priLOSEC) 20 MG capsule Take 1 capsule by mouth Daily. 90 capsule 0   • roflumilast " (Daliresp) 500 MCG tablet tablet Take 1 tablet by mouth Daily. 90 tablet 6   • tiZANidine (ZANAFLEX) 4 MG tablet Take 8 mg by mouth At Night As Needed for Muscle Spasms.     • Omega-3 Fatty Acids (fish oil) 1000 MG capsule capsule Take 1 capsule by mouth 2 (Two) Times a Day With Meals. 60 capsule 2     No current facility-administered medications for this visit.       Past Medical History:  Past Medical History:   Diagnosis Date   • Arthritis    • Chronic diastolic heart failure (HCC) 2021   • COPD (chronic obstructive pulmonary disease) (HCC)    • Depression    • Diverticulitis    • Emphysema (subcutaneous) (surgical) resulting from a procedure    • GERD (gastroesophageal reflux disease)    • Neuropathy    • Pneumothorax     Left, status post chest tube   • Shortness of breath    • Spinal stenosis    • Type 2 diabetes mellitus (HCC)        Past Surgical History:  Past Surgical History:   Procedure Laterality Date   • BRONCHOSCOPY N/A 2019    Procedure: Bronchoscopy with Transbronchial Biopsy with Fluoroscopy;  Surgeon: Eladio Bethea MD;  Location: [facility];  Service: Pulmonary   • CHEST TUBE INSERTION Left     Pneumothorax   • COLON RESECTION      had colostomy and reveresed back    • COLONOSCOPY     • COLOSTOMY CLOSURE     • LUNG BIOPSY Right     (non cancerous)   • OTHER SURGICAL HISTORY Left     LEFT ELBOW SURGERY       Social History:  Social History     Socioeconomic History   • Marital status:    • Number of children: 0   Tobacco Use   • Smoking status: Former     Packs/day: 1.00     Years: 30.00     Pack years: 30.00     Types: Cigarettes     Quit date:      Years since quittin.8   • Smokeless tobacco: Never   Vaping Use   • Vaping Use: Never used   Substance and Sexual Activity   • Alcohol use: No   • Drug use: No   • Sexual activity: Defer     Birth control/protection: Other       Family History:  Family History   Problem Relation Age of Onset   • Alzheimer's  "disease Mother    • Cancer Father         THROAT CANCER   • Diabetes Sister    • Diabetes Brother        Review of Systems:  Review of Systems   Respiratory: Negative for shortness of breath.    Cardiovascular: Negative for chest pain, palpitations and leg swelling.   Gastrointestinal: Negative for abdominal pain, nausea and vomiting.       Vital Signs:   /66   Pulse 100   Temp 96.4 °F (35.8 °C)   Ht 182.9 cm (72\")   Wt 95.1 kg (209 lb 9.6 oz)   SpO2 97%   BMI 28.43 kg/m²  RR: 15    Physical Exam:  Physical Exam  Vitals and nursing note reviewed.   Constitutional:       General: He is not in acute distress.     Appearance: Normal appearance. He is not ill-appearing or toxic-appearing.   HENT:      Head: Normocephalic.   Neck:      Vascular: No carotid bruit.   Cardiovascular:      Rate and Rhythm: Normal rate and regular rhythm.      Heart sounds: Normal heart sounds.   Pulmonary:      Effort: Pulmonary effort is normal. No respiratory distress.      Breath sounds: Normal breath sounds.   Abdominal:      General: Bowel sounds are normal.      Palpations: Abdomen is soft.   Musculoskeletal:         General: No swelling.   Skin:     General: Skin is warm and dry.      Coloration: Skin is not pale.   Neurological:      Mental Status: He is alert and oriented to person, place, and time.      Comments: Neuro exam per baseline    Psychiatric:         Mood and Affect: Mood normal.         Behavior: Behavior normal.         Thought Content: Thought content normal.         Judgment: Judgment normal.                  Assessment and Plan   Diagnoses and all orders for this visit:    1. Essential hypertension (Primary)  -     CBC & Differential  -     Comprehensive Metabolic Panel  -     Hemoglobin A1c  -     Lipid Panel  -     MicroAlbumin, Urine, Random - Urine, Clean Catch  -     TSH Rfx On Abnormal To Free T4  -     Vitamin D,25-Hydroxy    2. Type 2 diabetes mellitus with diabetic polyneuropathy, without " long-term current use of insulin (HCC)  -     CBC & Differential  -     Comprehensive Metabolic Panel  -     Hemoglobin A1c  -     Lipid Panel  -     MicroAlbumin, Urine, Random - Urine, Clean Catch  -     TSH Rfx On Abnormal To Free T4  -     Vitamin D,25-Hydroxy  -     glyburide (DIAbeta) 5 MG tablet; Take 1 tablet by mouth Daily With Breakfast.  Dispense: 90 tablet; Refill: 1    3. Dyslipidemia  -     CBC & Differential  -     Comprehensive Metabolic Panel  -     Hemoglobin A1c  -     Lipid Panel  -     TSH Rfx On Abnormal To Free T4  -     Vitamin D,25-Hydroxy    4. Vitamin D deficiency  -     Vitamin D,25-Hydroxy    5. Overweight (BMI 25.0-29.9)    6. Need for influenza vaccination  -     Fluzone High-Dose 65+yrs (3236-0500)      1, 2, 3.  Diet and lifestyle modifications to control condition.  4.  Recheck level.  5.  Diet lifestyle modifications to promote weight loss.  Other: Agrees to return to fasting   Continue to follow with neurology.    Discussed possible differential diagnoses, testing, treatment, recommended non-pharmacological interventions, risks, warning signs to monitor for that would indicate need for follow-up in clinic or ER. If no improvement with these regimens or you have new or worsening symptoms follow-up. Patient verbalizes understanding and agreement with plan of care. Denies further needs or concerns.     Patient was given instructions and counseling regarding her condition and for health maintenance advised.    BMI is >= 25 and <30. (Overweight) The following options were offered after discussion;: weight loss educational material (shared in after visit summary), exercise counseling/recommendations and nutrition counseling/recommendations       I spent 30 minutes caring for patient on this date of service. This time includes time spent by me in the following activities: preparing for the visit, reviewing tests, obtaining and/or reviewing a separately obtained history, performing a  medically appropriate examination and/or evaluation, counseling and educating the patient/family/caregiver, ordering medications, tests, or procedures and documenting information in the medical record    Meds ordered during this visit:  New Medications Ordered This Visit   Medications   • glyburide (DIAbeta) 5 MG tablet     Sig: Take 1 tablet by mouth Daily With Breakfast.     Dispense:  90 tablet     Refill:  1       Patient Instructions:  Patient instructions given for the following visit diagnosis:    ICD-10-CM ICD-9-CM   1. Essential hypertension  I10 401.9   2. Type 2 diabetes mellitus with diabetic polyneuropathy, without long-term current use of insulin (HCC)  E11.42 250.60     357.2   3. Dyslipidemia  E78.5 272.4   4. Vitamin D deficiency  E55.9 268.9   5. Overweight (BMI 25.0-29.9)  E66.3 278.02   6. Need for influenza vaccination  Z23 V04.81       Follow Up   Return in about 3 months (around 1/20/2023) for Recheck, Sooner if needed.        This document has been electronically signed by OSITO Poole  October 20, 2022 15:23 EDT    Patient was given instructions and counseling regarding his condition or for health maintenance advice. Please see specific information pulled into the AVS if appropriate.     Part of this note may be an electronic transcription/translation of spoken language to printed text using the Dragon Dictation System.

## 2022-10-20 NOTE — PROGRESS NOTES
"Chief Complaint  COPD, severe     Subjective        Maximo Kwon presents to [facility]  History of Present Illness     Patient presents today for follow-up of COPD, pulmonary nodules, cigarette dependence history.  He states that his COPD is doing fairly well at this time.  Has noticed slight increase in difficulty breathing, but remains tolerable at this time he does typically note symptom worsening with the colder months, and will keep a close eye on symptoms.   He states that he notes very minimal flareups while using breztri, as he did have more in the past while using Symbicort and Spiriva combination.  After restarting Daliresp due to concern of GI side effects, he states that these were likely related to his hypertension medications as he has had no issues with restarting on medication.  No acute complaints at this time.  He qualified for supplemental oxygen past, but did not have significant benefit with use and was only using at nighttime, supplies were later discontinued.      Objective   Vital Signs:  /62 (BP Location: Left arm, Patient Position: Sitting)   Pulse 89   Temp 96.9 °F (36.1 °C)   Ht 182.9 cm (72\")   Wt 94.8 kg (209 lb)   SpO2 98%   BMI 28.35 kg/m²   Estimated body mass index is 28.35 kg/m² as calculated from the following:    Height as of this encounter: 182.9 cm (72\").    Weight as of this encounter: 94.8 kg (209 lb).      Physical Exam  Vitals reviewed.   Constitutional:       General: He is not in acute distress.     Appearance: He is well-developed. He is not diaphoretic.   HENT:      Head: Normocephalic and atraumatic.   Cardiovascular:      Rate and Rhythm: Normal rate and regular rhythm.      Heart sounds: Normal heart sounds, S1 normal and S2 normal.   Pulmonary:      Effort: Pulmonary effort is normal.      Breath sounds: No wheezing, rhonchi or rales.   Neurological:      Mental Status: He is alert and oriented to " person, place, and time.   Psychiatric:         Behavior: Behavior normal.        Result Review :  The following data was reviewed by: Cecile Martin PA-C on 10/20/2022:    Reviewed previous PFT.  Reviewed the previous CT chest from January 2020.  Reviewed the previous echo from 2022.       Assessment and Plan   Diagnoses and all orders for this visit:    1. COPD, severe (HCC) (Primary)    2. Multiple pulmonary nodules    3. Former smoker        COPD:  • Continue albuterol inhaler as needed  • Continue DuoNebs as needed (through [employer])  • Continue Breztri as scheduled  • Continue Daliresp 500 mcg once daily  • Previously followed with  for possible lung transplant, and others [facility] facility for secondary evaluation.        Pulmonary nodules,  History of cigarette nicotine dependence:  Notable for attempted biopsy with subsequent pneumothorax in [place].  Underwent transbronchial biopsy in 2019 - for malignancy concern.  Later referred to Dr. Rehman as there was notable increase in size of the nodules, development of few other smaller nodules.  This was negative for malignancy.  There was a significant change in imaging from April 2019 to June 2020.  There is a slight increase of some of the smaller nodules with interval improvement of some of the larger opacities (PET scan in between these 2 images was nonsignificant.   Imaging appears overall stable upon personal comparison from June 2020 to present.  Recent CT imaging from August 2021 to January 2022 notable for stability of pulmonary nodules (listed above).    [facility] previously suggested 1 year follow-up (which would've been May 2022), however CT was recently completed in January 2022.  • Aiming for repeat CT chest in January 2023 for 1 year follow up.   Will order low-dose CT scan chest at the next visit.      Follow Up   Return in about 3 months (around 1/20/2023), or if symptoms worsen or fail to improve, for Next scheduled follow  up.  Patient was given instructions and counseling regarding his condition or for health maintenance advice. Please see specific information pulled into the AVS if appropriate.

## 2022-10-26 ENCOUNTER — LAB (OUTPATIENT)
Dept: FAMILY MEDICINE CLINIC | Facility: CLINIC | Age: 67
End: 2022-10-26

## 2022-10-26 PROCEDURE — 82306 VITAMIN D 25 HYDROXY: CPT | Performed by: NURSE PRACTITIONER

## 2022-10-26 PROCEDURE — 84443 ASSAY THYROID STIM HORMONE: CPT | Performed by: NURSE PRACTITIONER

## 2022-10-26 PROCEDURE — 82043 UR ALBUMIN QUANTITATIVE: CPT | Performed by: NURSE PRACTITIONER

## 2022-10-26 PROCEDURE — 80053 COMPREHEN METABOLIC PANEL: CPT | Performed by: NURSE PRACTITIONER

## 2022-10-26 PROCEDURE — 80061 LIPID PANEL: CPT | Performed by: NURSE PRACTITIONER

## 2022-10-26 PROCEDURE — 83036 HEMOGLOBIN GLYCOSYLATED A1C: CPT | Performed by: NURSE PRACTITIONER

## 2022-10-26 PROCEDURE — 85025 COMPLETE CBC W/AUTO DIFF WBC: CPT | Performed by: NURSE PRACTITIONER

## 2022-10-27 ENCOUNTER — TELEPHONE (OUTPATIENT)
Dept: FAMILY MEDICINE CLINIC | Facility: CLINIC | Age: 67
End: 2022-10-27

## 2022-10-27 LAB
25(OH)D3 SERPL-MCNC: 23 NG/ML (ref 30–100)
ALBUMIN SERPL-MCNC: 4.8 G/DL (ref 3.5–5.2)
ALBUMIN UR-MCNC: 1.3 MG/DL
ALBUMIN/GLOB SERPL: 1.8 G/DL
ALP SERPL-CCNC: 79 U/L (ref 39–117)
ALT SERPL W P-5'-P-CCNC: 32 U/L (ref 1–41)
ANION GAP SERPL CALCULATED.3IONS-SCNC: 16.4 MMOL/L (ref 5–15)
AST SERPL-CCNC: 21 U/L (ref 1–40)
BASOPHILS # BLD AUTO: 0.05 10*3/MM3 (ref 0–0.2)
BASOPHILS NFR BLD AUTO: 0.8 % (ref 0–1.5)
BILIRUB SERPL-MCNC: 0.3 MG/DL (ref 0–1.2)
BUN SERPL-MCNC: 15 MG/DL (ref 8–23)
BUN/CREAT SERPL: 14 (ref 7–25)
CALCIUM SPEC-SCNC: 10.2 MG/DL (ref 8.6–10.5)
CHLORIDE SERPL-SCNC: 92 MMOL/L (ref 98–107)
CHOLEST SERPL-MCNC: 214 MG/DL (ref 0–200)
CO2 SERPL-SCNC: 30.6 MMOL/L (ref 22–29)
CREAT SERPL-MCNC: 1.07 MG/DL (ref 0.76–1.27)
DEPRECATED RDW RBC AUTO: 45.5 FL (ref 37–54)
EGFRCR SERPLBLD CKD-EPI 2021: 76.1 ML/MIN/1.73
EOSINOPHIL # BLD AUTO: 0.22 10*3/MM3 (ref 0–0.4)
EOSINOPHIL NFR BLD AUTO: 3.4 % (ref 0.3–6.2)
ERYTHROCYTE [DISTWIDTH] IN BLOOD BY AUTOMATED COUNT: 14.1 % (ref 12.3–15.4)
GLOBULIN UR ELPH-MCNC: 2.6 GM/DL
GLUCOSE SERPL-MCNC: 135 MG/DL (ref 65–99)
HBA1C MFR BLD: 6.8 % (ref 4.8–5.6)
HCT VFR BLD AUTO: 44.5 % (ref 37.5–51)
HDLC SERPL-MCNC: 40 MG/DL (ref 40–60)
HGB BLD-MCNC: 14.9 G/DL (ref 13–17.7)
IMM GRANULOCYTES # BLD AUTO: 0.03 10*3/MM3 (ref 0–0.05)
IMM GRANULOCYTES NFR BLD AUTO: 0.5 % (ref 0–0.5)
LDLC SERPL CALC-MCNC: 123 MG/DL (ref 0–100)
LDLC/HDLC SERPL: 2.92 {RATIO}
LYMPHOCYTES # BLD AUTO: 1.19 10*3/MM3 (ref 0.7–3.1)
LYMPHOCYTES NFR BLD AUTO: 18.5 % (ref 19.6–45.3)
MCH RBC QN AUTO: 29.7 PG (ref 26.6–33)
MCHC RBC AUTO-ENTMCNC: 33.5 G/DL (ref 31.5–35.7)
MCV RBC AUTO: 88.8 FL (ref 79–97)
MONOCYTES # BLD AUTO: 0.65 10*3/MM3 (ref 0.1–0.9)
MONOCYTES NFR BLD AUTO: 10.1 % (ref 5–12)
NEUTROPHILS NFR BLD AUTO: 4.29 10*3/MM3 (ref 1.7–7)
NEUTROPHILS NFR BLD AUTO: 66.7 % (ref 42.7–76)
NRBC BLD AUTO-RTO: 0 /100 WBC (ref 0–0.2)
PLATELET # BLD AUTO: 247 10*3/MM3 (ref 140–450)
PMV BLD AUTO: 11.7 FL (ref 6–12)
POTASSIUM SERPL-SCNC: 4.9 MMOL/L (ref 3.5–5.2)
PROT SERPL-MCNC: 7.4 G/DL (ref 6–8.5)
RBC # BLD AUTO: 5.01 10*6/MM3 (ref 4.14–5.8)
SODIUM SERPL-SCNC: 139 MMOL/L (ref 136–145)
TRIGL SERPL-MCNC: 287 MG/DL (ref 0–150)
TSH SERPL DL<=0.05 MIU/L-ACNC: 1.1 UIU/ML (ref 0.27–4.2)
VLDLC SERPL-MCNC: 51 MG/DL (ref 5–40)
WBC NRBC COR # BLD: 6.43 10*3/MM3 (ref 3.4–10.8)

## 2022-10-27 RX ORDER — ATORVASTATIN CALCIUM 20 MG/1
20 TABLET, FILM COATED ORAL DAILY
Qty: 90 TABLET | Refills: 0 | Status: CANCELLED | OUTPATIENT
Start: 2022-10-27

## 2022-10-27 NOTE — TELEPHONE ENCOUNTER
----- Message from OSITO Poole sent at 10/27/2022  9:16 AM EDT -----  Please call patient regarding results.     Cholesterol is abnormal. To improve, decrease calorie intake, carbohydrates, saturated fats. Increase aerobic exercise like walking, running, swimming, stair climbing, bike riding etc. If he is okay with it, I would like to start him on medication for this. If he is okay with it, I will send in atorvastatin.     Vitamin D is low.  Supplement sent to pharmacy      Spoke with patient & he verbalized understanding,is NOT agreeable to a statin,reports he had tried it in the past & caused his legs to cramp,as to the Vit. D he will think about it does not like to take medications & stated to not send in anything without asking him first.

## 2022-11-02 ENCOUNTER — OFFICE VISIT (OUTPATIENT)
Dept: FAMILY MEDICINE CLINIC | Facility: CLINIC | Age: 67
End: 2022-11-02

## 2022-11-02 VITALS
OXYGEN SATURATION: 98 % | TEMPERATURE: 96.6 F | WEIGHT: 210 LBS | SYSTOLIC BLOOD PRESSURE: 160 MMHG | HEIGHT: 72 IN | BODY MASS INDEX: 28.44 KG/M2 | DIASTOLIC BLOOD PRESSURE: 88 MMHG | HEART RATE: 98 BPM

## 2022-11-02 DIAGNOSIS — Z91.199 NONCOMPLIANCE: ICD-10-CM

## 2022-11-02 DIAGNOSIS — E78.5 DYSLIPIDEMIA: ICD-10-CM

## 2022-11-02 DIAGNOSIS — E66.3 OVERWEIGHT (BMI 25.0-29.9): ICD-10-CM

## 2022-11-02 DIAGNOSIS — E55.9 VITAMIN D DEFICIENCY: ICD-10-CM

## 2022-11-02 DIAGNOSIS — E11.42 TYPE 2 DIABETES MELLITUS WITH DIABETIC POLYNEUROPATHY, WITHOUT LONG-TERM CURRENT USE OF INSULIN: ICD-10-CM

## 2022-11-02 DIAGNOSIS — I10 ESSENTIAL HYPERTENSION: Primary | ICD-10-CM

## 2022-11-02 DIAGNOSIS — G45.9 TRANSIENT ISCHEMIC ATTACK: ICD-10-CM

## 2022-11-02 PROCEDURE — 99214 OFFICE O/P EST MOD 30 MIN: CPT | Performed by: NURSE PRACTITIONER

## 2022-11-02 NOTE — PROGRESS NOTES
Chief Complaint  Hypertension    Subjective          Maximo Kwon is a 67 y.o. male who presents today to [facility] for follow up    HPI:   History of Present Illness      Presents to clinic for follow-up for blood pressure.  Reports blood pressure has been elevated for the past few days but he has not been taking his chlorthalidone.  Presents with home blood pressure log.  Some readings above goal and a few low readings.  Patient denies any associated symptoms.  Reports Dr. Ventura has been wanting him to monitor his orthostatic vitals which he has been doing. Reports is still having some low readings in the evenings.  Patient reports Dr. Ventura feels his variation in blood pressure is related to a neurological condition.  Patient reports he was diagnosed with something but is not sure what it was.  Reports Dr. Ventura sent a medication for him to take for low blood pressure but reports he has not been taking it.  Patient reports he is unsure of when he is supposed to follow-up with Dr. Ventura but would like to have a new neurologist as he does not like Dr. Ventura.  Patient also admits he has not been taking his glyburide.  No other issues or concerns at this time.          The following portions of the patient's history were reviewed and updated as appropriate: allergies, current medications, past family history, past medical history, past social history, past surgical history and problem list.    Objective     Allergy:   No Known Allergies     Current Medications:   Current Outpatient Medications   Medication Sig Dispense Refill   • albuterol sulfate  (90 Base) MCG/ACT inhaler Inhale 2 puffs Every 4 (Four) Hours As Needed for Wheezing or Shortness of Air. 54 g 6   • Budeson-Glycopyrrol-Formoterol (Breztri Aerosphere) 160-9-4.8 MCG/ACT aerosol inhaler Inhale 2 puffs 2 (Two) Times a Day. 3 each 6   • ipratropium-albuterol (DUO-NEB) 0.5-2.5 mg/3 ml nebulizer Take 3 mL by nebulization  4 (Four) Times a Day As Needed for Wheezing or Shortness of Air. 360 mL 8   • lisinopril (PRINIVIL,ZESTRIL) 20 MG tablet Take 1 tablet by mouth 2 (Two) Times a Day. 60 tablet 2   • metFORMIN (GLUCOPHAGE) 1000 MG tablet TAKE 1 TABLET TWICE DAILY WITH MEALS 180 tablet 1   • omeprazole (priLOSEC) 20 MG capsule Take 1 capsule by mouth Daily. 90 capsule 0   • roflumilast (Daliresp) 500 MCG tablet tablet Take 1 tablet by mouth Daily. 90 tablet 6     No current facility-administered medications for this visit.       Past Medical History:  Past Medical History:   Diagnosis Date   • Arthritis    • Chronic diastolic heart failure (HCC) 2021   • COPD (chronic obstructive pulmonary disease) (HCC)    • Depression    • Diverticulitis    • Emphysema (subcutaneous) (surgical) resulting from a procedure    • GERD (gastroesophageal reflux disease)    • Neuropathy    • Pneumothorax     Left, status post chest tube   • Shortness of breath    • Spinal stenosis    • Type 2 diabetes mellitus (HCC)        Past Surgical History:  Past Surgical History:   Procedure Laterality Date   • BRONCHOSCOPY N/A 2019    Procedure: Bronchoscopy with Transbronchial Biopsy with Fluoroscopy;  Surgeon: Eladio Bethea MD;  Location: [facility];  Service: Pulmonary   • CHEST TUBE INSERTION Left     Pneumothorax   • COLON RESECTION  2016    had colostomy and reveresed back    • COLONOSCOPY     • COLOSTOMY CLOSURE     • LUNG BIOPSY Right     (non cancerous)   • OTHER SURGICAL HISTORY Left     LEFT ELBOW SURGERY       Social History:  Social History     Socioeconomic History   • Marital status:    • Number of children: 0   Tobacco Use   • Smoking status: Former     Packs/day: 1.00     Years: 30.00     Pack years: 30.00     Types: Cigarettes     Quit date:      Years since quittin.8   • Smokeless tobacco: Never   Vaping Use   • Vaping Use: Never used   Substance and Sexual Activity   • Alcohol use: No   • Drug use: No   •  "Sexual activity: Defer     Birth control/protection: Other       Family History:  Family History   Problem Relation Age of Onset   • Alzheimer's disease Mother    • Cancer Father         THROAT CANCER   • Diabetes Sister    • Diabetes Brother        Review of Systems:  Review of Systems   Respiratory: Negative for shortness of breath.    Cardiovascular: Negative for chest pain, palpitations and leg swelling.       Vital Signs:   /88 (BP Location: Right arm, Patient Position: Sitting, Cuff Size: Large Adult)   Pulse 98   Temp 96.6 °F (35.9 °C) (Temporal)   Ht 182.9 cm (72\")   Wt 95.3 kg (210 lb)   SpO2 98%   BMI 28.48 kg/m²  Repeat BP: 160/86    Physical Exam:  Physical Exam  Vitals and nursing note reviewed.   Constitutional:       General: He is not in acute distress.     Appearance: Normal appearance. He is not ill-appearing or toxic-appearing.   HENT:      Head: Normocephalic.   Eyes:      Pupils: Pupils are equal, round, and reactive to light.   Cardiovascular:      Rate and Rhythm: Normal rate and regular rhythm.      Heart sounds: Normal heart sounds.   Pulmonary:      Effort: Pulmonary effort is normal. No respiratory distress.      Breath sounds: Normal breath sounds.   Abdominal:      General: Bowel sounds are normal.      Palpations: Abdomen is soft.   Musculoskeletal:         General: No swelling.   Skin:     General: Skin is warm and dry.      Coloration: Skin is not pale.   Neurological:      Mental Status: He is alert and oriented to person, place, and time. Mental status is at baseline.   Psychiatric:         Mood and Affect: Mood normal.         Behavior: Behavior normal.         Thought Content: Thought content normal.         Judgment: Judgment normal.                  Assessment and Plan   Diagnoses and all orders for this visit:    1. Essential hypertension (Primary)    2. Transient ischemic attack  -     Ambulatory Referral to Neurology    3. Noncompliance    4. Type 2 diabetes " mellitus with diabetic polyneuropathy, without long-term current use of insulin (HCC)    5. Dyslipidemia    6. Vitamin D deficiency    7. Overweight (BMI 25.0-29.9)    1.  Diet and lifestyle modifications to control condition.  Take medications as prescribed.  Continue to monitor blood pressure, keep log, bring log to follow-up appointment, follow-up sooner for readings outside of established parameters. Patient was not completing orthostatic vitals correctly.  Patient educated on correct way to check orthostatic vitals.  Patient verbalizes and demonstrates understanding.  2. New referral to neuro. Records requested from Dr. Ventura again today.   4.  Refuses to continue to take glyburide but is agreeable to continue metformin.  Continue diet and lifestyle modifications to control condition.  5.  Patient refuses fish oil or statin therapy. Continue diet and lifestyle modifications to control condition  6.  Refuses supplement.  7.  Diet and lifestyle modifications to promote healthy weight.    Discussed possible differential diagnoses, testing, treatment, recommended non-pharmacological interventions, risks, warning signs to monitor for that would indicate need for follow-up in clinic or ER. If no improvement with these regimens or you have new or worsening symptoms follow-up. Patient verbalizes understanding and agreement with plan of care. Denies further needs or concerns.     Patient was given instructions and counseling regarding her condition and for health maintenance advised.    BMI is >= 25 and <30. (Overweight) The following options were offered after discussion;: weight loss educational material (shared in after visit summary), exercise counseling/recommendations and nutrition counseling/recommendations       I spent 30 minutes caring for patient on this date of service. This time includes time spent by me in the following activities: preparing for the visit, reviewing tests, obtaining and/or reviewing a  separately obtained history, performing a medically appropriate examination and/or evaluation, counseling and educating the patient/family/caregiver, ordering medications, tests, or procedures and documenting information in the medical record    Meds ordered during this visit:  No orders of the defined types were placed in this encounter.      Patient Instructions:  Patient instructions given for the following visit diagnosis:    ICD-10-CM ICD-9-CM   1. Essential hypertension  I10 401.9   2. Transient ischemic attack  G45.9 435.9   3. Noncompliance  Z91.199 V15.81   4. Type 2 diabetes mellitus with diabetic polyneuropathy, without long-term current use of insulin (HCC)  E11.42 250.60     357.2   5. Dyslipidemia  E78.5 272.4   6. Vitamin D deficiency  E55.9 268.9   7. Overweight (BMI 25.0-29.9)  E66.3 278.02       Follow Up   Return in about 3 months (around 2/2/2023) for Recheck, Sooner if needed.        This document has been electronically signed by OSITO Poole  November 2, 2022 13:46 EDT    Patient was given instructions and counseling regarding his condition or for health maintenance advice. Please see specific information pulled into the AVS if appropriate.     Part of this note may be an electronic transcription/translation of spoken language to printed text using the Dragon Dictation System.

## 2022-11-17 ENCOUNTER — TELEPHONE (OUTPATIENT)
Dept: FAMILY MEDICINE CLINIC | Facility: CLINIC | Age: 67
End: 2022-11-17

## 2022-11-17 NOTE — TELEPHONE ENCOUNTER
Caller: Maximo Kwon    Relationship: Self    Best call back number: [phone]    Requested Prescriptions:   ASPIRIN 325MG TABLET   ONCE DAILY      Pharmacy where request should be sent: [facility] - [facility] [address] - [phone]  - [phone] FX     Does the patient have less than a 3 day supply:  [x] Yes  [] No    Jocelyn Morales Rep   11/17/22 09:56 EST

## 2022-11-23 DIAGNOSIS — R91.8 MULTIPLE PULMONARY NODULES: Primary | ICD-10-CM

## 2022-11-30 RX ORDER — ASPIRIN 325 MG
325 TABLET ORAL DAILY
Qty: 90 TABLET | Refills: 1 | Status: SHIPPED | OUTPATIENT
Start: 2022-11-30 | End: 2023-01-04 | Stop reason: SDUPTHER

## 2022-12-08 RX ORDER — OMEPRAZOLE 20 MG/1
CAPSULE, DELAYED RELEASE ORAL
Qty: 90 CAPSULE | Refills: 0 | Status: SHIPPED | OUTPATIENT
Start: 2022-12-08 | End: 2023-04-04 | Stop reason: SDUPTHER

## 2022-12-20 ENCOUNTER — TELEPHONE (OUTPATIENT)
Dept: CARDIAC SURGERY | Facility: CLINIC | Age: 67
End: 2022-12-20

## 2022-12-20 NOTE — TELEPHONE ENCOUNTER
Pt was scheduled for 1/11 in [place] for his yearly. He stated he would not be able to make it to his scan scheduled 1/9. I let patient know if I were to r/s his appt, it wont be until July. Pt was agreeable.

## 2023-01-04 ENCOUNTER — OFFICE VISIT (OUTPATIENT)
Dept: FAMILY MEDICINE CLINIC | Facility: CLINIC | Age: 68
End: 2023-01-04
Payer: MEDICARE

## 2023-01-04 VITALS
SYSTOLIC BLOOD PRESSURE: 130 MMHG | TEMPERATURE: 96.8 F | RESPIRATION RATE: 16 BRPM | HEART RATE: 117 BPM | BODY MASS INDEX: 27.33 KG/M2 | OXYGEN SATURATION: 97 % | DIASTOLIC BLOOD PRESSURE: 70 MMHG | HEIGHT: 72 IN | WEIGHT: 201.8 LBS

## 2023-01-04 DIAGNOSIS — J44.0 COPD WITH LOWER RESPIRATORY INFECTION: Primary | ICD-10-CM

## 2023-01-04 DIAGNOSIS — E66.3 OVERWEIGHT (BMI 25.0-29.9): ICD-10-CM

## 2023-01-04 DIAGNOSIS — I10 ESSENTIAL HYPERTENSION: ICD-10-CM

## 2023-01-04 PROCEDURE — 99213 OFFICE O/P EST LOW 20 MIN: CPT | Performed by: NURSE PRACTITIONER

## 2023-01-04 RX ORDER — PREDNISONE 20 MG/1
40 TABLET ORAL DAILY
Qty: 10 TABLET | Refills: 0 | Status: SHIPPED | OUTPATIENT
Start: 2023-01-04 | End: 2023-01-09

## 2023-01-04 RX ORDER — CHLORTHALIDONE 25 MG/1
TABLET ORAL
Qty: 90 TABLET | OUTPATIENT
Start: 2023-01-04

## 2023-01-04 RX ORDER — LISINOPRIL 20 MG/1
TABLET ORAL
Qty: 180 TABLET | Refills: 0 | Status: SHIPPED | OUTPATIENT
Start: 2023-01-04 | End: 2023-04-04 | Stop reason: SDUPTHER

## 2023-01-04 RX ORDER — ASPIRIN 325 MG
325 TABLET ORAL DAILY
Qty: 90 TABLET | Refills: 1 | Status: SHIPPED | OUTPATIENT
Start: 2023-01-04 | End: 2023-07-03

## 2023-01-04 RX ORDER — DOXYCYCLINE HYCLATE 100 MG/1
100 CAPSULE ORAL 2 TIMES DAILY
Qty: 20 CAPSULE | Refills: 0 | Status: SHIPPED | OUTPATIENT
Start: 2023-01-04 | End: 2023-01-14

## 2023-01-04 RX ORDER — GUAIFENESIN 600 MG/1
1200 TABLET, EXTENDED RELEASE ORAL 2 TIMES DAILY PRN
Qty: 40 TABLET | Refills: 0 | Status: SHIPPED | OUTPATIENT
Start: 2023-01-04 | End: 2023-01-14

## 2023-01-04 NOTE — PROGRESS NOTES
Chief Complaint  Nasal Congestion, Cough, and URI    Subjective          Maximo Kwon is a 67 y.o. male who presents today to [facility] for follow up    HPI:   History of Present Illness    Presents to clinic for complaint of cough and chest congestion for over a week. Has tried [place] cold medication with little relief. Has history of COPD.  Associated symptoms: Wheezing.  Has been using inhalers which help.  No other issues or concerns at this time.      The following portions of the patient's history were reviewed and updated as appropriate: allergies, current medications, past family history, past medical history, past social history, past surgical history and problem list.    Objective     Allergy:   No Known Allergies     Current Medications:   Current Outpatient Medications   Medication Sig Dispense Refill   • albuterol sulfate  (90 Base) MCG/ACT inhaler Inhale 2 puffs Every 4 (Four) Hours As Needed for Wheezing or Shortness of Air. 54 g 6   • aspirin (Anthony Aspirin) 325 MG tablet Take 1 tablet by mouth Daily for 180 days. 90 tablet 1   • Budeson-Glycopyrrol-Formoterol (Breztri Aerosphere) 160-9-4.8 MCG/ACT aerosol inhaler Inhale 2 puffs 2 (Two) Times a Day. 3 each 6   • ipratropium-albuterol (DUO-NEB) 0.5-2.5 mg/3 ml nebulizer Take 3 mL by nebulization 4 (Four) Times a Day As Needed for Wheezing or Shortness of Air. 360 mL 8   • lisinopril (PRINIVIL,ZESTRIL) 20 MG tablet TAKE 1 TABLET TWICE DAILY 180 tablet 0   • metFORMIN (GLUCOPHAGE) 1000 MG tablet TAKE 1 TABLET TWICE DAILY WITH MEALS 180 tablet 1   • omeprazole (priLOSEC) 20 MG capsule TAKE 1 CAPSULE EVERY DAY 90 capsule 0   • roflumilast (Daliresp) 500 MCG tablet tablet Take 1 tablet by mouth Daily. 90 tablet 6   • doxycycline (VIBRAMYCIN) 100 MG capsule Take 1 capsule by mouth 2 (Two) Times a Day for 10 days. 20 capsule 0   • guaiFENesin (Mucinex) 600 MG 12 hr tablet Take 2 tablets by mouth 2 (Two) Times  a Day As Needed for Congestion for up to 10 days. 40 tablet 0   • predniSONE (DELTASONE) 20 MG tablet Take 2 tablets by mouth Daily for 5 days. 10 tablet 0     No current facility-administered medications for this visit.       Past Medical History:  Past Medical History:   Diagnosis Date   • Arthritis    • Chronic diastolic heart failure (HCC) 2021   • COPD (chronic obstructive pulmonary disease) (HCC)    • Depression    • Diverticulitis    • Emphysema (subcutaneous) (surgical) resulting from a procedure    • GERD (gastroesophageal reflux disease)    • Neuropathy    • Pneumothorax     Left, status post chest tube   • Shortness of breath    • Spinal stenosis    • Type 2 diabetes mellitus (HCC)        Past Surgical History:  Past Surgical History:   Procedure Laterality Date   • BRONCHOSCOPY N/A 2019    Procedure: Bronchoscopy with Transbronchial Biopsy with Fluoroscopy;  Surgeon: Eladio Bethea MD;  [facility];  Service: Pulmonary   • CHEST TUBE INSERTION Left     Pneumothorax   • COLON RESECTION  2016    had colostomy and reveresed back    • COLONOSCOPY     • COLOSTOMY CLOSURE     • LUNG BIOPSY Right     (non cancerous)   • OTHER SURGICAL HISTORY Left     LEFT ELBOW SURGERY       Social History:  Social History     Socioeconomic History   • Marital status:    • Number of children: 0   Tobacco Use   • Smoking status: Former     Packs/day: 1.00     Years: 30.00     Pack years: 30.00     Types: Cigarettes     Quit date:      Years since quittin.0   • Smokeless tobacco: Never   Vaping Use   • Vaping Use: Never used   Substance and Sexual Activity   • Alcohol use: No   • Drug use: No   • Sexual activity: Defer     Birth control/protection: Other       Family History:  Family History   Problem Relation Age of Onset   • Alzheimer's disease Mother    • Cancer Father         THROAT CANCER   • Diabetes Sister    • Diabetes Brother        Review of Systems:  Review of Systems    Constitutional: Negative for fever.       Vital Signs:   /70 (BP Location: Right arm, Patient Position: Sitting, Cuff Size: Large Adult)   Pulse 117   Temp 96.8 °F (36 °C) (Temporal)   Resp 16   Ht 182.9 cm (72\")   Wt 91.5 kg (201 lb 12.8 oz)   SpO2 97%   BMI 27.37 kg/m²      Physical Exam:  Physical Exam  Vitals and nursing note reviewed.   Constitutional:       General: He is not in acute distress.     Appearance: Normal appearance. He is not ill-appearing or toxic-appearing.   HENT:      Head: Normocephalic.   Cardiovascular:      Rate and Rhythm: Regular rhythm. Tachycardia present.      Heart sounds: Normal heart sounds.   Pulmonary:      Effort: Pulmonary effort is normal. No respiratory distress.      Breath sounds: Normal breath sounds.   Skin:     General: Skin is warm and dry.      Coloration: Skin is not pale.   Neurological:      Mental Status: He is alert and oriented to person, place, and time.   Psychiatric:         Mood and Affect: Mood normal.         Behavior: Behavior normal.         Thought Content: Thought content normal.         Judgment: Judgment normal.                  Assessment and Plan   Diagnoses and all orders for this visit:    1. COPD with lower respiratory infection (HCC) (Primary)  -     predniSONE (DELTASONE) 20 MG tablet; Take 2 tablets by mouth Daily for 5 days.  Dispense: 10 tablet; Refill: 0  -     doxycycline (VIBRAMYCIN) 100 MG capsule; Take 1 capsule by mouth 2 (Two) Times a Day for 10 days.  Dispense: 20 capsule; Refill: 0  -     guaiFENesin (Mucinex) 600 MG 12 hr tablet; Take 2 tablets by mouth 2 (Two) Times a Day As Needed for Congestion for up to 10 days.  Dispense: 40 tablet; Refill: 0    2. Overweight (BMI 25.0-29.9)    Other orders  -     aspirin (Anthony Aspirin) 325 MG tablet; Take 1 tablet by mouth Daily for 180 days.  Dispense: 90 tablet; Refill: 1    1.  Cough and deep breathe.  Increase humidification and hydration.  Symptom management as discussed.   Regimen as above.  Avoid OTC decongestants as they can elevate blood pressure.  Refuses any testing.  2.  Continue to work on diet and exercise.    Discussed possible differential diagnoses, testing, treatment, recommended non-pharmacological interventions, risks, warning signs to monitor for that would indicate need for follow-up in clinic or ER. If no improvement with these regimens or you have new or worsening symptoms follow-up. Patient verbalizes understanding and agreement with plan of care. Denies further needs or concerns.     Patient was given instructions and counseling regarding her condition and for health maintenance advised.    BMI is >= 25 and <30. (Overweight) The following options were offered after discussion;: weight loss educational material (shared in after visit summary), exercise counseling/recommendations and nutrition counseling/recommendations       I spent 20 minutes caring for patient on this date of service. This time includes time spent by me in the following activities: preparing for the visit, reviewing tests, obtaining and/or reviewing a separately obtained history, performing a medically appropriate examination and/or evaluation, counseling and educating the patient/family/caregiver, ordering medications, tests, or procedures and documenting information in the medical record    Meds ordered during this visit:  New Medications Ordered This Visit   Medications   • aspirin (Anthony Aspirin) 325 MG tablet     Sig: Take 1 tablet by mouth Daily for 180 days.     Dispense:  90 tablet     Refill:  1   • predniSONE (DELTASONE) 20 MG tablet     Sig: Take 2 tablets by mouth Daily for 5 days.     Dispense:  10 tablet     Refill:  0   • doxycycline (VIBRAMYCIN) 100 MG capsule     Sig: Take 1 capsule by mouth 2 (Two) Times a Day for 10 days.     Dispense:  20 capsule     Refill:  0   • guaiFENesin (Mucinex) 600 MG 12 hr tablet     Sig: Take 2 tablets by mouth 2 (Two) Times a Day As Needed for  Congestion for up to 10 days.     Dispense:  40 tablet     Refill:  0       Patient Instructions:  Patient instructions given for the following visit diagnosis:    ICD-10-CM ICD-9-CM   1. COPD with lower respiratory infection (HCC)  J44.0 496     519.8   2. Overweight (BMI 25.0-29.9)  E66.3 278.02       Follow Up   Return for Recheck in 3-5 days, sooner if needed.        This document has been electronically signed by OSITO Poole  January 4, 2023 17:13 EST    Patient was given instructions and counseling regarding his condition or for health maintenance advice. Please see specific information pulled into the AVS if appropriate.     Part of this note may be an electronic transcription/translation of spoken language to printed text using the Dragon Dictation System.

## 2023-01-09 ENCOUNTER — APPOINTMENT (OUTPATIENT)
Dept: CT IMAGING | Facility: HOSPITAL | Age: 68
End: 2023-01-09

## 2023-01-18 ENCOUNTER — OFFICE VISIT (OUTPATIENT)
Dept: FAMILY MEDICINE CLINIC | Facility: CLINIC | Age: 68
End: 2023-01-18
Payer: MEDICARE

## 2023-01-18 VITALS
HEART RATE: 109 BPM | RESPIRATION RATE: 16 BRPM | OXYGEN SATURATION: 96 % | SYSTOLIC BLOOD PRESSURE: 138 MMHG | DIASTOLIC BLOOD PRESSURE: 72 MMHG | WEIGHT: 201 LBS | BODY MASS INDEX: 27.22 KG/M2 | HEIGHT: 72 IN | TEMPERATURE: 96.9 F

## 2023-01-18 DIAGNOSIS — I10 ESSENTIAL HYPERTENSION: ICD-10-CM

## 2023-01-18 DIAGNOSIS — E78.5 DYSLIPIDEMIA: ICD-10-CM

## 2023-01-18 DIAGNOSIS — E11.65 TYPE 2 DIABETES MELLITUS WITH HYPERGLYCEMIA, WITHOUT LONG-TERM CURRENT USE OF INSULIN: ICD-10-CM

## 2023-01-18 DIAGNOSIS — J44.0 COPD WITH LOWER RESPIRATORY INFECTION: Primary | ICD-10-CM

## 2023-01-18 PROCEDURE — 99214 OFFICE O/P EST MOD 30 MIN: CPT | Performed by: NURSE PRACTITIONER

## 2023-01-18 RX ORDER — CHLORTHALIDONE 25 MG/1
25 TABLET ORAL DAILY
COMMUNITY
End: 2023-01-18 | Stop reason: SDUPTHER

## 2023-01-18 RX ORDER — CHLORTHALIDONE 25 MG/1
25 TABLET ORAL DAILY
Qty: 90 TABLET | Refills: 0 | Status: SHIPPED | OUTPATIENT
Start: 2023-01-18 | End: 2023-04-04 | Stop reason: SDUPTHER

## 2023-01-18 NOTE — PROGRESS NOTES
Chief Complaint  URI    Subjective          Maximo Kwon is a 67 y.o. male who presents today to [facility] for follow up    HPI:   History of Present Illness    Presents to clinic for follow up for COPD exacerbation. Was seen and treated for this on 1/4/23. Today patient reports prednisone and doxycycline helped but is still coughing. Reports he feels like he needs to cough something up but is having trouble coughing up sputum. Denies wheezing or shortness of breath now but has had SOB that was better with nebulizer treatment. Was using mucinex but had to stop as it it was drying him up. Admits he has been out of roflumilast for a week. Reports he has refills on it and will get them filled today. Does need other refills. Reports he tolerates medications well with no issues or side effects. No other issues or concerns at this time.         The following portions of the patient's history were reviewed and updated as appropriate: allergies, current medications, past family history, past medical history, past social history, past surgical history and problem list.    Objective     Allergy:   No Known Allergies     Current Medications:   Current Outpatient Medications   Medication Sig Dispense Refill   • albuterol sulfate  (90 Base) MCG/ACT inhaler Inhale 2 puffs Every 4 (Four) Hours As Needed for Wheezing or Shortness of Air. 54 g 6   • aspirin (Anthony Aspirin) 325 MG tablet Take 1 tablet by mouth Daily for 180 days. 90 tablet 1   • Budeson-Glycopyrrol-Formoterol (Breztri Aerosphere) 160-9-4.8 MCG/ACT aerosol inhaler Inhale 2 puffs 2 (Two) Times a Day. 3 each 6   • chlorthalidone (HYGROTON) 25 MG tablet Take 1 tablet by mouth Daily. 90 tablet 0   • ipratropium-albuterol (DUO-NEB) 0.5-2.5 mg/3 ml nebulizer Take 3 mL by nebulization 4 (Four) Times a Day As Needed for Wheezing or Shortness of Air. 360 mL 8   • lisinopril (PRINIVIL,ZESTRIL) 20 MG tablet TAKE 1 TABLET TWICE DAILY  180 tablet 0   • metFORMIN (GLUCOPHAGE) 1000 MG tablet TAKE 1 TABLET TWICE DAILY WITH MEALS 180 tablet 1   • omeprazole (priLOSEC) 20 MG capsule TAKE 1 CAPSULE EVERY DAY 90 capsule 0   • roflumilast (Daliresp) 500 MCG tablet tablet Take 1 tablet by mouth Daily. 90 tablet 6     No current facility-administered medications for this visit.       Past Medical History:  Past Medical History:   Diagnosis Date   • Arthritis    • Chronic diastolic heart failure (HCC) 2021   • COPD (chronic obstructive pulmonary disease) (HCC)    • Depression    • Diverticulitis    • Emphysema (subcutaneous) (surgical) resulting from a procedure    • GERD (gastroesophageal reflux disease)    • Neuropathy    • Pneumothorax     Left, status post chest tube   • Shortness of breath    • Spinal stenosis    • Type 2 diabetes mellitus (HCC)        Past Surgical History:  Past Surgical History:   Procedure Laterality Date   • BRONCHOSCOPY N/A 2019    Procedure: Bronchoscopy with Transbronchial Biopsy with Fluoroscopy;  Surgeon: Eladio Bethea MD;  Location: [facility];  Service: Pulmonary   • CHEST TUBE INSERTION Left     Pneumothorax   • COLON RESECTION      had colostomy and reveresed back    • COLONOSCOPY     • COLOSTOMY CLOSURE     • LUNG BIOPSY Right     (non cancerous)   • OTHER SURGICAL HISTORY Left     LEFT ELBOW SURGERY       Social History:  Social History     Socioeconomic History   • Marital status:    • Number of children: 0   Tobacco Use   • Smoking status: Former     Packs/day: 1.00     Years: 30.00     Pack years: 30.00     Types: Cigarettes     Quit date:      Years since quittin.0   • Smokeless tobacco: Never   Vaping Use   • Vaping Use: Never used   Substance and Sexual Activity   • Alcohol use: No   • Drug use: No   • Sexual activity: Defer     Birth control/protection: Other       Family History:  Family History   Problem Relation Age of Onset   • Alzheimer's disease Mother    • Cancer  "Father         THROAT CANCER   • Diabetes Sister    • Diabetes Brother        Review of Systems:  Review of Systems   Constitutional: Negative for fever.   Respiratory: Negative for shortness of breath and wheezing.    Cardiovascular: Negative for chest pain, palpitations and leg swelling.       Vital Signs:   /72 (BP Location: Right arm, Patient Position: Sitting, Cuff Size: Large Adult)   Pulse 109   Temp 96.9 °F (36.1 °C) (Temporal)   Resp 16   Ht 182.9 cm (72\")   Wt 91.2 kg (201 lb)   SpO2 96%   BMI 27.26 kg/m²      Physical Exam:  Physical Exam  Vitals and nursing note reviewed.   Constitutional:       General: He is not in acute distress.     Appearance: Normal appearance. He is not ill-appearing or toxic-appearing.   HENT:      Head: Normocephalic.      Right Ear: Tympanic membrane, ear canal and external ear normal.      Left Ear: Tympanic membrane, ear canal and external ear normal.      Nose: Nose normal.      Mouth/Throat:      Mouth: Mucous membranes are moist.      Pharynx: Oropharynx is clear.   Eyes:      Conjunctiva/sclera: Conjunctivae normal.   Cardiovascular:      Rate and Rhythm: Normal rate and regular rhythm.      Heart sounds: Normal heart sounds.   Pulmonary:      Effort: Pulmonary effort is normal. No respiratory distress.      Breath sounds: Normal breath sounds.   Abdominal:      General: Bowel sounds are normal.      Palpations: Abdomen is soft.   Musculoskeletal:         General: No swelling.   Lymphadenopathy:      Cervical: No cervical adenopathy.   Skin:     General: Skin is warm and dry.      Coloration: Skin is not pale.   Neurological:      Mental Status: He is alert and oriented to person, place, and time. Mental status is at baseline.   Psychiatric:         Mood and Affect: Mood normal.         Behavior: Behavior normal.         Thought Content: Thought content normal.         Judgment: Judgment normal.                  Assessment and Plan   Diagnoses and all orders " for this visit:    1. COPD with lower respiratory infection (HCC) (Primary)  -     XR Chest PA & Lateral; Future  -     CBC & Differential  -     Comprehensive Metabolic Panel  -     Hemoglobin A1c  -     Lipid Panel  -     TSH Rfx On Abnormal To Free T4    2. Essential hypertension  -     chlorthalidone (HYGROTON) 25 MG tablet; Take 1 tablet by mouth Daily.  Dispense: 90 tablet; Refill: 0  -     CBC & Differential  -     Comprehensive Metabolic Panel  -     Hemoglobin A1c  -     Lipid Panel  -     TSH Rfx On Abnormal To Free T4    3. Dyslipidemia  -     CBC & Differential  -     Comprehensive Metabolic Panel  -     Hemoglobin A1c  -     Lipid Panel  -     TSH Rfx On Abnormal To Free T4    4. Type 2 diabetes mellitus with hyperglycemia, without long-term current use of insulin (HCC)  -     CBC & Differential  -     Comprehensive Metabolic Panel  -     Hemoglobin A1c  -     Lipid Panel  -     TSH Rfx On Abnormal To Free T4    1. Xray today. Cough and deep breathe. Increase hydration and humidification. Symptom management as discussed. Get roflumilast filled today and take as directed. Inhalers and nebs as directed.     2. Monitor blood pressure, keep log, bring log to follow up appointment, follow up sooner for readings outside of established parameters.     2-4. Diet and lifestyle modifications, as discussed, to control conditions.     Discussed possible differential diagnoses, testing, treatment, recommended non-pharmacological interventions, risks, warning signs to monitor for that would indicate need for follow-up in clinic or ER. If no improvement with these regimens or you have new or worsening symptoms follow-up. Patient verbalizes understanding and agreement with plan of care. Denies further needs or concerns.     Patient was given instructions and counseling regarding her condition and for health maintenance advised.    BMI is >= 25 and <30. (Overweight) The following options were offered after discussion;:  weight loss educational material (shared in after visit summary), exercise counseling/recommendations and nutrition counseling/recommendations       I spent 30 minutes caring for patient on this date of service. This time includes time spent by me in the following activities: preparing for the visit, reviewing tests, obtaining and/or reviewing a separately obtained history, performing a medically appropriate examination and/or evaluation, counseling and educating the patient/family/caregiver, ordering medications, tests, or procedures and documenting information in the medical record    Meds ordered during this visit:  New Medications Ordered This Visit   Medications   • chlorthalidone (HYGROTON) 25 MG tablet     Sig: Take 1 tablet by mouth Daily.     Dispense:  90 tablet     Refill:  0       Patient Instructions:  Patient instructions given for the following visit diagnosis:    ICD-10-CM ICD-9-CM   1. COPD with lower respiratory infection (HCC)  J44.0 496     519.8   2. Essential hypertension  I10 401.9   3. Dyslipidemia  E78.5 272.4   4. Type 2 diabetes mellitus with hyperglycemia, without long-term current use of insulin (HCC)  E11.65 250.00     790.29       Follow Up   Return for Recheck in 2-3 days, sooner if needed .        This document has been electronically signed by OSITO Poole  January 19, 2023 08:14 EST    Patient was given instructions and counseling regarding his condition or for health maintenance advice. Please see specific information pulled into the AVS if appropriate.     Part of this note may be an electronic transcription/translation of spoken language to printed text using the Dragon Dictation System.

## 2023-01-19 ENCOUNTER — TELEPHONE (OUTPATIENT)
Dept: FAMILY MEDICINE CLINIC | Facility: CLINIC | Age: 68
End: 2023-01-19
Payer: MEDICARE

## 2023-01-19 RX ORDER — PREDNISONE 50 MG/1
50 TABLET ORAL DAILY
Qty: 5 TABLET | Refills: 0 | Status: SHIPPED | OUTPATIENT
Start: 2023-01-19 | End: 2023-01-24

## 2023-01-19 NOTE — TELEPHONE ENCOUNTER
At this point, I would like for him to continue his inhalers and neb treatments.  I will send Rx for steroids and he should start those if he gets worse.      Spoke with patient & he verbalized understanding.

## 2023-01-19 NOTE — TELEPHONE ENCOUNTER
----- Message from OSITO Poole sent at 1/19/2023 10:22 AM EST -----  Please call patient regarding results.     Chest x-ray normal.  How is he feeling?  Would he like to try another round of steroids?      Pt expressed understanding, he agrees to steroids if you think it will help.

## 2023-01-19 NOTE — TELEPHONE ENCOUNTER
At this point, I would like for him to continue his inhalers and neb treatments.  I will send Rx for steroids and he should start those if he gets worse.

## 2023-01-20 ENCOUNTER — APPOINTMENT (OUTPATIENT)
Dept: CT IMAGING | Facility: HOSPITAL | Age: 68
End: 2023-01-20

## 2023-01-23 DIAGNOSIS — J44.9 COPD, SEVERE: ICD-10-CM

## 2023-01-23 RX ORDER — ROFLUMILAST 500 UG/1
500 TABLET ORAL DAILY
Qty: 90 TABLET | Refills: 1 | Status: SHIPPED | OUTPATIENT
Start: 2023-01-23 | End: 2023-03-15 | Stop reason: SDUPTHER

## 2023-02-01 ENCOUNTER — TELEPHONE (OUTPATIENT)
Dept: FAMILY MEDICINE CLINIC | Facility: CLINIC | Age: 68
End: 2023-02-01
Payer: MEDICARE

## 2023-02-10 ENCOUNTER — LAB (OUTPATIENT)
Dept: FAMILY MEDICINE CLINIC | Facility: CLINIC | Age: 68
End: 2023-02-10
Payer: MEDICARE

## 2023-02-10 DIAGNOSIS — E11.65 TYPE 2 DIABETES MELLITUS WITH HYPERGLYCEMIA, WITHOUT LONG-TERM CURRENT USE OF INSULIN: ICD-10-CM

## 2023-02-10 DIAGNOSIS — J44.0 COPD WITH LOWER RESPIRATORY INFECTION: ICD-10-CM

## 2023-02-10 DIAGNOSIS — I10 ESSENTIAL HYPERTENSION: ICD-10-CM

## 2023-02-10 DIAGNOSIS — E78.5 DYSLIPIDEMIA: ICD-10-CM

## 2023-02-10 DIAGNOSIS — G60.9 IDIOPATHIC PERIPHERAL NEUROPATHY: ICD-10-CM

## 2023-02-11 LAB
ALBUMIN SERPL-MCNC: 4.7 G/DL (ref 3.5–5.2)
ALBUMIN/GLOB SERPL: 2.4 G/DL
ALP SERPL-CCNC: 88 U/L (ref 39–117)
ALT SERPL-CCNC: 25 U/L (ref 1–41)
AST SERPL-CCNC: 14 U/L (ref 1–40)
BASOPHILS # BLD AUTO: 0.05 10*3/MM3 (ref 0–0.2)
BASOPHILS NFR BLD AUTO: 0.7 % (ref 0–1.5)
BILIRUB SERPL-MCNC: 0.4 MG/DL (ref 0–1.2)
BUN SERPL-MCNC: 13 MG/DL (ref 8–23)
BUN/CREAT SERPL: 13.1 (ref 7–25)
CALCIUM SERPL-MCNC: 9.6 MG/DL (ref 8.6–10.5)
CHLORIDE SERPL-SCNC: 95 MMOL/L (ref 98–107)
CHOLEST SERPL-MCNC: 205 MG/DL (ref 0–200)
CO2 SERPL-SCNC: 30 MMOL/L (ref 22–29)
CREAT SERPL-MCNC: 0.99 MG/DL (ref 0.76–1.27)
EGFRCR SERPLBLD CKD-EPI 2021: 83.5 ML/MIN/1.73
EOSINOPHIL # BLD AUTO: 0.19 10*3/MM3 (ref 0–0.4)
EOSINOPHIL NFR BLD AUTO: 2.7 % (ref 0.3–6.2)
ERYTHROCYTE [DISTWIDTH] IN BLOOD BY AUTOMATED COUNT: 15.1 % (ref 12.3–15.4)
GLOBULIN SER CALC-MCNC: 2 GM/DL
GLUCOSE SERPL-MCNC: 263 MG/DL (ref 65–99)
HBA1C MFR BLD: 9.2 % (ref 4.8–5.6)
HCT VFR BLD AUTO: 41.9 % (ref 37.5–51)
HDLC SERPL-MCNC: 39 MG/DL (ref 40–60)
HGB BLD-MCNC: 13.7 G/DL (ref 13–17.7)
IMM GRANULOCYTES # BLD AUTO: 0.04 10*3/MM3 (ref 0–0.05)
IMM GRANULOCYTES NFR BLD AUTO: 0.6 % (ref 0–0.5)
IMP & REVIEW OF LAB RESULTS: NORMAL
LDLC SERPL CALC-MCNC: 125 MG/DL (ref 0–100)
LYMPHOCYTES # BLD AUTO: 0.9 10*3/MM3 (ref 0.7–3.1)
LYMPHOCYTES NFR BLD AUTO: 12.9 % (ref 19.6–45.3)
MCH RBC QN AUTO: 29.5 PG (ref 26.6–33)
MCHC RBC AUTO-ENTMCNC: 32.7 G/DL (ref 31.5–35.7)
MCV RBC AUTO: 90.1 FL (ref 79–97)
MONOCYTES # BLD AUTO: 0.53 10*3/MM3 (ref 0.1–0.9)
MONOCYTES NFR BLD AUTO: 7.6 % (ref 5–12)
NEUTROPHILS # BLD AUTO: 5.26 10*3/MM3 (ref 1.7–7)
NEUTROPHILS NFR BLD AUTO: 75.5 % (ref 42.7–76)
NRBC BLD AUTO-RTO: 0 /100 WBC (ref 0–0.2)
PLATELET # BLD AUTO: 265 10*3/MM3 (ref 140–450)
POTASSIUM SERPL-SCNC: 4.9 MMOL/L (ref 3.5–5.2)
PROT SERPL-MCNC: 6.7 G/DL (ref 6–8.5)
RBC # BLD AUTO: 4.65 10*6/MM3 (ref 4.14–5.8)
SODIUM SERPL-SCNC: 135 MMOL/L (ref 136–145)
TRIGL SERPL-MCNC: 232 MG/DL (ref 0–150)
TSH SERPL DL<=0.005 MIU/L-ACNC: 0.51 UIU/ML (ref 0.27–4.2)
VLDLC SERPL CALC-MCNC: 41 MG/DL (ref 5–40)
WBC # BLD AUTO: 6.97 10*3/MM3 (ref 3.4–10.8)

## 2023-02-13 LAB
ANA SER QL: NEGATIVE
TSH SERPL DL<=0.005 MIU/L-ACNC: 0.48 UIU/ML (ref 0.27–4.2)
VIT B12 SERPL-MCNC: 311 PG/ML (ref 211–946)

## 2023-02-15 ENCOUNTER — TELEPHONE (OUTPATIENT)
Dept: FAMILY MEDICINE CLINIC | Facility: CLINIC | Age: 68
End: 2023-02-15
Payer: MEDICARE

## 2023-02-15 NOTE — TELEPHONE ENCOUNTER
----- Message from OSITO Poole sent at 2/15/2023 12:51 PM EST -----  Please call patient regarding results.     Cholesterol is still high and hemoglobin A1c has worsened significantly (from 6.80 to 9.20).  Is he still taking metformin 1000 mg BID?     Continue to work on diet and exercise.      Spoke with patient & he verbalized understanding,he is still taking the Metformin 1000 mg bid but admits he's been eating sweets,will cut those out & work on diet & exercise.

## 2023-02-23 NOTE — TELEPHONE ENCOUNTER
If he is taking the metformin and his A1c is that high, I recommend he follow-up so we can discuss adding other treatments to get his diabetes under control.

## 2023-02-23 NOTE — TELEPHONE ENCOUNTER
If he is taking the metformin and his A1c is that high, I recommend he follow-up so we can discuss adding other treatments to get his diabetes under control.    Spoke with patient & a FU was scheduled.

## 2023-02-24 ENCOUNTER — TELEPHONE (OUTPATIENT)
Dept: FAMILY MEDICINE CLINIC | Facility: CLINIC | Age: 68
End: 2023-02-24
Payer: MEDICARE

## 2023-03-01 ENCOUNTER — OFFICE VISIT (OUTPATIENT)
Dept: FAMILY MEDICINE CLINIC | Facility: CLINIC | Age: 68
End: 2023-03-01
Payer: MEDICARE

## 2023-03-01 VITALS
SYSTOLIC BLOOD PRESSURE: 116 MMHG | HEIGHT: 72 IN | RESPIRATION RATE: 16 BRPM | DIASTOLIC BLOOD PRESSURE: 60 MMHG | HEART RATE: 85 BPM | BODY MASS INDEX: 27.17 KG/M2 | WEIGHT: 200.6 LBS | TEMPERATURE: 97.3 F | OXYGEN SATURATION: 98 %

## 2023-03-01 DIAGNOSIS — E11.65 TYPE 2 DIABETES MELLITUS WITH HYPERGLYCEMIA, WITHOUT LONG-TERM CURRENT USE OF INSULIN: Primary | ICD-10-CM

## 2023-03-01 PROCEDURE — 99214 OFFICE O/P EST MOD 30 MIN: CPT | Performed by: NURSE PRACTITIONER

## 2023-03-01 PROCEDURE — 3046F HEMOGLOBIN A1C LEVEL >9.0%: CPT | Performed by: NURSE PRACTITIONER

## 2023-03-01 RX ORDER — ISOPROPYL ALCOHOL 0.7 ML/1
1 SWAB TOPICAL 3 TIMES DAILY
Qty: 100 EACH | Refills: 11 | Status: SHIPPED | OUTPATIENT
Start: 2023-03-01

## 2023-03-01 RX ORDER — LANCETS 30 GAUGE
1 EACH MISCELLANEOUS 2 TIMES DAILY
Qty: 100 EACH | Refills: 12 | Status: SHIPPED | OUTPATIENT
Start: 2023-03-01

## 2023-03-01 RX ORDER — DIPHENHYDRAMINE HYDROCHLORIDE 25 MG/1
1 CAPSULE, LIQUID FILLED ORAL 2 TIMES DAILY
Qty: 1 EACH | Refills: 0 | Status: SHIPPED | OUTPATIENT
Start: 2023-03-01

## 2023-03-01 RX ORDER — GABAPENTIN 300 MG/1
300 CAPSULE ORAL 2 TIMES DAILY
COMMUNITY
Start: 2023-02-09

## 2023-03-01 NOTE — PROGRESS NOTES
Chief Complaint  Diabetes    Subjective          Maximo Kwon is a 67 y.o. male who presents today to [facility] for follow up    HPI:   History of Present Illness    Presents to clinic for follow-up for diabetes.  A1c was 9.20 on 2/10/2023.  Patient reports he takes metformin as prescribed.  Was previously on glipizide but patient stopped taking it.  Reports he tolerated it well with no issues or side effects and is agreeable to restart it at this time.  Reports he has not been doing as well with his diet, has been eating more fritters and other desserts.  Denies any associated symptoms.  No other issues or concerns at this time.      The following portions of the patient's history were reviewed and updated as appropriate: allergies, current medications, past family history, past medical history, past social history, past surgical history and problem list.    Objective     Allergy:   No Known Allergies     Current Medications:   Current Outpatient Medications   Medication Sig Dispense Refill   • albuterol sulfate  (90 Base) MCG/ACT inhaler Inhale 2 puffs Every 4 (Four) Hours As Needed for Wheezing or Shortness of Air. 54 g 6   • aspirin (Anthony Aspirin) 325 MG tablet Take 1 tablet by mouth Daily for 180 days. 90 tablet 1   • Budeson-Glycopyrrol-Formoterol (Breztri Aerosphere) 160-9-4.8 MCG/ACT aerosol inhaler Inhale 2 puffs 2 (Two) Times a Day. 3 each 6   • chlorthalidone (HYGROTON) 25 MG tablet Take 1 tablet by mouth Daily. 90 tablet 0   • gabapentin (NEURONTIN) 300 MG capsule Take 1 capsule by mouth 2 (Two) Times a Day. SEND TO [facility]     • glucose blood test strip Use as instructed BID and PRN if symptomatic 100 each 11   • ipratropium-albuterol (DUO-NEB) 0.5-2.5 mg/3 ml nebulizer Take 3 mL by nebulization 4 (Four) Times a Day As Needed for Wheezing or Shortness of Air. 360 mL 8   • lisinopril (PRINIVIL,ZESTRIL) 20 MG tablet TAKE 1 TABLET TWICE DAILY 180 tablet 0   •  metFORMIN (GLUCOPHAGE) 1000 MG tablet TAKE 1 TABLET TWICE DAILY WITH MEALS 180 tablet 1   • omeprazole (priLOSEC) 20 MG capsule TAKE 1 CAPSULE EVERY DAY 90 capsule 0   • roflumilast (Daliresp) 500 MCG tablet tablet Take 1 tablet by mouth Daily. 90 tablet 1   • Alcohol Swabs (Alcohol Wipes) 70 % pads 1 each 3 (Three) Times a Day. 100 each 11   • Blood Glucose Monitoring Suppl (Blood Glucose Monitor System) w/Device kit 1 Device 2 (Two) Times a Day. 1 each 0   • Lancets misc 1 Device 2 (Two) Times a Day. 100 each 12     No current facility-administered medications for this visit.       Past Medical History:  Past Medical History:   Diagnosis Date   • Arthritis    • Chronic diastolic heart failure (HCC) 2021   • COPD (chronic obstructive pulmonary disease) (HCC)    • Depression    • Diverticulitis    • Emphysema (subcutaneous) (surgical) resulting from a procedure    • GERD (gastroesophageal reflux disease)    • Neuropathy    • Pneumothorax     Left, status post chest tube   • Shortness of breath    • Spinal stenosis    • Type 2 diabetes mellitus (HCC)        Past Surgical History:  Past Surgical History:   Procedure Laterality Date   • BRONCHOSCOPY N/A 2019    Procedure: Bronchoscopy with Transbronchial Biopsy with Fluoroscopy;  Surgeon: Eladio Bethea MD;  Location: [facility];  Service: Pulmonary   • CHEST TUBE INSERTION Left     Pneumothorax   • COLON RESECTION      had colostomy and reveresed back    • COLONOSCOPY     • COLOSTOMY CLOSURE     • LUNG BIOPSY Right     (non cancerous)   • OTHER SURGICAL HISTORY Left     LEFT ELBOW SURGERY       Social History:  Social History     Socioeconomic History   • Marital status:    • Number of children: 0   Tobacco Use   • Smoking status: Former     Packs/day: 1.00     Years: 30.00     Pack years: 30.00     Types: Cigarettes     Quit date:      Years since quittin.1   • Smokeless tobacco: Never   Vaping Use   • Vaping Use: Never used  "  Substance and Sexual Activity   • Alcohol use: No   • Drug use: No   • Sexual activity: Defer     Birth control/protection: Other       Family History:  Family History   Problem Relation Age of Onset   • Alzheimer's disease Mother    • Cancer Father         THROAT CANCER   • Diabetes Sister    • Diabetes Brother        Review of Systems:  Review of Systems   Endocrine: Negative for polydipsia, polyphagia and polyuria.       Vital Signs:   /60 (BP Location: Right arm, Patient Position: Sitting, Cuff Size: Large Adult)   Pulse 85   Temp 97.3 °F (36.3 °C) (Temporal)   Resp 16   Ht 182.9 cm (72\")   Wt 91 kg (200 lb 9.6 oz)   SpO2 98%   BMI 27.21 kg/m²      Physical Exam:  Physical Exam  Vitals and nursing note reviewed.   Constitutional:       General: He is not in acute distress.     Appearance: Normal appearance. He is not ill-appearing or toxic-appearing.   HENT:      Head: Normocephalic.   Cardiovascular:      Rate and Rhythm: Normal rate and regular rhythm.      Heart sounds: Normal heart sounds.   Pulmonary:      Effort: Pulmonary effort is normal. No respiratory distress.      Breath sounds: Normal breath sounds.   Skin:     General: Skin is warm and dry.      Coloration: Skin is not pale.   Neurological:      Mental Status: He is alert and oriented to person, place, and time.   Psychiatric:         Mood and Affect: Mood normal.         Behavior: Behavior normal.         Thought Content: Thought content normal.         Judgment: Judgment normal.                  Assessment and Plan   Diagnoses and all orders for this visit:    1. Type 2 diabetes mellitus with hyperglycemia, without long-term current use of insulin (HCC) (Primary)  -     Lancets misc; 1 Device 2 (Two) Times a Day.  Dispense: 100 each; Refill: 12  -     Blood Glucose Monitoring Suppl (Blood Glucose Monitor System) w/Device kit; 1 Device 2 (Two) Times a Day.  Dispense: 1 each; Refill: 0  -     Alcohol Swabs (Alcohol Wipes) 70 % pads; " 1 each 3 (Three) Times a Day.  Dispense: 100 each; Refill: 11  -     glucose blood test strip; Use as instructed BID and PRN if symptomatic  Dispense: 100 each; Refill: 11    Diet and lifestyle modifications to control condition.  Despite recommendations, patient refuses insulin.   Agreeable to monitor blood glucose, keep log, bring log to follow up appointment. Follow up sooner for readings outside of established parameters.       Discussed possible differential diagnoses, testing, treatment, recommended non-pharmacological interventions, risks, warning signs to monitor for that would indicate need for follow-up in clinic or ER. If no improvement with these regimens or you have new or worsening symptoms follow-up. Patient verbalizes understanding and agreement with plan of care. Denies further needs or concerns.     Patient was given instructions and counseling regarding her condition and for health maintenance advised.    BMI is >= 25 and <30. (Overweight) The following options were offered after discussion;: weight loss educational material (shared in after visit summary), exercise counseling/recommendations and nutrition counseling/recommendations       I spent 30 minutes caring for patient on this date of service. This time includes time spent by me in the following activities: preparing for the visit, reviewing tests, obtaining and/or reviewing a separately obtained history, performing a medically appropriate examination and/or evaluation, counseling and educating the patient/family/caregiver, ordering medications, tests, or procedures and documenting information in the medical record    Meds ordered during this visit:  New Medications Ordered This Visit   Medications   • Lancets misc     Si Device 2 (Two) Times a Day.     Dispense:  100 each     Refill:  12     Brand as covered by insurance   • Blood Glucose Monitoring Suppl (Blood Glucose Monitor System) w/Device kit     Si Device 2 (Two) Times a  Day.     Dispense:  1 each     Refill:  0     Brand as covered by insurance   • Alcohol Swabs (Alcohol Wipes) 70 % pads     Si each 3 (Three) Times a Day.     Dispense:  100 each     Refill:  11   • glucose blood test strip     Sig: Use as instructed BID and PRN if symptomatic     Dispense:  100 each     Refill:  11     Brand as covered by insurance       Patient Instructions:  Patient instructions given for the following visit diagnosis:    ICD-10-CM ICD-9-CM   1. Type 2 diabetes mellitus with hyperglycemia, without long-term current use of insulin (HCC)  E11.65 250.00     790.29       Follow Up   Return in about 1 month (around 2023) for Recheck, Sooner if needed.        This document has been electronically signed by OSITO Poole  2023 16:33 EST    Patient was given instructions and counseling regarding his condition or for health maintenance advice. Please see specific information pulled into the AVS if appropriate.     Part of this note may be an electronic transcription/translation of spoken language to printed text using the Dragon Dictation System.

## 2023-03-15 DIAGNOSIS — J44.9 COPD, SEVERE: ICD-10-CM

## 2023-03-15 RX ORDER — ROFLUMILAST 500 UG/1
500 TABLET ORAL DAILY
Qty: 90 TABLET | Refills: 1 | Status: SHIPPED | OUTPATIENT
Start: 2023-03-15

## 2023-03-20 DIAGNOSIS — E11.42 TYPE 2 DIABETES MELLITUS WITH DIABETIC POLYNEUROPATHY, WITHOUT LONG-TERM CURRENT USE OF INSULIN: ICD-10-CM

## 2023-03-21 ENCOUNTER — OFFICE VISIT (OUTPATIENT)
Dept: FAMILY MEDICINE CLINIC | Facility: CLINIC | Age: 68
End: 2023-03-21
Payer: MEDICARE

## 2023-03-21 VITALS
HEIGHT: 72 IN | SYSTOLIC BLOOD PRESSURE: 110 MMHG | WEIGHT: 199.2 LBS | DIASTOLIC BLOOD PRESSURE: 58 MMHG | BODY MASS INDEX: 26.98 KG/M2 | TEMPERATURE: 97.5 F | HEART RATE: 106 BPM | OXYGEN SATURATION: 96 % | RESPIRATION RATE: 16 BRPM

## 2023-03-21 DIAGNOSIS — Z12.11 COLON CANCER SCREENING: ICD-10-CM

## 2023-03-21 DIAGNOSIS — H91.90 DECREASED HEARING, UNSPECIFIED LATERALITY: ICD-10-CM

## 2023-03-21 DIAGNOSIS — H93.11 TINNITUS OF RIGHT EAR: Primary | ICD-10-CM

## 2023-03-21 PROCEDURE — 3074F SYST BP LT 130 MM HG: CPT | Performed by: NURSE PRACTITIONER

## 2023-03-21 PROCEDURE — 99214 OFFICE O/P EST MOD 30 MIN: CPT | Performed by: NURSE PRACTITIONER

## 2023-03-21 PROCEDURE — 3046F HEMOGLOBIN A1C LEVEL >9.0%: CPT | Performed by: NURSE PRACTITIONER

## 2023-03-21 PROCEDURE — 3078F DIAST BP <80 MM HG: CPT | Performed by: NURSE PRACTITIONER

## 2023-03-21 NOTE — PROGRESS NOTES
Chief Complaint  Tinnitus    Subjective          Maximo Kwon is a 67 y.o. male who presents today to [facility] for follow up    HPI:   History of Present Illness    Presents to clinic for complaint of ringing in right ear.  Associated symptoms: Has trouble hearing movies and sometimes hearing people speak. Denies any injuries. No other issues or concerns at this time.                                   The following portions of the patient's history were reviewed and updated as appropriate: allergies, current medications, past family history, past medical history, past social history, past surgical history and problem list.    Objective     Allergy:   No Known Allergies     Current Medications:   Current Outpatient Medications   Medication Sig Dispense Refill   • albuterol sulfate  (90 Base) MCG/ACT inhaler Inhale 2 puffs Every 4 (Four) Hours As Needed for Wheezing or Shortness of Air. 54 g 6   • Alcohol Swabs (Alcohol Wipes) 70 % pads 1 each 3 (Three) Times a Day. 100 each 11   • aspirin (Anthony Aspirin) 325 MG tablet Take 1 tablet by mouth Daily for 180 days. 90 tablet 1   • Blood Glucose Monitoring Suppl (Blood Glucose Monitor System) w/Device kit 1 Device 2 (Two) Times a Day. 1 each 0   • Budeson-Glycopyrrol-Formoterol (Breztri Aerosphere) 160-9-4.8 MCG/ACT aerosol inhaler Inhale 2 puffs 2 (Two) Times a Day. 3 each 6   • chlorthalidone (HYGROTON) 25 MG tablet Take 1 tablet by mouth Daily. 90 tablet 0   • gabapentin (NEURONTIN) 300 MG capsule Take 1 capsule by mouth 2 (Two) Times a Day. SEND TO [facility]     • glucose blood test strip Use as instructed BID and PRN if symptomatic 100 each 11   • ipratropium-albuterol (DUO-NEB) 0.5-2.5 mg/3 ml nebulizer Take 3 mL by nebulization 4 (Four) Times a Day As Needed for Wheezing or Shortness of Air. 360 mL 8   • Lancets misc 1 Device 2 (Two) Times a Day. 100 each 12   • lisinopril (PRINIVIL,ZESTRIL) 20 MG tablet TAKE 1 TABLET TWICE  DAILY 180 tablet 0   • metFORMIN (GLUCOPHAGE) 1000 MG tablet TAKE 1 TABLET TWICE DAILY WITH MEALS 180 tablet 1   • omeprazole (priLOSEC) 20 MG capsule TAKE 1 CAPSULE EVERY DAY 90 capsule 0   • roflumilast (Daliresp) 500 MCG tablet tablet Take 1 tablet by mouth Daily. 90 tablet 1     No current facility-administered medications for this visit.       Past Medical History:  Past Medical History:   Diagnosis Date   • Arthritis    • Chronic diastolic heart failure (HCC) 2021   • COPD (chronic obstructive pulmonary disease) (HCC)    • Depression    • Diverticulitis    • Emphysema (subcutaneous) (surgical) resulting from a procedure    • GERD (gastroesophageal reflux disease)    • Neuropathy    • Pneumothorax     Left, status post chest tube   • Shortness of breath    • Spinal stenosis    • Type 2 diabetes mellitus (HCC)        Past Surgical History:  Past Surgical History:   Procedure Laterality Date   • BRONCHOSCOPY N/A 2019    Procedure: Bronchoscopy with Transbronchial Biopsy with Fluoroscopy;  Surgeon: Eladio Bethea MD;  [facility];  Service: Pulmonary   • CHEST TUBE INSERTION Left     Pneumothorax   • COLON RESECTION      had colostomy and reveresed back    • COLONOSCOPY     • COLOSTOMY CLOSURE     • LUNG BIOPSY Right     (non cancerous)   • OTHER SURGICAL HISTORY Left     LEFT ELBOW SURGERY       Social History:  Social History     Socioeconomic History   • Marital status:    • Number of children: 0   Tobacco Use   • Smoking status: Former     Packs/day: 1.00     Years: 30.00     Pack years: 30.00     Types: Cigarettes     Quit date:      Years since quittin.2   • Smokeless tobacco: Never   Vaping Use   • Vaping Use: Never used   Substance and Sexual Activity   • Alcohol use: No   • Drug use: No   • Sexual activity: Defer     Birth control/protection: Other       Family History:  Family History   Problem Relation Age of Onset   • Alzheimer's disease Mother    •  "Cancer Father         THROAT CANCER   • Diabetes Sister    • Diabetes Brother        Review of Systems:  Review of Systems   HENT: Negative for ear pain.    Neurological: Negative for headaches.       Vital Signs:   /58 (BP Location: Right arm, Patient Position: Sitting, Cuff Size: Large Adult)   Pulse 106   Temp 97.5 °F (36.4 °C) (Temporal)   Resp 16   Ht 182.9 cm (72\")   Wt 90.4 kg (199 lb 3.2 oz)   SpO2 96%   BMI 27.02 kg/m²      Physical Exam:  Physical Exam  Vitals and nursing note reviewed.   Constitutional:       General: He is not in acute distress.     Appearance: Normal appearance. He is not ill-appearing or toxic-appearing.   HENT:      Head: Normocephalic.      Right Ear: Tympanic membrane, ear canal and external ear normal.      Left Ear: Tympanic membrane, ear canal and external ear normal.   Cardiovascular:      Rate and Rhythm: Normal rate and regular rhythm.      Heart sounds: Normal heart sounds.   Pulmonary:      Effort: Pulmonary effort is normal. No respiratory distress.      Breath sounds: Normal breath sounds.   Skin:     General: Skin is warm and dry.      Coloration: Skin is not pale.   Neurological:      General: No focal deficit present.      Mental Status: He is alert and oriented to person, place, and time.   Psychiatric:         Mood and Affect: Mood normal.         Behavior: Behavior normal.         Thought Content: Thought content normal.         Judgment: Judgment normal.                  Assessment and Plan   Diagnoses and all orders for this visit:    1. Tinnitus of right ear (Primary)  -     Ambulatory Referral to Audiology    2. Decreased hearing, unspecified laterality  -     Ambulatory Referral to Audiology    3. Colon cancer screening  -     Cologuard - Stool, Per Rectum; Future      Referral for further evaluation  Agreeable to Cologuard for colon cancer screening.  Refuses colonoscopy.    Discussed possible differential diagnoses, testing, treatment, recommended " non-pharmacological interventions, risks, warning signs to monitor for that would indicate need for follow-up in clinic or ER. If no improvement with these regimens or you have new or worsening symptoms follow-up. Patient verbalizes understanding and agreement with plan of care. Denies further needs or concerns.     Patient was given instructions and counseling regarding her condition and for health maintenance advised.    BMI is >= 25 and <30. (Overweight) The following options were offered after discussion;: weight loss educational material (shared in after visit summary), exercise counseling/recommendations and nutrition counseling/recommendations       I spent 30 minutes caring for patient on this date of service. This time includes time spent by me in the following activities: preparing for the visit, reviewing tests, obtaining and/or reviewing a separately obtained history, performing a medically appropriate examination and/or evaluation, counseling and educating the patient/family/caregiver, ordering medications, tests, or procedures and documenting information in the medical record    Meds ordered during this visit:  No orders of the defined types were placed in this encounter.      Patient Instructions:  Patient instructions given for the following visit diagnosis:    ICD-10-CM ICD-9-CM   1. Tinnitus of right ear  H93.11 388.30   2. Decreased hearing, unspecified laterality  H91.90 389.9   3. Colon cancer screening  Z12.11 V76.51       Follow Up   Return in about 2 weeks (around 4/4/2023) for Recheck, Sooner if needed.        This document has been electronically signed by OSITO Poole  March 21, 2023 17:12 EDT    Patient was given instructions and counseling regarding his condition or for health maintenance advice. Please see specific information pulled into the AVS if appropriate.     Part of this note may be an electronic transcription/translation of spoken language to printed text using the Dragon  Dictation System.

## 2023-03-28 ENCOUNTER — TRANSCRIBE ORDERS (OUTPATIENT)
Dept: ADMINISTRATIVE | Facility: HOSPITAL | Age: 68
End: 2023-03-28
Payer: MEDICARE

## 2023-03-28 DIAGNOSIS — I70.213 ATHEROSCLEROSIS OF NATIVE ARTERY OF BOTH LOWER EXTREMITIES WITH INTERMITTENT CLAUDICATION: Primary | ICD-10-CM

## 2023-04-04 ENCOUNTER — OFFICE VISIT (OUTPATIENT)
Dept: FAMILY MEDICINE CLINIC | Facility: CLINIC | Age: 68
End: 2023-04-04
Payer: MEDICARE

## 2023-04-04 VITALS
HEART RATE: 96 BPM | OXYGEN SATURATION: 96 % | WEIGHT: 202 LBS | BODY MASS INDEX: 27.36 KG/M2 | DIASTOLIC BLOOD PRESSURE: 72 MMHG | RESPIRATION RATE: 16 BRPM | TEMPERATURE: 97.1 F | SYSTOLIC BLOOD PRESSURE: 130 MMHG | HEIGHT: 72 IN

## 2023-04-04 DIAGNOSIS — J44.9 COPD WITHOUT EXACERBATION: ICD-10-CM

## 2023-04-04 DIAGNOSIS — I10 ESSENTIAL HYPERTENSION: ICD-10-CM

## 2023-04-04 DIAGNOSIS — K21.9 GASTROESOPHAGEAL REFLUX DISEASE, UNSPECIFIED WHETHER ESOPHAGITIS PRESENT: Primary | ICD-10-CM

## 2023-04-04 PROCEDURE — 3075F SYST BP GE 130 - 139MM HG: CPT | Performed by: NURSE PRACTITIONER

## 2023-04-04 PROCEDURE — 3046F HEMOGLOBIN A1C LEVEL >9.0%: CPT | Performed by: NURSE PRACTITIONER

## 2023-04-04 PROCEDURE — 1159F MED LIST DOCD IN RCRD: CPT | Performed by: NURSE PRACTITIONER

## 2023-04-04 PROCEDURE — 1160F RVW MEDS BY RX/DR IN RCRD: CPT | Performed by: NURSE PRACTITIONER

## 2023-04-04 PROCEDURE — 3078F DIAST BP <80 MM HG: CPT | Performed by: NURSE PRACTITIONER

## 2023-04-04 PROCEDURE — 1170F FXNL STATUS ASSESSED: CPT | Performed by: NURSE PRACTITIONER

## 2023-04-04 PROCEDURE — G0439 PPPS, SUBSEQ VISIT: HCPCS | Performed by: NURSE PRACTITIONER

## 2023-04-04 PROCEDURE — 96160 PT-FOCUSED HLTH RISK ASSMT: CPT | Performed by: NURSE PRACTITIONER

## 2023-04-04 RX ORDER — ALBUTEROL SULFATE 90 UG/1
2 AEROSOL, METERED RESPIRATORY (INHALATION) EVERY 4 HOURS PRN
Qty: 54 G | Refills: 6 | Status: SHIPPED | OUTPATIENT
Start: 2023-04-04

## 2023-04-04 RX ORDER — CHLORTHALIDONE 25 MG/1
25 TABLET ORAL DAILY
Qty: 90 TABLET | Refills: 0 | Status: SHIPPED | OUTPATIENT
Start: 2023-04-04

## 2023-04-04 RX ORDER — OMEPRAZOLE 20 MG/1
20 CAPSULE, DELAYED RELEASE ORAL DAILY
Qty: 90 CAPSULE | Refills: 0 | Status: SHIPPED | OUTPATIENT
Start: 2023-04-04

## 2023-04-04 RX ORDER — LISINOPRIL 20 MG/1
20 TABLET ORAL 2 TIMES DAILY
Qty: 180 TABLET | Refills: 0 | Status: SHIPPED | OUTPATIENT
Start: 2023-04-04

## 2023-04-04 RX ORDER — BUDESONIDE, GLYCOPYRROLATE, AND FORMOTEROL FUMARATE 160; 9; 4.8 UG/1; UG/1; UG/1
2 AEROSOL, METERED RESPIRATORY (INHALATION) 2 TIMES DAILY
Qty: 3 EACH | Refills: 6 | Status: SHIPPED | OUTPATIENT
Start: 2023-04-04

## 2023-04-04 RX ORDER — IPRATROPIUM BROMIDE AND ALBUTEROL SULFATE 2.5; .5 MG/3ML; MG/3ML
3 SOLUTION RESPIRATORY (INHALATION) 4 TIMES DAILY PRN
Qty: 360 ML | Refills: 8 | Status: SHIPPED | OUTPATIENT
Start: 2023-04-04

## 2023-04-04 NOTE — PROGRESS NOTES
The ABCs of the Annual Wellness Visit  Subsequent Medicare Wellness Visit    Subjective    Maximo Kwon is a 68 y.o. male who presents for a Subsequent Medicare Wellness Visit.    The following portions of the patient's history were reviewed and   updated as appropriate: allergies, current medications, past family history, past medical history, past social history, past surgical history and problem list.    Compared to one year ago, the patient feels his physical   health is the same.    Compared to one year ago, the patient feels his mental   health is the same.    Recent Hospitalizations:  This patient has had a [facility] admission record on file within the last 365 days.    Current Medical Providers:  Patient Care Team:  Nilsa Freeman APRN as PCP - General (Nurse Practitioner)  Marcelino Ventura MD as Consulting Physician (Neurology)    Outpatient Medications Prior to Visit   Medication Sig Dispense Refill   • Alcohol Swabs (Alcohol Wipes) 70 % pads 1 each 3 (Three) Times a Day. 100 each 11   • aspirin (Anthony Aspirin) 325 MG tablet Take 1 tablet by mouth Daily for 180 days. 90 tablet 1   • Blood Glucose Monitoring Suppl (Blood Glucose Monitor System) w/Device kit 1 Device 2 (Two) Times a Day. 1 each 0   • gabapentin (NEURONTIN) 300 MG capsule Take 1 capsule by mouth 2 (Two) Times a Day. SEND TO [facility]     • glucose blood test strip Use as instructed BID and PRN if symptomatic 100 each 11   • Lancets misc 1 Device 2 (Two) Times a Day. 100 each 12   • metFORMIN (GLUCOPHAGE) 1000 MG tablet TAKE 1 TABLET TWICE DAILY WITH MEALS 180 tablet 1   • roflumilast (Daliresp) 500 MCG tablet tablet Take 1 tablet by mouth Daily. 90 tablet 1   • albuterol sulfate  (90 Base) MCG/ACT inhaler Inhale 2 puffs Every 4 (Four) Hours As Needed for Wheezing or Shortness of Air. 54 g 6   • Budeson-Glycopyrrol-Formoterol (Breztri Aerosphere) 160-9-4.8 MCG/ACT aerosol inhaler Inhale 2 puffs 2 (Two) Times a Day. 3 each 6   •  chlorthalidone (HYGROTON) 25 MG tablet Take 1 tablet by mouth Daily. 90 tablet 0   • ipratropium-albuterol (DUO-NEB) 0.5-2.5 mg/3 ml nebulizer Take 3 mL by nebulization 4 (Four) Times a Day As Needed for Wheezing or Shortness of Air. 360 mL 8   • lisinopril (PRINIVIL,ZESTRIL) 20 MG tablet TAKE 1 TABLET TWICE DAILY 180 tablet 0   • omeprazole (priLOSEC) 20 MG capsule TAKE 1 CAPSULE EVERY DAY 90 capsule 0   • multivitamin with minerals (MULTIVITAMIN ADULT PO) Take 1 tablet by mouth Daily.       No facility-administered medications prior to visit.       No opioid medication identified on active medication list. I have reviewed chart for other potential  high risk medication/s and harmful drug interactions in the elderly.          Aspirin is on active medication list. Aspirin use is indicated based on review of current medical condition/s. Pros and cons of this therapy have been discussed today. Benefits of this medication outweigh potential harm.  Patient has been encouraged to continue taking this medication.  .      Patient Active Problem List   Diagnosis   • Mixed hyperlipidemia   • Simple chronic bronchitis   • Multiple pulmonary nodules   • Essential hypertension   • Left ventricular hypertrophy with grade 1 diastolic dysfunction   • Hereditary and idiopathic peripheral neuropathy   • COPD, severe (HCC)   • Degenerative disc disease, lumbar   • Arthritis   • Diabetes mellitus   • Neuropathy   • Claudication of both lower extremities   • LAZCANO (dyspnea on exertion)   • GERD (gastroesophageal reflux disease)   • Chronic respiratory failure with hypoxia   • Chronic diastolic heart failure   • Former smoker     Advance Care Planning   Advance Care Planning     Advance Directive is not on file.  ACP discussion was held with the patient during this visit. Patient does not have an advance directive, information provided.     Objective    Vitals:    04/04/23 1257 04/04/23 1323   BP: 138/70 130/72   BP Location: Right arm   "  Patient Position: Sitting    Cuff Size: Large Adult    Pulse: 113 96   Resp: 16    Temp: 97.1 °F (36.2 °C)    TempSrc: Temporal    SpO2: 96%    Weight: 91.6 kg (202 lb)    Height: 182.9 cm (72\")    PainSc:   6    PainLoc: Back      Estimated body mass index is 27.4 kg/m² as calculated from the following:    Height as of this encounter: 182.9 cm (72\").    Weight as of this encounter: 91.6 kg (202 lb).    BMI is >= 25 and <30. (Overweight) The following options were offered after discussion;: weight loss educational material (shared in after visit summary), exercise counseling/recommendations and nutrition counseling/recommendations      Does the patient have evidence of cognitive impairment? No     HEALTH RISK ASSESSMENT    Smoking Status:  Social History     Tobacco Use   Smoking Status Former   • Packs/day: 1.00   • Years: 30.00   • Pack years: 30.00   • Types: Cigarettes   • Quit date:    • Years since quittin.2   Smokeless Tobacco Never     Alcohol Consumption:  Social History     Substance and Sexual Activity   Alcohol Use No     Fall Risk Screen:    STEADI Fall Risk Assessment was completed, and patient is at LOW risk for falls.Assessment completed on:2023    Depression Screening:  PHQ-2/PHQ-9 Depression Screening 2023   Little Interest or Pleasure in Doing Things 0-->not at all   Feeling Down, Depressed or Hopeless 0-->not at all   PHQ-9: Brief Depression Severity Measure Score 0       Health Habits and Functional and Cognitive Screening:  Functional & Cognitive Status 2023   Do you have difficulty preparing food and eating? No   Do you have difficulty bathing yourself, getting dressed or grooming yourself? Yes   Do you have difficulty using the toilet? No   Do you have difficulty moving around from place to place? No   Do you have trouble with steps or getting out of a bed or a chair? Yes   Current Diet Well Balanced Diet   Exercise (times per week) 7 times per week   Current Exercises " Include Walking   Do you need help using the phone?  No   Are you deaf or do you have serious difficulty hearing?  Yes   Do you need help with transportation? No   Do you need help shopping? No   Do you need help preparing meals?  No   Do you need help with housework?  No   Do you need help with laundry? No   Do you need help taking your medications? No   Do you need help managing money? No   Do you ever drive or ride in a car without wearing a seat belt? No       Age-appropriate Screening Schedule:  Refer to the list below for future screening recommendations based on patient's age, sex and/or medical conditions. Orders for these recommended tests are listed in the plan section. The patient has been provided with a written plan.    Health Maintenance   Topic Date Due   • ZOSTER VACCINE (1 of 2) Never done   • COLORECTAL CANCER SCREENING  03/12/2020   • LUNG CANCER SCREENING  01/05/2023   • COVID-19 Vaccine (4 - Booster for Moderna series) 04/06/2023 (Originally 1/12/2022)   • DIABETIC EYE EXAM  05/23/2023 (Originally 12/10/2022)   • HEPATITIS C SCREENING  04/04/2024 (Originally 4/18/2017)   • Pneumococcal Vaccine 65+ (1 - PCV) 04/04/2024 (Originally 3/22/1961)   • TDAP/TD VACCINES (1 - Tdap) 04/04/2024 (Originally 3/22/1974)   • INFLUENZA VACCINE  08/01/2023   • HEMOGLOBIN A1C  08/10/2023   • URINE MICROALBUMIN  10/26/2023   • DIABETIC FOOT EXAM  12/20/2023   • LIPID PANEL  02/10/2024   • ANNUAL WELLNESS VISIT  04/04/2024   • AAA SCREEN (ONE-TIME)  Completed                  CMS Preventative Services Quick Reference  Risk Factors Identified During Encounter  Immunizations Discussed/Encouraged: Td, Tdap, Hepatitis A Vaccine/Series, Hepatitis B Vaccine/Series, Influenza, Pneumococcal 23, Prevnar 20 (Pneumococcal 20-valent conjugate), Vaxneuvance (Pneumococcal 15-valent conjugate), Shingrix and COVID19  Vision Screening Recommended  The above risks/problems have been discussed with the patient.  Pertinent information  "has been shared with the patient in the After Visit Summary.  An After Visit Summary and PPPS were made available to the patient.    Follow Up:   Next Medicare Wellness visit to be scheduled in 1 year.       Additional E&M Note during same encounter follows:  Patient has multiple medical problems which are significant and separately identifiable that require additional work above and beyond the Medicare Wellness Visit.      Chief Complaint  Medicare Wellness-subsequent    Subjective        HPI  Maximo Kwon is also being seen today for follow up and medication refill. Reports he restarted glipizide last month, does not need refills. Reports is tolerating medications well with no issues or side effects.  Reports he plans to go to the [facility] to work out.  Denies any other issues or concerns at this time.      Review of Systems   Respiratory: Negative for shortness of breath.    Cardiovascular: Negative for chest pain, palpitations and leg swelling.   Gastrointestinal: Negative for nausea and vomiting.       Objective   Vital Signs:  /72   Pulse 96   Temp 97.1 °F (36.2 °C) (Temporal)   Resp 16   Ht 182.9 cm (72\")   Wt 91.6 kg (202 lb)   SpO2 96%   BMI 27.40 kg/m²     Physical Exam  Vitals and nursing note reviewed.   Constitutional:       General: He is not in acute distress.     Appearance: Normal appearance. He is not ill-appearing or toxic-appearing.   HENT:      Head: Normocephalic.   Cardiovascular:      Rate and Rhythm: Normal rate and regular rhythm.      Heart sounds: Normal heart sounds.   Pulmonary:      Effort: Pulmonary effort is normal. No respiratory distress.      Breath sounds: Normal breath sounds.   Abdominal:      General: Bowel sounds are normal.      Palpations: Abdomen is soft.   Skin:     General: Skin is warm and dry.      Coloration: Skin is not pale.   Neurological:      Mental Status: He is alert and oriented to person, place, and time.   Psychiatric:         Mood and " Affect: Mood normal.         Behavior: Behavior normal.         Thought Content: Thought content normal.         Judgment: Judgment normal.               Assessment and Plan   Diagnoses and all orders for this visit:    1. Gastroesophageal reflux disease, unspecified whether esophagitis present (Primary)  -     omeprazole (priLOSEC) 20 MG capsule; Take 1 capsule by mouth Daily.  Dispense: 90 capsule; Refill: 0    2. COPD without exacerbation  -     albuterol sulfate  (90 Base) MCG/ACT inhaler; Inhale 2 puffs Every 4 (Four) Hours As Needed for Wheezing or Shortness of Air.  Dispense: 54 g; Refill: 6  -     Budeson-Glycopyrrol-Formoterol (Breztri Aerosphere) 160-9-4.8 MCG/ACT aerosol inhaler; Inhale 2 puffs 2 (Two) Times a Day.  Dispense: 3 each; Refill: 6  -     ipratropium-albuterol (DUO-NEB) 0.5-2.5 mg/3 ml nebulizer; Take 3 mL by nebulization 4 (Four) Times a Day As Needed for Wheezing or Shortness of Air.  Dispense: 360 mL; Refill: 8    3. Essential hypertension  -     chlorthalidone (HYGROTON) 25 MG tablet; Take 1 tablet by mouth Daily.  Dispense: 90 tablet; Refill: 0  -     lisinopril (PRINIVIL,ZESTRIL) 20 MG tablet; Take 1 tablet by mouth 2 (Two) Times a Day.  Dispense: 180 tablet; Refill: 0      Discussed possible differential diagnoses, testing, treatment, recommended non-pharmacological interventions and/or lifestyle modifications, risks, warning signs to monitor for that would indicate need for follow-up in clinic or ER. If no improvement with this plan of care or you develop new or worsening symptoms, follow-up. Patient and/or guardian verbalizes understanding and agreement with plan of care. Denies further needs or concerns.    I spent 45 minutes caring for patient on this date of service. This time includes time spent by me in the following activities: preparing for the visit, reviewing tests, obtaining and/or reviewing a separately obtained history, performing a medically appropriate examination  and/or evaluation, counseling and educating the patient/family/caregiver, ordering medications, tests, or procedures and documenting information in the medical record.    Return in about 3 months (around 7/4/2023) for Recheck, Sooner if needed.      Patient was given instructions and counseling regarding his condition or for health maintenance advice. Please see specific information pulled into the AVS if appropriate.   Reports he follows with pulmonology to do lung cancer screenings. Declines prostate or hepatitis screening at this time.

## 2023-04-05 DIAGNOSIS — I10 ESSENTIAL HYPERTENSION: ICD-10-CM

## 2023-04-05 RX ORDER — CHLORTHALIDONE 25 MG/1
TABLET ORAL
Qty: 90 TABLET | Refills: 0 | OUTPATIENT
Start: 2023-04-05

## 2023-04-05 RX ORDER — MULTIPLE VITAMINS W/ MINERALS TAB 9MG-400MCG
1 TAB ORAL DAILY
COMMUNITY

## 2023-04-11 DIAGNOSIS — I10 ESSENTIAL HYPERTENSION: ICD-10-CM

## 2023-04-11 RX ORDER — CHLORTHALIDONE 25 MG/1
TABLET ORAL
Qty: 90 TABLET | Refills: 0 | OUTPATIENT
Start: 2023-04-11

## 2023-04-12 ENCOUNTER — TELEPHONE (OUTPATIENT)
Dept: FAMILY MEDICINE CLINIC | Facility: CLINIC | Age: 68
End: 2023-04-12
Payer: MEDICARE

## 2023-04-12 NOTE — TELEPHONE ENCOUNTER
----- Message from OSITO Poole sent at 4/12/2023  9:57 AM EDT -----  Please call patient regarding results.     Cologuard negative        Left patient voicemail.

## 2023-04-12 NOTE — TELEPHONE ENCOUNTER
Pt stopped by the office today regarding his pharmacy issues. He would like only his Gabapentin and anything that Nilsa prescribes temporarily  (cold med's, etc) [facility] He would like all of his routine maintence med's to go to [facility] pharmacy.

## 2023-04-19 ENCOUNTER — HOSPITAL ENCOUNTER (OUTPATIENT)
Dept: CARDIOLOGY | Facility: HOSPITAL | Age: 68
Discharge: HOME | End: 2023-04-19
Admitting: PODIATRIST
Payer: MEDICARE

## 2023-04-19 DIAGNOSIS — I10 ESSENTIAL HYPERTENSION: ICD-10-CM

## 2023-04-19 DIAGNOSIS — E11.42 TYPE 2 DIABETES MELLITUS WITH DIABETIC POLYNEUROPATHY, WITHOUT LONG-TERM CURRENT USE OF INSULIN: ICD-10-CM

## 2023-04-19 DIAGNOSIS — I70.213 ATHEROSCLEROSIS OF NATIVE ARTERY OF BOTH LOWER EXTREMITIES WITH INTERMITTENT CLAUDICATION: ICD-10-CM

## 2023-04-19 PROCEDURE — 93925 LOWER EXTREMITY STUDY: CPT | Performed by: RADIOLOGY

## 2023-04-19 PROCEDURE — 93925 LOWER EXTREMITY STUDY: CPT

## 2023-04-19 RX ORDER — CHLORTHALIDONE 25 MG/1
TABLET ORAL
Qty: 90 TABLET | Refills: 0 | OUTPATIENT
Start: 2023-04-19

## 2023-04-19 RX ORDER — GLYBURIDE 5 MG/1
TABLET ORAL
Qty: 90 TABLET | Refills: 1 | OUTPATIENT
Start: 2023-04-19

## 2023-05-18 DIAGNOSIS — K21.9 GASTROESOPHAGEAL REFLUX DISEASE, UNSPECIFIED WHETHER ESOPHAGITIS PRESENT: ICD-10-CM

## 2023-05-18 RX ORDER — OMEPRAZOLE 20 MG/1
20 CAPSULE, DELAYED RELEASE ORAL DAILY
Qty: 90 CAPSULE | Refills: 0 | Status: SHIPPED | OUTPATIENT
Start: 2023-05-18

## 2023-05-25 DIAGNOSIS — R91.8 MULTIPLE PULMONARY NODULES: Primary | ICD-10-CM

## 2023-06-06 DIAGNOSIS — I10 ESSENTIAL HYPERTENSION: ICD-10-CM

## 2023-06-06 RX ORDER — LISINOPRIL 20 MG/1
TABLET ORAL
Qty: 180 TABLET | Refills: 0 | Status: SHIPPED | OUTPATIENT
Start: 2023-06-06

## 2023-06-16 ENCOUNTER — HOSPITAL ENCOUNTER (OUTPATIENT)
Dept: CT IMAGING | Facility: HOSPITAL | Age: 68
Discharge: HOME | End: 2023-06-16
Payer: MEDICARE

## 2023-06-16 DIAGNOSIS — R91.8 MULTIPLE PULMONARY NODULES: ICD-10-CM

## 2023-06-16 LAB — CREAT BLDA-MCNC: 1.1 MG/DL (ref 0.6–1.3)

## 2023-06-16 PROCEDURE — 71270 CT THORAX DX C-/C+: CPT

## 2023-06-16 PROCEDURE — 25510000001 IOPAMIDOL 61 % SOLUTION: Performed by: REGISTERED NURSE

## 2023-06-16 PROCEDURE — 82565 ASSAY OF CREATININE: CPT

## 2023-06-16 PROCEDURE — 71270 CT THORAX DX C-/C+: CPT | Performed by: RADIOLOGY

## 2023-06-16 RX ADMIN — IOPAMIDOL 80 ML: 612 INJECTION, SOLUTION INTRAVENOUS at 09:30

## 2023-09-21 DIAGNOSIS — K21.9 GASTROESOPHAGEAL REFLUX DISEASE, UNSPECIFIED WHETHER ESOPHAGITIS PRESENT: ICD-10-CM

## 2023-09-21 DIAGNOSIS — I10 ESSENTIAL HYPERTENSION: ICD-10-CM

## 2023-09-21 RX ORDER — OMEPRAZOLE 20 MG/1
CAPSULE, DELAYED RELEASE ORAL
Qty: 90 CAPSULE | Refills: 0 | Status: SHIPPED | OUTPATIENT
Start: 2023-09-21

## 2023-09-21 RX ORDER — CHLORTHALIDONE 25 MG/1
TABLET ORAL
Qty: 90 TABLET | Refills: 0 | Status: SHIPPED | OUTPATIENT
Start: 2023-09-21

## 2023-10-09 ENCOUNTER — OFFICE VISIT (OUTPATIENT)
Dept: FAMILY MEDICINE CLINIC | Facility: CLINIC | Age: 68
End: 2023-10-09
Payer: MEDICARE

## 2023-10-09 VITALS
HEART RATE: 91 BPM | HEIGHT: 72 IN | BODY MASS INDEX: 27.2 KG/M2 | SYSTOLIC BLOOD PRESSURE: 160 MMHG | OXYGEN SATURATION: 95 % | TEMPERATURE: 97.1 F | WEIGHT: 200.8 LBS | DIASTOLIC BLOOD PRESSURE: 88 MMHG

## 2023-10-09 DIAGNOSIS — I10 ESSENTIAL HYPERTENSION: ICD-10-CM

## 2023-10-09 DIAGNOSIS — E11.42 TYPE 2 DIABETES MELLITUS WITH DIABETIC POLYNEUROPATHY, WITHOUT LONG-TERM CURRENT USE OF INSULIN: ICD-10-CM

## 2023-10-09 DIAGNOSIS — J44.9 COPD WITHOUT EXACERBATION: ICD-10-CM

## 2023-10-09 DIAGNOSIS — K21.9 GASTROESOPHAGEAL REFLUX DISEASE, UNSPECIFIED WHETHER ESOPHAGITIS PRESENT: ICD-10-CM

## 2023-10-09 PROCEDURE — 3077F SYST BP >= 140 MM HG: CPT | Performed by: FAMILY MEDICINE

## 2023-10-09 PROCEDURE — 90662 IIV NO PRSV INCREASED AG IM: CPT | Performed by: FAMILY MEDICINE

## 2023-10-09 PROCEDURE — 99214 OFFICE O/P EST MOD 30 MIN: CPT | Performed by: FAMILY MEDICINE

## 2023-10-09 PROCEDURE — 1159F MED LIST DOCD IN RCRD: CPT | Performed by: FAMILY MEDICINE

## 2023-10-09 PROCEDURE — 3079F DIAST BP 80-89 MM HG: CPT | Performed by: FAMILY MEDICINE

## 2023-10-09 PROCEDURE — G0008 ADMIN INFLUENZA VIRUS VAC: HCPCS | Performed by: FAMILY MEDICINE

## 2023-10-09 PROCEDURE — 1160F RVW MEDS BY RX/DR IN RCRD: CPT | Performed by: FAMILY MEDICINE

## 2023-10-09 PROCEDURE — 3044F HG A1C LEVEL LT 7.0%: CPT | Performed by: FAMILY MEDICINE

## 2023-10-09 RX ORDER — ASPIRIN 81 MG/1
81 TABLET ORAL DAILY
COMMUNITY

## 2023-10-09 RX ORDER — ISOPROPYL ALCOHOL 70 ML/100ML
SWAB TOPICAL
COMMUNITY
Start: 2023-08-08

## 2023-10-09 RX ORDER — OMEPRAZOLE 20 MG/1
20 CAPSULE, DELAYED RELEASE ORAL DAILY
Qty: 90 CAPSULE | Refills: 0 | Status: SHIPPED | OUTPATIENT
Start: 2023-10-09

## 2023-10-09 RX ORDER — BUDESONIDE, GLYCOPYRROLATE, AND FORMOTEROL FUMARATE 160; 9; 4.8 UG/1; UG/1; UG/1
2 AEROSOL, METERED RESPIRATORY (INHALATION) 2 TIMES DAILY
Qty: 3 EACH | Refills: 6 | Status: SHIPPED | OUTPATIENT
Start: 2023-10-09

## 2023-10-09 RX ORDER — LISINOPRIL 20 MG/1
20 TABLET ORAL 2 TIMES DAILY
Qty: 180 TABLET | Refills: 0 | Status: SHIPPED | OUTPATIENT
Start: 2023-10-09

## 2023-10-09 RX ORDER — CHLORTHALIDONE 25 MG/1
25 TABLET ORAL DAILY
Qty: 90 TABLET | Refills: 0 | Status: SHIPPED | OUTPATIENT
Start: 2023-10-09

## 2023-10-10 LAB
ALBUMIN SERPL-MCNC: 5 G/DL (ref 3.5–5.2)
ALBUMIN/GLOB SERPL: 2.6 G/DL
ALP SERPL-CCNC: 70 U/L (ref 39–117)
ALT SERPL-CCNC: 23 U/L (ref 1–41)
AST SERPL-CCNC: 21 U/L (ref 1–40)
BASOPHILS # BLD AUTO: 0.03 10*3/MM3 (ref 0–0.2)
BASOPHILS NFR BLD AUTO: 0.5 % (ref 0–1.5)
BILIRUB SERPL-MCNC: 0.3 MG/DL (ref 0–1.2)
BUN SERPL-MCNC: 14 MG/DL (ref 8–23)
BUN/CREAT SERPL: 13.9 (ref 7–25)
CALCIUM SERPL-MCNC: 10.3 MG/DL (ref 8.6–10.5)
CHLORIDE SERPL-SCNC: 97 MMOL/L (ref 98–107)
CHOLEST SERPL-MCNC: 214 MG/DL (ref 0–200)
CO2 SERPL-SCNC: 31.3 MMOL/L (ref 22–29)
CREAT SERPL-MCNC: 1.01 MG/DL (ref 0.76–1.27)
EGFRCR SERPLBLD CKD-EPI 2021: 81 ML/MIN/1.73
EOSINOPHIL # BLD AUTO: 0.22 10*3/MM3 (ref 0–0.4)
EOSINOPHIL NFR BLD AUTO: 4 % (ref 0.3–6.2)
ERYTHROCYTE [DISTWIDTH] IN BLOOD BY AUTOMATED COUNT: 13.9 % (ref 12.3–15.4)
GLOBULIN SER CALC-MCNC: 1.9 GM/DL
GLUCOSE SERPL-MCNC: 125 MG/DL (ref 65–99)
HBA1C MFR BLD: 6 % (ref 4.8–5.6)
HCT VFR BLD AUTO: 41.8 % (ref 37.5–51)
HDLC SERPL-MCNC: 43 MG/DL (ref 40–60)
HGB BLD-MCNC: 14.1 G/DL (ref 13–17.7)
IMM GRANULOCYTES # BLD AUTO: 0.02 10*3/MM3 (ref 0–0.05)
IMM GRANULOCYTES NFR BLD AUTO: 0.4 % (ref 0–0.5)
IMP & REVIEW OF LAB RESULTS: NORMAL
LDLC SERPL CALC-MCNC: 143 MG/DL (ref 0–100)
LYMPHOCYTES # BLD AUTO: 1.08 10*3/MM3 (ref 0.7–3.1)
LYMPHOCYTES NFR BLD AUTO: 19.6 % (ref 19.6–45.3)
MCH RBC QN AUTO: 28.9 PG (ref 26.6–33)
MCHC RBC AUTO-ENTMCNC: 33.7 G/DL (ref 31.5–35.7)
MCV RBC AUTO: 85.7 FL (ref 79–97)
MONOCYTES # BLD AUTO: 0.54 10*3/MM3 (ref 0.1–0.9)
MONOCYTES NFR BLD AUTO: 9.8 % (ref 5–12)
NEUTROPHILS # BLD AUTO: 3.63 10*3/MM3 (ref 1.7–7)
NEUTROPHILS NFR BLD AUTO: 65.7 % (ref 42.7–76)
NRBC BLD AUTO-RTO: 0 /100 WBC (ref 0–0.2)
PLATELET # BLD AUTO: 232 10*3/MM3 (ref 140–450)
POTASSIUM SERPL-SCNC: 4.6 MMOL/L (ref 3.5–5.2)
PROT SERPL-MCNC: 6.9 G/DL (ref 6–8.5)
RBC # BLD AUTO: 4.88 10*6/MM3 (ref 4.14–5.8)
SODIUM SERPL-SCNC: 138 MMOL/L (ref 136–145)
T4 FREE SERPL-MCNC: 1.4 NG/DL (ref 0.93–1.7)
TRIGL SERPL-MCNC: 155 MG/DL (ref 0–150)
TSH SERPL DL<=0.005 MIU/L-ACNC: 0.69 UIU/ML (ref 0.27–4.2)
VLDLC SERPL CALC-MCNC: 28 MG/DL (ref 5–40)
WBC # BLD AUTO: 5.52 10*3/MM3 (ref 3.4–10.8)

## 2023-10-10 NOTE — PROGRESS NOTES
"Chief Complaint  Hypertension    Subjective          Maximo Kwon presents to [facility]  Hypertension  This is a chronic problem. The current episode started more than 1 year ago. The problem is controlled. Current antihypertension treatment includes ACE inhibitors and diuretics. The current treatment provides significant improvement.   Diabetes  He presents for his follow-up diabetic visit. He has type 2 diabetes mellitus. His disease course has been stable. Current diabetic treatment includes oral agent (monotherapy). He is compliant with treatment all of the time. His weight is stable. Meal planning includes avoidance of concentrated sweets. An ACE inhibitor/angiotensin II receptor blocker is being taken.   Heartburn  This is a chronic problem. The current episode started more than 1 year ago. He has tried a PPI for the symptoms. The treatment provided significant relief.       Review of Systems      Objective   Vital Signs:   /88 (BP Location: Right arm, Patient Position: Sitting, Cuff Size: Adult)   Pulse 91   Temp 97.1 øF (36.2 øC) (Temporal)   Ht 182.9 cm (72\")   Wt 91.1 kg (200 lb 12.8 oz)   SpO2 95%   BMI 27.23 kg/mý     Physical Exam  Constitutional:       General: He is not in acute distress.     Appearance: Normal appearance. He is well-developed and well-groomed. He is not ill-appearing, toxic-appearing or diaphoretic.   HENT:      Head: Normocephalic.      Nose: Nose normal. No congestion or rhinorrhea.      Mouth/Throat:      Mouth: Mucous membranes are moist.      Pharynx: Oropharynx is clear. No oropharyngeal exudate or posterior oropharyngeal erythema.   Eyes:      General: Lids are normal.         Right eye: No discharge.         Left eye: No discharge.      Extraocular Movements: Extraocular movements intact.      Pupils: Pupils are equal, round, and reactive to light.   Neck:      Vascular: No carotid bruit.   Cardiovascular:      Rate and Rhythm: " Normal rate and regular rhythm.      Pulses: Normal pulses.      Heart sounds: Normal heart sounds. No murmur heard.     No friction rub. No gallop.   Pulmonary:      Effort: Pulmonary effort is normal. No respiratory distress.      Breath sounds: Normal breath sounds. No stridor. No wheezing, rhonchi or rales.   Chest:      Chest wall: No tenderness.   Abdominal:      General: Bowel sounds are normal. There is no distension.      Palpations: Abdomen is soft. There is no mass.      Tenderness: There is no abdominal tenderness. There is no right CVA tenderness, left CVA tenderness, guarding or rebound.      Hernia: No hernia is present.   Musculoskeletal:         General: No swelling or tenderness. Normal range of motion.      Cervical back: Normal range of motion and neck supple. No rigidity or tenderness.      Right lower leg: No edema.      Left lower leg: No edema.   Lymphadenopathy:      Cervical: No cervical adenopathy.   Skin:     General: Skin is warm.      Capillary Refill: Capillary refill takes less than 2 seconds.      Coloration: Skin is not jaundiced.      Findings: No bruising, erythema or rash.   Neurological:      General: No focal deficit present.      Mental Status: He is alert and oriented to person, place, and time.      Motor: Motor function is intact. No weakness.      Coordination: Coordination is intact.      Gait: Gait is intact. Gait normal.   Psychiatric:         Attention and Perception: Attention normal.         Mood and Affect: Mood normal.         Speech: Speech normal.         Behavior: Behavior normal.         Cognition and Memory: Cognition normal.         Judgment: Judgment normal.        Result Review :                 Assessment and Plan    Diagnoses and all orders for this visit:    1. COPD without exacerbation  -     Budeson-Glycopyrrol-Formoterol ([facility]) 160-9-4.8 MCG/ACT aerosol inhaler; Inhale 2 puffs 2 (Two) Times a Day.  Dispense: 3 each; Refill: 6  -     T4,  Free  -     TSH  -     Lipid Panel  -     Hemoglobin A1c  -     Comprehensive Metabolic Panel  -     CBC & Differential    2. Essential hypertension  -     chlorthalidone (HYGROTON) 25 MG tablet; Take 1 tablet by mouth Daily.  Dispense: 90 tablet; Refill: 0  -     lisinopril (PRINIVIL,ZESTRIL) 20 MG tablet; Take 1 tablet by mouth 2 (Two) Times a Day.  Dispense: 180 tablet; Refill: 0  -     CBC Auto Differential; Future  -     Cancel: Comprehensive Metabolic Panel; Future  -     Cancel: Hemoglobin A1c; Future  -     Cancel: Lipid Panel; Future  -     Cancel: TSH; Future  -     Cancel: T4, Free; Future  -     T4, Free  -     TSH  -     Lipid Panel  -     Hemoglobin A1c  -     Comprehensive Metabolic Panel  -     CBC & Differential    3. Type 2 diabetes mellitus with diabetic polyneuropathy, without long-term current use of insulin  -     metFORMIN (GLUCOPHAGE) 1000 MG tablet; Take 1 tablet by mouth 2 (Two) Times a Day With Meals.  Dispense: 180 tablet; Refill: 1  -     Cancel: Hemoglobin A1c; Future  -     T4, Free  -     TSH  -     Lipid Panel  -     Hemoglobin A1c  -     Comprehensive Metabolic Panel  -     CBC & Differential    4. Gastroesophageal reflux disease, unspecified whether esophagitis present  -     omeprazole (priLOSEC) 20 MG capsule; Take 1 capsule by mouth Daily.  Dispense: 90 capsule; Refill: 0  -     T4, Free  -     TSH  -     Lipid Panel  -     Hemoglobin A1c  -     Comprehensive Metabolic Panel  -     CBC & Differential    Other orders  -     Fluzone High-Dose 65+yrs (1061-1281)  -     Cardiovascular Risk Assessment      Patient's Body mass index is 27.23 kg/mý. indicating that he is overweight (BMI 25-29.9). Patient's (Body mass index is 27.23 kg/mý.) indicates that they are overweight with health conditions that include hypertension, diabetes mellitus, and GERD . Weight is unchanged. BMI is is above average; BMI management plan is completed. We discussed low calorie, low carb based diet program,  portion control, and increasing exercise. .    Follow Up   Return in about 3 months (around 1/9/2024), or labs today.  Patient was given instructions and counseling regarding his condition or for health maintenance advice. Please see specific information pulled into the AVS if appropriate.     This document has been electronically signed by OSITO Juares  October 10, 2023 08:23 EDT

## 2023-10-17 ENCOUNTER — TELEPHONE (OUTPATIENT)
Dept: FAMILY MEDICINE CLINIC | Facility: CLINIC | Age: 68
End: 2023-10-17
Payer: MEDICARE

## 2023-11-06 ENCOUNTER — TELEPHONE (OUTPATIENT)
Dept: FAMILY MEDICINE CLINIC | Facility: CLINIC | Age: 68
End: 2023-11-06

## 2023-11-06 ENCOUNTER — OFFICE VISIT (OUTPATIENT)
Dept: FAMILY MEDICINE CLINIC | Facility: CLINIC | Age: 68
End: 2023-11-06
Payer: MEDICARE

## 2023-11-06 VITALS
TEMPERATURE: 97.1 F | BODY MASS INDEX: 27.6 KG/M2 | DIASTOLIC BLOOD PRESSURE: 70 MMHG | HEART RATE: 52 BPM | WEIGHT: 203.8 LBS | OXYGEN SATURATION: 94 % | HEIGHT: 72 IN | SYSTOLIC BLOOD PRESSURE: 138 MMHG

## 2023-11-06 DIAGNOSIS — E11.65 TYPE 2 DIABETES MELLITUS WITH HYPERGLYCEMIA, UNSPECIFIED WHETHER LONG TERM INSULIN USE: Primary | ICD-10-CM

## 2023-11-06 DIAGNOSIS — R05.9 COUGH, UNSPECIFIED TYPE: ICD-10-CM

## 2023-11-06 DIAGNOSIS — J44.1 COPD EXACERBATION: ICD-10-CM

## 2023-11-06 LAB
EXPIRATION DATE: NORMAL
EXPIRATION DATE: NORMAL
FLUAV AG UPPER RESP QL IA.RAPID: NOT DETECTED
FLUBV AG UPPER RESP QL IA.RAPID: NOT DETECTED
INTERNAL CONTROL: NORMAL
Lab: NORMAL
Lab: NORMAL
POC CREATININE URINE: 50
POC MICROALBUMIN URINE: NORMAL
SARS-COV-2 AG UPPER RESP QL IA.RAPID: NOT DETECTED

## 2023-11-06 PROCEDURE — 1159F MED LIST DOCD IN RCRD: CPT | Performed by: FAMILY MEDICINE

## 2023-11-06 PROCEDURE — 3044F HG A1C LEVEL LT 7.0%: CPT | Performed by: FAMILY MEDICINE

## 2023-11-06 PROCEDURE — 3075F SYST BP GE 130 - 139MM HG: CPT | Performed by: FAMILY MEDICINE

## 2023-11-06 PROCEDURE — 1160F RVW MEDS BY RX/DR IN RCRD: CPT | Performed by: FAMILY MEDICINE

## 2023-11-06 PROCEDURE — 3078F DIAST BP <80 MM HG: CPT | Performed by: FAMILY MEDICINE

## 2023-11-06 RX ORDER — PREDNISONE 20 MG/1
20 TABLET ORAL DAILY
Qty: 5 TABLET | Refills: 0 | Status: SHIPPED | OUTPATIENT
Start: 2023-11-06

## 2023-11-06 RX ORDER — DEXTROMETHORPHAN HYDROBROMIDE AND PROMETHAZINE HYDROCHLORIDE 15; 6.25 MG/5ML; MG/5ML
5 SYRUP ORAL 4 TIMES DAILY PRN
Qty: 118 ML | Refills: 0 | Status: SHIPPED | OUTPATIENT
Start: 2023-11-06

## 2023-11-06 RX ORDER — DOXYCYCLINE HYCLATE 100 MG/1
100 CAPSULE ORAL 2 TIMES DAILY
Qty: 14 CAPSULE | Refills: 0 | Status: SHIPPED | OUTPATIENT
Start: 2023-11-06

## 2023-11-06 NOTE — TELEPHONE ENCOUNTER
Caller: Maximo Kwon    Relationship: Self    Best call back number: [phone]    What medication are you requesting: COUGH SUPPRESSANT, Z PACK    What are your current symptoms: COPD FLARE UP. COUGH    How long have you been experiencing symptoms: A FEW DAYS    Have you had these symptoms before:    [x] Yes  [] No    Have you been treated for these symptoms before:   [x] Yes  [] No    If a prescription is needed, what is your preferred pharmacy and phone number: [facility] [id number] - [place], [place] - [facility] AT [address] [place] [address] [phone] [facility] [phone] FX     Additional notes:      Spoke with patient & scheduled a same day appointment.

## 2023-11-06 NOTE — PROGRESS NOTES
"Chief Complaint  Shortness of Breath    Subjective          Maximo Kwon presents to [facility]  COPD  He complains of shortness of breath, sputum production and wheezing. This is a new problem. The current episode started in the past 7 days. The problem has been gradually worsening. Associated symptoms include dyspnea on exertion, nasal congestion, rhinorrhea and a sore throat. His symptoms are alleviated by rest. He reports moderate improvement on treatment. His past medical history is significant for COPD.       Review of Systems   HENT:  Positive for rhinorrhea and sore throat.    Respiratory:  Positive for sputum production, shortness of breath and wheezing.    Cardiovascular:  Positive for dyspnea on exertion.         Objective   Vital Signs:   /70 (BP Location: Right arm, Patient Position: Sitting, Cuff Size: Adult)   Pulse 52   Temp 97.1 °F (36.2 °C) (Temporal)   Ht 182.9 cm (72\")   Wt 92.4 kg (203 lb 12.8 oz)   SpO2 94%   BMI 27.64 kg/m²     Physical Exam  Constitutional:       General: He is not in acute distress.     Appearance: Normal appearance. He is well-developed and well-groomed. He is not ill-appearing, toxic-appearing or diaphoretic.   HENT:      Head: Normocephalic.      Nose: Nose normal. Congestion and rhinorrhea present.      Mouth/Throat:      Mouth: Mucous membranes are moist.      Pharynx: Oropharynx is clear. Posterior oropharyngeal erythema present. No oropharyngeal exudate.   Eyes:      General: Lids are normal.         Right eye: No discharge.         Left eye: No discharge.      Extraocular Movements: Extraocular movements intact.      Pupils: Pupils are equal, round, and reactive to light.   Neck:      Vascular: No carotid bruit.   Cardiovascular:      Rate and Rhythm: Normal rate and regular rhythm.      Pulses: Normal pulses.      Heart sounds: Normal heart sounds. No murmur heard.     No friction rub. No gallop.   Pulmonary:      " Effort: Pulmonary effort is normal. No respiratory distress.      Breath sounds: No stridor. Wheezing present. No rhonchi or rales.   Chest:      Chest wall: No tenderness.   Abdominal:      General: Bowel sounds are normal. There is no distension.      Palpations: Abdomen is soft. There is no mass.      Tenderness: There is no abdominal tenderness. There is no right CVA tenderness, left CVA tenderness, guarding or rebound.      Hernia: No hernia is present.   Musculoskeletal:         General: No swelling or tenderness. Normal range of motion.      Cervical back: Normal range of motion and neck supple. No rigidity or tenderness.      Right lower leg: No edema.      Left lower leg: No edema.   Lymphadenopathy:      Cervical: No cervical adenopathy.   Skin:     General: Skin is warm.      Capillary Refill: Capillary refill takes less than 2 seconds.      Coloration: Skin is not jaundiced.      Findings: No bruising, erythema or rash.   Neurological:      General: No focal deficit present.      Mental Status: He is alert and oriented to person, place, and time.      Motor: Motor function is intact. No weakness.      Coordination: Coordination is intact.      Gait: Gait is intact. Gait normal.   Psychiatric:         Attention and Perception: Attention normal.         Mood and Affect: Mood normal.         Speech: Speech normal.         Behavior: Behavior normal.         Cognition and Memory: Cognition normal.         Judgment: Judgment normal.        Result Review :                 Assessment and Plan    Diagnoses and all orders for this visit:    1. Cough, unspecified type (Primary)  -     MicroAlbumin, Urine, Random - Urine, Clean Catch; Future    2. COPD exacerbation  -     doxycycline (VIBRAMYCIN) 100 MG capsule; Take 1 capsule by mouth 2 (Two) Times a Day.  Dispense: 14 capsule; Refill: 0  -     predniSONE (DELTASONE) 20 MG tablet; Take 1 tablet by mouth Daily.  Dispense: 5 tablet; Refill: 0  -      promethazine-dextromethorphan (PROMETHAZINE-DM) 6.25-15 MG/5ML syrup; Take 5 mL by mouth 4 (Four) Times a Day As Needed for Cough.  Dispense: 118 mL; Refill: 0      Patient's Body mass index is 27.64 kg/m². indicating that he is overweight (BMI 25-29.9). Patient's (Body mass index is 27.64 kg/m².) indicates that they are overweight with health conditions that include hypertension, diabetes mellitus, and GERD . Weight is unchanged. BMI is is above average; BMI management plan is completed. We discussed low calorie, low carb based diet program, portion control, and increasing exercise. .    Follow Up   Return if symptoms worsen or fail to improve.  Patient was given instructions and counseling regarding his condition or for health maintenance advice. Please see specific information pulled into the AVS if appropriate.     This document has been electronically signed by OSITO Juares  November 6, 2023 11:01 EST

## 2023-11-06 NOTE — TELEPHONE ENCOUNTER
Caller: Maximo Kwon    Relationship: Self    Best call back number: [phone]    What medication are you requesting: COUGH SUPPRESSANT, Z PACK    What are your current symptoms: COPD FLARE UP. COUGH    How long have you been experiencing symptoms: A FEW DAYS    Have you had these symptoms before:    [x] Yes  [] No    Have you been treated for these symptoms before:   [x] Yes  [] No    If a prescription is needed, what is your preferred pharmacy and phone number: [facility] [id number] - [place] [id number] [facility] AT [address] [place] [address] [phone] [facility] [phone] FX     Additional notes:

## 2023-11-07 LAB — MICROALBUMIN UR-MCNC: 11 UG/ML

## 2023-12-07 ENCOUNTER — OFFICE VISIT (OUTPATIENT)
Dept: FAMILY MEDICINE CLINIC | Facility: CLINIC | Age: 68
End: 2023-12-07
Payer: MEDICARE

## 2023-12-07 VITALS
SYSTOLIC BLOOD PRESSURE: 120 MMHG | TEMPERATURE: 97.5 F | HEART RATE: 117 BPM | OXYGEN SATURATION: 97 % | BODY MASS INDEX: 26.84 KG/M2 | HEIGHT: 72 IN | DIASTOLIC BLOOD PRESSURE: 70 MMHG | WEIGHT: 198.2 LBS

## 2023-12-07 DIAGNOSIS — J44.1 COPD EXACERBATION: Primary | ICD-10-CM

## 2023-12-07 LAB
EXPIRATION DATE: NORMAL
FLUAV AG UPPER RESP QL IA.RAPID: NOT DETECTED
FLUBV AG UPPER RESP QL IA.RAPID: NOT DETECTED
INTERNAL CONTROL: NORMAL
Lab: NORMAL
SARS-COV-2 AG UPPER RESP QL IA.RAPID: NOT DETECTED

## 2023-12-07 RX ORDER — CEFTRIAXONE 1 G/1
1 INJECTION, POWDER, FOR SOLUTION INTRAMUSCULAR; INTRAVENOUS ONCE
Status: COMPLETED | OUTPATIENT
Start: 2023-12-07 | End: 2023-12-07

## 2023-12-07 RX ORDER — DEXAMETHASONE SODIUM PHOSPHATE 4 MG/ML
4 INJECTION, SOLUTION INTRA-ARTICULAR; INTRALESIONAL; INTRAMUSCULAR; INTRAVENOUS; SOFT TISSUE ONCE
Status: COMPLETED | OUTPATIENT
Start: 2023-12-07 | End: 2023-12-07

## 2023-12-07 RX ORDER — GUAIFENESIN 600 MG/1
1200 TABLET, EXTENDED RELEASE ORAL 2 TIMES DAILY
Qty: 60 TABLET | Refills: 1 | Status: SHIPPED | OUTPATIENT
Start: 2023-12-07

## 2023-12-07 RX ORDER — AMOXICILLIN AND CLAVULANATE POTASSIUM 875; 125 MG/1; MG/1
1 TABLET, FILM COATED ORAL 2 TIMES DAILY
Qty: 20 TABLET | Refills: 0 | Status: SHIPPED | OUTPATIENT
Start: 2023-12-07

## 2023-12-07 RX ADMIN — DEXAMETHASONE SODIUM PHOSPHATE 4 MG: 4 INJECTION, SOLUTION INTRA-ARTICULAR; INTRALESIONAL; INTRAMUSCULAR; INTRAVENOUS; SOFT TISSUE at 14:49

## 2023-12-07 RX ADMIN — CEFTRIAXONE 1 G: 1 INJECTION, POWDER, FOR SOLUTION INTRAMUSCULAR; INTRAVENOUS at 14:48

## 2023-12-12 ENCOUNTER — TELEPHONE (OUTPATIENT)
Dept: FAMILY MEDICINE CLINIC | Facility: CLINIC | Age: 68
End: 2023-12-12
Payer: MEDICARE

## 2023-12-12 NOTE — PROGRESS NOTES
"Chief Complaint  conjestion    Subjective          Maximo Kwon presents to [facility]  History of Present Illness    COPD  He complains of shortness of breath, sputum production and wheezing. This is a new problem. The current episode started in the past 10 days. The problem has been gradually worsening. Associated symptoms include dyspnea on exertion, nasal congestion, rhinorrhea and a sore throat. His symptoms are alleviated by rest. He reports moderate improvement on treatment. His past medical history is significant for COPD.     Review of Systems      Objective   Vital Signs:   /70 (BP Location: Right arm, Patient Position: Sitting, Cuff Size: Adult)   Pulse 117   Temp 97.5 °F (36.4 °C) (Temporal)   Ht 182.9 cm (72\")   Wt 89.9 kg (198 lb 3.2 oz)   SpO2 97%   BMI 26.88 kg/m²     Physical Exam  Constitutional:       General: He is not in acute distress.     Appearance: Normal appearance. He is well-developed and well-groomed. He is not ill-appearing, toxic-appearing or diaphoretic.   HENT:      Head: Normocephalic.      Right Ear: Tympanic membrane, ear canal and external ear normal.      Left Ear: Tympanic membrane, ear canal and external ear normal.      Nose: Nose normal. No congestion or rhinorrhea.      Mouth/Throat:      Mouth: Mucous membranes are moist.      Pharynx: Oropharynx is clear. No oropharyngeal exudate or posterior oropharyngeal erythema.   Eyes:      General: Lids are normal.         Right eye: No discharge.         Left eye: No discharge.      Extraocular Movements: Extraocular movements intact.      Pupils: Pupils are equal, round, and reactive to light.   Neck:      Vascular: No carotid bruit.   Cardiovascular:      Rate and Rhythm: Normal rate and regular rhythm.      Pulses: Normal pulses.      Heart sounds: Normal heart sounds. No murmur heard.     No friction rub. No gallop.   Pulmonary:      Effort: Pulmonary effort is normal. No " respiratory distress.      Breath sounds: No stridor. Wheezing present. No rhonchi or rales.   Chest:      Chest wall: No tenderness.   Abdominal:      General: Bowel sounds are normal. There is no distension.      Palpations: Abdomen is soft. There is no mass.      Tenderness: There is no abdominal tenderness. There is no right CVA tenderness, left CVA tenderness, guarding or rebound.      Hernia: No hernia is present.   Musculoskeletal:         General: No swelling or tenderness. Normal range of motion.      Cervical back: Normal range of motion and neck supple. No rigidity or tenderness.      Right lower leg: No edema.      Left lower leg: No edema.   Lymphadenopathy:      Cervical: No cervical adenopathy.   Skin:     General: Skin is warm.      Capillary Refill: Capillary refill takes less than 2 seconds.      Coloration: Skin is not jaundiced.      Findings: No bruising, erythema or rash.   Neurological:      General: No focal deficit present.      Mental Status: He is alert and oriented to person, place, and time.      Motor: Motor function is intact. No weakness.      Coordination: Coordination is intact.      Gait: Gait is intact. Gait normal.   Psychiatric:         Attention and Perception: Attention normal.         Mood and Affect: Mood normal.         Speech: Speech normal.         Behavior: Behavior normal.         Cognition and Memory: Cognition normal.         Judgment: Judgment normal.        Result Review :                 Assessment and Plan    Diagnoses and all orders for this visit:    1. COPD exacerbation (Primary)  -     cefTRIAXone (ROCEPHIN) injection 1 g  -     dexAMETHasone (DECADRON) injection 4 mg  -     amoxicillin-clavulanate (AUGMENTIN) 875-125 MG per tablet; Take 1 tablet by mouth 2 (Two) Times a Day.  Dispense: 20 tablet; Refill: 0  -     guaiFENesin (Mucinex) 600 MG 12 hr tablet; Take 2 tablets by mouth 2 (Two) Times a Day.  Dispense: 60 tablet; Refill: 1  -     POCT SARS-CoV-2  Antigen TAMARA + Flu      Patient's Body mass index is 26.88 kg/m². indicating that he is overweight (BMI 25-29.9). Patient's (Body mass index is 26.88 kg/m².) indicates that they are overweight with health conditions that include hypertension, diabetes mellitus, and GERD . Weight is unchanged. BMI is is above average; BMI management plan is completed. We discussed low calorie, low carb based diet program, portion control, and increasing exercise. .    Follow Up   Return if symptoms worsen or fail to improve.  Patient was given instructions and counseling regarding his condition or for health maintenance advice. Please see specific information pulled into the AVS if appropriate.     This document has been electronically signed by OSITO Juares  December 12, 2023 15:11 EST

## 2023-12-12 NOTE — TELEPHONE ENCOUNTER
Caller: Maximo Kwon    Relationship: Self    Best call back number: [phone]    What medication are you requesting:     guaiFENesin (Mucinex) 600 MG 12 hr tablet     If a prescription is needed, what is your preferred pharmacy and phone number:      [facility] [id number] - [place], [place] - [facility] AT [address] [place] [address] - [phone] [facility] [phone] FX     Additional notes:    PATIENT PICKED UP ANTIBIOTIC.  HOWEVER, [facility] STATED THEY DID NOT RECEIVE THE REQUEST FOR THE MUCINEX

## 2023-12-12 NOTE — TELEPHONE ENCOUNTER
Caller: [facility] [id number] [place], [place] - [address] [facility] AT [address] [place] [address] - [phone] [place] [phone] FX    Relationship: Pharmacy    Best call back number: [phone]    What medication are you requesting:     guaiFENesin (Mucinex) 600 MG 12 hr tablet     If a prescription is needed, what is your preferred pharmacy and phone number:      [facility] [id number]  [place], [place] - [facility] [facility] AT [address] [place] [address] - [phone] [facility] [phone] FX     Additional notes:    PATIENT PICKED UP ANTIBIOTIC.  HOWEVER, [facility] STATED THEY DID NOT RECEIVE THE REQUEST FOR THE MUCINEX      Called [facility] they did get the Rx but it is over the counter will have to get OTC,he verbalized understanding.

## 2024-01-09 ENCOUNTER — OFFICE VISIT (OUTPATIENT)
Dept: FAMILY MEDICINE CLINIC | Facility: CLINIC | Age: 69
End: 2024-01-09
Payer: MEDICARE

## 2024-01-09 VITALS
TEMPERATURE: 97.5 F | HEIGHT: 72 IN | WEIGHT: 198.8 LBS | DIASTOLIC BLOOD PRESSURE: 88 MMHG | BODY MASS INDEX: 26.93 KG/M2 | SYSTOLIC BLOOD PRESSURE: 150 MMHG | HEART RATE: 107 BPM | OXYGEN SATURATION: 89 %

## 2024-01-09 DIAGNOSIS — I10 ESSENTIAL HYPERTENSION: ICD-10-CM

## 2024-01-09 DIAGNOSIS — E11.42 TYPE 2 DIABETES MELLITUS WITH DIABETIC POLYNEUROPATHY, WITHOUT LONG-TERM CURRENT USE OF INSULIN: ICD-10-CM

## 2024-01-09 DIAGNOSIS — J44.9 COPD WITHOUT EXACERBATION: ICD-10-CM

## 2024-01-09 DIAGNOSIS — K21.9 GASTROESOPHAGEAL REFLUX DISEASE, UNSPECIFIED WHETHER ESOPHAGITIS PRESENT: ICD-10-CM

## 2024-01-09 PROCEDURE — 3077F SYST BP >= 140 MM HG: CPT | Performed by: FAMILY MEDICINE

## 2024-01-09 PROCEDURE — 1160F RVW MEDS BY RX/DR IN RCRD: CPT | Performed by: FAMILY MEDICINE

## 2024-01-09 PROCEDURE — 3079F DIAST BP 80-89 MM HG: CPT | Performed by: FAMILY MEDICINE

## 2024-01-09 PROCEDURE — 1159F MED LIST DOCD IN RCRD: CPT | Performed by: FAMILY MEDICINE

## 2024-01-09 PROCEDURE — 99214 OFFICE O/P EST MOD 30 MIN: CPT | Performed by: FAMILY MEDICINE

## 2024-01-09 RX ORDER — CHLORTHALIDONE 25 MG/1
25 TABLET ORAL DAILY
Qty: 90 TABLET | Refills: 0 | Status: SHIPPED | OUTPATIENT
Start: 2024-01-09

## 2024-01-09 RX ORDER — OMEPRAZOLE 20 MG/1
20 CAPSULE, DELAYED RELEASE ORAL DAILY
Qty: 90 CAPSULE | Refills: 0 | Status: SHIPPED | OUTPATIENT
Start: 2024-01-09

## 2024-01-09 RX ORDER — LISINOPRIL 20 MG/1
20 TABLET ORAL 2 TIMES DAILY
Qty: 180 TABLET | Refills: 0 | Status: SHIPPED | OUTPATIENT
Start: 2024-01-09

## 2024-01-09 RX ORDER — BUDESONIDE, GLYCOPYRROLATE, AND FORMOTEROL FUMARATE 160; 9; 4.8 UG/1; UG/1; UG/1
2 AEROSOL, METERED RESPIRATORY (INHALATION) 2 TIMES DAILY
Qty: 3 EACH | Refills: 6 | Status: SHIPPED | OUTPATIENT
Start: 2024-01-09

## 2024-01-09 NOTE — PROGRESS NOTES
"Chief Complaint  COPD    Subjective          Maximo Kwon presents to [facility]  COPD        Hypertension  This is a chronic problem. The current episode started more than 1 year ago. The problem is controlled. Current antihypertension treatment includes ACE inhibitors and diuretics. The current treatment provides significant improvement.   Diabetes  He presents for his follow-up diabetic visit. He has type 2 diabetes mellitus. His disease course has been stable. Current diabetic treatment includes oral agent (monotherapy). He is compliant with treatment all of the time. His weight is stable. Meal planning includes avoidance of concentrated sweets. An ACE inhibitor/angiotensin II receptor blocker is being taken.   Heartburn  This is a chronic problem. The current episode started more than 1 year ago. He has tried a PPI for the symptoms. The treatment provided significant relief.     States he is doing well with current medications and needs refills.     Review of Systems      Objective   Vital Signs:   /88 (BP Location: Right arm, Patient Position: Sitting, Cuff Size: Adult)   Pulse 107   Temp 97.5 °F (36.4 °C) (Temporal)   Ht 182.9 cm (72\")   Wt 90.2 kg (198 lb 12.8 oz)   SpO2 (!) 89%   BMI 26.96 kg/m²     Physical Exam  Constitutional:       General: He is not in acute distress.     Appearance: Normal appearance. He is well-developed and well-groomed. He is not ill-appearing, toxic-appearing or diaphoretic.   HENT:      Head: Normocephalic.      Nose: Nose normal. No congestion or rhinorrhea.      Mouth/Throat:      Mouth: Mucous membranes are moist.      Pharynx: Oropharynx is clear. No oropharyngeal exudate or posterior oropharyngeal erythema.   Eyes:      General: Lids are normal.         Right eye: No discharge.         Left eye: No discharge.      Extraocular Movements: Extraocular movements intact.      Pupils: Pupils are equal, round, and reactive to light. "   Neck:      Vascular: No carotid bruit.   Cardiovascular:      Rate and Rhythm: Normal rate and regular rhythm.      Pulses: Normal pulses.      Heart sounds: Normal heart sounds. No murmur heard.     No friction rub. No gallop.   Pulmonary:      Effort: Pulmonary effort is normal. No respiratory distress.      Breath sounds: Normal breath sounds. No stridor. No wheezing, rhonchi or rales.   Chest:      Chest wall: No tenderness.   Abdominal:      General: Bowel sounds are normal. There is no distension.      Palpations: Abdomen is soft. There is no mass.      Tenderness: There is no abdominal tenderness. There is no right CVA tenderness, left CVA tenderness, guarding or rebound.      Hernia: No hernia is present.   Musculoskeletal:         General: No swelling or tenderness. Normal range of motion.      Cervical back: Normal range of motion and neck supple. No rigidity or tenderness.      Right lower leg: No edema.      Left lower leg: No edema.   Lymphadenopathy:      Cervical: No cervical adenopathy.   Skin:     General: Skin is warm.      Capillary Refill: Capillary refill takes less than 2 seconds.      Coloration: Skin is not jaundiced.      Findings: No bruising, erythema or rash.   Neurological:      General: No focal deficit present.      Mental Status: He is alert and oriented to person, place, and time.      Motor: Motor function is intact. No weakness.      Coordination: Coordination is intact.      Gait: Gait is intact. Gait normal.   Psychiatric:         Attention and Perception: Attention normal.         Mood and Affect: Mood normal.         Speech: Speech normal.         Behavior: Behavior normal.         Cognition and Memory: Cognition normal.         Judgment: Judgment normal.        Result Review :                 Assessment and Plan    Diagnoses and all orders for this visit:    1. COPD without exacerbation  -     Budeson-Glycopyrrol-Formoterol ([facility]) 160-9-4.8 MCG/ACT aerosol  inhaler; Inhale 2 puffs 2 (Two) Times a Day.  Dispense: 3 each; Refill: 6    2. Essential hypertension  -     chlorthalidone (HYGROTON) 25 MG tablet; Take 1 tablet by mouth Daily.  Dispense: 90 tablet; Refill: 0  -     lisinopril (PRINIVIL,ZESTRIL) 20 MG tablet; Take 1 tablet by mouth 2 (Two) Times a Day.  Dispense: 180 tablet; Refill: 0    3. Type 2 diabetes mellitus with diabetic polyneuropathy, without long-term current use of insulin  -     metFORMIN (GLUCOPHAGE) 1000 MG tablet; Take 1 tablet by mouth 2 (Two) Times a Day With Meals.  Dispense: 180 tablet; Refill: 1  -     CBC Auto Differential; Future  -     Comprehensive Metabolic Panel; Future  -     Hemoglobin A1c; Future  -     Lipid Panel; Future  -     T4, Free; Future  -     TSH; Future    4. Gastroesophageal reflux disease, unspecified whether esophagitis present  -     omeprazole (priLOSEC) 20 MG capsule; Take 1 capsule by mouth Daily.  Dispense: 90 capsule; Refill: 0      Patient's Body mass index is 26.96 kg/m². indicating that he is overweight (BMI 25-29.9). Patient's (Body mass index is 26.96 kg/m².) indicates that they are overweight with health conditions that include hypertension, diabetes mellitus, and GERD . Weight is unchanged. BMI is is above average; BMI management plan is completed. We discussed low calorie, low carb based diet program, portion control, and increasing exercise. .    Follow Up   Return in about 3 months (around 4/9/2024), or labs today.  Patient was given instructions and counseling regarding his condition or for health maintenance advice. Please see specific information pulled into the AVS if appropriate.     This document has been electronically signed by OSITO Juares  January 9, 2024 09:07 EST

## 2024-01-10 ENCOUNTER — TELEPHONE (OUTPATIENT)
Dept: FAMILY MEDICINE CLINIC | Facility: CLINIC | Age: 69
End: 2024-01-10
Payer: MEDICARE

## 2024-01-10 LAB
ALBUMIN SERPL-MCNC: 4.6 G/DL (ref 3.5–5.2)
ALBUMIN/GLOB SERPL: 2.6 G/DL
ALP SERPL-CCNC: 71 U/L (ref 39–117)
ALT SERPL-CCNC: 25 U/L (ref 1–41)
AST SERPL-CCNC: 18 U/L (ref 1–40)
BASOPHILS # BLD AUTO: 0.03 10*3/MM3 (ref 0–0.2)
BASOPHILS NFR BLD AUTO: 0.5 % (ref 0–1.5)
BILIRUB SERPL-MCNC: 0.3 MG/DL (ref 0–1.2)
BUN SERPL-MCNC: 17 MG/DL (ref 8–23)
BUN/CREAT SERPL: 15.3 (ref 7–25)
CALCIUM SERPL-MCNC: 10 MG/DL (ref 8.6–10.5)
CHLORIDE SERPL-SCNC: 99 MMOL/L (ref 98–107)
CHOLEST SERPL-MCNC: 208 MG/DL (ref 0–200)
CO2 SERPL-SCNC: 29.5 MMOL/L (ref 22–29)
CREAT SERPL-MCNC: 1.11 MG/DL (ref 0.76–1.27)
EGFRCR SERPLBLD CKD-EPI 2021: 72.3 ML/MIN/1.73
EOSINOPHIL # BLD AUTO: 0.2 10*3/MM3 (ref 0–0.4)
EOSINOPHIL NFR BLD AUTO: 3.4 % (ref 0.3–6.2)
ERYTHROCYTE [DISTWIDTH] IN BLOOD BY AUTOMATED COUNT: 14.9 % (ref 12.3–15.4)
GLOBULIN SER CALC-MCNC: 1.8 GM/DL
GLUCOSE SERPL-MCNC: 136 MG/DL (ref 65–99)
HBA1C MFR BLD: 6.1 % (ref 4.8–5.6)
HCT VFR BLD AUTO: 41.6 % (ref 37.5–51)
HDLC SERPL-MCNC: 38 MG/DL (ref 40–60)
HGB BLD-MCNC: 13.9 G/DL (ref 13–17.7)
IMM GRANULOCYTES # BLD AUTO: 0.02 10*3/MM3 (ref 0–0.05)
IMM GRANULOCYTES NFR BLD AUTO: 0.3 % (ref 0–0.5)
IMP & REVIEW OF LAB RESULTS: NORMAL
LDLC SERPL CALC-MCNC: 141 MG/DL (ref 0–100)
LYMPHOCYTES # BLD AUTO: 0.85 10*3/MM3 (ref 0.7–3.1)
LYMPHOCYTES NFR BLD AUTO: 14.4 % (ref 19.6–45.3)
MCH RBC QN AUTO: 29.4 PG (ref 26.6–33)
MCHC RBC AUTO-ENTMCNC: 33.4 G/DL (ref 31.5–35.7)
MCV RBC AUTO: 88.1 FL (ref 79–97)
MONOCYTES # BLD AUTO: 0.54 10*3/MM3 (ref 0.1–0.9)
MONOCYTES NFR BLD AUTO: 9.2 % (ref 5–12)
NEUTROPHILS # BLD AUTO: 4.25 10*3/MM3 (ref 1.7–7)
NEUTROPHILS NFR BLD AUTO: 72.2 % (ref 42.7–76)
NRBC BLD AUTO-RTO: 0 /100 WBC (ref 0–0.2)
PLATELET # BLD AUTO: 228 10*3/MM3 (ref 140–450)
POTASSIUM SERPL-SCNC: 4.3 MMOL/L (ref 3.5–5.2)
PROT SERPL-MCNC: 6.4 G/DL (ref 6–8.5)
RBC # BLD AUTO: 4.72 10*6/MM3 (ref 4.14–5.8)
SODIUM SERPL-SCNC: 140 MMOL/L (ref 136–145)
T4 FREE SERPL-MCNC: 1.42 NG/DL (ref 0.93–1.7)
TRIGL SERPL-MCNC: 162 MG/DL (ref 0–150)
TSH SERPL DL<=0.005 MIU/L-ACNC: 0.81 UIU/ML (ref 0.27–4.2)
VLDLC SERPL CALC-MCNC: 29 MG/DL (ref 5–40)
WBC # BLD AUTO: 5.89 10*3/MM3 (ref 3.4–10.8)

## 2024-01-10 NOTE — TELEPHONE ENCOUNTER
----- Message from OSITO Juares sent at 1/10/2024  9:02 AM EST -----  Please let patient know results are stable. Thank you.

## 2024-01-10 NOTE — TELEPHONE ENCOUNTER
Notified patient of results, he verbalized understanding   He would like to do a PSA the next time he has labs done

## 2024-01-30 DIAGNOSIS — R91.8 MULTIPLE PULMONARY NODULES: Primary | ICD-10-CM

## 2024-01-31 DIAGNOSIS — J44.9 COPD, SEVERE: ICD-10-CM

## 2024-01-31 RX ORDER — ROFLUMILAST 500 UG/1
500 TABLET ORAL DAILY
Qty: 90 TABLET | Refills: 3 | Status: SHIPPED | OUTPATIENT
Start: 2024-01-31

## 2024-03-25 DIAGNOSIS — I10 ESSENTIAL HYPERTENSION: ICD-10-CM

## 2024-03-25 RX ORDER — LISINOPRIL 20 MG/1
20 TABLET ORAL 2 TIMES DAILY
Qty: 180 TABLET | Refills: 0 | Status: SHIPPED | OUTPATIENT
Start: 2024-03-25

## 2024-03-28 ENCOUNTER — OFFICE VISIT (OUTPATIENT)
Dept: PULMONOLOGY | Facility: CLINIC | Age: 69
End: 2024-03-28
Payer: MEDICARE

## 2024-03-28 VITALS
HEIGHT: 72 IN | OXYGEN SATURATION: 97 % | WEIGHT: 200.6 LBS | BODY MASS INDEX: 27.17 KG/M2 | DIASTOLIC BLOOD PRESSURE: 84 MMHG | TEMPERATURE: 95.9 F | SYSTOLIC BLOOD PRESSURE: 152 MMHG | HEART RATE: 76 BPM

## 2024-03-28 DIAGNOSIS — J44.9 COPD, SEVERE: Primary | ICD-10-CM

## 2024-03-28 DIAGNOSIS — R91.8 MULTIPLE PULMONARY NODULES: ICD-10-CM

## 2024-03-28 DIAGNOSIS — Z87.891 FORMER SMOKER: ICD-10-CM

## 2024-03-28 PROCEDURE — 3077F SYST BP >= 140 MM HG: CPT | Performed by: PHYSICIAN ASSISTANT

## 2024-03-28 PROCEDURE — 1159F MED LIST DOCD IN RCRD: CPT | Performed by: PHYSICIAN ASSISTANT

## 2024-03-28 PROCEDURE — 1160F RVW MEDS BY RX/DR IN RCRD: CPT | Performed by: PHYSICIAN ASSISTANT

## 2024-03-28 PROCEDURE — 99214 OFFICE O/P EST MOD 30 MIN: CPT | Performed by: PHYSICIAN ASSISTANT

## 2024-03-28 PROCEDURE — 3079F DIAST BP 80-89 MM HG: CPT | Performed by: PHYSICIAN ASSISTANT

## 2024-03-28 NOTE — PROGRESS NOTES
"Chief Complaint  COPD    Subjective        Maximo Kwon presents to [facility]  History of Present Illness    Patient presents today for follow-up of severe COPD.  Continues on all previous maintenance therapies.  Notes overall decline over time and shortness of breath, as he cannot tolerate some of the previous exertional activities that he used to.  Requires rest intermittently.  Has not qualified in the past for supplemental oxygen use.  Was referred for possible lung transplant, but not considered an appropriate candidate yet.  Was able to quit smoking after around 30-year history in 2010.      Objective   Vital Signs:  /84   Pulse 76   Temp 95.9 °F (35.5 °C)   Ht 182.9 cm (72\")   Wt 91 kg (200 lb 9.6 oz)   SpO2 97%   BMI 27.21 kg/m²   Estimated body mass index is 27.21 kg/m² as calculated from the following:    Height as of this encounter: 182.9 cm (72\").    Weight as of this encounter: 91 kg (200 lb 9.6 oz).         Physical Exam  Vitals reviewed.   Constitutional:       General: He is not in acute distress.     Appearance: He is well-developed. He is not diaphoretic.   HENT:      Head: Normocephalic and atraumatic.   Cardiovascular:      Rate and Rhythm: Normal rate and regular rhythm.   Pulmonary:      Effort: Pulmonary effort is normal.      Breath sounds: No wheezing, rhonchi or rales.   Neurological:      Mental Status: He is alert and oriented to person, place, and time.   Psychiatric:         Behavior: Behavior normal.        Result Review :    The following data was reviewed by: Cecile Martin PA-C on 03/28/2024:  PFT results from 2021  CT chest imaging report from 2023        Assessment and Plan     Diagnoses and all orders for this visit:    1. Multiple pulmonary nodules (Primary)  -     CT chest low dose wo; Future    2. Former smoker  -     CT chest low dose wo; Future    3. COPD, severe        Severe COPD:  Continue albuterol " inhaler as needed  Continue DuoNebs as needed  Continue breztri as scheduled  Continue Daliresp 500 mcg daily  PFT notable for severe obstruction.  Hold off on repeat at this time as symptomatically stable and currently on all maintenance options  Deviously evaluated for possible lung transplant, not considered appropriate candidate yet  Not yet requiring supplemental oxygen use.  Saturation noted at 97% today on room air.  Considered overnight oxygen testing, discussed today but decided to hold off.  Will reconsider again at the next visit/sooner as needed        Pulmonary nodules, former smoker:  Past smoking history of approximately 30 years with cessation in 2010.  History - notable for attempted biopsy with subsequent pneumothorax in [place].  Underwent transbronchial biopsy in 2019 due to malignancy concern.   Repeat CT chest in April 2019 was concerning for slight increase of 2 nodules measuring 8.3 and 7.2 mm.  Patient was referred to Dr. Rehman and PET scan was completed, which was nonsignificant.  Repeat CT scan completed in June 2020 suggested slight interval change.  Short-term follow-up in October 2020 again suggested slight interval change.  Followed with [facility] pulmonology services in 2021, repeat scan was completed in May 2021 suggested stability of most nodules/slight decrease in size of 1 nodule -- recommended continued follow-up.  Further imaging completed suggests stability from 2021 to most recent imaging in June 2023.  Order new low-dose CT chest for follow-up        Follow Up     Return in about 6 months (around 9/28/2024).  Patient was given instructions and counseling regarding his condition or for health maintenance advice. Please see specific information pulled into the AVS if appropriate.

## 2024-04-04 DIAGNOSIS — I10 ESSENTIAL HYPERTENSION: ICD-10-CM

## 2024-04-04 RX ORDER — LISINOPRIL 20 MG/1
20 TABLET ORAL 2 TIMES DAILY
Qty: 180 TABLET | Refills: 3 | OUTPATIENT
Start: 2024-04-04

## 2024-04-10 ENCOUNTER — HOSPITAL ENCOUNTER (OUTPATIENT)
Dept: CT IMAGING | Facility: HOSPITAL | Age: 69
Discharge: HOME | End: 2024-04-10
Admitting: PHYSICIAN ASSISTANT
Payer: MEDICARE

## 2024-04-10 DIAGNOSIS — R91.8 MULTIPLE PULMONARY NODULES: ICD-10-CM

## 2024-04-10 DIAGNOSIS — Z87.891 FORMER SMOKER: ICD-10-CM

## 2024-04-10 PROCEDURE — 71271 CT THORAX LUNG CANCER SCR C-: CPT

## 2024-04-13 DIAGNOSIS — K21.9 GASTROESOPHAGEAL REFLUX DISEASE, UNSPECIFIED WHETHER ESOPHAGITIS PRESENT: ICD-10-CM

## 2024-04-13 DIAGNOSIS — I10 ESSENTIAL HYPERTENSION: ICD-10-CM

## 2024-04-15 RX ORDER — CHLORTHALIDONE 25 MG/1
25 TABLET ORAL DAILY
Qty: 90 TABLET | Refills: 0 | Status: SHIPPED | OUTPATIENT
Start: 2024-04-15

## 2024-04-15 RX ORDER — OMEPRAZOLE 20 MG/1
20 CAPSULE, DELAYED RELEASE ORAL DAILY
Qty: 90 CAPSULE | Refills: 0 | Status: SHIPPED | OUTPATIENT
Start: 2024-04-15

## 2024-04-30 ENCOUNTER — TELEPHONE (OUTPATIENT)
Dept: PULMONOLOGY | Facility: CLINIC | Age: 69
End: 2024-04-30
Payer: MEDICARE

## 2024-04-30 NOTE — TELEPHONE ENCOUNTER
Updated with CT can results.   Findings listed below remain stable - can consider repeat scan in 1 year    Stable right apical scarring (minimal of the left, also stable)  Image 83 - stable R 3 mm nodule (since 2018)  Image 88 - stable R 5 mm nodule (since 2019)  New larger opacity in 2019 later reduced to 5.8 mm in 2020.  Now same length but smaller 5 mm width (stable/improved).   Image 94 - stable 9.1 mm lymph node (since 2018)  Image 106 - stable 5-6 mm nodule (since 2018)  Image 120 - stable 8-9 mm nodule (previously 7-8 mm, since 2018)

## 2024-05-02 ENCOUNTER — TELEPHONE (OUTPATIENT)
Dept: FAMILY MEDICINE CLINIC | Facility: CLINIC | Age: 69
End: 2024-05-02
Payer: MEDICARE

## 2024-05-02 NOTE — TELEPHONE ENCOUNTER
Called patient in regards to elevated BP ( 198/99 per Neuro, they called and were concerned).  Patient noted he was anxious and in some pain and that was the probable cause it was elevated.  He rechecked it while on the phone and it was well within normal limits.  I told him to check it periodically and keep a log.  If it was consistently elevated he should come in for further evaluation.    Patient verbalized understanding.

## 2024-06-06 ENCOUNTER — OFFICE VISIT (OUTPATIENT)
Dept: FAMILY MEDICINE CLINIC | Facility: CLINIC | Age: 69
End: 2024-06-06
Payer: MEDICARE

## 2024-06-06 VITALS
TEMPERATURE: 98 F | BODY MASS INDEX: 26.25 KG/M2 | HEIGHT: 72 IN | OXYGEN SATURATION: 97 % | SYSTOLIC BLOOD PRESSURE: 138 MMHG | HEART RATE: 111 BPM | WEIGHT: 193.8 LBS | DIASTOLIC BLOOD PRESSURE: 80 MMHG

## 2024-06-06 DIAGNOSIS — J44.9 COPD WITHOUT EXACERBATION: ICD-10-CM

## 2024-06-06 DIAGNOSIS — E11.42 TYPE 2 DIABETES MELLITUS WITH DIABETIC POLYNEUROPATHY, WITHOUT LONG-TERM CURRENT USE OF INSULIN: ICD-10-CM

## 2024-06-06 DIAGNOSIS — K21.9 GASTROESOPHAGEAL REFLUX DISEASE, UNSPECIFIED WHETHER ESOPHAGITIS PRESENT: ICD-10-CM

## 2024-06-06 DIAGNOSIS — I10 ESSENTIAL HYPERTENSION: ICD-10-CM

## 2024-06-06 PROCEDURE — 1159F MED LIST DOCD IN RCRD: CPT | Performed by: FAMILY MEDICINE

## 2024-06-06 PROCEDURE — 96372 THER/PROPH/DIAG INJ SC/IM: CPT | Performed by: FAMILY MEDICINE

## 2024-06-06 PROCEDURE — 1125F AMNT PAIN NOTED PAIN PRSNT: CPT | Performed by: FAMILY MEDICINE

## 2024-06-06 PROCEDURE — 3079F DIAST BP 80-89 MM HG: CPT | Performed by: FAMILY MEDICINE

## 2024-06-06 PROCEDURE — 3075F SYST BP GE 130 - 139MM HG: CPT | Performed by: FAMILY MEDICINE

## 2024-06-06 PROCEDURE — 1160F RVW MEDS BY RX/DR IN RCRD: CPT | Performed by: FAMILY MEDICINE

## 2024-06-06 PROCEDURE — 99214 OFFICE O/P EST MOD 30 MIN: CPT | Performed by: FAMILY MEDICINE

## 2024-06-06 PROCEDURE — 3044F HG A1C LEVEL LT 7.0%: CPT | Performed by: FAMILY MEDICINE

## 2024-06-06 RX ORDER — OMEPRAZOLE 20 MG/1
20 CAPSULE, DELAYED RELEASE ORAL DAILY
Qty: 90 CAPSULE | Refills: 0 | Status: SHIPPED | OUTPATIENT
Start: 2024-06-06

## 2024-06-06 RX ORDER — PREDNISONE 20 MG/1
40 TABLET ORAL DAILY
Qty: 10 TABLET | Refills: 0 | Status: SHIPPED | OUTPATIENT
Start: 2024-06-06 | End: 2024-06-11

## 2024-06-06 RX ORDER — CEFTRIAXONE 1 G/1
1 INJECTION, POWDER, FOR SOLUTION INTRAMUSCULAR; INTRAVENOUS ONCE
Status: COMPLETED | OUTPATIENT
Start: 2024-06-06 | End: 2024-06-06

## 2024-06-06 RX ORDER — BENZONATATE 100 MG/1
100 CAPSULE ORAL 3 TIMES DAILY PRN
Qty: 20 CAPSULE | Refills: 0 | Status: SHIPPED | OUTPATIENT
Start: 2024-06-06

## 2024-06-06 RX ORDER — BUDESONIDE, GLYCOPYRROLATE, AND FORMOTEROL FUMARATE 160; 9; 4.8 UG/1; UG/1; UG/1
2 AEROSOL, METERED RESPIRATORY (INHALATION) 2 TIMES DAILY
Qty: 3 EACH | Refills: 6 | Status: SHIPPED | OUTPATIENT
Start: 2024-06-06

## 2024-06-06 RX ORDER — DOXYCYCLINE HYCLATE 100 MG/1
100 CAPSULE ORAL 2 TIMES DAILY
Qty: 14 CAPSULE | Refills: 0 | Status: SHIPPED | OUTPATIENT
Start: 2024-06-06

## 2024-06-06 RX ORDER — LISINOPRIL 20 MG/1
20 TABLET ORAL 2 TIMES DAILY
Qty: 180 TABLET | Refills: 0 | Status: SHIPPED | OUTPATIENT
Start: 2024-06-06

## 2024-06-06 RX ORDER — METHYLPREDNISOLONE ACETATE 40 MG/ML
40 INJECTION, SUSPENSION INTRA-ARTICULAR; INTRALESIONAL; INTRAMUSCULAR; SOFT TISSUE ONCE
Status: COMPLETED | OUTPATIENT
Start: 2024-06-06 | End: 2024-06-06

## 2024-06-06 RX ADMIN — METHYLPREDNISOLONE ACETATE 40 MG: 40 INJECTION, SUSPENSION INTRA-ARTICULAR; INTRALESIONAL; INTRAMUSCULAR; SOFT TISSUE at 11:50

## 2024-06-06 RX ADMIN — CEFTRIAXONE 1 G: 1 INJECTION, POWDER, FOR SOLUTION INTRAMUSCULAR; INTRAVENOUS at 11:48

## 2024-06-06 NOTE — PROGRESS NOTES
"Chief Complaint  COPD    Subjective          Maximo Kwon presents to [facility]  COPD  He complains of cough, shortness of breath, sputum production and wheezing. The current episode started 1 to 4 weeks ago. The problem has been gradually worsening. The cough is productive of purulent sputum. Associated symptoms include nasal congestion, postnasal drip, rhinorrhea, sneezing and a sore throat. His symptoms are alleviated by beta-agonist and rest. He reports minimal improvement on treatment. His past medical history is significant for COPD and emphysema.     Continues refills on his regular medications.  He is doing well with them at this time.    Review of Systems   HENT:  Positive for postnasal drip, rhinorrhea, sneezing and sore throat.    Respiratory:  Positive for cough, sputum production, shortness of breath and wheezing.          Objective   Vital Signs:   /80 (BP Location: Right arm, Patient Position: Sitting, Cuff Size: Adult)   Pulse 111   Temp 98 °F (36.7 °C) (Temporal)   Ht 182.9 cm (72\")   Wt 87.9 kg (193 lb 12.8 oz)   SpO2 97%   BMI 26.28 kg/m²     Physical Exam  Constitutional:       General: He is not in acute distress.     Appearance: Normal appearance. He is well-developed and well-groomed. He is not ill-appearing, toxic-appearing or diaphoretic.   HENT:      Head: Normocephalic.      Nose: Nose normal. No congestion or rhinorrhea.      Mouth/Throat:      Mouth: Mucous membranes are moist.      Pharynx: Oropharynx is clear. No oropharyngeal exudate or posterior oropharyngeal erythema.   Eyes:      General: Lids are normal.         Right eye: No discharge.         Left eye: No discharge.      Extraocular Movements: Extraocular movements intact.      Pupils: Pupils are equal, round, and reactive to light.   Neck:      Vascular: No carotid bruit.   Cardiovascular:      Rate and Rhythm: Normal rate and regular rhythm.      Pulses: Normal pulses.      " Heart sounds: Normal heart sounds. No murmur heard.     No friction rub. No gallop.   Pulmonary:      Effort: Pulmonary effort is normal. No respiratory distress.      Breath sounds: No stridor. Wheezing present. No rhonchi or rales.   Chest:      Chest wall: No tenderness.   Abdominal:      General: Bowel sounds are normal. There is no distension.      Palpations: Abdomen is soft. There is no mass.      Tenderness: There is no abdominal tenderness. There is no right CVA tenderness, left CVA tenderness, guarding or rebound.      Hernia: No hernia is present.   Musculoskeletal:         General: No swelling or tenderness. Normal range of motion.      Cervical back: Normal range of motion and neck supple. No rigidity or tenderness.      Right lower leg: No edema.      Left lower leg: No edema.   Lymphadenopathy:      Cervical: No cervical adenopathy.   Skin:     General: Skin is warm.      Capillary Refill: Capillary refill takes less than 2 seconds.      Coloration: Skin is not jaundiced.      Findings: No bruising, erythema or rash.   Neurological:      General: No focal deficit present.      Mental Status: He is alert and oriented to person, place, and time.      Motor: Motor function is intact. No weakness.      Coordination: Coordination is intact.      Gait: Gait is intact. Gait normal.   Psychiatric:         Attention and Perception: Attention normal.         Mood and Affect: Mood normal.         Speech: Speech normal.         Behavior: Behavior normal.         Cognition and Memory: Cognition normal.         Judgment: Judgment normal.        Result Review :                 Assessment and Plan    Diagnoses and all orders for this visit:    1. Essential hypertension  -     lisinopril (PRINIVIL,ZESTRIL) 20 MG tablet; Take 1 tablet by mouth 2 (Two) Times a Day.  Dispense: 180 tablet; Refill: 0    2. Type 2 diabetes mellitus with diabetic polyneuropathy, without long-term current use of insulin  -     metFORMIN  (GLUCOPHAGE) 1000 MG tablet; Take 1 tablet by mouth 2 (Two) Times a Day With Meals.  Dispense: 180 tablet; Refill: 1    3. Gastroesophageal reflux disease, unspecified whether esophagitis present  -     omeprazole (priLOSEC) 20 MG capsule; Take 1 capsule by mouth Daily.  Dispense: 90 capsule; Refill: 0    4. COPD without exacerbation  -     Budeson-Glycopyrrol-Formoterol ([facility]) 160-9-4.8 MCG/ACT aerosol inhaler; Inhale 2 puffs 2 (Two) Times a Day.  Dispense: 3 each; Refill: 6  -     cefTRIAXone (ROCEPHIN) injection 1 g  -     methylPREDNISolone acetate (DEPO-medrol) injection 40 mg    Other orders  -     doxycycline (VIBRAMYCIN) 100 MG capsule; Take 1 capsule by mouth 2 (Two) Times a Day.  Dispense: 14 capsule; Refill: 0  -     predniSONE (DELTASONE) 20 MG tablet; Take 2 tablets by mouth Daily for 5 days.  Dispense: 10 tablet; Refill: 0  -     benzonatate (Tessalon Perles) 100 MG capsule; Take 1 capsule by mouth 3 (Three) Times a Day As Needed for Cough.  Dispense: 20 capsule; Refill: 0      Patient's Body mass index is 26.28 kg/m². indicating that he is overweight (BMI 25-29.9). Patient's (Body mass index is 26.28 kg/m².) indicates that they are overweight with health conditions that include hypertension, diabetes mellitus, and GERD . Weight is unchanged. BMI is is above average; BMI management plan is completed. We discussed low calorie, low carb based diet program, portion control, and increasing exercise. .    Follow Up   Return if symptoms worsen or fail to improve.  Patient was given instructions and counseling regarding his condition or for health maintenance advice. Please see specific information pulled into the AVS if appropriate.     This document has been electronically signed by OSITO Juares  June 6, 2024 13:10 EDT

## 2024-06-25 ENCOUNTER — HOSPITAL ENCOUNTER (OUTPATIENT)
Dept: GENERAL RADIOLOGY | Facility: HOSPITAL | Age: 69
Discharge: HOME | End: 2024-06-25
Admitting: PHYSICIAN ASSISTANT
Payer: MEDICARE

## 2024-06-25 ENCOUNTER — OFFICE VISIT (OUTPATIENT)
Dept: PULMONOLOGY | Facility: CLINIC | Age: 69
End: 2024-06-25
Payer: MEDICARE

## 2024-06-25 ENCOUNTER — TELEPHONE (OUTPATIENT)
Dept: PULMONOLOGY | Facility: CLINIC | Age: 69
End: 2024-06-25
Payer: MEDICARE

## 2024-06-25 VITALS
BODY MASS INDEX: 26.14 KG/M2 | HEART RATE: 96 BPM | HEIGHT: 72 IN | WEIGHT: 193 LBS | SYSTOLIC BLOOD PRESSURE: 136 MMHG | OXYGEN SATURATION: 94 % | DIASTOLIC BLOOD PRESSURE: 72 MMHG | TEMPERATURE: 97.5 F

## 2024-06-25 DIAGNOSIS — Z87.891 FORMER SMOKER: ICD-10-CM

## 2024-06-25 DIAGNOSIS — J44.9 CHRONIC OBSTRUCTIVE PULMONARY DISEASE, UNSPECIFIED COPD TYPE: Primary | ICD-10-CM

## 2024-06-25 DIAGNOSIS — J44.9 CHRONIC OBSTRUCTIVE PULMONARY DISEASE, UNSPECIFIED COPD TYPE: ICD-10-CM

## 2024-06-25 DIAGNOSIS — R91.8 MULTIPLE PULMONARY NODULES: ICD-10-CM

## 2024-06-25 PROCEDURE — 71046 X-RAY EXAM CHEST 2 VIEWS: CPT | Performed by: RADIOLOGY

## 2024-06-25 PROCEDURE — 71046 X-RAY EXAM CHEST 2 VIEWS: CPT

## 2024-06-25 RX ORDER — ACETYLCYSTEINE 200 MG/ML
4 SOLUTION ORAL; RESPIRATORY (INHALATION) 3 TIMES DAILY
Qty: 120 ML | Refills: 2 | Status: SHIPPED | OUTPATIENT
Start: 2024-06-25

## 2024-06-25 RX ORDER — PREDNISONE 10 MG/1
TABLET ORAL
Qty: 31 TABLET | Refills: 0 | Status: SHIPPED | OUTPATIENT
Start: 2024-06-25

## 2024-06-25 NOTE — PROGRESS NOTES
Subjective      Chief Complaint  COPD, severe    Subjective      History of Present Illness  Maximo Kwon is a 69 y.o. male who presents today to [facility] with past medical history of essential hypertension, chronic diastolic CHF, type II diabetes mellitus, GERD, arthritis, COPD, pulmonary nodules, and former smoker who presents today for COPD, severe. This visit is a follow up appointment.     COPD, severe:  Patient reports that he feels that his shortness of breath has been getting worse over the last month. He was evaluated by his PCP a few weeks ago and received IM injections during his office visit and prescribed oral steroids and antibiotics which he completed about 2 weeks ago. He states that he still doesn't feel that his symptoms have returned back to baseline. He reports that he has shortness of breath with exertion and states that this usually resolves with rest but now it's taking him longer to recover. He reports that he has been coughing and feels that he has a lot of mucous in his chest but isn't able to produce much. He continues to use Breztri 2 puffs twice daily. He has been using his DuoNeb treatments 3-4 times daily. He also has an albuterol inhaler which he uses as needed.       Current Outpatient Medications:     albuterol sulfate  (90 Base) MCG/ACT inhaler, Inhale 2 puffs Every 4 (Four) Hours As Needed for Wheezing or Shortness of Air., Disp: 54 g, Rfl: 6    Alcohol Swabs (DropSafe Alcohol Prep) 70 % pads, , Disp: , Rfl:     aspirin 81 MG EC tablet, Take 1 tablet by mouth Daily., Disp: , Rfl:     benzonatate (Tessalon Perles) 100 MG capsule, Take 1 capsule by mouth 3 (Three) Times a Day As Needed for Cough., Disp: 20 capsule, Rfl: 0    Blood Glucose Monitoring Suppl (Blood Glucose Monitor System) w/Device kit, 1 Device 2 (Two) Times a Day., Disp: 1 each, Rfl: 0    Budeson-Glycopyrrol-Formoterol (Breztri Aerosphere) 160-9-4.8  "MCG/ACT aerosol inhaler, Inhale 2 puffs 2 (Two) Times a Day., Disp: 3 each, Rfl: 6    chlorthalidone (HYGROTON) 25 MG tablet, TAKE 1 TABLET EVERY DAY, Disp: 90 tablet, Rfl: 0    doxycycline (VIBRAMYCIN) 100 MG capsule, Take 1 capsule by mouth 2 (Two) Times a Day., Disp: 14 capsule, Rfl: 0    gabapentin (NEURONTIN) 300 MG capsule, Take 1 capsule by mouth 3 (Three) Times a Day. SEND TO [facility], Disp: , Rfl:     glyburide (DIAbeta) 5 MG tablet, Take 1 tablet by mouth Daily With Breakfast., Disp: , Rfl:     ipratropium-albuterol (DUO-NEB) 0.5-2.5 mg/3 ml nebulizer, Take 3 mL by nebulization 4 (Four) Times a Day As Needed for Wheezing or Shortness of Air., Disp: 360 mL, Rfl: 8    Lancets misc, 1 Device 2 (Two) Times a Day., Disp: 100 each, Rfl: 12    lisinopril (PRINIVIL,ZESTRIL) 20 MG tablet, Take 1 tablet by mouth 2 (Two) Times a Day., Disp: 180 tablet, Rfl: 0    metFORMIN (GLUCOPHAGE) 1000 MG tablet, Take 1 tablet by mouth 2 (Two) Times a Day With Meals., Disp: 180 tablet, Rfl: 1    multivitamin with minerals tablet tablet, Take 1 tablet by mouth Daily., Disp: , Rfl:     omeprazole (priLOSEC) 20 MG capsule, Take 1 capsule by mouth Daily., Disp: 90 capsule, Rfl: 0    roflumilast (DALIRESP) 500 MCG tablet tablet, TAKE 1 TABLET EVERY DAY, Disp: 90 tablet, Rfl: 3    acetylcysteine (MUCOMYST) 20 % nebulizer solution, Take 4 mL by nebulization 3 (Three) Times a Day., Disp: 120 mL, Rfl: 2    predniSONE (DELTASONE) 10 MG tablet, Take 4 tabs daily x 3 days, then take 3 tabs daily x 3 days, then take 2 tabs daily x 3 days, then take 1 tab daily x 3 days, Disp: 31 tablet, Rfl: 0      No Known Allergies    Objective     Objective   Vital Signs:  /72   Pulse 96   Temp 97.5 °F (36.4 °C)   Ht 182.9 cm (72.01\")   Wt 87.5 kg (193 lb)   SpO2 94%   BMI 26.17 kg/m²   Estimated body mass index is 26.17 kg/m² as calculated from the following:    Height as of this encounter: 182.9 cm (72.01\").    Weight as of this encounter: 87.5 " kg (193 lb).          Past Medical History:   Diagnosis Date    Arthritis     Chronic diastolic heart failure 2021    COPD (chronic obstructive pulmonary disease)     Depression     Diverticulitis     Emphysema (subcutaneous) (surgical) resulting from a procedure     GERD (gastroesophageal reflux disease)     Neuropathy     Pneumothorax     Left, status post chest tube    Shortness of breath     Spinal stenosis     Type 2 diabetes mellitus      Past Surgical History:   Procedure Laterality Date    BRONCHOSCOPY N/A 2019    Procedure: Bronchoscopy with Transbronchial Biopsy with Fluoroscopy;  Surgeon: Eladio Bethea MD;  Location: [facility];  Service: Pulmonary    CHEST TUBE INSERTION Left     Pneumothorax    COLON RESECTION  2016    had colostomy and reveresed back     COLONOSCOPY  2016    COLOSTOMY CLOSURE      LUNG BIOPSY Right     (non cancerous)    OTHER SURGICAL HISTORY Left     LEFT ELBOW SURGERY     Social History     Socioeconomic History    Marital status:     Number of children: 0   Tobacco Use    Smoking status: Former     Current packs/day: 0.00     Average packs/day: 1 pack/day for 30.0 years (30.0 ttl pk-yrs)     Types: Cigarettes     Start date:      Quit date:      Years since quittin.4     Passive exposure: Past    Smokeless tobacco: Never   Vaping Use    Vaping status: Never Used   Substance and Sexual Activity    Alcohol use: No    Drug use: No    Sexual activity: Defer     Birth control/protection: Other        Physical Exam  Constitutional:       General: He is awake.      Appearance: Normal appearance.   HENT:      Head: Normocephalic and atraumatic.      Nose: Nose normal.      Mouth/Throat:      Mouth: Mucous membranes are moist.      Pharynx: Oropharynx is clear.   Eyes:      Conjunctiva/sclera: Conjunctivae normal.      Pupils: Pupils are equal, round, and reactive to light.   Cardiovascular:      Rate and Rhythm: Normal rate and regular rhythm.       "Pulses: Normal pulses.      Heart sounds: Normal heart sounds. No murmur heard.     No friction rub. No gallop.   Pulmonary:      Effort: Pulmonary effort is normal. No tachypnea, accessory muscle usage or respiratory distress.      Breath sounds: Decreased breath sounds present. No wheezing, rhonchi or rales.   Musculoskeletal:         General: Normal range of motion.      Cervical back: Full passive range of motion without pain and normal range of motion.   Skin:     General: Skin is warm and dry.   Neurological:      General: No focal deficit present.      Mental Status: He is alert. Mental status is at baseline.   Psychiatric:         Mood and Affect: Mood normal.         Behavior: Behavior normal. Behavior is cooperative.         Thought Content: Thought content normal.          Result Review :  The following labs and radiology results have been reviewed.    Lab Review:   No results found for: \"FEV1\", \"FVC\", \"JIV8PBU\", \"TLC\", \"DLCO\"  No visits with results within 3 Month(s) from this visit.   Latest known visit with results is:   Office Visit on 01/09/2024   Component Date Value Ref Range Status    TSH 01/09/2024 0.808  0.270 - 4.200 uIU/mL Final    Free T4 01/09/2024 1.42  0.93 - 1.70 ng/dL Final    Results may be falsely increased if patient taking Biotin.    Total Cholesterol 01/09/2024 208 (H)  0 - 200 mg/dL Final    Comment: Cholesterol Reference Ranges  (U.S. Department of Health and Human Services ATP III  Classifications)  Desirable          <200 mg/dL  Borderline High    200-239 mg/dL  High Risk          >240 mg/dL  Triglyceride Reference Ranges  (U.S. Department of Health and Human Services ATP III  Classifications)  Normal           <150 mg/dL  Borderline High  150-199 mg/dL  High             200-499 mg/dL  Very High        >500 mg/dL  HDL Reference Ranges  (U.S. Department of Health and Human Services ATP III  Classifications)  Low     <40 mg/dl (major risk factor for CHD)  High    >60 mg/dl " ('negative' risk factor for CHD)  LDL Reference Ranges  (U.S. Department of Health and Human Services ATP III  Classifications)  Optimal          <100 mg/dL  Near Optimal     100-129 mg/dL  Borderline High  130-159 mg/dL  High             160-189 mg/dL  Very High        >189 mg/dL      Triglycerides 01/09/2024 162 (H)  0 - 150 mg/dL Final    HDL Cholesterol 01/09/2024 38 (L)  40 - 60 mg/dL Final    VLDL Cholesterol Pedro 01/09/2024 29  5 - 40 mg/dL Final    LDL Chol Calc (NIH) 01/09/2024 141 (H)  0 - 100 mg/dL Final    Hemoglobin A1C 01/09/2024 6.10 (H)  4.80 - 5.60 % Final    Comment: Hemoglobin A1C Ranges:  Increased Risk for Diabetes  5.7% to 6.4%  Diabetes                     >= 6.5%  Diabetic Goal                < 7.0%      Glucose 01/09/2024 136 (H)  65 - 99 mg/dL Final    BUN 01/09/2024 17  8 - 23 mg/dL Final    Creatinine 01/09/2024 1.11  0.76 - 1.27 mg/dL Final    EGFR Result 01/09/2024 72.3  >60.0 mL/min/1.73 Final    Comment: GFR Normal >60  Chronic Kidney Disease <60  Kidney Failure <15      BUN/Creatinine Ratio 01/09/2024 15.3  7.0 - 25.0 Final    Sodium 01/09/2024 140  136 - 145 mmol/L Final    Potassium 01/09/2024 4.3  3.5 - 5.2 mmol/L Final    Chloride 01/09/2024 99  98 - 107 mmol/L Final    Total CO2 01/09/2024 29.5 (H)  22.0 - 29.0 mmol/L Final    Calcium 01/09/2024 10.0  8.6 - 10.5 mg/dL Final    Total Protein 01/09/2024 6.4  6.0 - 8.5 g/dL Final    Albumin 01/09/2024 4.6  3.5 - 5.2 g/dL Final    Globulin 01/09/2024 1.8  gm/dL Final    A/G Ratio 01/09/2024 2.6  g/dL Final    Total Bilirubin 01/09/2024 0.3  0.0 - 1.2 mg/dL Final    Alkaline Phosphatase 01/09/2024 71  39 - 117 U/L Final    AST (SGOT) 01/09/2024 18  1 - 40 U/L Final    ALT (SGPT) 01/09/2024 25  1 - 41 U/L Final    WBC 01/09/2024 5.89  3.40 - 10.80 10*3/mm3 Final    RBC 01/09/2024 4.72  4.14 - 5.80 10*6/mm3 Final    Hemoglobin 01/09/2024 13.9  13.0 - 17.7 g/dL Final    Hematocrit 01/09/2024 41.6  37.5 - 51.0 % Final    MCV 01/09/2024  88.1  79.0 - 97.0 fL Final    MCH 01/09/2024 29.4  26.6 - 33.0 pg Final    MCHC 01/09/2024 33.4  31.5 - 35.7 g/dL Final    RDW 01/09/2024 14.9  12.3 - 15.4 % Final    Platelets 01/09/2024 228  140 - 450 10*3/mm3 Final    Neutrophil Rel % 01/09/2024 72.2  42.7 - 76.0 % Final    Lymphocyte Rel % 01/09/2024 14.4 (L)  19.6 - 45.3 % Final    Monocyte Rel % 01/09/2024 9.2  5.0 - 12.0 % Final    Eosinophil Rel % 01/09/2024 3.4  0.3 - 6.2 % Final    Basophil Rel % 01/09/2024 0.5  0.0 - 1.5 % Final    Neutrophils Absolute 01/09/2024 4.25  1.70 - 7.00 10*3/mm3 Final    Lymphocytes Absolute 01/09/2024 0.85  0.70 - 3.10 10*3/mm3 Final    Monocytes Absolute 01/09/2024 0.54  0.10 - 0.90 10*3/mm3 Final    Eosinophils Absolute 01/09/2024 0.20  0.00 - 0.40 10*3/mm3 Final    Basophils Absolute 01/09/2024 0.03  0.00 - 0.20 10*3/mm3 Final    Immature Granulocyte Rel % 01/09/2024 0.3  0.0 - 0.5 % Final    Immature Grans Absolute 01/09/2024 0.02  0.00 - 0.05 10*3/mm3 Final    nRBC 01/09/2024 0.0  0.0 - 0.2 /100 WBC Final    Interpretation 01/09/2024 Note   Final    Supplemental report is available.        Imaging Results (Most Recent)       None            Radiology Review:   Imaging Results (Last 72 Hours)       ** No results found for the last 72 hours. **          Reviewed PFT from 08/19/2021      Reviewed LDCT imaging from 04/10/2024  Additional findings:  Stable right apical scarring and minimal stable scarring of left apical lung area.   Image 83: right 3 mm nodule, stable since 2018  Image 88: right 5 mm nodule, stable since 2019  Image 94: 9.1 mm lymph node, stable since 2018  Image 106: 5-6 mm nodule, stable since 2018  Image 120: 8-9 mm nodule, previously 7-8 mm since 2018    Narrative & Impression   EXAM:    CT Chest, Lung Cancer Screening Without Intravenous Contrast     EXAM DATE:    4/10/2024 10:55 AM     CLINICAL HISTORY:    Lung cancer screening, >= 20 pk-yr smoking history (Age >= 50y)     TECHNIQUE:    Axial computed  tomography images of the chest without intravenous  contrast using low dose (LDCT) lung cancer screening protocol.  Sagittal  and coronal reformatted images were created and reviewed.  This CT exam  was performed using one or more of the following dose reduction  techniques:  automated exposure control, adjustment of the mA and/or kV  according to patient size, and/or use of iterative reconstruction  technique.     COMPARISON:    6/16/2023     FINDINGS:    Lungs and pleural spaces:  Advanced upper lung predominant  centrilobular emphysema, stable.  5.8 mm nodule right lower lobe, image  #106, was previously 5.5 mm.  5 mm nodule right lower lobe, image #88,  stable from previous.  8.8 mm pulmonary nodule right lower lobe, image  #120, was previously 9.3 mm indicating no significant change.  No  consolidation.  No pneumothorax.  No significant effusion.  No new  pulmonary parenchymal nodules have developed.    Heart:  Stable coronary artery calcifications.    Bones/joints:  Stable degenerative changes thoracic spine. No acute  bony findings identified.  No dislocation.    Soft tissues:  Unremarkable as visualized.    Vasculature:  See above.    Lymph nodes:  9.1 mm nodule right lung within the minor fissure most  consistent with benign lymph node, image #94, stable from previous.     IMPRESSION:  1.  No new pulmonary parenchymal nodules have developed.  2.  Advanced upper lung predominant centrilobular emphysema, stable.  3.  9.1 mm nodule right lung within the minor fissure most consistent  with benign lymph node, image #94, stable from previous.  4.  5.8 mm nodule right lower lobe, image #106, was previously 5.5 mm.  5.  5 mm nodule right lower lobe, image #88, stable from previous.  6.  8.8 mm pulmonary nodule right lower lobe, image #120, was previously  9.3 mm indicating no significant change.     ASSESSMENT:  Lung-RADS score: 2 - Benign Appearance or Behavior.   Recommend continued annual screening with a  low-dose CT (LDCT) in 12  months.        This report was finalized on 4/10/2024 11:39 AM by Dr. Isaac Salas MD.        Assessment / Plan         Assessment   Diagnoses and all orders for this visit:    1. Chronic obstructive pulmonary disease, unspecified COPD type (Primary)  May consider PFT/spirometry at next follow-up visit once acute symptoms resolve.   Performed 6-MWT during visit with saturations remaining 93-96% on room air with ambulation.  Previously considered not appropriate candidate for lung transplant, may consider in the future.   Continue Breztri 2 puffs twice daily.   Continue albuterol inhaler as needed.   Continue DuoNeb treatments as needed.   Continue Daliresp 500 mcg once daily.   Will order Mucomyst neb treatments TID to help with mucous production.   Will order prednisone 40 mg taper.   Will order chest XR to evaluate for underlying pneumonia.     -     acetylcysteine (MUCOMYST) 20 % nebulizer solution; Take 4 mL by nebulization 3 (Three) Times a Day.  Dispense: 120 mL; Refill: 2  -     predniSONE (DELTASONE) 10 MG tablet; Take 4 tabs daily x 3 days, then take 3 tabs daily x 3 days, then take 2 tabs daily x 3 days, then take 1 tab daily x 3 days  Dispense: 31 tablet; Refill: 0  -     XR Chest 2 View; Future    2. Multiple pulmonary nodules  Previously had attempted biopsy with development of pneumothorax in [place].   Underwent transbronchial biopsy in 2019 due to concerns for malignancy.   Repeat CT chest in 2019 concerning for slight increase of nodules measuring 8.3 and 7.2 mm.   Referred to Dr. Rehman with PET/CT imaging which was nonsignificant.   Follow-up CT imaging 06/2020 and 10/2020 both revealing slight interval change of nodules.   Followed with [facility] in 2021 and follow-up scan revealing stability of nodules.   Recent CT imaging from 04/2024 reviewed and detailed above and nodules appearing stable.   Planning to obtain annual LDCT screening in April  2025.     3. Former smoker  Maximo Kwon  reports that he quit smoking about 14 years ago. His smoking use included cigarettes. He started smoking about 44 years ago. He has a 30 pack-year smoking history. He has been exposed to tobacco smoke. He has never used smokeless tobacco.     There are no discontinued medications.     New Medications Ordered This Visit   Medications    acetylcysteine (MUCOMYST) 20 % nebulizer solution     Sig: Take 4 mL by nebulization 3 (Three) Times a Day.     Dispense:  120 mL     Refill:  2    predniSONE (DELTASONE) 10 MG tablet     Sig: Take 4 tabs daily x 3 days, then take 3 tabs daily x 3 days, then take 2 tabs daily x 3 days, then take 1 tab daily x 3 days     Dispense:  31 tablet     Refill:  0     I spent 30 minutes caring for Maximo on this date of service. This time includes time spent by me in the following activities:preparing for the visit, reviewing tests, obtaining and/or reviewing a separately obtained history, performing a medically appropriate examination and/or evaluation , counseling and educating the patient/family/caregiver, ordering medications, tests, or procedures, referring and communicating with other health care professionals , documenting information in the medical record, and independently interpreting results and communicating that information with the patient/family/caregiver    Follow Up   Return in about 3 months (around 9/30/2024), or if symptoms worsen or fail to improve, for Next scheduled follow up.    Patient was given instructions and counseling regarding his condition or for health maintenance advice. Please see specific information pulled into the AVS if appropriate.       This document has been electronically signed by Marylin Seay PA-C   June 26, 2024 09:54 EDT    Dictated Utilizing Dragon Dictation: Part of this note may be an electronic transcription/translation of spoken language to printed text using the Dragon Dictation System.

## 2024-08-22 DIAGNOSIS — I10 ESSENTIAL HYPERTENSION: ICD-10-CM

## 2024-08-22 RX ORDER — LISINOPRIL 20 MG/1
20 TABLET ORAL 2 TIMES DAILY
Qty: 180 TABLET | Refills: 0 | Status: SHIPPED | OUTPATIENT
Start: 2024-08-22

## 2024-08-31 DIAGNOSIS — K21.9 GASTROESOPHAGEAL REFLUX DISEASE, UNSPECIFIED WHETHER ESOPHAGITIS PRESENT: ICD-10-CM

## 2024-08-31 DIAGNOSIS — I10 ESSENTIAL HYPERTENSION: ICD-10-CM

## 2024-09-03 RX ORDER — CHLORTHALIDONE 25 MG/1
25 TABLET ORAL DAILY
Qty: 90 TABLET | Refills: 0 | Status: SHIPPED | OUTPATIENT
Start: 2024-09-03

## 2024-10-08 ENCOUNTER — PATIENT OUTREACH (OUTPATIENT)
Dept: FAMILY MEDICINE CLINIC | Facility: CLINIC | Age: 69
End: 2024-10-08
Payer: MEDICARE

## 2024-11-03 DIAGNOSIS — E11.42 TYPE 2 DIABETES MELLITUS WITH DIABETIC POLYNEUROPATHY, WITHOUT LONG-TERM CURRENT USE OF INSULIN: ICD-10-CM

## 2024-11-03 DIAGNOSIS — I10 ESSENTIAL HYPERTENSION: ICD-10-CM

## 2024-11-04 DIAGNOSIS — J44.9 CHRONIC OBSTRUCTIVE PULMONARY DISEASE, UNSPECIFIED COPD TYPE: ICD-10-CM

## 2024-11-04 RX ORDER — ACETYLCYSTEINE 200 MG/ML
SOLUTION ORAL; RESPIRATORY (INHALATION)
Qty: 100 ML | Refills: 3 | Status: SHIPPED | OUTPATIENT
Start: 2024-11-04

## 2024-11-04 RX ORDER — LISINOPRIL 20 MG/1
20 TABLET ORAL 2 TIMES DAILY
Qty: 180 TABLET | Refills: 3 | Status: SHIPPED | OUTPATIENT
Start: 2024-11-04

## 2024-11-05 ENCOUNTER — OFFICE VISIT (OUTPATIENT)
Dept: FAMILY MEDICINE CLINIC | Facility: CLINIC | Age: 69
End: 2024-11-05
Payer: MEDICARE

## 2024-11-05 VITALS
DIASTOLIC BLOOD PRESSURE: 70 MMHG | TEMPERATURE: 98.2 F | HEIGHT: 72 IN | BODY MASS INDEX: 26.84 KG/M2 | OXYGEN SATURATION: 96 % | HEART RATE: 100 BPM | SYSTOLIC BLOOD PRESSURE: 160 MMHG | WEIGHT: 198.2 LBS

## 2024-11-05 DIAGNOSIS — J44.9 CHRONIC OBSTRUCTIVE PULMONARY DISEASE, UNSPECIFIED COPD TYPE: ICD-10-CM

## 2024-11-05 DIAGNOSIS — R05.9 COUGH, UNSPECIFIED TYPE: Primary | ICD-10-CM

## 2024-11-05 DIAGNOSIS — J44.1 COPD WITH EXACERBATION: ICD-10-CM

## 2024-11-05 LAB
EXPIRATION DATE: NORMAL
EXPIRATION DATE: NORMAL
FLUAV RNA RESP QL NAA+PROBE: NOT DETECTED
FLUBV RNA RESP QL NAA+PROBE: NOT DETECTED
INTERNAL CONTROL: NORMAL
INTERNAL CONTROL: NORMAL
Lab: NORMAL
Lab: NORMAL
SARS-COV-2 AG UPPER RESP QL IA.RAPID: NOT DETECTED

## 2024-11-05 PROCEDURE — 96372 THER/PROPH/DIAG INJ SC/IM: CPT | Performed by: FAMILY MEDICINE

## 2024-11-05 PROCEDURE — 1125F AMNT PAIN NOTED PAIN PRSNT: CPT | Performed by: FAMILY MEDICINE

## 2024-11-05 PROCEDURE — 99214 OFFICE O/P EST MOD 30 MIN: CPT | Performed by: FAMILY MEDICINE

## 2024-11-05 PROCEDURE — 87502 INFLUENZA DNA AMP PROBE: CPT | Performed by: FAMILY MEDICINE

## 2024-11-05 PROCEDURE — 3044F HG A1C LEVEL LT 7.0%: CPT | Performed by: FAMILY MEDICINE

## 2024-11-05 PROCEDURE — 3078F DIAST BP <80 MM HG: CPT | Performed by: FAMILY MEDICINE

## 2024-11-05 PROCEDURE — 87426 SARSCOV CORONAVIRUS AG IA: CPT | Performed by: FAMILY MEDICINE

## 2024-11-05 PROCEDURE — 3077F SYST BP >= 140 MM HG: CPT | Performed by: FAMILY MEDICINE

## 2024-11-05 RX ORDER — CEFTRIAXONE 1 G/1
1 INJECTION, POWDER, FOR SOLUTION INTRAMUSCULAR; INTRAVENOUS ONCE
Status: COMPLETED | OUTPATIENT
Start: 2024-11-05 | End: 2024-11-05

## 2024-11-05 RX ORDER — PREDNISONE 10 MG/1
TABLET ORAL
Qty: 31 TABLET | Refills: 0 | Status: SHIPPED | OUTPATIENT
Start: 2024-11-05

## 2024-11-05 RX ORDER — DEXAMETHASONE SODIUM PHOSPHATE 4 MG/ML
4 INJECTION, SOLUTION INTRA-ARTICULAR; INTRALESIONAL; INTRAMUSCULAR; INTRAVENOUS; SOFT TISSUE ONCE
Status: COMPLETED | OUTPATIENT
Start: 2024-11-05 | End: 2024-11-05

## 2024-11-05 RX ORDER — DOXYCYCLINE 100 MG/1
100 CAPSULE ORAL 2 TIMES DAILY
Qty: 14 CAPSULE | Refills: 0 | Status: SHIPPED | OUTPATIENT
Start: 2024-11-05

## 2024-11-05 RX ORDER — OXCARBAZEPINE 150 MG/1
150 TABLET, FILM COATED ORAL 2 TIMES DAILY
COMMUNITY

## 2024-11-05 RX ORDER — ACETYLCYSTEINE 200 MG/ML
4 SOLUTION ORAL; RESPIRATORY (INHALATION)
COMMUNITY

## 2024-11-05 RX ADMIN — CEFTRIAXONE 1 G: 1 INJECTION, POWDER, FOR SOLUTION INTRAMUSCULAR; INTRAVENOUS at 11:22

## 2024-11-05 RX ADMIN — DEXAMETHASONE SODIUM PHOSPHATE 4 MG: 4 INJECTION, SOLUTION INTRA-ARTICULAR; INTRALESIONAL; INTRAMUSCULAR; INTRAVENOUS; SOFT TISSUE at 11:23

## 2024-11-15 DIAGNOSIS — I10 ESSENTIAL HYPERTENSION: ICD-10-CM

## 2024-11-15 DIAGNOSIS — J44.9 COPD, SEVERE: ICD-10-CM

## 2024-11-15 DIAGNOSIS — K21.9 GASTROESOPHAGEAL REFLUX DISEASE, UNSPECIFIED WHETHER ESOPHAGITIS PRESENT: ICD-10-CM

## 2024-11-15 RX ORDER — ROFLUMILAST 500 UG/1
500 TABLET ORAL DAILY
Qty: 90 TABLET | Refills: 3 | Status: SHIPPED | OUTPATIENT
Start: 2024-11-15

## 2024-11-15 RX ORDER — CHLORTHALIDONE 25 MG/1
25 TABLET ORAL DAILY
Qty: 90 TABLET | Refills: 0 | Status: SHIPPED | OUTPATIENT
Start: 2024-11-15

## 2024-11-29 ENCOUNTER — APPOINTMENT (OUTPATIENT)
Dept: GENERAL RADIOLOGY | Facility: HOSPITAL | Age: 69
End: 2024-11-29
Payer: MEDICARE

## 2024-11-29 ENCOUNTER — HOSPITAL ENCOUNTER (EMERGENCY)
Facility: HOSPITAL | Age: 69
Discharge: HOME | End: 2024-11-29
Attending: EMERGENCY MEDICINE
Payer: MEDICARE

## 2024-11-29 VITALS
OXYGEN SATURATION: 95 % | HEIGHT: 72 IN | DIASTOLIC BLOOD PRESSURE: 91 MMHG | SYSTOLIC BLOOD PRESSURE: 173 MMHG | TEMPERATURE: 97.7 F | RESPIRATION RATE: 20 BRPM | WEIGHT: 198.19 LBS | HEART RATE: 97 BPM | BODY MASS INDEX: 26.84 KG/M2

## 2024-11-29 DIAGNOSIS — J06.9 UPPER RESPIRATORY TRACT INFECTION, UNSPECIFIED TYPE: Primary | ICD-10-CM

## 2024-11-29 LAB
ALBUMIN SERPL-MCNC: 4.3 G/DL (ref 3.5–5.2)
ALBUMIN/GLOB SERPL: 1.5 G/DL
ALP SERPL-CCNC: 91 U/L (ref 39–117)
ALT SERPL W P-5'-P-CCNC: 18 U/L (ref 1–41)
ANION GAP SERPL CALCULATED.3IONS-SCNC: 15.3 MMOL/L (ref 5–15)
AST SERPL-CCNC: 15 U/L (ref 1–40)
BASOPHILS # BLD AUTO: 0.05 10*3/MM3 (ref 0–0.2)
BASOPHILS NFR BLD AUTO: 0.5 % (ref 0–1.5)
BILIRUB SERPL-MCNC: 0.5 MG/DL (ref 0–1.2)
BUN SERPL-MCNC: 15 MG/DL (ref 8–23)
BUN/CREAT SERPL: 13.4 (ref 7–25)
CALCIUM SPEC-SCNC: 9.2 MG/DL (ref 8.6–10.5)
CHLORIDE SERPL-SCNC: 88 MMOL/L (ref 98–107)
CO2 SERPL-SCNC: 30.7 MMOL/L (ref 22–29)
CREAT SERPL-MCNC: 1.12 MG/DL (ref 0.76–1.27)
DEPRECATED RDW RBC AUTO: 45.9 FL (ref 37–54)
EGFRCR SERPLBLD CKD-EPI 2021: 71.1 ML/MIN/1.73
EOSINOPHIL # BLD AUTO: 0.35 10*3/MM3 (ref 0–0.4)
EOSINOPHIL NFR BLD AUTO: 3.2 % (ref 0.3–6.2)
ERYTHROCYTE [DISTWIDTH] IN BLOOD BY AUTOMATED COUNT: 14.4 % (ref 12.3–15.4)
FLUAV RNA RESP QL NAA+PROBE: NOT DETECTED
FLUBV RNA RESP QL NAA+PROBE: NOT DETECTED
GLOBULIN UR ELPH-MCNC: 2.9 GM/DL
GLUCOSE SERPL-MCNC: 254 MG/DL (ref 65–99)
HCT VFR BLD AUTO: 39.2 % (ref 37.5–51)
HGB BLD-MCNC: 12.8 G/DL (ref 13–17.7)
HOLD SPECIMEN: NORMAL
HOLD SPECIMEN: NORMAL
IMM GRANULOCYTES # BLD AUTO: 0.07 10*3/MM3 (ref 0–0.05)
IMM GRANULOCYTES NFR BLD AUTO: 0.6 % (ref 0–0.5)
LYMPHOCYTES # BLD AUTO: 0.86 10*3/MM3 (ref 0.7–3.1)
LYMPHOCYTES NFR BLD AUTO: 7.9 % (ref 19.6–45.3)
MCH RBC QN AUTO: 29.2 PG (ref 26.6–33)
MCHC RBC AUTO-ENTMCNC: 32.7 G/DL (ref 31.5–35.7)
MCV RBC AUTO: 89.5 FL (ref 79–97)
MONOCYTES # BLD AUTO: 0.92 10*3/MM3 (ref 0.1–0.9)
MONOCYTES NFR BLD AUTO: 8.4 % (ref 5–12)
NEUTROPHILS NFR BLD AUTO: 79.4 % (ref 42.7–76)
NEUTROPHILS NFR BLD AUTO: 8.68 10*3/MM3 (ref 1.7–7)
NRBC BLD AUTO-RTO: 0 /100 WBC (ref 0–0.2)
PLATELET # BLD AUTO: 220 10*3/MM3 (ref 140–450)
PMV BLD AUTO: 10.8 FL (ref 6–12)
POTASSIUM SERPL-SCNC: 3.7 MMOL/L (ref 3.5–5.2)
PROT SERPL-MCNC: 7.2 G/DL (ref 6–8.5)
RBC # BLD AUTO: 4.38 10*6/MM3 (ref 4.14–5.8)
RSV RNA RESP QL NAA+PROBE: NOT DETECTED
S PYO AG THROAT QL: NEGATIVE
SARS-COV-2 RNA RESP QL NAA+PROBE: NOT DETECTED
SODIUM SERPL-SCNC: 134 MMOL/L (ref 136–145)
WBC NRBC COR # BLD AUTO: 10.93 10*3/MM3 (ref 3.4–10.8)
WHOLE BLOOD HOLD COAG: NORMAL
WHOLE BLOOD HOLD SPECIMEN: NORMAL

## 2024-11-29 PROCEDURE — 85025 COMPLETE CBC W/AUTO DIFF WBC: CPT | Performed by: PHYSICIAN ASSISTANT

## 2024-11-29 PROCEDURE — 71046 X-RAY EXAM CHEST 2 VIEWS: CPT

## 2024-11-29 PROCEDURE — 87081 CULTURE SCREEN ONLY: CPT

## 2024-11-29 PROCEDURE — 25010000002 CEFTRIAXONE PER 250 MG

## 2024-11-29 PROCEDURE — 80053 COMPREHEN METABOLIC PANEL: CPT | Performed by: PHYSICIAN ASSISTANT

## 2024-11-29 PROCEDURE — 71046 X-RAY EXAM CHEST 2 VIEWS: CPT | Performed by: RADIOLOGY

## 2024-11-29 PROCEDURE — 36415 COLL VENOUS BLD VENIPUNCTURE: CPT

## 2024-11-29 PROCEDURE — 87880 STREP A ASSAY W/OPTIC: CPT

## 2024-11-29 PROCEDURE — 96372 THER/PROPH/DIAG INJ SC/IM: CPT

## 2024-11-29 PROCEDURE — 87637 SARSCOV2&INF A&B&RSV AMP PRB: CPT | Performed by: PHYSICIAN ASSISTANT

## 2024-11-29 PROCEDURE — 99283 EMERGENCY DEPT VISIT LOW MDM: CPT

## 2024-11-29 PROCEDURE — 25010000002 LIDOCAINE PF 1% 1 % SOLUTION 2 ML VIAL

## 2024-11-29 RX ORDER — AZITHROMYCIN 250 MG/1
TABLET, FILM COATED ORAL
Qty: 6 TABLET | Refills: 0 | Status: SHIPPED | OUTPATIENT
Start: 2024-11-29

## 2024-11-29 RX ORDER — BENZONATATE 100 MG/1
100 CAPSULE ORAL ONCE
Status: COMPLETED | OUTPATIENT
Start: 2024-11-29 | End: 2024-11-29

## 2024-11-29 RX ORDER — BENZONATATE 100 MG/1
100 CAPSULE ORAL 3 TIMES DAILY PRN
Qty: 15 CAPSULE | Refills: 0 | Status: SHIPPED | OUTPATIENT
Start: 2024-11-29 | End: 2024-12-04

## 2024-11-29 RX ADMIN — BENZONATATE 100 MG: 100 CAPSULE ORAL at 18:26

## 2024-11-29 RX ADMIN — LIDOCAINE HYDROCHLORIDE 1 G: 10 INJECTION, SOLUTION EPIDURAL; INFILTRATION; INTRACAUDAL; PERINEURAL at 18:26

## 2024-11-29 NOTE — DISCHARGE INSTRUCTIONS
I have sent you an antibiotic and the medication for your cough.  Please take as prescribed.  If your symptoms acutely worsen please return to the emergency room for evaluation.

## 2024-11-29 NOTE — ED PROVIDER NOTES
Subjective   History of Present Illness  69-year-old male with history of arthritis, COPD, diabetes mellitus, hypertension presenting to the ED due to cough congestion and sore throat.  He states his symptoms started earlier this month.  He was treated at that time with an antibiotic and steroids.  His symptoms improved for a brief period of time but have now returned.  His wife is having similar symptoms at this time.  He does have a history of COPD and he states that these types of infections usually cause a COPD exacerbation.      Review of Systems   Constitutional: Negative.  Negative for fever.   HENT:  Positive for congestion.    Respiratory:  Positive for cough and shortness of breath.    Cardiovascular: Negative.  Negative for chest pain.   Gastrointestinal: Negative.  Negative for abdominal pain.   Endocrine: Negative.    Genitourinary: Negative.  Negative for dysuria.   Skin: Negative.    Neurological: Negative.    Psychiatric/Behavioral: Negative.     All other systems reviewed and are negative.      Past Medical History:   Diagnosis Date    Arthritis     Chronic diastolic heart failure 08/19/2021    COPD (chronic obstructive pulmonary disease)     Depression     Diverticulitis     Emphysema (subcutaneous) (surgical) resulting from a procedure     GERD (gastroesophageal reflux disease)     Neuropathy     Pneumothorax     Left, status post chest tube    Shortness of breath     Spinal stenosis     Type 2 diabetes mellitus        No Known Allergies    Past Surgical History:   Procedure Laterality Date    BRONCHOSCOPY N/A 5/14/2019    Procedure: Bronchoscopy with Transbronchial Biopsy with Fluoroscopy;  Surgeon: Eladio Bethea MD;  Location: [facility];  Service: Pulmonary    CHEST TUBE INSERTION Left     Pneumothorax    COLON RESECTION  2016    had colostomy and reveresed back     COLONOSCOPY  2016    COLOSTOMY CLOSURE      LUNG BIOPSY Right 2014    (non cancerous)    OTHER SURGICAL HISTORY Left     LEFT  ELBOW SURGERY       Family History   Problem Relation Age of Onset    Alzheimer's disease Mother     Cancer Father         THROAT CANCER    Diabetes Sister     Diabetes Brother        Social History     Socioeconomic History    Marital status:     Number of children: 0   Tobacco Use    Smoking status: Former     Current packs/day: 0.00     Average packs/day: 1 pack/day for 30.0 years (30.0 ttl pk-yrs)     Types: Cigarettes     Start date:      Quit date:      Years since quittin.9     Passive exposure: Past    Smokeless tobacco: Never   Vaping Use    Vaping status: Never Used   Substance and Sexual Activity    Alcohol use: No    Drug use: No    Sexual activity: Defer     Birth control/protection: Other           Objective   Physical Exam  Vitals and nursing note reviewed.   Constitutional:       General: He is not in acute distress.     Appearance: He is well-developed. He is not diaphoretic.   HENT:      Head: Normocephalic and atraumatic.      Right Ear: External ear normal.      Left Ear: External ear normal.      Nose: Nose normal. Congestion and rhinorrhea present.      Mouth/Throat:      Pharynx: Posterior oropharyngeal erythema present. No oropharyngeal exudate.   Eyes:      Conjunctiva/sclera: Conjunctivae normal.      Pupils: Pupils are equal, round, and reactive to light.   Neck:      Vascular: No JVD.      Trachea: No tracheal deviation.   Cardiovascular:      Rate and Rhythm: Normal rate and regular rhythm.      Heart sounds: Normal heart sounds. No murmur heard.  Pulmonary:      Effort: Pulmonary effort is normal. No respiratory distress.      Breath sounds: Rhonchi present. No wheezing.   Abdominal:      General: Bowel sounds are normal.      Palpations: Abdomen is soft.      Tenderness: There is no abdominal tenderness.   Musculoskeletal:         General: No deformity. Normal range of motion.      Cervical back: Normal range of motion and neck supple.   Skin:     General: Skin is  warm and dry.      Coloration: Skin is not pale.      Findings: No erythema or rash.   Neurological:      Mental Status: He is alert and oriented to person, place, and time.      Cranial Nerves: No cranial nerve deficit.   Psychiatric:         Behavior: Behavior normal.         Thought Content: Thought content normal.         Procedures           ED Course                                                       Medical Decision Making  Patient has a upper respiratory infection.  Given his COPD and comorbidities we decided that we would start on antibiotic.  We did discuss starting a another dose of steroids however given the extent of the steroids that he just completed earlier in the month I do not believe that the risk is worth the potential benefit.  We had a long discussion at bedside discussing the risk and benefits of initiating antibiotics and steroids today all questions answered patient and I agree with plan of care ED precautions given.    Problems Addressed:  Upper respiratory tract infection, unspecified type: complicated acute illness or injury    Amount and/or Complexity of Data Reviewed  Labs: ordered.  Radiology: ordered.    Risk  Prescription drug management.      XR Chest 2 View    Result Date: 11/29/2024    Stable chest. No acute cardiopulmonary findings.  This report was finalized on 11/29/2024 2:58 PM by Dr. Isaac Salas MD.     Results for orders placed or performed during the hospital encounter of 11/29/24   COVID-19, FLU A/B, RSV PCR 1 HR TAT - Swab, Nasopharynx    Collection Time: 11/29/24  2:55 PM    Specimen: Nasopharynx; Swab   Result Value Ref Range    COVID19 Not Detected Not Detected - Ref. Range    Influenza A PCR Not Detected Not Detected    Influenza B PCR Not Detected Not Detected    RSV, PCR Not Detected Not Detected   Comprehensive Metabolic Panel    Collection Time: 11/29/24  2:55 PM    Specimen: Arm, Right; Blood   Result Value Ref Range    Glucose 254 (H) 65 - 99 mg/dL    BUN  15 8 - 23 mg/dL    Creatinine 1.12 0.76 - 1.27 mg/dL    Sodium 134 (L) 136 - 145 mmol/L    Potassium 3.7 3.5 - 5.2 mmol/L    Chloride 88 (L) 98 - 107 mmol/L    CO2 30.7 (H) 22.0 - 29.0 mmol/L    Calcium 9.2 8.6 - 10.5 mg/dL    Total Protein 7.2 6.0 - 8.5 g/dL    Albumin 4.3 3.5 - 5.2 g/dL    ALT (SGPT) 18 1 - 41 U/L    AST (SGOT) 15 1 - 40 U/L    Alkaline Phosphatase 91 39 - 117 U/L    Total Bilirubin 0.5 0.0 - 1.2 mg/dL    Globulin 2.9 gm/dL    A/G Ratio 1.5 g/dL    BUN/Creatinine Ratio 13.4 7.0 - 25.0    Anion Gap 15.3 (H) 5.0 - 15.0 mmol/L    eGFR 71.1 >60.0 mL/min/1.73   CBC Auto Differential    Collection Time: 11/29/24  2:55 PM    Specimen: Arm, Right; Blood   Result Value Ref Range    WBC 10.93 (H) 3.40 - 10.80 10*3/mm3    RBC 4.38 4.14 - 5.80 10*6/mm3    Hemoglobin 12.8 (L) 13.0 - 17.7 g/dL    Hematocrit 39.2 37.5 - 51.0 %    MCV 89.5 79.0 - 97.0 fL    MCH 29.2 26.6 - 33.0 pg    MCHC 32.7 31.5 - 35.7 g/dL    RDW 14.4 12.3 - 15.4 %    RDW-SD 45.9 37.0 - 54.0 fl    MPV 10.8 6.0 - 12.0 fL    Platelets 220 140 - 450 10*3/mm3    Neutrophil % 79.4 (H) 42.7 - 76.0 %    Lymphocyte % 7.9 (L) 19.6 - 45.3 %    Monocyte % 8.4 5.0 - 12.0 %    Eosinophil % 3.2 0.3 - 6.2 %    Basophil % 0.5 0.0 - 1.5 %    Immature Grans % 0.6 (H) 0.0 - 0.5 %    Neutrophils, Absolute 8.68 (H) 1.70 - 7.00 10*3/mm3    Lymphocytes, Absolute 0.86 0.70 - 3.10 10*3/mm3    Monocytes, Absolute 0.92 (H) 0.10 - 0.90 10*3/mm3    Eosinophils, Absolute 0.35 0.00 - 0.40 10*3/mm3    Basophils, Absolute 0.05 0.00 - 0.20 10*3/mm3    Immature Grans, Absolute 0.07 (H) 0.00 - 0.05 10*3/mm3    nRBC 0.0 0.0 - 0.2 /100 WBC   Green Top (Gel)    Collection Time: 11/29/24  2:55 PM   Result Value Ref Range    Extra Tube Hold for add-ons.    Lavender Top    Collection Time: 11/29/24  2:55 PM   Result Value Ref Range    Extra Tube hold for add-on    Gold Top - SST    Collection Time: 11/29/24  2:55 PM   Result Value Ref Range    Extra Tube Hold for add-ons.    Light  Blue Top    Collection Time: 11/29/24  2:55 PM   Result Value Ref Range    Extra Tube Hold for add-ons.    Rapid Strep A Screen - Swab, Throat    Collection Time: 11/29/24  5:25 PM    Specimen: Throat; Swab   Result Value Ref Range    Strep A Ag Negative Negative       Final diagnoses:   Upper respiratory tract infection, unspecified type       ED Disposition  ED Disposition       ED Disposition   Discharge    Condition   Stable    Comment   --               Meryl Pineda, APRN  [address] [place] [address]  [phone]    Schedule an appointment as soon as possible for a visit in 1 week      [facility] EMERGENCY DEPARTMENT  [address] [facility] [id number] [phone]  Go to   If symptoms worsen         Medication List        New Prescriptions      azithromycin 250 MG tablet  Commonly known as: Zithromax Z-Femi  Take 2 tablets by mouth on day 1, then 1 tablet daily on days 2-5     benzonatate 100 MG capsule  Commonly known as: TESSALON  Take 1 capsule by mouth 3 (Three) Times a Day As Needed for Cough for up to 5 days.               Where to Get Your Medications        These medications were sent to [facility] [id number] - [place] [facility] [address] [facility] [address] [address] [place] [address] - [phone]  - [phone] FX  [address] [facility] [place] [address]      Phone: [phone]   azithromycin 250 MG tablet  benzonatate 100 MG capsule            Ebenezer George, DO  11/29/24 [id number]

## 2024-12-01 LAB — BACTERIA SPEC AEROBE CULT: NORMAL

## 2025-01-13 NOTE — OUTREACH NOTE
Stroke Week 2 Survey    Flowsheet Row Responses   [facility] patient discharged from? [facility]   Does the patient have one of the following disease processes/diagnoses(primary or secondary)? Stroke (TIA)   Week 2 attempt successful? Yes   Call start time 0920   Call end time 0936   Discharge diagnosis Crescendo TIAs, Hx tremors, uncontrolled HTN, DM   Meds reviewed with patient/caregiver? Yes   Is the patient having any side effects they believe may be caused by any medication additions or changes? Yes   Side effects comments  fatigue. Cramps from statins   Is the patient taking all medications as directed (includes completed medication regime)? No   What is preventing the patient from taking all medications as directed? Side effects   Medication comments stopped taking coreg and statins--did not like the side effects   Has the patient kept scheduled appointments due by today? N/A   Comments Has been in contact with [facility] about meds. Has an appt on Friday   Psychosocial issues? No   Does the patient require any assistance with activities of daily living such as eating, bathing, dressing, walking, etc.? No   Does the patient have any residual symptoms from stroke/TIA? Yes   Residual symptoms comments Has some left arm weakness, neuropathy. Lips are numb. Left leg weaker than right.    What is the patient's perception of their health status since discharge? Same  [still fatigue]   Nursing interventions Nurse provided patient education   Is the patient able to teach back FAST for Stroke? Yes   Is the patient/caregiver able to teach back the risk factors for a stroke? History of TIAs, High blood pressure-goal below 120/80, Excessive alcohol intake, Physical inactivity and obesity, Diabetes   Is the patient/caregiver able to teach back the hierarchy of who to call/visit for symptoms/problems? PCP, Specialist, Home health nurse, Urgent Care, ED, 911 Yes   Additional teach back comments Enc him to take his b/p daily.  Thinks b/p is r/t pain.    Week 2 call completed? Yes   Wrap up additional comments Enc pt to take med list to PCP and review. Still having alot of fatigue. Enc him to swim for exercise.           MIAH CHOUDHURY - Registered Nurse   within normal limits

## 2025-02-03 DIAGNOSIS — I10 ESSENTIAL HYPERTENSION: ICD-10-CM

## 2025-02-03 DIAGNOSIS — K21.9 GASTROESOPHAGEAL REFLUX DISEASE, UNSPECIFIED WHETHER ESOPHAGITIS PRESENT: ICD-10-CM

## 2025-02-03 RX ORDER — CHLORTHALIDONE 25 MG/1
25 TABLET ORAL DAILY
Qty: 90 TABLET | Refills: 3 | Status: SHIPPED | OUTPATIENT
Start: 2025-02-03

## 2025-03-12 ENCOUNTER — OFFICE VISIT (OUTPATIENT)
Dept: FAMILY MEDICINE CLINIC | Facility: CLINIC | Age: 70
End: 2025-03-12
Payer: MEDICARE

## 2025-03-12 VITALS
WEIGHT: 197.6 LBS | DIASTOLIC BLOOD PRESSURE: 68 MMHG | TEMPERATURE: 97.7 F | HEIGHT: 72 IN | HEART RATE: 111 BPM | OXYGEN SATURATION: 96 % | SYSTOLIC BLOOD PRESSURE: 118 MMHG | BODY MASS INDEX: 26.76 KG/M2

## 2025-03-12 DIAGNOSIS — K21.9 GASTROESOPHAGEAL REFLUX DISEASE, UNSPECIFIED WHETHER ESOPHAGITIS PRESENT: ICD-10-CM

## 2025-03-12 DIAGNOSIS — J44.9 COPD WITHOUT EXACERBATION: ICD-10-CM

## 2025-03-12 DIAGNOSIS — I10 ESSENTIAL HYPERTENSION: ICD-10-CM

## 2025-03-12 DIAGNOSIS — E78.5 DYSLIPIDEMIA: ICD-10-CM

## 2025-03-12 DIAGNOSIS — E11.42 TYPE 2 DIABETES MELLITUS WITH DIABETIC POLYNEUROPATHY, WITHOUT LONG-TERM CURRENT USE OF INSULIN: Primary | ICD-10-CM

## 2025-03-12 RX ORDER — BUDESONIDE, GLYCOPYRROLATE, AND FORMOTEROL FUMARATE 160; 9; 4.8 UG/1; UG/1; UG/1
2 AEROSOL, METERED RESPIRATORY (INHALATION) 2 TIMES DAILY
Qty: 3 EACH | Refills: 6 | Status: SHIPPED | OUTPATIENT
Start: 2025-03-12

## 2025-03-12 RX ORDER — TIZANIDINE HYDROCHLORIDE 4 MG/1
4 CAPSULE, GELATIN COATED ORAL 2 TIMES DAILY PRN
COMMUNITY

## 2025-03-12 RX ORDER — HYDROCHLOROTHIAZIDE 25 MG/1
25 TABLET ORAL DAILY
COMMUNITY

## 2025-03-12 RX ORDER — CHLORTHALIDONE 25 MG/1
25 TABLET ORAL DAILY
Qty: 90 TABLET | Refills: 1 | Status: SHIPPED | OUTPATIENT
Start: 2025-03-12

## 2025-03-12 RX ORDER — LISINOPRIL 20 MG/1
20 TABLET ORAL 2 TIMES DAILY
Qty: 180 TABLET | Refills: 1 | Status: SHIPPED | OUTPATIENT
Start: 2025-03-12

## 2025-03-12 RX ORDER — GABAPENTIN 400 MG/1
400 CAPSULE ORAL 4 TIMES DAILY
COMMUNITY
Start: 2025-03-12

## 2025-03-12 RX ORDER — OMEPRAZOLE 20 MG/1
20 CAPSULE, DELAYED RELEASE ORAL DAILY
Qty: 90 CAPSULE | Refills: 1 | Status: SHIPPED | OUTPATIENT
Start: 2025-03-12

## 2025-03-12 NOTE — PROGRESS NOTES
Chief Complaint  Peripheral Neuropathy    Subjective          Maximo Kwon presents to [facility]  Peripheral Neuropathy    History of Present Illness  The patient is a 69-year-old male who presents for a medication follow-up.    He has been managing his neuropathy with gabapentin, administered at a dosage of 400 mg four times daily. Despite this regimen, he occasionally requires an additional dose. He is seeking alternative treatment options, including physical therapy and dietary modifications. He has expressed interest in obtaining a foot brace due to a flopping sensation in his foot during ambulation. A nerve study test was recommended, but he declined due to financial constraints. He has not consulted with a neurologist, but rather with nursing staff. He has conducted personal research and believes that gabapentin is the least harmful long-term medication for his condition. He experiences difficulty kneeling and is considering the use of a -type stool on rollers for mobility. He has a follow-up appointment scheduled for July 2024, but is uncertain about his attendance. He has been prescribed another medication in conjunction with gabapentin, but discontinued its use due to perceived ineffectiveness. His current medication schedule includes doses in the morning, at 8:00 PM, 10:00 PM, and at bedtime. He also utilizes an electric blanket for leg comfort. He has been managing his neuropathy with gabapentin, administered at a dosage of 400 mg four times daily. He also takes tizanidine 4 mg, which provides minimal relief but does not completely alleviate the pain.    He has been on a long-term regimen of metformin, taking 2000 mg daily, but has recently begun to experience gastrointestinal issues. He suspects that prolonged use of the medication may be contributing to these symptoms. He has considered reintroducing yogurt into his diet, but is concerned about its  "cholesterol content. He has ceased self-monitoring his blood glucose levels as they have consistently been within normal limits. He consumed a meal at 9:00 AM today and has not eaten since. He anticipates a slight increase in his A1c levels due to dietary indulgences during the winter season. He has been on a long-term regimen of metformin, taking 2000 mg daily.    MEDICATIONS  gabapentin, metformin, tizanidine    Review of Systems      Objective   Vital Signs:   /68 (BP Location: Right arm, Patient Position: Sitting, Cuff Size: Adult)   Pulse 111   Temp 97.7 °F (36.5 °C) (Temporal)   Ht 182.9 cm (72\")   Wt 89.6 kg (197 lb 9.6 oz)   SpO2 96%   BMI 26.80 kg/m²     Physical Exam      Physical Exam  Constitutional:       General: He is not in acute distress.     Appearance: Normal appearance. He is well-developed and well-groomed. He is not ill-appearing, toxic-appearing or diaphoretic.   HENT:      Head: Normocephalic.      Nose: Nose normal. No congestion or rhinorrhea.      Mouth/Throat:      Mouth: Mucous membranes are moist.      Pharynx: Oropharynx is clear. No oropharyngeal exudate or posterior oropharyngeal erythema.   Eyes:      General: Lids are normal.         Right eye: No discharge.         Left eye: No discharge.      Extraocular Movements: Extraocular movements intact.      Pupils: Pupils are equal, round, and reactive to light.   Neck:      Vascular: No carotid bruit.   Cardiovascular:      Rate and Rhythm: Normal rate and regular rhythm.      Pulses: Normal pulses.      Heart sounds: Normal heart sounds. No murmur heard.     No friction rub. No gallop.   Pulmonary:      Effort: Pulmonary effort is normal. No respiratory distress.      Breath sounds: Normal breath sounds. No stridor. No wheezing, rhonchi or rales.   Chest:      Chest wall: No tenderness.   Abdominal:      General: Bowel sounds are normal. There is no distension.      Palpations: Abdomen is soft. There is no mass.      " Tenderness: There is no abdominal tenderness. There is no right CVA tenderness, left CVA tenderness, guarding or rebound.      Hernia: No hernia is present.   Musculoskeletal:         General: No swelling or tenderness. Normal range of motion.      Cervical back: Normal range of motion and neck supple. No rigidity or tenderness.      Right lower leg: No edema.      Left lower leg: No edema.   Lymphadenopathy:      Cervical: No cervical adenopathy.   Skin:     General: Skin is warm.      Capillary Refill: Capillary refill takes less than 2 seconds.      Coloration: Skin is not jaundiced.      Findings: No bruising, erythema or rash.   Neurological:      General: No focal deficit present.      Mental Status: He is alert and oriented to person, place, and time.      Motor: Motor function is intact. No weakness.      Coordination: Coordination is intact.      Gait: Gait is intact. Gait normal.   Psychiatric:         Attention and Perception: Attention normal.         Mood and Affect: Mood normal.         Speech: Speech normal.         Behavior: Behavior normal.         Cognition and Memory: Cognition normal.         Judgment: Judgment normal.        Result Review :          Results                Assessment and Plan    Diagnoses and all orders for this visit:    1. Type 2 diabetes mellitus with diabetic polyneuropathy, without long-term current use of insulin (Primary)  -     CBC Auto Differential; Future  -     Comprehensive Metabolic Panel; Future  -     Hemoglobin A1c; Future  -     T4, Free; Future  -     TSH; Future  -     metFORMIN (GLUCOPHAGE) 1000 MG tablet; Take 1 tablet by mouth 2 (Two) Times a Day With Meals.  Dispense: 180 tablet; Refill: 1  -     Microalbumin / Creatinine Urine Ratio - Urine, Clean Catch; Future    2. Dyslipidemia  -     Lipid Panel; Future    3. COPD without exacerbation  -     Budeson-Glycopyrrol-Formoterol (B[employer]tr[employer]) 160-9-4.8 MCG/ACT aerosol inhaler; Inhale 2 puffs 2 (Two)  Times a Day.  Dispense: 3 each; Refill: 6    4. Essential hypertension  -     chlorthalidone (HYGROTON) 25 MG tablet; Take 1 tablet by mouth Daily.  Dispense: 90 tablet; Refill: 1  -     lisinopril (PRINIVIL,ZESTRIL) 20 MG tablet; Take 1 tablet by mouth 2 (Two) Times a Day.  Dispense: 180 tablet; Refill: 1    5. Gastroesophageal reflux disease, unspecified whether esophagitis present  -     omeprazole (priLOSEC) 20 MG capsule; Take 1 capsule by mouth Daily.  Dispense: 90 capsule; Refill: 1      Assessment & Plan  1. Neuropathy.  He has been taking gabapentin 400 mg four times a day but reports inadequate pain relief. He is advised to consider physical therapy as a potential adjunct treatment. A prescription for gabapentin 800 mg, to be taken three times daily, has been issued. He is also advised to continue using his electric blanket for symptom relief. If symptoms persist, a referral to a neurologist or pain management specialist may be considered.    2. Diabetes Mellitus.  He has been on metformin 2000 mg daily for a long time but reports recent gastrointestinal issues. He is advised to monitor his diet, particularly reducing sugar intake, and consider adding yogurt to his diet to help with GI symptoms. Laboratory tests will be conducted today to assess his current status, including A1c levels. If GI symptoms persist, a review of his medication regimen will be considered.    Patient's Body mass index is 26.8 kg/m². indicating that he is overweight (BMI 25-29.9). Patient's (Body mass index is 26.8 kg/m².) indicates that they are overweight with health conditions that include hypertension, diabetes mellitus, and dyslipidemias . Weight is unchanged. BMI is above average; BMI management plan is completed. We discussed low calorie, low carb based diet program, portion control, and increasing exercise. .    Follow Up   Return in about 3 months (around 6/12/2025).  Patient was given instructions and counseling regarding  his condition or for health maintenance advice. Please see specific information pulled into the AVS if appropriate.     This document has been electronically signed by OSITO Juares  March 12, 2025 16:35 EDT     Patient or patient representative verbalized consent for the use of Ambient Listening during the visit with  OSITO Juares for chart documentation. 3/12/2025  16:36 EDT

## 2025-03-18 ENCOUNTER — LAB (OUTPATIENT)
Dept: FAMILY MEDICINE CLINIC | Facility: CLINIC | Age: 70
End: 2025-03-18
Payer: MEDICARE

## 2025-03-18 DIAGNOSIS — E78.5 DYSLIPIDEMIA: ICD-10-CM

## 2025-03-18 DIAGNOSIS — I10 ESSENTIAL HYPERTENSION: ICD-10-CM

## 2025-03-18 DIAGNOSIS — E11.42 TYPE 2 DIABETES MELLITUS WITH DIABETIC POLYNEUROPATHY, WITHOUT LONG-TERM CURRENT USE OF INSULIN: Primary | ICD-10-CM

## 2025-03-18 LAB
ALBUMIN SERPL-MCNC: 4.6 G/DL (ref 3.5–5.2)
ALBUMIN/GLOB SERPL: 2.4 G/DL
ALP SERPL-CCNC: 76 U/L (ref 39–117)
ALT SERPL-CCNC: 20 U/L (ref 1–41)
AST SERPL-CCNC: 15 U/L (ref 1–40)
BILIRUB SERPL-MCNC: 0.3 MG/DL (ref 0–1.2)
BUN SERPL-MCNC: 12 MG/DL (ref 8–23)
BUN/CREAT SERPL: 11.7 (ref 7–25)
CALCIUM SERPL-MCNC: 9.8 MG/DL (ref 8.6–10.5)
CHLORIDE SERPL-SCNC: 95 MMOL/L (ref 98–107)
CO2 SERPL-SCNC: 29.5 MMOL/L (ref 22–29)
CREAT SERPL-MCNC: 1.03 MG/DL (ref 0.76–1.27)
EGFRCR SERPLBLD CKD-EPI 2021: 78.6 ML/MIN/1.73
GLOBULIN SER CALC-MCNC: 1.9 GM/DL
GLUCOSE SERPL-MCNC: 155 MG/DL (ref 65–99)
POTASSIUM SERPL-SCNC: 4.9 MMOL/L (ref 3.5–5.2)
PROT SERPL-MCNC: 6.5 G/DL (ref 6–8.5)
SODIUM SERPL-SCNC: 137 MMOL/L (ref 136–145)

## 2025-03-19 LAB
BASOPHILS # BLD AUTO: 0.03 10*3/MM3 (ref 0–0.2)
BASOPHILS NFR BLD AUTO: 0.5 % (ref 0–1.5)
CHOLEST SERPL-MCNC: 200 MG/DL (ref 0–200)
EOSINOPHIL # BLD AUTO: 0.21 10*3/MM3 (ref 0–0.4)
EOSINOPHIL NFR BLD AUTO: 3.3 % (ref 0.3–6.2)
ERYTHROCYTE [DISTWIDTH] IN BLOOD BY AUTOMATED COUNT: 13.1 % (ref 12.3–15.4)
HBA1C MFR BLD: 7.1 % (ref 4.8–5.6)
HCT VFR BLD AUTO: 44.4 % (ref 37.5–51)
HDLC SERPL-MCNC: 37 MG/DL (ref 40–60)
HGB BLD-MCNC: 15.2 G/DL (ref 13–17.7)
IMM GRANULOCYTES # BLD AUTO: 0.02 10*3/MM3 (ref 0–0.05)
IMM GRANULOCYTES NFR BLD AUTO: 0.3 % (ref 0–0.5)
IMP & REVIEW OF LAB RESULTS: NORMAL
LDLC SERPL CALC-MCNC: 122 MG/DL (ref 0–100)
LYMPHOCYTES # BLD AUTO: 1.11 10*3/MM3 (ref 0.7–3.1)
LYMPHOCYTES NFR BLD AUTO: 17.3 % (ref 19.6–45.3)
MCH RBC QN AUTO: 30.4 PG (ref 26.6–33)
MCHC RBC AUTO-ENTMCNC: 34.2 G/DL (ref 31.5–35.7)
MCV RBC AUTO: 88.8 FL (ref 79–97)
MONOCYTES # BLD AUTO: 0.59 10*3/MM3 (ref 0.1–0.9)
MONOCYTES NFR BLD AUTO: 9.2 % (ref 5–12)
NEUTROPHILS # BLD AUTO: 4.44 10*3/MM3 (ref 1.7–7)
NEUTROPHILS NFR BLD AUTO: 69.4 % (ref 42.7–76)
NRBC BLD AUTO-RTO: 0 /100 WBC (ref 0–0.2)
PLATELET # BLD AUTO: 234 10*3/MM3 (ref 140–450)
RBC # BLD AUTO: 5 10*6/MM3 (ref 4.14–5.8)
T4 FREE SERPL-MCNC: 1.44 NG/DL (ref 0.92–1.68)
TRIGL SERPL-MCNC: 234 MG/DL (ref 0–150)
TSH SERPL DL<=0.005 MIU/L-ACNC: 0.76 UIU/ML (ref 0.27–4.2)
VLDLC SERPL CALC-MCNC: 41 MG/DL (ref 5–40)
WBC # BLD AUTO: 6.4 10*3/MM3 (ref 3.4–10.8)

## 2025-03-20 ENCOUNTER — RESULTS FOLLOW-UP (OUTPATIENT)
Dept: FAMILY MEDICINE CLINIC | Facility: CLINIC | Age: 70
End: 2025-03-20
Payer: MEDICARE

## 2025-03-20 LAB
ALBUMIN/CREAT UR: 10 MG/G CREAT (ref 0–29)
CREAT UR-MCNC: 74.5 MG/DL
MICROALBUMIN UR-MCNC: 7.1 UG/ML

## 2025-03-26 ENCOUNTER — TELEPHONE (OUTPATIENT)
Dept: FAMILY MEDICINE CLINIC | Facility: CLINIC | Age: 70
End: 2025-03-26
Payer: MEDICARE

## 2025-03-26 DIAGNOSIS — M51.360 DEGENERATION OF INTERVERTEBRAL DISC OF LUMBAR REGION WITH DISCOGENIC BACK PAIN: ICD-10-CM

## 2025-03-26 DIAGNOSIS — G62.9 NEUROPATHY: Primary | ICD-10-CM

## 2025-03-26 RX ORDER — GABAPENTIN 600 MG/1
600 TABLET ORAL 3 TIMES DAILY
Qty: 90 TABLET | Refills: 0 | Status: SHIPPED | OUTPATIENT
Start: 2025-03-26

## 2025-04-14 ENCOUNTER — RESULTS FOLLOW-UP (OUTPATIENT)
Dept: FAMILY MEDICINE CLINIC | Facility: CLINIC | Age: 70
End: 2025-04-14

## 2025-04-14 ENCOUNTER — OFFICE VISIT (OUTPATIENT)
Dept: FAMILY MEDICINE CLINIC | Facility: CLINIC | Age: 70
End: 2025-04-14
Payer: MEDICARE

## 2025-04-14 VITALS
HEART RATE: 101 BPM | TEMPERATURE: 97.7 F | HEIGHT: 72 IN | BODY MASS INDEX: 26.82 KG/M2 | DIASTOLIC BLOOD PRESSURE: 78 MMHG | WEIGHT: 198 LBS | OXYGEN SATURATION: 96 % | SYSTOLIC BLOOD PRESSURE: 138 MMHG

## 2025-04-14 DIAGNOSIS — E11.43 TYPE 2 DIABETES MELLITUS WITH DIABETIC AUTONOMIC NEUROPATHY, WITHOUT LONG-TERM CURRENT USE OF INSULIN: ICD-10-CM

## 2025-04-14 DIAGNOSIS — K59.1 FUNCTIONAL DIARRHEA: ICD-10-CM

## 2025-04-14 DIAGNOSIS — R10.84 GENERALIZED ABDOMINAL PAIN: Primary | ICD-10-CM

## 2025-04-14 DIAGNOSIS — E11.42 TYPE 2 DIABETES MELLITUS WITH DIABETIC POLYNEUROPATHY, WITHOUT LONG-TERM CURRENT USE OF INSULIN: ICD-10-CM

## 2025-04-14 RX ORDER — TIZANIDINE HYDROCHLORIDE 4 MG/1
4 CAPSULE, GELATIN COATED ORAL 2 TIMES DAILY PRN
Qty: 30 CAPSULE | Refills: 2 | Status: SHIPPED | OUTPATIENT
Start: 2025-04-14 | End: 2025-04-21 | Stop reason: SDUPTHER

## 2025-04-14 RX ORDER — ONDANSETRON 4 MG/1
4 TABLET, ORALLY DISINTEGRATING ORAL EVERY 8 HOURS PRN
Qty: 30 TABLET | Refills: 1 | Status: SHIPPED | OUTPATIENT
Start: 2025-04-14

## 2025-04-14 NOTE — PROGRESS NOTES
"Chief Complaint  Nausea and Diarrhea (Diarrhea for 2 weeks)    Subjective          Maximo Kwon presents to [facility]  Nausea  Symptoms include nausea.    Diarrhea       History of Present Illness  The patient is a 70-year-old male who presents with a complaint of nausea.    He reports experiencing loose stools, which have been progressively worsening over the past few weeks. He initially attributed this to dietary factors and attempted to manage the condition by halving his metformin dosage, as he was informed that long-term use could potentially lead to gastrointestinal issues. However, this intervention did not yield any improvement. He also tried using Imodium, but it was ineffective. He experiences intermittent episodes of nausea, but these are not severe. He suspects a possible infection. He does not report any recent changes in his eating habits or dining out prior to the onset of symptoms. He is uncertain about a previous diagnosis of colitis. He reports lower abdominal pain but no vomiting. He does not experience any tenderness or significant pain. He still has his gallbladder intact. He has not been using Zofran or Imodium for symptom management.    Supplemental Information  He has run out of trazodone, which was not prescribed for him anyway. He wakes up in pain, which disrupts his sleep, although he gets about 7 hours of sleep. He sometimes wakes up a couple of times during the night. He has been taking gabapentin for his legs, which has been helpful.    MEDICATIONS  Current: Metformin, gabapentin, tizanidine.  Past: Trazodone.    Review of Systems   Gastrointestinal:  Positive for diarrhea and nausea.         Objective   Vital Signs:   /78 (BP Location: Right arm, Patient Position: Sitting)   Pulse 101   Temp 97.7 °F (36.5 °C) (Temporal)   Ht 182.9 cm (72\")   Wt 89.8 kg (198 lb)   SpO2 96%   BMI 26.85 kg/m²     Physical Exam      Physical " Exam  Constitutional:       General: He is not in acute distress.     Appearance: Normal appearance. He is well-developed and well-groomed. He is not ill-appearing, toxic-appearing or diaphoretic.   HENT:      Head: Normocephalic.      Nose: Nose normal. No congestion or rhinorrhea.      Mouth/Throat:      Mouth: Mucous membranes are moist.      Pharynx: Oropharynx is clear. No oropharyngeal exudate or posterior oropharyngeal erythema.   Eyes:      General: Lids are normal.         Right eye: No discharge.         Left eye: No discharge.      Extraocular Movements: Extraocular movements intact.      Pupils: Pupils are equal, round, and reactive to light.   Neck:      Vascular: No carotid bruit.   Cardiovascular:      Rate and Rhythm: Normal rate and regular rhythm.      Pulses: Normal pulses.      Heart sounds: Normal heart sounds. No murmur heard.     No friction rub. No gallop.   Pulmonary:      Effort: Pulmonary effort is normal. No respiratory distress.      Breath sounds: Normal breath sounds. No stridor. No wheezing, rhonchi or rales.   Chest:      Chest wall: No tenderness.   Abdominal:      General: Bowel sounds are normal. There is no distension.      Palpations: Abdomen is soft. There is no mass.      Tenderness: There is no abdominal tenderness. There is no right CVA tenderness, left CVA tenderness, guarding or rebound.      Hernia: No hernia is present.   Musculoskeletal:         General: No swelling or tenderness. Normal range of motion.      Cervical back: Normal range of motion and neck supple. No rigidity or tenderness.      Right lower leg: No edema.      Left lower leg: No edema.   Lymphadenopathy:      Cervical: No cervical adenopathy.   Skin:     General: Skin is warm.      Capillary Refill: Capillary refill takes less than 2 seconds.      Coloration: Skin is not jaundiced.      Findings: No bruising, erythema or rash.   Neurological:      General: No focal deficit present.      Mental Status: He  is alert and oriented to person, place, and time.      Motor: Motor function is intact. No weakness.      Coordination: Coordination is intact.      Gait: Gait is intact. Gait normal.   Psychiatric:         Attention and Perception: Attention normal.         Mood and Affect: Mood normal.         Speech: Speech normal.         Behavior: Behavior normal.         Cognition and Memory: Cognition normal.         Judgment: Judgment normal.        Result Review :          Results                Assessment and Plan    Diagnoses and all orders for this visit:    1. Generalized abdominal pain (Primary)  -     XR Abdomen KUB (In Office)    2. Functional diarrhea  -     Gastrointestinal Panel, PCR - Stool, Per Rectum; Future    3. Type 2 diabetes mellitus with diabetic polyneuropathy, without long-term current use of insulin  -     Gastrointestinal Panel, PCR - Stool, Per Rectum; Future    4. Type 2 diabetes mellitus with diabetic autonomic neuropathy, without long-term current use of insulin  -     Gastrointestinal Panel, PCR - Stool, Per Rectum; Future    Other orders  -     TiZANidine (ZANAFLEX) 4 MG capsule; Take 1 capsule by mouth 2 (Two) Times a Day As Needed for Muscle Spasms.  Dispense: 30 capsule; Refill: 2  -     ondansetron ODT (ZOFRAN-ODT) 4 MG disintegrating tablet; Place 1 tablet on the tongue Every 8 (Eight) Hours As Needed for Nausea or Vomiting.  Dispense: 30 tablet; Refill: 1      Assessment & Plan  1. Nausea.  An abdominal x-ray will be conducted to further investigate the cause of the nausea. A stool culture will also be performed to rule out any potential infections. A prescription for Zofran has been issued to manage the nausea. If the results of the x-ray and stool culture are normal, a course of antibiotics may be considered to treat potential colitis.    2. Diarrhea.  The patient reports loose stools for a couple of weeks, which have not improved with Imodium. The abdominal x-ray and stool culture will  help determine the cause. If the results are normal, colitis will be considered, and antibiotics may be prescribed.    3. Medication management.  A refill for tizanidine has been provided.    Patient's Body mass index is 26.85 kg/m². indicating that he is overweight (BMI 25-29.9). Patient's (Body mass index is 26.85 kg/m².) indicates that they are overweight with health conditions that include hypertension, diabetes mellitus, dyslipidemias, and GERD . Weight is unchanged. BMI is above average; BMI management plan is completed. We discussed low calorie, low carb based diet program, portion control, and increasing exercise. .    Follow Up   Return in about 1 week (around 4/21/2025), or xray today.  Patient was given instructions and counseling regarding his condition or for health maintenance advice. Please see specific information pulled into the AVS if appropriate.     This document has been electronically signed by OSITO Juares  April 14, 2025 15:35 EDT     Patient or patient representative verbalized consent for the use of Ambient Listening during the visit with  OSITO Juares for chart documentation. 4/14/2025  15:48 EDT

## 2025-04-15 ENCOUNTER — LAB (OUTPATIENT)
Dept: LAB | Facility: HOSPITAL | Age: 70
End: 2025-04-15
Payer: MEDICARE

## 2025-04-15 DIAGNOSIS — K59.1 FUNCTIONAL DIARRHEA: ICD-10-CM

## 2025-04-15 DIAGNOSIS — E11.42 TYPE 2 DIABETES MELLITUS WITH DIABETIC POLYNEUROPATHY, WITHOUT LONG-TERM CURRENT USE OF INSULIN: ICD-10-CM

## 2025-04-15 DIAGNOSIS — E11.43 TYPE 2 DIABETES MELLITUS WITH DIABETIC AUTONOMIC NEUROPATHY, WITHOUT LONG-TERM CURRENT USE OF INSULIN: ICD-10-CM

## 2025-04-15 PROCEDURE — 87507 IADNA-DNA/RNA PROBE TQ 12-25: CPT

## 2025-04-17 ENCOUNTER — TELEPHONE (OUTPATIENT)
Dept: FAMILY MEDICINE CLINIC | Facility: CLINIC | Age: 70
End: 2025-04-17

## 2025-04-17 LAB

## 2025-04-17 NOTE — TELEPHONE ENCOUNTER
Caller: Maximo Kwon    Relationship: Self    Best call back number: [phone]     Caller requesting test results: PATIENT    What test was performed: STOOL SPECIMEN    When was the test performed: 4/15/25    Where was the test performed: [facility]    Additional notes: PATIENT NO BETTER.

## 2025-04-17 NOTE — TELEPHONE ENCOUNTER
Spoke with patient & informed him stool panel is still pending,he reports he's no better,No worse.

## 2025-04-21 ENCOUNTER — OFFICE VISIT (OUTPATIENT)
Dept: FAMILY MEDICINE CLINIC | Facility: CLINIC | Age: 70
End: 2025-04-21
Payer: MEDICARE

## 2025-04-21 VITALS
OXYGEN SATURATION: 96 % | BODY MASS INDEX: 26.68 KG/M2 | DIASTOLIC BLOOD PRESSURE: 82 MMHG | WEIGHT: 197 LBS | HEART RATE: 101 BPM | TEMPERATURE: 97.8 F | HEIGHT: 72 IN | SYSTOLIC BLOOD PRESSURE: 138 MMHG

## 2025-04-21 DIAGNOSIS — G62.9 NEUROPATHY: ICD-10-CM

## 2025-04-21 DIAGNOSIS — M51.360 DEGENERATION OF INTERVERTEBRAL DISC OF LUMBAR REGION WITH DISCOGENIC BACK PAIN: ICD-10-CM

## 2025-04-21 RX ORDER — GABAPENTIN 600 MG/1
600 TABLET ORAL 3 TIMES DAILY
Qty: 90 TABLET | Refills: 0 | Status: SHIPPED | OUTPATIENT
Start: 2025-04-21

## 2025-04-21 RX ORDER — AZITHROMYCIN 250 MG/1
TABLET, FILM COATED ORAL
Qty: 6 TABLET | Refills: 0 | Status: SHIPPED | OUTPATIENT
Start: 2025-04-21 | End: 2025-04-21

## 2025-04-21 RX ORDER — CYCLOBENZAPRINE HCL 10 MG
10 TABLET ORAL 2 TIMES DAILY PRN
Qty: 60 TABLET | Refills: 2 | Status: SHIPPED | OUTPATIENT
Start: 2025-04-21

## 2025-04-21 RX ORDER — TIZANIDINE HYDROCHLORIDE 4 MG/1
4 CAPSULE, GELATIN COATED ORAL 2 TIMES DAILY PRN
Qty: 30 CAPSULE | Refills: 2 | Status: SHIPPED | OUTPATIENT
Start: 2025-04-21 | End: 2025-04-21

## 2025-04-21 RX ORDER — AZITHROMYCIN 250 MG/1
TABLET, FILM COATED ORAL
Qty: 6 TABLET | Refills: 0 | Status: SHIPPED | OUTPATIENT
Start: 2025-04-21

## 2025-04-21 RX ORDER — SACCHAROMYCES BOULARDII 250 MG
250 CAPSULE ORAL 2 TIMES DAILY
Qty: 60 CAPSULE | Refills: 0 | Status: SHIPPED | OUTPATIENT
Start: 2025-04-21

## 2025-04-21 NOTE — PROGRESS NOTES
Subjective   The ABCs of the Annual Wellness Visit  Medicare Wellness Visit      Maximo Kwon is a 70 y.o. patient who presents for a Medicare Wellness Visit.    The following portions of the patient's history were reviewed and   updated as appropriate: allergies, current medications, past family history, past medical history, past social history, past surgical history, and problem list.    Compared to one year ago, the patient's physical   health is the same.  Compared to one year ago, the patient's mental   health is the same.    Recent Hospitalizations:  He was not admitted to the hospital during the last year.     Current Medical Providers:  Patient Care Team:  Meryl Pineda APRN as PCP - General (Nurse Practitioner)    Outpatient Medications Prior to Visit   Medication Sig Dispense Refill    acetylcysteine (MUCOMYST) 20 % nebulizer solution INHALE FOUR MILLILITERS (THE CONTENTS OF ONE VIAL) VIA NEBULIZER THREE TIMES A  mL 3    acetylcysteine (MUCOMYST) 20 % nebulizer solution Take 4 mL by nebulization Every 4 (Four) Hours.      albuterol sulfate  (90 Base) MCG/ACT inhaler Inhale 2 puffs Every 4 (Four) Hours As Needed for Wheezing or Shortness of Air. 54 g 6    Alcohol Swabs (DropSafe Alcohol Prep) 70 % pads       Blood Glucose Monitoring Suppl (Blood Glucose Monitor System) w/Device kit 1 Device 2 (Two) Times a Day. 1 each 0    Budeson-Glycopyrrol-Formoterol (Breztri Aerosphere) 160-9-4.8 MCG/ACT aerosol inhaler Inhale 2 puffs 2 (Two) Times a Day. 3 each 6    chlorthalidone (HYGROTON) 25 MG tablet Take 1 tablet by mouth Daily. 90 tablet 1    hydroCHLOROthiazide 25 MG tablet Take 1 tablet by mouth Daily.      ipratropium-albuterol (DUO-NEB) 0.5-2.5 mg/3 ml nebulizer Take 3 mL by nebulization 4 (Four) Times a Day As Needed for Wheezing or Shortness of Air. 360 mL 8    Lancets misc 1 Device 2 (Two) Times a Day. 100 each 12    metFORMIN (GLUCOPHAGE) 1000 MG tablet Take 1 tablet by mouth 2 (Two)  Times a Day With Meals. 180 tablet 1    omeprazole (priLOSEC) 20 MG capsule Take 1 capsule by mouth Daily. (Patient taking differently: Take 1 capsule by mouth 4 (Four) Times a Week.) 90 capsule 1    ondansetron ODT (ZOFRAN-ODT) 4 MG disintegrating tablet Place 1 tablet on the tongue Every 8 (Eight) Hours As Needed for Nausea or Vomiting. 30 tablet 1    OXcarbazepine (TRILEPTAL) 150 MG tablet Take 1 tablet by mouth 2 (Two) Times a Day.      roflumilast (DALIRESP) 500 MCG tablet tablet TAKE 1 TABLET EVERY DAY 90 tablet 3    gabapentin (NEURONTIN) 600 MG tablet Take 1 tablet by mouth 3 (Three) Times a Day. 90 tablet 0    TiZANidine (ZANAFLEX) 4 MG capsule Take 1 capsule by mouth 2 (Two) Times a Day As Needed for Muscle Spasms. 30 capsule 2    aspirin 81 MG EC tablet Take 1 tablet by mouth Daily. (Patient not taking: Reported on 4/21/2025)      glyburide (DIAbeta) 5 MG tablet Take 1 tablet by mouth Daily With Breakfast. (Patient not taking: Reported on 3/12/2025)      lisinopril (PRINIVIL,ZESTRIL) 20 MG tablet Take 1 tablet by mouth 2 (Two) Times a Day. 180 tablet 1     No facility-administered medications prior to visit.     No opioid medication identified on active medication list. I have reviewed chart for other potential  high risk medication/s and harmful drug interactions in the elderly.      Aspirin is on active medication list. Aspirin use is indicated based on review of current medical condition/s. Pros and cons of this therapy have been discussed today. Benefits of this medication outweigh potential harm.  Patient has been encouraged to continue taking this medication.  .      Patient Active Problem List   Diagnosis    Mixed hyperlipidemia    Simple chronic bronchitis    Multiple pulmonary nodules    Essential hypertension    Left ventricular hypertrophy with grade 1 diastolic dysfunction    Hereditary and idiopathic peripheral neuropathy    COPD, severe    Degenerative disc disease, lumbar    Arthritis     "Diabetes mellitus    Neuropathy    Claudication of both lower extremities    LAZCANO (dyspnea on exertion)    GERD (gastroesophageal reflux disease)    Chronic respiratory failure with hypoxia    Chronic diastolic heart failure    Former smoker     Advance Care Planning Advance Directive is not on file.  ACP discussion was declined by the patient. Patient does not have an advance directive, information provided.            Objective   Vitals:    04/21/25 1120   BP: 138/82   BP Location: Right arm   Patient Position: Sitting   Cuff Size: Adult   Pulse: 101   Temp: 97.8 °F (36.6 °C)   TempSrc: Temporal   SpO2: 96%   Weight: 89.4 kg (197 lb)   Height: 182.9 cm (72\")   PainSc: 4    PainLoc: Back       Estimated body mass index is 26.72 kg/m² as calculated from the following:    Height as of this encounter: 182.9 cm (72\").    Weight as of this encounter: 89.4 kg (197 lb).            Gait and Balance Evaluation:  Normal     Does the patient have evidence of cognitive impairment? No  Lab Results   Component Value Date    CHLPL 200 03/18/2025    TRIG 234 (H) 03/18/2025    HDL 37 (L) 03/18/2025     (H) 03/18/2025    VLDL 41 (H) 03/18/2025    HGBA1C 7.10 (H) 03/18/2025                                                                                                Health  Risk Assessment    Smoking Status:  Social History     Tobacco Use   Smoking Status Former    Current packs/day: 0.00    Average packs/day: 1 pack/day for 30.0 years (30.0 ttl pk-yrs)    Types: Cigarettes    Start date: 1980    Quit date: 2010    Years since quitting: 15.3    Passive exposure: Past   Smokeless Tobacco Never     Alcohol Consumption:  Social History     Substance and Sexual Activity   Alcohol Use No       Fall Risk Screen  STEADI Fall Risk Assessment was completed, and patient is at MODERATE risk for falls. Assessment completed on:4/21/2025    Depression Screening   Little interest or pleasure in doing things? Several days   Feeling down, " depressed, or hopeless? Not at all   PHQ-2 Total Score 1      Health Habits and Functional and Cognitive Screenin/21/2025    11:29 AM   Functional & Cognitive Status   Do you have difficulty preparing food and eating? No   Do you have difficulty bathing yourself, getting dressed or grooming yourself? No   Do you have difficulty using the toilet? No   Do you have difficulty moving around from place to place? No   Do you have trouble with steps or getting out of a bed or a chair? No   Current Diet Well Balanced Diet   Dental Exam Not up to date   Eye Exam Up to date   Exercise (times per week) 1 times per week   Current Exercises Include Walking   Do you need help using the phone?  No   Are you deaf or do you have serious difficulty hearing?  No   Do you need help to go to places out of walking distance? No   Do you need help shopping? No   Do you need help preparing meals?  No   Do you need help with housework?  No   Do you need help with laundry? No   Do you need help taking your medications? No   Do you need help managing money? No   Do you ever drive or ride in a car without wearing a seat belt? No   Have you felt unusual stress, anger or loneliness in the last month? No   Who do you live with? Other   If you need help, do you have trouble finding someone available to you? No   Have you been bothered in the last four weeks by sexual problems? No   Do you have difficulty concentrating, remembering or making decisions? No           Age-appropriate Screening Schedule:  Refer to the list below for future screening recommendations based on patient's age, sex and/or medical conditions. Orders for these recommended tests are listed in the plan section. The patient has been provided with a written plan.    Health Maintenance List  Health Maintenance   Topic Date Due    TDAP/TD VACCINES (1 - Tdap) Never done    ZOSTER VACCINE (1 of 2) Never done    HEPATITIS C SCREENING  Never done    DIABETIC EYE EXAM  12/10/2022  "   COVID-19 Vaccine (4 - 2024-25 season) 09/01/2024    Pneumococcal Vaccine 50+ (2 of 2 - PCV) 03/12/2026 (Originally 3/7/2019)    INFLUENZA VACCINE  07/01/2025    HEMOGLOBIN A1C  09/18/2025    LIPID PANEL  03/18/2026    URINE MICROALBUMIN-CREATININE RATIO (uACR)  03/18/2026    COLORECTAL CANCER SCREENING  04/04/2026    ANNUAL WELLNESS VISIT  04/21/2026    AAA SCREEN ONCE  Completed    LUNG CANCER SCREENING  Discontinued                                                                                                                                                CMS Preventative Services Quick Reference  Risk Factors Identified During Encounter  None Identified    The above risks/problems have been discussed with the patient.  Pertinent information has been shared with the patient in the After Visit Summary.  An After Visit Summary and PPPS were made available to the patient.    Follow Up:   Next Medicare Wellness visit to be scheduled in 1 year.         Additional E&M Note during same encounter follows:  Patient has additional, significant, and separately identifiable condition(s)/problem(s) that require work above and beyond the Medicare Wellness Visit     Chief Complaint  Abdominal Pain and Medicare Wellness-subsequent    Subjective    Abdominal Pain      Maximo is also being seen today for additional medical problem/s.         Review of Systems   Gastrointestinal:  Positive for abdominal pain.          Objective   Vital Signs:  /82 (BP Location: Right arm, Patient Position: Sitting, Cuff Size: Adult)   Pulse 101   Temp 97.8 °F (36.6 °C) (Temporal)   Ht 182.9 cm (72\")   Wt 89.4 kg (197 lb)   SpO2 96%   BMI 26.72 kg/m²   Physical Exam  Constitutional:       General: He is not in acute distress.     Appearance: Normal appearance. He is well-developed and well-groomed. He is not ill-appearing, toxic-appearing or diaphoretic.   HENT:      Head: Normocephalic.      Nose: Nose normal. No congestion or " rhinorrhea.      Mouth/Throat:      Mouth: Mucous membranes are moist.      Pharynx: Oropharynx is clear. No oropharyngeal exudate or posterior oropharyngeal erythema.   Eyes:      General: Lids are normal.         Right eye: No discharge.         Left eye: No discharge.      Extraocular Movements: Extraocular movements intact.      Pupils: Pupils are equal, round, and reactive to light.   Neck:      Vascular: No carotid bruit.   Cardiovascular:      Rate and Rhythm: Normal rate and regular rhythm.      Pulses: Normal pulses.      Heart sounds: Normal heart sounds. No murmur heard.     No friction rub. No gallop.   Pulmonary:      Effort: Pulmonary effort is normal. No respiratory distress.      Breath sounds: Normal breath sounds. No stridor. No wheezing, rhonchi or rales.   Chest:      Chest wall: No tenderness.   Abdominal:      General: Bowel sounds are normal. There is no distension.      Palpations: Abdomen is soft. There is no mass.      Tenderness: There is no abdominal tenderness. There is no right CVA tenderness, left CVA tenderness, guarding or rebound.      Hernia: No hernia is present.   Musculoskeletal:         General: No swelling or tenderness. Normal range of motion.      Cervical back: Normal range of motion and neck supple. No rigidity or tenderness.      Right lower leg: No edema.      Left lower leg: No edema.   Lymphadenopathy:      Cervical: No cervical adenopathy.   Skin:     General: Skin is warm.      Capillary Refill: Capillary refill takes less than 2 seconds.      Coloration: Skin is not jaundiced.      Findings: No bruising, erythema or rash.   Neurological:      General: No focal deficit present.      Mental Status: He is alert and oriented to person, place, and time.      Motor: Motor function is intact. No weakness.      Coordination: Coordination is intact.      Gait: Gait is intact. Gait normal.   Psychiatric:         Attention and Perception: Attention normal.         Mood and  Affect: Mood normal.         Speech: Speech normal.         Behavior: Behavior normal.         Cognition and Memory: Cognition normal.         Judgment: Judgment normal.               The following data was reviewed by: OSITO Juares on 04/21/2025:    Common labs          11/29/2024    14:55 3/18/2025    00:00 3/18/2025    09:23 3/18/2025    10:18   Common Labs   Glucose 254  155      BUN 15  12      Creatinine 1.12  1.03      Sodium 134  137      Potassium 3.7  4.9      Chloride 88  95      Calcium 9.2  9.8      Albumin 4.3  4.6      Total Bilirubin 0.5  0.3      Alkaline Phosphatase 91  76      AST (SGOT) 15  15      ALT (SGPT) 18  20      WBC 10.93   6.40     Hemoglobin 12.8   15.2     Hematocrit 39.2   44.4     Platelets 220   234     Total Cholesterol   200     Triglycerides   234     HDL Cholesterol   37     LDL Cholesterol    122     Hemoglobin A1C   7.10     Microalbumin, Urine    7.1        Results             Assessment and Plan                 Follow Up   Return if symptoms worsen or fail to improve, for Next scheduled follow up.  Patient was given instructions and counseling regarding his condition or for health maintenance advice. Please see specific information pulled into the AVS if appropriate.  Patient or patient representative verbalized consent for the use of Ambient Listening during the visit with  OSITO Juares for chart documentation. 4/21/2025  14:26 EDT

## 2025-05-05 RX ORDER — AZITHROMYCIN 250 MG/1
TABLET, FILM COATED ORAL
Qty: 6 TABLET | Refills: 0 | Status: SHIPPED | OUTPATIENT
Start: 2025-05-05

## 2025-05-07 ENCOUNTER — TELEPHONE (OUTPATIENT)
Dept: PULMONOLOGY | Facility: CLINIC | Age: 70
End: 2025-05-07

## 2025-05-07 NOTE — TELEPHONE ENCOUNTER
Caller: Maximo Kwon    Relationship: Self    Best call back number: [phone]     What orders are you requesting (i.e. lab or imaging): LUNG CANCER SCREENING    In what timeframe would the patient need to come in: ASAP    Where will you receive your lab/imaging services: [facility]    Additional notes:

## 2025-05-09 ENCOUNTER — EXTERNAL PBMM DATA (OUTPATIENT)
Dept: PHARMACY | Facility: OTHER | Age: 70
End: 2025-05-09
Payer: MEDICARE

## 2025-05-12 RX ORDER — AZITHROMYCIN 250 MG/1
TABLET, FILM COATED ORAL
Qty: 6 TABLET | Refills: 0 | OUTPATIENT
Start: 2025-05-12

## 2025-05-13 DIAGNOSIS — G62.9 NEUROPATHY: ICD-10-CM

## 2025-05-13 DIAGNOSIS — M51.360 DEGENERATION OF INTERVERTEBRAL DISC OF LUMBAR REGION WITH DISCOGENIC BACK PAIN: ICD-10-CM

## 2025-05-13 RX ORDER — GABAPENTIN 600 MG/1
600 TABLET ORAL 3 TIMES DAILY
Qty: 90 TABLET | Refills: 0 | OUTPATIENT
Start: 2025-05-13

## 2025-05-13 NOTE — TELEPHONE ENCOUNTER
Too soon   Propranolol Counseling:  I discussed with the patient the risks of propranolol including but not limited to low heart rate, low blood pressure, low blood sugar, restlessness and increased cold sensitivity. They should call the office if they experience any of these side effects.

## 2025-05-13 NOTE — TELEPHONE ENCOUNTER
Caller: Maximo Kwon    Relationship: Self    Best call back number: [phone]     Requested Prescriptions:   Requested Prescriptions     Pending Prescriptions Disp Refills    gabapentin (NEURONTIN) 600 MG tablet 90 tablet 0     Sig: Take 1 tablet by mouth 3 (Three) Times a Day.        Pharmacy where request should be sent: [facility] - [facility] [address] - [phone]  - [phone] [facility]     Last office visit with prescribing clinician: 4/21/2025   Last telemedicine visit with prescribing clinician: Visit date not found   Next office visit with prescribing clinician: 6/12/2025     Additional details provided by patient: PATIENT ASKING FOR A 90 DAY SUPPLY WITH REFILLS DUE TO BEING MAIL DELIVERY. THIS IS FOR THE NEUROPATHY IN HIS KNEES AND LEGS.     Does the patient have less than a 3 day supply:  [] Yes  [x] No    Jocelyn Whitmore Rep   05/13/25 13:01 EDT

## 2025-05-14 NOTE — TELEPHONE ENCOUNTER
Spoke with Maximo he stated he knew it was early but takes a few days to get it from Mail order & wanted to see if with next fill could he get a 90 day supply because it is mail order?

## 2025-05-18 DIAGNOSIS — Z87.891 FORMER SMOKER: Primary | ICD-10-CM

## 2025-05-18 DIAGNOSIS — R91.8 MULTIPLE PULMONARY NODULES: ICD-10-CM

## 2025-05-20 DIAGNOSIS — M51.360 DEGENERATION OF INTERVERTEBRAL DISC OF LUMBAR REGION WITH DISCOGENIC BACK PAIN: ICD-10-CM

## 2025-05-20 DIAGNOSIS — G62.9 NEUROPATHY: ICD-10-CM

## 2025-05-20 RX ORDER — GABAPENTIN 600 MG/1
600 TABLET ORAL 3 TIMES DAILY
Qty: 90 TABLET | OUTPATIENT
Start: 2025-05-20

## 2025-05-20 RX ORDER — GABAPENTIN 600 MG/1
600 TABLET ORAL 3 TIMES DAILY
Qty: 90 TABLET | Refills: 0 | OUTPATIENT
Start: 2025-05-20

## 2025-05-20 NOTE — TELEPHONE ENCOUNTER
Caller: Maximo Kwon    Relationship: Self    Best call back number: [phone]     Requested Prescriptions:   Requested Prescriptions     Pending Prescriptions Disp Refills    gabapentin (NEURONTIN) 600 MG tablet 90 tablet 0     Sig: Take 1 tablet by mouth 3 (Three) Times a Day.        Pharmacy where request should be sent: [facility] [id number] [place] [id number] [facility] [address] [phone]  - [phone] FX     Last office visit with prescribing clinician: 4/21/2025   Last telemedicine visit with prescribing clinician: Visit date not found   Next office visit with prescribing clinician: 6/12/2025     Additional details provided by patient: PATIENT STATED THAT HE IS GOING TO BE OUT OF TOWN AND IS NEEDING TO HAVE SOME OF THIS MEDICATION CALLED IN FOR IM JUST IN CASE HE RUNS OUT.

## 2025-05-20 NOTE — TELEPHONE ENCOUNTER
Mignon stated they plan to leave tomorrow and he has enough medication to last until Wednesday 5/28/25 but doesn't plan to return until 6/1/25.      She indicated from this point forward they would like it sent locally to [facility].     She also said...  it would need to be indicated that you are aware that it is an early fill but is permissible.

## 2025-05-20 NOTE — TELEPHONE ENCOUNTER
Pt came in requesting a 15 day supply of the gabapentin be sent to [facility], stated he was going out of town and is afraid he'll run out

## 2025-05-21 DIAGNOSIS — G62.9 NEUROPATHY: ICD-10-CM

## 2025-05-21 DIAGNOSIS — M51.360 DEGENERATION OF INTERVERTEBRAL DISC OF LUMBAR REGION WITH DISCOGENIC BACK PAIN: ICD-10-CM

## 2025-05-21 RX ORDER — GABAPENTIN 600 MG/1
600 TABLET ORAL 3 TIMES DAILY
Qty: 12 TABLET | Refills: 0 | Status: SHIPPED | OUTPATIENT
Start: 2025-05-21

## 2025-06-05 DIAGNOSIS — M51.360 DEGENERATION OF INTERVERTEBRAL DISC OF LUMBAR REGION WITH DISCOGENIC BACK PAIN: ICD-10-CM

## 2025-06-05 DIAGNOSIS — G62.9 NEUROPATHY: ICD-10-CM

## 2025-06-05 RX ORDER — GABAPENTIN 600 MG/1
600 TABLET ORAL 3 TIMES DAILY
Qty: 12 TABLET | Refills: 0 | Status: CANCELLED | OUTPATIENT
Start: 2025-06-05

## 2025-06-05 NOTE — TELEPHONE ENCOUNTER
"Caller: Maximo Kwon    Relationship: Self    Best call back number: [phone]     Requested Prescriptions:   Requested Prescriptions     Pending Prescriptions Disp Refills    gabapentin (NEURONTIN) 600 MG tablet 12 tablet 0     Sig: Take 1 tablet by mouth 3 (Three) Times a Day.        Pharmacy where request should be sent: [facility] [id number] [place] [id number] [facility] [address] [place] [address] [phone] [facility] [phone] FX     Last office visit with prescribing clinician: 4/21/2025   Last telemedicine visit with prescribing clinician: Visit date not found   Next office visit with prescribing clinician: 6/12/2025     Additional details provided by patient: \"SEND 90 DAY SUPPLY AND ASAP \"  "

## 2025-06-06 ENCOUNTER — OFFICE VISIT (OUTPATIENT)
Dept: FAMILY MEDICINE CLINIC | Facility: CLINIC | Age: 70
End: 2025-06-06
Payer: MEDICARE

## 2025-06-06 VITALS
HEART RATE: 100 BPM | DIASTOLIC BLOOD PRESSURE: 92 MMHG | HEIGHT: 72 IN | TEMPERATURE: 96.9 F | OXYGEN SATURATION: 96 % | SYSTOLIC BLOOD PRESSURE: 172 MMHG | WEIGHT: 197.4 LBS | BODY MASS INDEX: 26.74 KG/M2

## 2025-06-06 DIAGNOSIS — G62.9 NEUROPATHY: Primary | ICD-10-CM

## 2025-06-06 DIAGNOSIS — M51.360 DEGENERATION OF INTERVERTEBRAL DISC OF LUMBAR REGION WITH DISCOGENIC BACK PAIN: ICD-10-CM

## 2025-06-06 DIAGNOSIS — E11.42 TYPE 2 DIABETES MELLITUS WITH DIABETIC POLYNEUROPATHY, WITHOUT LONG-TERM CURRENT USE OF INSULIN: ICD-10-CM

## 2025-06-06 RX ORDER — GABAPENTIN 600 MG/1
600 TABLET ORAL 3 TIMES DAILY
Qty: 90 TABLET | Refills: 0 | Status: SHIPPED | OUTPATIENT
Start: 2025-06-06

## 2025-06-06 NOTE — PROGRESS NOTES
Chief Complaint  Peripheral Neuropathy    HPI:   Peripheral Neuropathy     Maximo Kwon is a 70 y.o. male who presents today to [facility] for Peripheral Neuropathy.  History of Present Illness  The patient presents for a medication refill.    He is seeking a refill of his gabapentin prescription, which he uses to manage neuropathic pain. His last consultation with Meryl was approximately 2 weeks ago. He has been experiencing difficulties in obtaining his medication due to issues with his insurance and pharmacy. He had requested a transfer of his prescription to [facility], but this was not successful due to communication issues with the pharmacy. He is currently on a regimen of gabapentin 600 mg, administered 3 times daily.    He also has a diagnosis of type 2 diabetes, for which he is prescribed metformin.       Previous History:   Past Medical History:   Diagnosis Date    Arthritis     Chronic diastolic heart failure 08/19/2021    pt says he does not have chronic diastolic heart failure    COPD (chronic obstructive pulmonary disease)     Depression     Diverticulitis     Emphysema (subcutaneous) (surgical) resulting from a procedure     GERD (gastroesophageal reflux disease)     Neuropathy     Pneumothorax     Left, status post chest tube    Shortness of breath     Spinal stenosis     Type 2 diabetes mellitus       Past Surgical History:   Procedure Laterality Date    BRONCHOSCOPY N/A 5/14/2019    Procedure: Bronchoscopy with Transbronchial Biopsy with Fluoroscopy;  Surgeon: Eladio Bethea MD;  Location: [facility] OR;  Service: Pulmonary    CHEST TUBE INSERTION Left     Pneumothorax    COLON RESECTION  2016    had colostomy and reveresed back     COLONOSCOPY  2016    COLOSTOMY CLOSURE      LUNG BIOPSY Right 2014    (non cancerous)    OTHER SURGICAL HISTORY Left     LEFT ELBOW SURGERY      Social History     Socioeconomic History    Marital status:     Number of children: 0    Tobacco Use    Smoking status: Former     Current packs/day: 0.00     Average packs/day: 1 pack/day for 30.0 years (30.0 ttl pk-yrs)     Types: Cigarettes     Start date: 1980     Quit date: 2010     Years since quitting: 15.4     Passive exposure: Past    Smokeless tobacco: Never   Vaping Use    Vaping status: Never Used   Substance and Sexual Activity    Alcohol use: No    Drug use: No    Sexual activity: Defer     Birth control/protection: Other      Health Maintenance Due   Topic Date Due    TDAP/TD VACCINES (1 - Tdap) Never done    ZOSTER VACCINE (1 of 2) Never done    HEPATITIS C SCREENING  Never done    DIABETIC EYE EXAM  12/10/2022    DIABETIC FOOT EXAM  12/20/2023    COVID-19 Vaccine (4 - 2024-25 season) 09/01/2024        Current Medications:  Current Outpatient Medications   Medication Sig Dispense Refill    acetylcysteine (MUCOMYST) 20 % nebulizer solution INHALE FOUR MILLILITERS (THE CONTENTS OF ONE VIAL) VIA NEBULIZER THREE TIMES A  mL 3    acetylcysteine (MUCOMYST) 20 % nebulizer solution Take 4 mL by nebulization Every 4 (Four) Hours.      albuterol sulfate  (90 Base) MCG/ACT inhaler Inhale 2 puffs Every 4 (Four) Hours As Needed for Wheezing or Shortness of Air. 54 g 6    Alcohol Swabs (DropSafe Alcohol Prep) 70 % pads       Blood Glucose Monitoring Suppl (Blood Glucose Monitor System) w/Device kit 1 Device 2 (Two) Times a Day. 1 each 0    Budeson-Glycopyrrol-Formoterol (Breztri Aerosphere) 160-9-4.8 MCG/ACT aerosol inhaler Inhale 2 puffs 2 (Two) Times a Day. 3 each 6    chlorthalidone (HYGROTON) 25 MG tablet Take 1 tablet by mouth Daily. 90 tablet 1    cyclobenzaprine (FLEXERIL) 10 MG tablet Take 1 tablet by mouth 2 (Two) Times a Day As Needed for Muscle Spasms. 60 tablet 2    gabapentin (NEURONTIN) 600 MG tablet Take 1 tablet by mouth 3 (Three) Times a Day. 90 tablet 0    hydroCHLOROthiazide 25 MG tablet Take 1 tablet by mouth Daily.      ipratropium-albuterol (DUO-NEB) 0.5-2.5 mg/3  "ml nebulizer Take 3 mL by nebulization 4 (Four) Times a Day As Needed for Wheezing or Shortness of Air. 360 mL 8    Lancets misc 1 Device 2 (Two) Times a Day. 100 each 12    lisinopril (PRINIVIL,ZESTRIL) 20 MG tablet Take 1 tablet by mouth 2 (Two) Times a Day. 180 tablet 1    metFORMIN (GLUCOPHAGE) 1000 MG tablet Take 1 tablet by mouth 2 (Two) Times a Day With Meals. 180 tablet 1    omeprazole (priLOSEC) 20 MG capsule Take 1 capsule by mouth Daily. (Patient taking differently: Take 1 capsule by mouth 4 (Four) Times a Week.) 90 capsule 1    ondansetron ODT (ZOFRAN-ODT) 4 MG disintegrating tablet Place 1 tablet on the tongue Every 8 (Eight) Hours As Needed for Nausea or Vomiting. 30 tablet 1    roflumilast (DALIRESP) 500 MCG tablet tablet TAKE 1 TABLET EVERY DAY 90 tablet 3    saccharomyces boulardii (Florastor) 250 MG capsule Take 1 capsule by mouth 2 (Two) Times a Day. 60 capsule 0    glyburide (DIAbeta) 5 MG tablet Take 1 tablet by mouth Daily With Breakfast. (Patient not taking: Reported on 6/6/2025)      OXcarbazepine (TRILEPTAL) 150 MG tablet Take 1 tablet by mouth 2 (Two) Times a Day.       No current facility-administered medications for this visit.       Allergies:   No Known Allergies    Vitals:   /92 (BP Location: Right arm, Patient Position: Sitting, Cuff Size: Adult)   Pulse 100   Temp 96.9 °F (36.1 °C) (Temporal)   Ht 182.9 cm (72\")   Wt 89.5 kg (197 lb 6.4 oz)   SpO2 96%   BMI 26.77 kg/m²     Estimated body mass index is 26.77 kg/m² as calculated from the following:    Height as of this encounter: 182.9 cm (72\").    Weight as of this encounter: 89.5 kg (197 lb 6.4 oz).    Maximo Kwon  reports that he quit smoking about 15 years ago. His smoking use included cigarettes. He started smoking about 45 years ago. He has a 30 pack-year smoking history. He has been exposed to tobacco smoke. He has never used smokeless tobacco.            Physical Exam:   Physical Exam  Constitutional:       " Appearance: Normal appearance.   HENT:      Mouth/Throat:      Mouth: Mucous membranes are moist.   Cardiovascular:      Rate and Rhythm: Normal rate and regular rhythm.   Pulmonary:      Effort: Pulmonary effort is normal.      Breath sounds: Normal breath sounds. No wheezing or rhonchi.   Musculoskeletal:         General: Normal range of motion.   Skin:     General: Skin is warm and dry.   Neurological:      Mental Status: He is alert.   Psychiatric:         Mood and Affect: Mood normal.          Lab Results:   Lab on 04/15/2025   Component Date Value Ref Range Status    Campylobacter 04/15/2025 Not Detected  Not Detected Final    C.difficile toxin A/B 04/15/2025 Not Detected  Not Detected Final    Plesiomonas shigelloides 04/15/2025 Not Detected  Not Detected Final    Salmonella 04/15/2025 Not Detected  Not Detected Final    Vibrio 04/15/2025 Not Detected  Not Detected Final    Vibrio cholerae 04/15/2025 Not Detected  Not Detected Final    Yersinia enterocolitica 04/15/2025 Not Detected  Not Detected Final    Enteroaggregative E. coli 04/15/2025 Detected (A)  Not Detected Final    Enteropathogenic E. coli 04/15/2025 Not Detected  Not Detected Final    Enterotoxigenic E. coli 04/15/2025 Not Detected  Not Detected Final    Shiga-like toxin-producing E. coli  04/15/2025 Not Detected  Not Detected Final    E. coli O157 04/15/2025 Not applicable  Not Detected Final    Shigella enteroinvasive E. coli 04/15/2025 Not Detected  Not Detected Final    Cryptosporidium 04/15/2025 Not Detected  Not Detected Final    Cyclospora cayetanensis 04/15/2025 Not Detected  Not Detected Final    Entamoeba Histolytica Ag 04/15/2025 Not Detected  Not Detected Final    Giardia lamblia 04/15/2025 Not Detected  Not Detected Final    Adenovirus 40/41 Ag 04/15/2025 Not Detected  Not Detected Final    Astrovirus 04/15/2025 Not Detected  Not Detected Final    Norovirus GI/GII 04/15/2025 Not Detected  Not Detected Final    Rotavirus A 04/15/2025  Not Detected  Not Detected Final    Sapovirus 04/15/2025 Not Detected  Not Detected Final   Lab on 03/18/2025   Component Date Value Ref Range Status    Free T4 03/18/2025 1.44  0.92 - 1.68 ng/dL Final    WBC 03/18/2025 6.40  3.40 - 10.80 10*3/mm3 Final    RBC 03/18/2025 5.00  4.14 - 5.80 10*6/mm3 Final    Hemoglobin 03/18/2025 15.2  13.0 - 17.7 g/dL Final    Hematocrit 03/18/2025 44.4  37.5 - 51.0 % Final    MCV 03/18/2025 88.8  79.0 - 97.0 fL Final    MCH 03/18/2025 30.4  26.6 - 33.0 pg Final    MCHC 03/18/2025 34.2  31.5 - 35.7 g/dL Final    RDW 03/18/2025 13.1  12.3 - 15.4 % Final    Platelets 03/18/2025 234  140 - 450 10*3/mm3 Final    Neutrophil Rel % 03/18/2025 69.4  42.7 - 76.0 % Final    Lymphocyte Rel % 03/18/2025 17.3 (L)  19.6 - 45.3 % Final    Monocyte Rel % 03/18/2025 9.2  5.0 - 12.0 % Final    Eosinophil Rel % 03/18/2025 3.3  0.3 - 6.2 % Final    Basophil Rel % 03/18/2025 0.5  0.0 - 1.5 % Final    Neutrophils Absolute 03/18/2025 4.44  1.70 - 7.00 10*3/mm3 Final    Lymphocytes Absolute 03/18/2025 1.11  0.70 - 3.10 10*3/mm3 Final    Monocytes Absolute 03/18/2025 0.59  0.10 - 0.90 10*3/mm3 Final    Eosinophils Absolute 03/18/2025 0.21  0.00 - 0.40 10*3/mm3 Final    Basophils Absolute 03/18/2025 0.03  0.00 - 0.20 10*3/mm3 Final    Immature Granulocyte Rel % 03/18/2025 0.3  0.0 - 0.5 % Final    Immature Grans Absolute 03/18/2025 0.02  0.00 - 0.05 10*3/mm3 Final    nRBC 03/18/2025 0.0  0.0 - 0.2 /100 WBC Final    TSH 03/18/2025 0.764  0.270 - 4.200 uIU/mL Final    Creatinine, Urine 03/18/2025 74.5  Not Estab. mg/dL Final    Microalbumin, Urine 03/18/2025 7.1  Not Estab. ug/mL Final    Microalbumin/Creatinine Ratio 03/18/2025 10  0 - 29 mg/g creat Final    Comment:                        Normal:                0 -  29                         Moderately increased: 30 - 300                         Severely increased:       >300      Total Cholesterol 03/18/2025 200  0 - 200 mg/dL Final    Comment: Cholesterol  Reference Ranges  (U.S. Department of Health and Human Services ATP III  Classifications)  Desirable          <200 mg/dL  Borderline High    200-239 mg/dL  High Risk          >240 mg/dL  Triglyceride Reference Ranges  (U.S. Department of Health and Human Services ATP III  Classifications)  Normal           <150 mg/dL  Borderline High  150-199 mg/dL  High             200-499 mg/dL  Very High        >500 mg/dL  HDL Reference Ranges  (U.S. Department of Health and Human Services ATP III  Classifications)  Low     <40 mg/dl (major risk factor for CHD)  High    >60 mg/dl ('negative' risk factor for CHD)  LDL Reference Ranges  (U.S. Department of Health and Human Services ATP III  Classifications)  Optimal          <100 mg/dL  Near Optimal     100-129 mg/dL  Borderline High  130-159 mg/dL  High             160-189 mg/dL  Very High        >189 mg/dL  LDL is calculated using the NIH LDL-C calculation.      Triglycerides 03/18/2025 234 (H)  0 - 150 mg/dL Final    HDL Cholesterol 03/18/2025 37 (L)  40 - 60 mg/dL Final    VLDL Cholesterol Pedro 03/18/2025 41 (H)  5 - 40 mg/dL Final    LDL Chol Calc (NIH) 03/18/2025 122 (H)  0 - 100 mg/dL Final    Hemoglobin A1C 03/18/2025 7.10 (H)  4.80 - 5.60 % Final    Comment: Hemoglobin A1C Ranges:  Increased Risk for Diabetes  5.7% to 6.4%  Diabetes                     >= 6.5%  Diabetic Goal                < 7.0%      Glucose 03/18/2025 155 (H)  65 - 99 mg/dL Final    BUN 03/18/2025 12  8 - 23 mg/dL Final    Creatinine 03/18/2025 1.03  0.76 - 1.27 mg/dL Final    EGFR Result 03/18/2025 78.6  >60.0 mL/min/1.73 Final    Comment: GFR Categories in Chronic Kidney Disease (CKD)/X09/  /X09/  GFR Category          GFR (mL/min/1.73)    Interpretation  G1/X09/                    90 or greater/X09/        Normal or high  (1)  G2//                    60-89                Mild decrease  (1)  G3a                   45-59                Mild to moderate  decrease  G3b                   30-44                 Moderate to  severe decrease  G4                    15-29                Severe decrease  G5                    14 or less           Kidney failure//  /[id number]/  (1)In the absence of evidence of kidney disease, neither  GFR category G1 or G2 fulfill the criteria for CKD.  eGFR calculation 2021 CKD-EPI creatinine equation, which  does not include race as a factor      BUN/Creatinine Ratio 03/18/2025 11.7  7.0 - 25.0 Final    Sodium 03/18/2025 137  136 - 145 mmol/L Final    Potassium 03/18/2025 4.9  3.5 - 5.2 mmol/L Final    Chloride 03/18/2025 95 (L)  98 - 107 mmol/L Final    Total CO2 03/18/2025 29.5 (H)  22.0 - 29.0 mmol/L Final    Calcium 03/18/2025 9.8  8.6 - 10.5 mg/dL Final    Total Protein 03/18/2025 6.5  6.0 - 8.5 g/dL Final    Albumin 03/18/2025 4.6  3.5 - 5.2 g/dL Final    Globulin 03/18/2025 1.9  gm/dL Final    A/G Ratio 03/18/2025 2.4  g/dL Final    Total Bilirubin 03/18/2025 0.3  0.0 - 1.2 mg/dL Final    Alkaline Phosphatase 03/18/2025 76  39 - 117 U/L Final    AST (SGOT) 03/18/2025 15  1 - 40 U/L Final    ALT (SGPT) 03/18/2025 20  1 - 41 U/L Final    Interpretation 03/18/2025 Note   Final    Supplemental report is available.       Assessment and Plan  Diagnoses and all orders for this visit:    1. Neuropathy (Primary)  -     gabapentin (NEURONTIN) 600 MG tablet; Take 1 tablet by mouth 3 (Three) Times a Day.  Dispense: 90 tablet; Refill: 0    2. Degeneration of intervertebral disc of lumbar region with discogenic back pain  -     gabapentin (NEURONTIN) 600 MG tablet; Take 1 tablet by mouth 3 (Three) Times a Day.  Dispense: 90 tablet; Refill: 0    3. Type 2 diabetes mellitus with diabetic polyneuropathy, without long-term current use of insulin      Assessment & Plan  1. Neuropathy.  - Currently taking gabapentin 600 mg three times a day for neuropathy.  - Reports running out of medication and needing a new prescription.  - Discussed the issue with previous prescriptions and the need for a  reliable pharmacy.  - Prescription for gabapentin 600 mg, sufficient for one month, will be sent to pharmacy. Advised to follow up with Meryl for refills.  - ELBA Martínez reviewed    2. Type 2 diabetes mellitus.  - Currently on metformin for type 2 diabetes.  - No changes to diabetes management plan discussed during this visit.  - No new symptoms or concerns related to diabetes were reported.  - Continue current medication regimen and follow up as needed.               New Medications:   New Medications Ordered This Visit   Medications    gabapentin (NEURONTIN) 600 MG tablet     Sig: Take 1 tablet by mouth 3 (Three) Times a Day.     Dispense:  90 tablet     Refill:  0       Discontinued Medications:   Medications Discontinued During This Encounter   Medication Reason    aspirin 81 MG EC tablet *Therapy completed    azithromycin (ZITHROMAX) 250 MG tablet *Therapy completed    gabapentin (NEURONTIN) 600 MG tablet Reorder                 Follow Up:   No follow-ups on file.    Patient was given instructions and counseling regarding his condition or for health maintenance advice. Please see specific information pulled into the AVS if appropriate.     Patient or patient representative verbalized consent for the use of Ambient Listening during the visit with  Kamron Nelson DO for chart documentation. 6/6/2025  11:41 EDT       This document has been electronically signed by Kamron Nelson DO   June 6, 2025 11:41 EDT      Dictated Utilizing Dragon Dictation: Part of this note may be an electronic transcription/translation of spoken language to printed text using the Dragon Dictation System.

## 2025-06-16 ENCOUNTER — HOSPITAL ENCOUNTER (OUTPATIENT)
Facility: HOSPITAL | Age: 70
Discharge: HOME | End: 2025-06-16
Admitting: PHYSICIAN ASSISTANT
Payer: MEDICARE

## 2025-06-16 DIAGNOSIS — Z87.891 FORMER SMOKER: ICD-10-CM

## 2025-06-16 DIAGNOSIS — R91.8 MULTIPLE PULMONARY NODULES: ICD-10-CM

## 2025-06-16 PROCEDURE — 71271 CT THORAX LUNG CANCER SCR C-: CPT | Performed by: RADIOLOGY

## 2025-06-16 PROCEDURE — 71271 CT THORAX LUNG CANCER SCR C-: CPT

## 2025-07-03 ENCOUNTER — TELEPHONE (OUTPATIENT)
Dept: FAMILY MEDICINE CLINIC | Facility: CLINIC | Age: 70
End: 2025-07-03

## 2025-07-08 ENCOUNTER — OFFICE VISIT (OUTPATIENT)
Dept: FAMILY MEDICINE CLINIC | Facility: CLINIC | Age: 70
End: 2025-07-08
Payer: MEDICARE

## 2025-07-08 ENCOUNTER — LAB (OUTPATIENT)
Dept: FAMILY MEDICINE CLINIC | Facility: CLINIC | Age: 70
End: 2025-07-08
Payer: MEDICARE

## 2025-07-08 VITALS
HEART RATE: 101 BPM | SYSTOLIC BLOOD PRESSURE: 140 MMHG | DIASTOLIC BLOOD PRESSURE: 78 MMHG | BODY MASS INDEX: 25.95 KG/M2 | HEIGHT: 72 IN | OXYGEN SATURATION: 95 % | TEMPERATURE: 97.1 F | WEIGHT: 191.6 LBS

## 2025-07-08 DIAGNOSIS — M51.360 DEGENERATION OF INTERVERTEBRAL DISC OF LUMBAR REGION WITH DISCOGENIC BACK PAIN: ICD-10-CM

## 2025-07-08 DIAGNOSIS — J43.2 CENTRILOBULAR EMPHYSEMA: ICD-10-CM

## 2025-07-08 DIAGNOSIS — E11.42 TYPE 2 DIABETES MELLITUS WITH DIABETIC POLYNEUROPATHY, WITHOUT LONG-TERM CURRENT USE OF INSULIN: ICD-10-CM

## 2025-07-08 DIAGNOSIS — K21.9 GASTROESOPHAGEAL REFLUX DISEASE, UNSPECIFIED WHETHER ESOPHAGITIS PRESENT: ICD-10-CM

## 2025-07-08 DIAGNOSIS — I10 PRIMARY HYPERTENSION: ICD-10-CM

## 2025-07-08 DIAGNOSIS — Z76.89 ENCOUNTER TO ESTABLISH CARE: Primary | ICD-10-CM

## 2025-07-08 DIAGNOSIS — G62.9 NEUROPATHY: ICD-10-CM

## 2025-07-08 DIAGNOSIS — I10 PRIMARY HYPERTENSION: Primary | ICD-10-CM

## 2025-07-08 PROCEDURE — 3044F HG A1C LEVEL LT 7.0%: CPT | Performed by: FAMILY MEDICINE

## 2025-07-08 PROCEDURE — G2211 COMPLEX E/M VISIT ADD ON: HCPCS | Performed by: FAMILY MEDICINE

## 2025-07-08 PROCEDURE — 1125F AMNT PAIN NOTED PAIN PRSNT: CPT | Performed by: FAMILY MEDICINE

## 2025-07-08 PROCEDURE — 3078F DIAST BP <80 MM HG: CPT | Performed by: FAMILY MEDICINE

## 2025-07-08 PROCEDURE — 99214 OFFICE O/P EST MOD 30 MIN: CPT | Performed by: FAMILY MEDICINE

## 2025-07-08 PROCEDURE — 3077F SYST BP >= 140 MM HG: CPT | Performed by: FAMILY MEDICINE

## 2025-07-08 RX ORDER — BUDESONIDE, GLYCOPYRROLATE, AND FORMOTEROL FUMARATE 160; 9; 4.8 UG/1; UG/1; UG/1
2 AEROSOL, METERED RESPIRATORY (INHALATION) 2 TIMES DAILY
Qty: 3 EACH | Refills: 6 | Status: SHIPPED | OUTPATIENT
Start: 2025-07-08

## 2025-07-08 RX ORDER — CHLORTHALIDONE 25 MG/1
25 TABLET ORAL DAILY
Qty: 90 TABLET | Refills: 0 | Status: SHIPPED | OUTPATIENT
Start: 2025-07-08

## 2025-07-08 RX ORDER — IPRATROPIUM BROMIDE AND ALBUTEROL SULFATE 2.5; .5 MG/3ML; MG/3ML
3 SOLUTION RESPIRATORY (INHALATION) 4 TIMES DAILY PRN
Qty: 360 ML | Refills: 8 | Status: SHIPPED | OUTPATIENT
Start: 2025-07-08

## 2025-07-08 RX ORDER — GABAPENTIN 600 MG/1
600 TABLET ORAL 3 TIMES DAILY
Qty: 270 TABLET | Refills: 0 | Status: SHIPPED | OUTPATIENT
Start: 2025-07-08

## 2025-07-08 RX ORDER — OMEPRAZOLE 20 MG/1
20 CAPSULE, DELAYED RELEASE ORAL DAILY
Qty: 90 CAPSULE | Refills: 0 | Status: SHIPPED | OUTPATIENT
Start: 2025-07-08

## 2025-07-08 RX ORDER — LISINOPRIL 20 MG/1
20 TABLET ORAL 2 TIMES DAILY
Qty: 180 TABLET | Refills: 0 | Status: SHIPPED | OUTPATIENT
Start: 2025-07-08

## 2025-07-09 LAB
ALBUMIN SERPL-MCNC: 4.6 G/DL (ref 3.5–5.2)
ALBUMIN/GLOB SERPL: 2 G/DL
ALP SERPL-CCNC: 91 U/L (ref 39–117)
ALT SERPL-CCNC: 16 U/L (ref 1–41)
AST SERPL-CCNC: 13 U/L (ref 1–40)
BILIRUB SERPL-MCNC: 0.3 MG/DL (ref 0–1.2)
BUN SERPL-MCNC: 10 MG/DL (ref 8–23)
BUN/CREAT SERPL: 11 (ref 7–25)
CALCIUM SERPL-MCNC: 9.4 MG/DL (ref 8.6–10.5)
CHLORIDE SERPL-SCNC: 97 MMOL/L (ref 98–107)
CHOLEST SERPL-MCNC: 185 MG/DL (ref 0–200)
CO2 SERPL-SCNC: 27.2 MMOL/L (ref 22–29)
CREAT SERPL-MCNC: 0.91 MG/DL (ref 0.76–1.27)
EGFRCR SERPLBLD CKD-EPI 2021: 90.7 ML/MIN/1.73
ERYTHROCYTE [DISTWIDTH] IN BLOOD BY AUTOMATED COUNT: 13 % (ref 12.3–15.4)
GLOBULIN SER CALC-MCNC: 2.3 GM/DL
GLUCOSE SERPL-MCNC: 155 MG/DL (ref 65–99)
HBA1C MFR BLD: 6.9 % (ref 4.8–5.6)
HCT VFR BLD AUTO: 43.1 % (ref 37.5–51)
HDLC SERPL-MCNC: 39 MG/DL (ref 40–60)
HGB BLD-MCNC: 14.5 G/DL (ref 13–17.7)
IMP & REVIEW OF LAB RESULTS: NORMAL
LDLC SERPL CALC-MCNC: 103 MG/DL (ref 0–100)
MCH RBC QN AUTO: 30.3 PG (ref 26.6–33)
MCHC RBC AUTO-ENTMCNC: 33.6 G/DL (ref 31.5–35.7)
MCV RBC AUTO: 90 FL (ref 79–97)
PLATELET # BLD AUTO: 249 10*3/MM3 (ref 140–450)
POTASSIUM SERPL-SCNC: 4.4 MMOL/L (ref 3.5–5.2)
PROT SERPL-MCNC: 6.9 G/DL (ref 6–8.5)
RBC # BLD AUTO: 4.79 10*6/MM3 (ref 4.14–5.8)
SODIUM SERPL-SCNC: 137 MMOL/L (ref 136–145)
TRIGL SERPL-MCNC: 248 MG/DL (ref 0–150)
VLDLC SERPL CALC-MCNC: 43 MG/DL (ref 5–40)
WBC # BLD AUTO: 6.24 10*3/MM3 (ref 3.4–10.8)

## 2025-07-10 ENCOUNTER — RESULTS FOLLOW-UP (OUTPATIENT)
Dept: FAMILY MEDICINE CLINIC | Facility: CLINIC | Age: 70
End: 2025-07-10
Payer: MEDICARE

## 2025-07-10 NOTE — PROGRESS NOTES
Labs are stable.  No major concerns.  Of note, your LDL cholesterol is slowly making its way downward.

## 2025-07-16 NOTE — PROGRESS NOTES
Subjective   Maximo Kwon is a 70 y.o. male.   Pt presents today with CC of Hypertension      History of Present Illness   History of Present Illness  Patient is a 70-year-old male here to follow-up on diabetes associated with neuropathy.  He takes metformin, and he takes gabapentin for neuropathy in his feet.  He is agreeable to UDS today.  He has no concerns with his current medications.       The following portions of the patient's history were reviewed and updated as appropriate: allergies, current medications, past family history, past medical history, past social history, past surgical history, and problem list.    Review of Systems   Constitutional:  Negative for chills, fever and unexpected weight loss.   HENT:  Negative for congestion and sore throat.    Eyes:  Negative for blurred vision and visual disturbance.   Respiratory:  Negative for cough and wheezing.    Cardiovascular:  Negative for chest pain and palpitations.   Gastrointestinal:  Negative for abdominal pain and diarrhea.   Endocrine: Negative for cold intolerance and heat intolerance.   Genitourinary:  Negative for dysuria.   Musculoskeletal:  Negative for arthralgias and neck stiffness.   Neurological:  Negative for dizziness, seizures and syncope.   Psychiatric/Behavioral:  Negative for self-injury, suicidal ideas and depressed mood.      Vitals:    07/08/25 1022   BP: 140/78   Pulse:    Temp:    SpO2:         Objective   Physical Exam  Vitals and nursing note reviewed.   Constitutional:       Appearance: He is well-developed.   HENT:      Head: Normocephalic and atraumatic.      Right Ear: External ear normal.      Left Ear: External ear normal.      Nose: Nose normal.   Eyes:      Conjunctiva/sclera: Conjunctivae normal.      Pupils: Pupils are equal, round, and reactive to light.   Cardiovascular:      Rate and Rhythm: Normal rate and regular rhythm.      Heart sounds: Normal heart sounds.   Pulmonary:      Effort: Pulmonary effort is  normal.      Breath sounds: Normal breath sounds.   Abdominal:      General: Bowel sounds are normal.      Palpations: Abdomen is soft.   Musculoskeletal:      Cervical back: Normal range of motion and neck supple.   Skin:     General: Skin is warm and dry.   Neurological:      Mental Status: He is alert and oriented to person, place, and time.   Psychiatric:         Behavior: Behavior normal.           Assessment & Plan   Diagnoses and all orders for this visit:    1. Neuropathy  -     gabapentin (NEURONTIN) 600 MG tablet; Take 1 tablet by mouth 3 (Three) Times a Day.  Dispense: 270 tablet; Refill: 0  He is doing well on his current dose of Neurontin.  Refill sent.  UDS updated.  Controlled substance agreement is in his chart.  2. Degeneration of intervertebral disc of lumbar region with discogenic back pain  -     gabapentin (NEURONTIN) 600 MG tablet; Take 1 tablet by mouth 3 (Three) Times a Day.  Dispense: 270 tablet; Refill: 0    3. Primary hypertension  -     lisinopril (PRINIVIL,ZESTRIL) 20 MG tablet; Take 1 tablet by mouth 2 (Two) Times a Day.  Dispense: 180 tablet; Refill: 0  -     chlorthalidone (HYGROTON) 25 MG tablet; Take 1 tablet by mouth Daily.  Dispense: 90 tablet; Refill: 0    4. Type 2 diabetes mellitus with diabetic polyneuropathy, without long-term current use of insulin  -     metFORMIN (GLUCOPHAGE) 1000 MG tablet; Take 1 tablet by mouth 2 (Two) Times a Day With Meals.  Dispense: 180 tablet; Refill: 0  -     Cancel: CBC No Differential; Future  -     Cancel: Comprehensive Metabolic Panel; Future  -     Cancel: Hemoglobin A1c; Future  -     Cancel: Lipid Panel; Future  Labs were not canceled.  [email or web address].  Will check to ensure that his hemoglobin A1c is at the goal of 7.0 or lower.  5. Centrilobular emphysema  -     Budeson-Glycopyrrol-Formoterol (Breztri Aerosphere) 160-9-4.8 MCG/ACT aerosol inhaler; Inhale 2 puffs 2 (Two) Times a Day.  Dispense: 3 each; Refill: 6  -      ipratropium-albuterol (DUO-NEB) 0.5-2.5 mg/3 ml nebulizer; Take 3 mL by nebulization 4 (Four) Times a Day As Needed for Wheezing or Shortness of Air.  Dispense: 360 mL; Refill: 8    6. Gastroesophageal reflux disease, unspecified whether esophagitis present  -     omeprazole (priLOSEC) 20 MG capsule; Take 1 capsule by mouth Daily.  Dispense: 90 capsule; Refill: 0    #7 encounter to establish care                         This document has been electronically signed by Campbell Ingram DO  July 16, 2025 13:44 EDT    Dictated Utilizing Dragon Dictation: Part of this note may be an electronic transcription/translation of spoken language to printed text using the Dragon Dictation System.

## 2025-07-25 ENCOUNTER — TELEPHONE (OUTPATIENT)
Dept: PULMONOLOGY | Facility: CLINIC | Age: 70
End: 2025-07-25
Payer: MEDICARE

## 2025-07-25 NOTE — TELEPHONE ENCOUNTER
Call patient with CT scan results.    Per personal review, all nodules have been stable since 2021.  This includes  -Image 87 -2 mm right nodule  -Image 96 -7.7 mm right nodule  - Image 98 -7.1 mm right nodule  - Image 114 -10 mm left nodule  - Image 114 -7.4 mm right nodule (report had mention 5.8 previously, but this was actually at a different angle, technically measured 7.4 previously)  - Image 127 -8.3 mm right nodule (measuring around 6 mm from 2018, then measured 8 mm from 2021 until present)  - Right apical scarring stable    Recommend considering next LDCT again in 1 year, closer to June 2026.  This can be ordered closer to that time.

## 2025-08-11 RX ORDER — CYCLOBENZAPRINE HCL 10 MG
TABLET ORAL
Qty: 60 TABLET | Refills: 11 | Status: SHIPPED | OUTPATIENT
Start: 2025-08-11

## 2025-08-24 DIAGNOSIS — E11.42 TYPE 2 DIABETES MELLITUS WITH DIABETIC POLYNEUROPATHY, WITHOUT LONG-TERM CURRENT USE OF INSULIN: ICD-10-CM

## 2025-08-24 DIAGNOSIS — I10 PRIMARY HYPERTENSION: ICD-10-CM

## 2025-08-25 RX ORDER — LISINOPRIL 20 MG/1
20 TABLET ORAL 2 TIMES DAILY
Qty: 180 TABLET | Refills: 3 | Status: SHIPPED | OUTPATIENT
Start: 2025-08-25

## (undated) DEVICE — BRUSH CYTO MULTI APPL

## (undated) DEVICE — Device

## (undated) DEVICE — ADAPT SWVL FIBROPTIC BRONCH

## (undated) DEVICE — DEV NDL ASP TRNSBRNCH EXCELON 19G 15MM 130CM

## (undated) DEVICE — SINGLE USE BIOPSY VALVE MAJ-210: Brand: SINGLE USE BIOPSY VALVE (STERILE)

## (undated) DEVICE — SYR LUER SLPTP 50ML

## (undated) DEVICE — GOWN,REINF,POLY,ECL,PP SLV,XL: Brand: MEDLINE

## (undated) DEVICE — BITEBLOCK 1P/U

## (undated) DEVICE — SINGLE USE SUCTION VALVE MAJ-209: Brand: SINGLE USE SUCTION VALVE (STERILE)

## (undated) DEVICE — FRCP BX RADJAW4 PULM WO NDL STD1.8X2 100

## (undated) DEVICE — TUBING, SUCTION, 3/16" X 6', STRAIGHT: Brand: MEDLINE

## (undated) DEVICE — TUBING, SUCTION, 1/4" X 20', STRAIGHT: Brand: MEDLINE INDUSTRIES, INC.